# Patient Record
Sex: MALE | Race: WHITE | Employment: OTHER | URBAN - NONMETROPOLITAN AREA
[De-identification: names, ages, dates, MRNs, and addresses within clinical notes are randomized per-mention and may not be internally consistent; named-entity substitution may affect disease eponyms.]

---

## 2017-07-14 ENCOUNTER — ALLIED HEALTH/NURSE VISIT (OUTPATIENT)
Dept: FAMILY MEDICINE | Facility: CLINIC | Age: 72
End: 2017-07-14
Payer: COMMERCIAL

## 2017-07-14 ENCOUNTER — OFFICE VISIT (OUTPATIENT)
Dept: FAMILY MEDICINE | Facility: CLINIC | Age: 72
End: 2017-07-14
Payer: COMMERCIAL

## 2017-07-14 DIAGNOSIS — T16.1XXA FOREIGN BODY IN RIGHT EAR, INITIAL ENCOUNTER: Primary | ICD-10-CM

## 2017-07-14 DIAGNOSIS — T16.9XXA EAR FOREIGN BODY: Primary | ICD-10-CM

## 2017-07-14 PROCEDURE — 99207 ZZC NO CHARGE NURSE ONLY: CPT

## 2017-07-14 PROCEDURE — 99213 OFFICE O/P EST LOW 20 MIN: CPT | Performed by: FAMILY MEDICINE

## 2017-07-14 NOTE — PROGRESS NOTES
SUBJECTIVE:                                                    Vel Espana is a 72 year old male who presents to clinic today for the following health issues:      Foreign Body:  Hearing aid ear piece left inside right ear, causing some discomfort, tried to remove it by himself but was unsuccessful.         Problem list and histories reviewed & adjusted, as indicated.  Additional history: as documented    Patient Active Problem List   Diagnosis     CARDIOVASCULAR SCREENING; LDL GOAL LESS THAN 160     HTN (hypertension)     No past surgical history on file.    Social History   Substance Use Topics     Smoking status: Never Smoker     Smokeless tobacco: Not on file     Alcohol use No     No family history on file.      Current Outpatient Prescriptions   Medication Sig Dispense Refill     terazosin (HYTRIN) 10 MG capsule Take 10 mg by mouth At Bedtime       aspirin 81 MG tablet Take 81 mg by mouth daily       spironolactone (ALDACTONE) 25 MG tablet Take 25 mg by mouth daily       cephALEXin (KEFLEX) 500 MG capsule Take 1 capsule (500 mg) by mouth 3 times daily 30 capsule 0     lisinopril (PRINIVIL,ZESTRIL) 40 MG tablet Take 40 mg by mouth daily.       amLODIPine (NORVASC) 10 MG tablet Take 0.5 tablets by mouth daily.       Glucosamine-Chondroit-Vit C-Mn (GLUCOSAMINE CHONDR 1500 COMPLX PO) Take 1 tablet by mouth daily.       Multiple Vitamin (MULTI-VITAMIN) per tablet Take 1 tablet by mouth daily.       No Known Allergies  No lab results found.   BP Readings from Last 3 Encounters:   08/10/15 126/86   07/17/15 118/78   06/30/15 102/80    Wt Readings from Last 3 Encounters:   06/30/15 281 lb (127.5 kg)   05/31/12 233 lb (105.7 kg)                  Labs reviewed in EPIC    Reviewed and updated as needed this visit by clinical staff       Reviewed and updated as needed this visit by Provider         ROS:  Constitutional, HEENT, cardiovascular, pulmonary, gi and gu systems are negative, except as otherwise  noted.    OBJECTIVE:   GENERAL: alert and no distress  EYES: Eyes grossly normal to inspection, PERRL and conjunctivae and sclerae normal  HENT: normal cephalic/atraumatic, right ear: hearing aid ear piece in external candal, left ear: normal: no effusions, no erythema, normal landmarks and oral mucous membranes moist  NEURO: Normal strength and tone, mentation intact and speech normal      ASSESSMENT/PLAN:         ICD-10-CM    1. Foreign body in right ear, initial encounter T16.1XXA REMOVE FB, EXT AUDITORY CANAL       Hearing aid ear piece removed with the help of alligator without any complication. Cerumen cleaned with the help of curette. Suggested to avoid using Q-tips. Patient will be following audiology. QUESTIONS ANSWERED.      Michael Medrano MD  [facility]

## 2017-07-14 NOTE — MR AVS SNAPSHOT
"              After Visit Summary   7/14/2017    Vel Espana    MRN: [id number]           Patient Information     Date Of Birth          1945        Visit Information        Provider Department      7/14/2017 10:00 AM Michael Medrano MD [facility]        Today's Diagnoses     Foreign body in right ear, initial encounter    -  1       Follow-ups after your visit        Your next 10 appointments already scheduled     Jul 14, 2017 10:00 AM CDT   SHORT with Michael Medrano MD   [facility] ([facility])    [address] [id number] [phone]              Who to contact     If you have questions or need follow up information about today's clinic visit or your schedule please contact [facility] directly at [phone].  Normal or non-critical lab and imaging results will be communicated to you by MyChart, letter or phone within 4 business days after the clinic has received the results. If you do not hear from us within 7 days, please contact the clinic through MyChart or phone. If you have a critical or abnormal lab result, we will notify you by phone as soon as possible.  Submit refill requests through Next[employer] or call your pharmacy and they will forward the refill request to us. Please allow 3 business days for your refill to be completed.          Additional Information About Your Visit        [employer] Information     NextVR lets you send messages to your doctor, view your test results, renew your prescriptions, schedule appointments and more. To sign up, go to [email or web address] . Click on \"Log in\" on the left side of the screen, which will take you to the Welcome page. Then click on \"Sign up Now\" on the right side of the page.     You will be asked to enter the access code listed below, as well as some personal information. Please follow the directions to create your username and password.     Your access code " is: [email or web address]  Expires: 10/12/2017  9:50 AM     Your access code will  in 90 days. If you need help or a new code, please call your [place] clinic or [phone].        Care EveryWhere ID     This is your Care EveryWhere ID. This could be used by other organizations to access your [place] medical records  [id number]         Blood Pressure from Last 3 Encounters:   08/10/15 126/86   07/17/15 118/78   06/30/15 102/80    Weight from Last 3 Encounters:   06/30/15 281 lb (127.5 kg)   12 233 lb (105.7 kg)              We Performed the Following     REMOVE FB, EXT AUDITORY CANAL        Primary Care Provider    None Specified       No primary provider on file.        Equal Access to Services     JENNY LINCOLN : Ramón Weinstein, waaxda luqadaha, qaybta kaalmada adecoleyaceline, shelby palma . So [facility] [phone].    ATENCIÓN: Si habla español, tiene a sheehan disposición servicios gratuitos de asistencia lingüística. Llame al [phone].    We comply with applicable federal civil rights laws and [place] laws. We do not discriminate on the basis of race, color, national origin, age, disability sex, sexual orientation or gender identity.            Thank you!     Thank you for choosing [facility]  for your care. Our goal is always to provide you with excellent care. Hearing back from our patients is one way we can continue to improve our services. Please take a few minutes to complete the written survey that you may receive in the mail after your visit with us. Thank you!             Your Updated Medication List - Protect others around you: Learn how to safely use, store and throw away your medicines at www.disposemymeds.org.          This list is accurate as of: 17  9:50 AM.  Always use your most recent med list.                   Brand Name Dispense Instructions for use Diagnosis    amLODIPine 10 MG tablet    NORVASC     Take 0.5 tablets by mouth  daily.        aspirin 81 MG tablet      Take 81 mg by mouth daily        cephALEXin 500 MG capsule    KEFLEX    30 capsule    Take 1 capsule (500 mg) by mouth 3 times daily    Cellulitis of right lower leg       GLUCOSAMINE CHONDR 1500 COMPLX PO      Take 1 tablet by mouth daily.        lisinopril 40 MG tablet    PRINIVIL/ZESTRIL     Take 40 mg by mouth daily.        Multi-vitamin Tabs tablet   Generic drug:  multivitamin, therapeutic with minerals      Take 1 tablet by mouth daily.        spironolactone 25 MG tablet    ALDACTONE     Take 25 mg by mouth daily        terazosin 10 MG capsule    HYTRIN     Take 10 mg by mouth At Bedtime

## 2017-07-14 NOTE — MR AVS SNAPSHOT
"              After Visit Summary   7/14/2017    Vel Espana    MRN: [id number]           Patient Information     Date Of Birth          1945        Visit Information        Provider Department      7/14/2017 9:30 AM [place] PI RN [facility]        Today's Diagnoses     Ear foreign body    -  1       Follow-ups after your visit        Your next 10 appointments already scheduled     Jul 14, 2017 10:00 AM CDT   SHORT [facility]rano [facility]   [facility]    [address] [id number] [phone]              Who to contact     If you have questions or need follow up information about today's clinic visit or your schedule please contact [facility] directly at [phone].  Normal or non-critical lab and imaging results will be communicated to you by MyChart, letter or phone within 4 business days after the clinic has received the results. If you do not hear from us within 7 days, please contact the clinic through MyChart or phone. If you have a critical or abnormal lab result, we will notify you by phone as soon as possible.  Submit refill requests through [employer] or call your pharmacy and they will forward the refill request to us. Please allow 3 business days for your refill to be completed.          Additional Information About Your Visit        [employer] Information     [employer] lets you send messages to your doctor, view your test results, renew your prescriptions, schedule appointments and more. To sign up, go to [email or web address] . Click on \"Log in\" on the left side of the screen, which will take you to the Welcome page. Then click on \"Sign up Now\" on the right side of the page.     You will be asked to enter the access code listed below, as well as some personal information. Please follow the directions to create your username and password.     Your access code is: [email or web address]  Expires: 10/12/2017  9:50 " AM     Your access code will  in 90 days. If you need help or a new code, please call your [place] clinic or [phone].        Care EveryWhere ID     This is your Care EveryWhere ID. This could be used by other organizations to access your [place] medical records  [id number]         Blood Pressure from Last 3 Encounters:   08/10/15 126/86   07/17/15 118/78   06/30/15 102/80    Weight from Last 3 Encounters:   06/30/15 281 lb (127.5 kg)   12 233 lb (105.7 kg)              Today, you had the following     No orders found for display       Primary Care Provider    None Specified       No primary provider on file.        Equal Access to Services     JENNY LINCOLN : Ramón Weinstein, yazmin martinez, viv wagner, shelby palma . So [facility] [phone].    ATENCIÓN: Si habla español, tiene a sheehan disposición servicios gratuitos de asistencia lingüística. Llame al [phone].    We comply with applicable federal civil rights laws and [place] laws. We do not discriminate on the basis of race, color, national origin, age, disability sex, sexual orientation or gender identity.            Thank you!     Thank you for choosing [facility]  for your care. Our goal is always to provide you with excellent care. Hearing back from our patients is one way we can continue to improve our services. Please take a few minutes to complete the written survey that you may receive in the mail after your visit with us. Thank you!             Your Updated Medication List - Protect others around you: Learn how to safely use, store and throw away your medicines at www.disposemymeds.org.          This list is accurate as of: 17  9:53 AM.  Always use your most recent med list.                   Brand Name Dispense Instructions for use Diagnosis    amLODIPine 10 MG tablet    NORVASC     Take 0.5 tablets by mouth daily.        aspirin 81 MG tablet      Take 81  mg by mouth daily        cephALEXin 500 MG capsule    KEFLEX    30 capsule    Take 1 capsule (500 mg) by mouth 3 times daily    Cellulitis of right lower leg       GLUCOSAMINE CHONDR 1500 COMPLX PO      Take 1 tablet by mouth daily.        lisinopril 40 MG tablet    PRINIVIL/ZESTRIL     Take 40 mg by mouth daily.        Multi-vitamin Tabs tablet   Generic drug:  multivitamin, therapeutic with minerals      Take 1 tablet by mouth daily.        spironolactone 25 MG tablet    ALDACTONE     Take 25 mg by mouth daily        terazosin 10 MG capsule    HYTRIN     Take 10 mg by mouth At Bedtime

## 2017-10-23 ENCOUNTER — OFFICE VISIT (OUTPATIENT)
Dept: FAMILY MEDICINE | Facility: CLINIC | Age: 72
End: 2017-10-23
Payer: COMMERCIAL

## 2017-10-23 VITALS
RESPIRATION RATE: 16 BRPM | DIASTOLIC BLOOD PRESSURE: 80 MMHG | WEIGHT: 283 LBS | OXYGEN SATURATION: 98 % | SYSTOLIC BLOOD PRESSURE: 120 MMHG | HEART RATE: 71 BPM | BODY MASS INDEX: 39.47 KG/M2

## 2017-10-23 DIAGNOSIS — M21.611 BUNION, RIGHT: ICD-10-CM

## 2017-10-23 DIAGNOSIS — M79.671 RIGHT FOOT PAIN: Primary | ICD-10-CM

## 2017-10-23 DIAGNOSIS — L84 CALLUS OF FOOT: ICD-10-CM

## 2017-10-23 PROCEDURE — 99213 OFFICE O/P EST LOW 20 MIN: CPT | Performed by: NURSE PRACTITIONER

## 2017-10-23 NOTE — PATIENT INSTRUCTIONS
The pain is from the callus from the bunion on right foot  May need to be debrided  Also need to reduced friction or pressure to this area  Keep foot elevated at bedtime- off the mattress  Corn cushion when wearing shoes  May need orthotics   ?xray     Treating Corns and Calluses  If your corns or calluses are mild, reducing friction may help. Different shoes, moleskin patches, or soft pads may be all the treatment you need. In more severe cases, treating tissue buildup may require your doctor s care. Sometimes custom-made shoe inserts (orthotics) or special pads are prescribed to reduce friction and pressure.    Change shoes  If you have corns, your doctor may suggest wearing shoes that have more toe room. This way, buckled joints are less likely to be pinched against the top of the shoe. If you have calluses, wearing a cushioned insole, arch support, or heel counter can help reduce friction.  Visit your doctor  In some cases, your doctor may trim away the outer layers of skin that make up the corn or callus. For a painful corn, medicine may be injected beneath the built-up tissue.  Wear orthotics  Orthotics are specially made to meet the needs of your feet. They cushion calluses or divert pressure away from these problem areas. Worn as directed, orthotics help limit existing problems and prevent new ones from forming.  If you need surgery  If a bone or joint is out of place, certain parts of your foot may be under too much pressure. This can cause severe corns and calluses. In such cases, surgery may be the best way to correct the problem.  Outpatient procedures  In most cases, surgery to improve bone position is an outpatient procedure. Your doctor may cut away excess bone, reposition prominent bones, or even fuse joints. Sometimes tendons or ligaments are cut to reduce tension on a bone or joint. Your doctor will talk with you about the procedure that is best suited to your needs.  Date Last Reviewed:  7/1/2016 2000-2017 [employer]. [address] [place] [address], [place] [address]. All rights reserved. This information is not intended as a substitute for professional medical care. Always follow your healthcare professional's instructions.        Bunion    You have a bunion. This is a bony bump at the base of your big toe, along the inside edge of your foot. As the bump gets bigger, it can become red, swollen, and painful with shoe wear.  Bunions may occur if you wear shoes that are too tight and pinch your toes together. High heels may make this worse. In some cases a bunion is due to poor alignment of the foot and ankle. This puts extra weight on the instep of each foot.  Once a bunion forms, it changes the way weight is spread all across your foot. This causes the bunion to get worse over time. The big toe will bend more and more toward the other toes.  A minor bunion can be treated by:    Wearing properly fitting shoes    Using bunion pads    Wearing shoe inserts, called orthotics, to better align the foot and ankle  Physical therapy with ultrasound or whirlpool baths can ease pain, redness, and swelling. Severe cases may require surgery. If you don t treat what is causing the bunion, it may get larger and more painful.  Home care    Limit high heels. These shoes force your foot forward, crowding the toes together.    Switch to comfortable shoes with a wide toe area. Or have your existing shoes stretched by a shoe repair shop.    Avoid shoes that are tight, narrow, or pointed.    If you are flat-footed, using arch supports may help prevent further deformity. The best shoe inserts are the ones custom made by a foot specialist, called a podiatrist, or other healthcare provider.    Put a bunion pad over the bunion to ease pressure of your shoe against the bunion. You can buy these pads at most pharmacies without a prescription    To reduce pain and swelling, apply an ice pack over the injured area for  15 to 20 minutes. Do this every 1 to 2 hours the first day. Keep using ice 3 to 4 times a day until the pain and swelling goes away.    To make an ice pack, put ice cubes in a sealed zip-lock plastic bag. Wrap the bag in a clean, thin towel or cloth. Never put ice or an ice pack directly on the skin.    You may use over-the-counter pain medicine to control pain, unless another medicine was prescribed. Talk with your provider before using these medicines if you have chronic liver or kidney disease, or ever had a stomach ulcer or GI (gastrointestinal) bleeding.  Follow-up care  Follow up with a podiatrist or foot doctor, or as advised.  If X-rays were taken, you will be notified of any new findings that may affect your care.  When to seek medical care  Contact your healthcare provider if any of the following occur:    Increasing pain or redness around the base of the big toe    Painful ingrown toenail, with redness and swelling or pus around the nail  Date Last Reviewed: 11/21/2015 2000-2017 [employer]. [address] [place] [address], [place] [address]. All rights reserved. This information is not intended as a substitute for professional medical care. Always follow your healthcare professional's instructions.

## 2017-10-23 NOTE — NURSING NOTE
"Chief Complaint   Patient presents with     Musculoskeletal Problem     Right Foot, big toe       Initial /80  Pulse 71  Resp 16  Wt 283 lb (128.4 kg)  SpO2 98%  BMI 39.47 kg/m2 Estimated body mass index is 39.47 kg/(m^2) as calculated from the following:    Height as of 6/30/15: 5' 11\" (1.803 m).    Weight as of this encounter: 283 lb (128.4 kg).  Medication Reconciliation: complete    Health Maintenance that is potentially due pending provider review:  [facility]        Is there anyone who you would like to be able to receive your results? No  If yes have patient fill out KAVITA      "

## 2017-10-23 NOTE — PROGRESS NOTES
SUBJECTIVE:   Vel Espana is a 72 year old male who presents to clinic today for the following health issues:      Musculoskeletal problem/pain      Duration: 1 year worse in the past few days     Description  Location: Right Great Toe    Intensity:  moderate    Accompanying signs and symptoms: Red, Swollen, Feels sharp pain    History  Previous similar problem: no   Previous evaluation:  none    Precipitating or alleviating factors:  Trauma or overuse: no   Aggravating factors include: just bothersome all the time    Therapies tried and outcome: Tylenol, Cream        Problem list and histories reviewed & adjusted, as indicated.  Additional history: as documented    Patient Active Problem List   Diagnosis     CARDIOVASCULAR SCREENING; LDL GOAL LESS THAN 160     HTN (hypertension)     History reviewed. No pertinent surgical history.    Social History   Substance Use Topics     Smoking status: Never Smoker     Smokeless tobacco: Never Used     Alcohol use No     History reviewed. No pertinent family history.          Reviewed and updated as needed this visit by clinical staff     Reviewed and updated as needed this visit by Provider         ROS:  Constitutional, HEENT, cardiovascular, pulmonary, gi and gu systems are negative, except as otherwise noted.      OBJECTIVE:                                                    /80  Pulse 71  Resp 16  Wt 283 lb (128.4 kg)  SpO2 98%  BMI 39.47 kg/m2  Body mass index is 39.47 kg/(m^2).  GENERAL APPEARANCE: healthy, alert and no distress  ORTHO: Foot Exam: Inspection:  no swelling bunion noted   There is an area of hyperkeratoses with blackened center- this area is tender        Diagnostic test results:  Diagnostic Test Results:  none        ASSESSMENT/PLAN:                                                      1. Right foot pain    2. Callus of foot    3. Bunion, right        Patient Instructions     The pain is from the callus from the bunion on right foot  May  need to be debrided  Also need to reduced friction or pressure to this area  Keep foot elevated at bedtime- off the mattress  Corn cushion when wearing shoes  May need orthotics   ?xray     Treating Corns and Calluses  If your corns or calluses are mild, reducing friction may help. Different shoes, moleskin patches, or soft pads may be all the treatment you need. In more severe cases, treating tissue buildup may require your doctor s care. Sometimes custom-made shoe inserts (orthotics) or special pads are prescribed to reduce friction and pressure.    Change shoes  If you have corns, your doctor may suggest wearing shoes that have more toe room. This way, buckled joints are less likely to be pinched against the top of the shoe. If you have calluses, wearing a cushioned insole, arch support, or heel counter can help reduce friction.  Visit your doctor  In some cases, your doctor may trim away the outer layers of skin that make up the corn or callus. For a painful corn, medicine may be injected beneath the built-up tissue.  Wear orthotics  Orthotics are specially made to meet the needs of your feet. They cushion calluses or divert pressure away from these problem areas. Worn as directed, orthotics help limit existing problems and prevent new ones from forming.  If you need surgery  If a bone or joint is out of place, certain parts of your foot may be under too much pressure. This can cause severe corns and calluses. In such cases, surgery may be the best way to correct the problem.  Outpatient procedures  In most cases, surgery to improve bone position is an outpatient procedure. Your doctor may cut away excess bone, reposition prominent bones, or even fuse joints. Sometimes tendons or ligaments are cut to reduce tension on a bone or joint. Your doctor will talk with you about the procedure that is best suited to your needs.  Date Last Reviewed: 7/1/2016 2000-2017 [employer]. [address] [facility],  [place] [address]. All rights reserved. This information is not intended as a substitute for professional medical care. Always follow your healthcare professional's instructions.        Bunion    You have a bunion. This is a bony bump at the base of your big toe, along the inside edge of your foot. As the bump gets bigger, it can become red, swollen, and painful with shoe wear.  Bunions may occur if you wear shoes that are too tight and pinch your toes together. High heels may make this worse. In some cases a bunion is due to poor alignment of the foot and ankle. This puts extra weight on the instep of each foot.  Once a bunion forms, it changes the way weight is spread all across your foot. This causes the bunion to get worse over time. The big toe will bend more and more toward the other toes.  A minor bunion can be treated by:    Wearing properly fitting shoes    Using bunion pads    Wearing shoe inserts, called orthotics, to better align the foot and ankle  Physical therapy with ultrasound or whirlpool baths can ease pain, redness, and swelling. Severe cases may require surgery. If you don t treat what is causing the bunion, it may get larger and more painful.  Home care    Limit high heels. These shoes force your foot forward, crowding the toes together.    Switch to comfortable shoes with a wide toe area. Or have your existing shoes stretched by a shoe repair shop.    Avoid shoes that are tight, narrow, or pointed.    If you are flat-footed, using arch supports may help prevent further deformity. The best shoe inserts are the ones custom made by a foot specialist, called a podiatrist, or other healthcare provider.    Put a bunion pad over the bunion to ease pressure of your shoe against the bunion. You can buy these pads at most pharmacies without a prescription    To reduce pain and swelling, apply an ice pack over the injured area for 15 to 20 minutes. Do this every 1 to 2 hours the first day. Keep using ice  3 to 4 times a day until the pain and swelling goes away.    To make an ice pack, put ice cubes in a sealed zip-lock plastic bag. Wrap the bag in a clean, thin towel or cloth. Never put ice or an ice pack directly on the skin.    You may use over-the-counter pain medicine to control pain, unless another medicine was prescribed. Talk with your provider before using these medicines if you have chronic liver or kidney disease, or ever had a stomach ulcer or GI (gastrointestinal) bleeding.  Follow-up care  Follow up with a podiatrist or foot doctor, or as advised.  If X-rays were taken, you will be notified of any new findings that may affect your care.  When to seek medical care  Contact your healthcare provider if any of the following occur:    Increasing pain or redness around the base of the big toe    Painful ingrown toenail, with redness and swelling or pus around the nail  Date Last Reviewed: 11/21/2015 2000-2017 The [employer]. [address] [place] [address], [place] [address]. All rights reserved. This information is not intended as a substitute for professional medical care. Always follow your healthcare professional's instructions.            Ani Barboza NP  [facility]

## 2017-10-23 NOTE — MR AVS SNAPSHOT
After Visit Summary   10/23/2017    Vel Espana    MRN: [id number]           Patient Information     Date Of Birth          1945        Visit Information        Provider Department      10/23/2017 11:20 AM Ani Barboza NP [facility]        Care Instructions      The pain is from the callus from the bunion on right foot  May need to be debrided  Also need to reduced friction or pressure to this area  Keep foot elevated at bedtime- off the mattress  Corn cushion when wearing shoes  May need orthotics   ?xray     Treating Corns and Calluses  If your corns or calluses are mild, reducing friction may help. Different shoes, moleskin patches, or soft pads may be all the treatment you need. In more severe cases, treating tissue buildup may require your doctor s care. Sometimes custom-made shoe inserts (orthotics) or special pads are prescribed to reduce friction and pressure.    Change shoes  If you have corns, your doctor may suggest wearing shoes that have more toe room. This way, buckled joints are less likely to be pinched against the top of the shoe. If you have calluses, wearing a cushioned insole, arch support, or heel counter can help reduce friction.  Visit your doctor  In some cases, your doctor may trim away the outer layers of skin that make up the corn or callus. For a painful corn, medicine may be injected beneath the built-up tissue.  Wear orthotics  Orthotics are specially made to meet the needs of your feet. They cushion calluses or divert pressure away from these problem areas. Worn as directed, orthotics help limit existing problems and prevent new ones from forming.  If you need surgery  If a bone or joint is out of place, certain parts of your foot may be under too much pressure. This can cause severe corns and calluses. In such cases, surgery may be the best way to correct the problem.  Outpatient procedures  In most cases, surgery to improve bone position  is an outpatient procedure. Your doctor may cut away excess bone, reposition prominent bones, or even fuse joints. Sometimes tendons or ligaments are cut to reduce tension on a bone or joint. Your doctor will talk with you about the procedure that is best suited to your needs.  Date Last Reviewed: 7/1/2016 2000-2017 [employer]. [address] [place] [address], [place] [address]. All rights reserved. This information is not intended as a substitute for professional medical care. Always follow your healthcare professional's instructions.        Bunion    You have a bunion. This is a bony bump at the base of your big toe, along the inside edge of your foot. As the bump gets bigger, it can become red, swollen, and painful with shoe wear.  Bunions may occur if you wear shoes that are too tight and pinch your toes together. High heels may make this worse. In some cases a bunion is due to poor alignment of the foot and ankle. This puts extra weight on the instep of each foot.  Once a bunion forms, it changes the way weight is spread all across your foot. This causes the bunion to get worse over time. The big toe will bend more and more toward the other toes.  A minor bunion can be treated by:    Wearing properly fitting shoes    Using bunion pads    Wearing shoe inserts, called orthotics, to better align the foot and ankle  Physical therapy with ultrasound or whirlpool baths can ease pain, redness, and swelling. Severe cases may require surgery. If you don t treat what is causing the bunion, it may get larger and more painful.  Home care    Limit high heels. These shoes force your foot forward, crowding the toes together.    Switch to comfortable shoes with a wide toe area. Or have your existing shoes stretched by a shoe repair shop.    Avoid shoes that are tight, narrow, or pointed.    If you are flat-footed, using arch supports may help prevent further deformity. The best shoe inserts are the ones custom made by  a foot specialist, called a podiatrist, or other healthcare provider.    Put a bunion pad over the bunion to ease pressure of your shoe against the bunion. You can buy these pads at most pharmacies without a prescription    To reduce pain and swelling, apply an ice pack over the injured area for 15 to 20 minutes. Do this every 1 to 2 hours the first day. Keep using ice 3 to 4 times a day until the pain and swelling goes away.    To make an ice pack, put ice cubes in a sealed zip-lock plastic bag. Wrap the bag in a clean, thin towel or cloth. Never put ice or an ice pack directly on the skin.    You may use over-the-counter pain medicine to control pain, unless another medicine was prescribed. Talk with your provider before using these medicines if you have chronic liver or kidney disease, or ever had a stomach ulcer or GI (gastrointestinal) bleeding.  Follow-up care  Follow up with a podiatrist or foot doctor, or as advised.  If X-rays were taken, you will be notified of any new findings that may affect your care.  When to seek medical care  Contact your healthcare provider if any of the following occur:    Increasing pain or redness around the base of the big toe    Painful ingrown toenail, with redness and swelling or pus around the nail  Date Last Reviewed: 11/21/2015 2000-2017 [employer]. [address] [place] [address]. All rights reserved. This information is not intended as a substitute for professional medical care. Always follow your healthcare professional's instructions.                Follow-ups after your visit        Your next 10 appointments already scheduled     Nov 01, 2017  2:20 PM CDT   New Visit with Russell [facility] DPM   [facility]    [address] [place] [id number] [phone]              Who to contact     If you have questions or need follow up information about today's clinic visit or your schedule please  "contact [facility] directly at [phone].  Normal or non-critical lab and imaging results will be communicated to you by MyChart, letter or phone within 4 business days after the clinic has received the results. If you do not hear from us within 7 days, please contact the clinic through MyChart or phone. If you have a critical or abnormal lab result, we will notify you by phone as soon as possible.  Submit refill requests through [employer] or call your pharmacy and they will forward the refill request to us. Please allow 3 business days for your refill to be completed.          Additional Information About Your Visit        [employer] Information     [employer] lets you send messages to your doctor, view your test results, renew your prescriptions, schedule appointments and more. To sign up, go to [email or web address] . Click on \"Log in\" on the left side of the screen, which will take you to the Welcome page. Then click on \"Sign up Now\" on the right side of the page.     You will be asked to enter the access code listed below, as well as some personal information. Please follow the directions to create your username and password.     Your access code is: [email or web address]  Expires: 2018 12:01 PM     Your access code will  in 90 days. If you need help or a new code, please call your [facility] clinic or [phone].        Care EveryWhere ID     This is your Care EveryWhere ID. This could be used by other organizations to access your [facility] medical records  [id number]        Your Vitals Were     Pulse Respirations Pulse Oximetry BMI (Body Mass Index)          71 16 98% 39.47 kg/m2         Blood Pressure from Last 3 Encounters:   10/23/17 120/80   08/10/15 126/86   07/17/15 118/78    Weight from Last 3 Encounters:   10/23/17 283 lb (128.4 kg)   06/30/15 281 lb (127.5 kg)   12 233 lb (105.7 kg)              Today, you had the following     No orders found for display       Primary Care " Provider Office Phone # Fax #    Ani Barboza [phone]       [address] [facility] [address]        Equal Access to Services     JENNY LINCOLN : Hadii aad ku hadjennifero Sobullali, waaxda luqadaha, qaybta kaalmada adeharlan, shelby beltranluke barnhartcole momin minerva diaz. So [facility] [phone].    ATENCIÓN: Si habla español, tiene a sheehan disposición servicios gratuitos de asistencia lingüística. Llame al [phone].    We comply with applicable federal civil rights laws and [place] laws. We do not discriminate on the basis of race, color, national origin, age, disability, sex, sexual orientation, or gender identity.            Thank you!     Thank you for choosing [facility]  for your care. Our goal is always to provide you with excellent care. Hearing back from our patients is one way we can continue to improve our services. Please take a few minutes to complete the written survey that you may receive in the mail after your visit with us. Thank you!             Your Updated Medication List - Protect others around you: Learn how to safely use, store and throw away your medicines at www.disposemymeds.org.          This list is accurate as of: 10/23/17 12:01 PM.  Always use your most recent med list.                   Brand Name Dispense Instructions for use Diagnosis    aspirin 81 MG tablet      Take 81 mg by mouth daily        Multi-vitamin Tabs tablet   Generic drug:  multivitamin, therapeutic with minerals      Take 1 tablet by mouth daily.        spironolactone 25 MG tablet    ALDACTONE     Take 25 mg by mouth daily        terazosin 10 MG capsule    HYTRIN     Take 10 mg by mouth At Bedtime

## 2019-04-16 ENCOUNTER — OFFICE VISIT (OUTPATIENT)
Dept: FAMILY MEDICINE | Facility: CLINIC | Age: 74
End: 2019-04-16
Payer: COMMERCIAL

## 2019-04-16 VITALS
DIASTOLIC BLOOD PRESSURE: 80 MMHG | HEIGHT: 71 IN | WEIGHT: 275 LBS | RESPIRATION RATE: 18 BRPM | HEART RATE: 68 BPM | TEMPERATURE: 97.8 F | SYSTOLIC BLOOD PRESSURE: 106 MMHG | BODY MASS INDEX: 38.5 KG/M2

## 2019-04-16 DIAGNOSIS — L30.9 ECZEMA, UNSPECIFIED TYPE: Primary | ICD-10-CM

## 2019-04-16 PROCEDURE — 99213 OFFICE O/P EST LOW 20 MIN: CPT | Performed by: FAMILY MEDICINE

## 2019-04-16 RX ORDER — CLOBETASOL PROPIONATE 0.5 MG/G
OINTMENT TOPICAL 2 TIMES DAILY
Qty: 45 G | Refills: 1 | Status: SHIPPED | OUTPATIENT
Start: 2019-04-16 | End: 2019-12-16

## 2019-04-16 RX ORDER — ATENOLOL 25 MG/1
25 TABLET ORAL DAILY
Status: ON HOLD | COMMUNITY
End: 2020-03-05

## 2019-04-16 RX ORDER — CLOBETASOL PROPIONATE 0.5 MG/G
OINTMENT TOPICAL 2 TIMES DAILY
Qty: 45 G | Refills: 1 | Status: SHIPPED | OUTPATIENT
Start: 2019-04-16 | End: 2019-04-16

## 2019-04-16 ASSESSMENT — MIFFLIN-ST. JEOR: SCORE: 1999.52

## 2019-04-16 NOTE — PROGRESS NOTES
SUBJECTIVE:   Vel Espana is a 74 year old male who presents to clinic today for the following   health issues:      Rash      Duration: about a year- temi faded away and now has come back bad-     Description  Location: left leg - and starting up in upper leg fold  Itching: mild    Intensity:  moderate    Accompanying signs and symptoms: None    History (similar episodes/previous evaluation): recurrent    Precipitating or alleviating factors:  New exposures:  None  Recent travel: no      Therapies tried and outcome: hydrocortisone cream -  not effective        Additional history: as documented    Reviewed  and updated as needed this visit by clinical staff         Reviewed and updated as needed this visit by Provider         Patient Active Problem List   Diagnosis     CARDIOVASCULAR SCREENING; LDL GOAL LESS THAN 160     HTN (hypertension)     No past surgical history on file.    Social History     Tobacco Use     Smoking status: Never Smoker     Smokeless tobacco: Never Used   Substance Use Topics     Alcohol use: No     No family history on file.      Current Outpatient Medications   Medication Sig Dispense Refill     aspirin 81 MG tablet Take 81 mg by mouth daily       atenolol (TENORMIN) 25 MG tablet Take 25 mg by mouth daily       Multiple Vitamin (MULTI-VITAMIN) per tablet Take 1 tablet by mouth daily.       spironolactone (ALDACTONE) 25 MG tablet Take 25 mg by mouth daily       No Known Allergies  No lab results found.   BP Readings from Last 3 Encounters:   04/16/19 106/80   10/23/17 120/80   08/10/15 126/86    Wt Readings from Last 3 Encounters:   04/16/19 124.7 kg (275 lb)   10/23/17 128.4 kg (283 lb)   06/30/15 127.5 kg (281 lb)                  Labs reviewed in EPIC    ROS:  Constitutional, HEENT, cardiovascular, pulmonary, gi and gu systems are negative, except as otherwise noted.    OBJECTIVE:     /80 (Cuff Size: Adult Large)   Pulse 68   Temp 97.8  F (36.6  C) (Tympanic)   Resp 18    "Ht 1.803 m (5' 11\")   Wt 124.7 kg (275 lb)   BMI 38.35 kg/m    Body mass index is 38.35 kg/m .  GENERAL: alert and no distress  NECK: no adenopathy, no asymmetry, masses, or scars and thyroid normal to palpation  RESP: lungs clear to auscultation - no rales, rhonchi or wheezes  CV: regular rates and rhythm, normal S1 S2, no S3 or S4 and no murmur, click or rub  SKIN: dry erythematous rashes involving bilateral inner thigh and left lower leg as shown below, no blistering or discharge noted    Left lower leg      Right inner thigh          ASSESSMENT/PLAN:       (L30.9) Eczema, unspecified type  (primary encounter diagnosis)  Comment: Suspect rash is due to eczema.  Differential discussed in detail including but not limited to infection, vasculitis and allergy.  Clobetasol ointment prescribed and suggested to use regular moisturizer.  Follow-up if present persist or worsen, will consider skin biopsy.  Patient understood and in agreement with above plan.  All questions answered.  Plan: clobetasol (TEMOVATE) 0.05 % external ointment             Patient Instructions       Patient Education     Managing Atopic Dermatitis (Eczema)     After bathing, gently pat your skin dry (don t rub). Apply moisturizer while your skin is still damp.   To manage your symptoms and help reduce the severity and frequency, try these self-care tips:  Caring for your skin    Use a gentle, fragrance-free cleanser (or nonsoap cleanser) for bathing. Rinse well. Pat skin dry.    Take warm, not hot, baths or showers. Try to limit them to no more that 10 to 15 minutes.     Use moisturizer liberally right after you bathe, while your skin is still damp.    Avoid scratching because it will cause more damage to your skin.     Topical, over-the-counter hydrocortisone cream may help control mild symptoms.   Controlling your environment    Avoid extreme heat or cold.    Avoid very humid or very dry air.    If your home or office air is very dry, use a " humidifier.    Avoid allergens, such as dust, that may be present in bedding, carpets, plush toys, or rugs.    Know that pet hair and dander can cause flare-ups.  Seeking medical treatment  Another way to keep symptoms under control is to seek medical treatment. Talk with your healthcare provider about the type of treatment that may work best for you. Your provider may prescribe treatments such as the following:    Topical treatments to put on the skin daily    Medicines taken by mouth (oral medicines), such as antihistamines, antibiotics, or corticosteroids    In severe cases shots (injections) may be needed to control the symptoms. You may even need antibiotics if skin infections occur.  Treatments don t work the same way for every person. So if your symptoms continue or get worse, ask your healthcare provider about other treatments.  Making lifestyle choices    Manage the stress in your life.    Wear loose-fitting cotton clothing that does not bind or rub your skin.    Avoid contact with wool or other scratchy fabrics.    Use fragrance-free products.  Getting good results  Now that you know more about atopic dermatitis, the next step is up to you. Follow your healthcare provider s treatment plan and your self-care routine. This will help bring atopic dermatitis under control. If your symptoms persist, be sure to let your health care provider know.   Date Last Reviewed: 2/1/2017 2000-2018 [employer]. [address] [place] [address], [place] [address]. All rights reserved. This information is not intended as a substitute for professional medical care. Always follow your healthcare professional's instructions.               Michael Medrano MD  [facility]

## 2019-04-16 NOTE — PATIENT INSTRUCTIONS
Patient Education     Managing Atopic Dermatitis (Eczema)     After bathing, gently pat your skin dry (don t rub). Apply moisturizer while your skin is still damp.   To manage your symptoms and help reduce the severity and frequency, try these self-care tips:  Caring for your skin    Use a gentle, fragrance-free cleanser (or nonsoap cleanser) for bathing. Rinse well. Pat skin dry.    Take warm, not hot, baths or showers. Try to limit them to no more that 10 to 15 minutes.     Use moisturizer liberally right after you bathe, while your skin is still damp.    Avoid scratching because it will cause more damage to your skin.     Topical, over-the-counter hydrocortisone cream may help control mild symptoms.   Controlling your environment    Avoid extreme heat or cold.    Avoid very humid or very dry air.    If your home or office air is very dry, use a humidifier.    Avoid allergens, such as dust, that may be present in bedding, carpets, plush toys, or rugs.    Know that pet hair and dander can cause flare-ups.  Seeking medical treatment  Another way to keep symptoms under control is to seek medical treatment. Talk with your healthcare provider about the type of treatment that may work best for you. Your provider may prescribe treatments such as the following:    Topical treatments to put on the skin daily    Medicines taken by mouth (oral medicines), such as antihistamines, antibiotics, or corticosteroids    In severe cases shots (injections) may be needed to control the symptoms. You may even need antibiotics if skin infections occur.  Treatments don t work the same way for every person. So if your symptoms continue or get worse, ask your healthcare provider about other treatments.  Making lifestyle choices    Manage the stress in your life.    Wear loose-fitting cotton clothing that does not bind or rub your skin.    Avoid contact with wool or other scratchy fabrics.    Use fragrance-free products.  Getting good  results  Now that you know more about atopic dermatitis, the next step is up to you. Follow your healthcare provider s treatment plan and your self-care routine. This will help bring atopic dermatitis under control. If your symptoms persist, be sure to let your health care provider know.   Date Last Reviewed: 2/1/2017 2000-2018 [employer]. [address] [place] [address], [place] [address]. All rights reserved. This information is not intended as a substitute for professional medical care. Always follow your healthcare professional's instructions.

## 2019-04-16 NOTE — NURSING NOTE
"Chief Complaint   Patient presents with     Derm Problem       Initial /80 (Cuff Size: Adult Large)   Pulse 68   Temp 97.8  F (36.6  C) (Tympanic)   Resp 18   Ht 1.803 m (5' 11\")   Wt 124.7 kg (275 lb)   BMI 38.35 kg/m   Estimated body mass index is 38.35 kg/m  as calculated from the following:    Height as of this encounter: 1.803 m (5' 11\").    Weight as of this encounter: 124.7 kg (275 lb).    Patient presents to the clinic using No DME    Health Maintenance that is potentially due pending provider review:  [facility]    Pt reported. Sent to abstracting.    Is there anyone who you would like to be able to receive your results? Not Applicable  If yes have patient fill out KAVITA      "

## 2019-12-16 ENCOUNTER — OFFICE VISIT (OUTPATIENT)
Dept: FAMILY MEDICINE | Facility: CLINIC | Age: 74
End: 2019-12-16
Payer: COMMERCIAL

## 2019-12-16 ENCOUNTER — ANCILLARY PROCEDURE (OUTPATIENT)
Dept: GENERAL RADIOLOGY | Facility: CLINIC | Age: 74
End: 2019-12-16
Attending: FAMILY MEDICINE
Payer: COMMERCIAL

## 2019-12-16 VITALS
HEIGHT: 71 IN | BODY MASS INDEX: 39.62 KG/M2 | WEIGHT: 283 LBS | HEART RATE: 100 BPM | TEMPERATURE: 97.8 F | SYSTOLIC BLOOD PRESSURE: 132 MMHG | DIASTOLIC BLOOD PRESSURE: 84 MMHG | OXYGEN SATURATION: 94 % | RESPIRATION RATE: 20 BRPM

## 2019-12-16 DIAGNOSIS — J20.9 ACUTE BRONCHITIS WITH SYMPTOMS > 10 DAYS: Primary | ICD-10-CM

## 2019-12-16 DIAGNOSIS — R06.2 WHEEZING: ICD-10-CM

## 2019-12-16 DIAGNOSIS — R05.9 COUGH: ICD-10-CM

## 2019-12-16 PROCEDURE — 71046 X-RAY EXAM CHEST 2 VIEWS: CPT | Mod: FY

## 2019-12-16 PROCEDURE — 99214 OFFICE O/P EST MOD 30 MIN: CPT | Performed by: FAMILY MEDICINE

## 2019-12-16 RX ORDER — AZITHROMYCIN 250 MG/1
TABLET, FILM COATED ORAL
Qty: 6 TABLET | Refills: 0 | Status: SHIPPED | OUTPATIENT
Start: 2019-12-16

## 2019-12-16 RX ORDER — PREDNISONE 20 MG/1
40 TABLET ORAL DAILY
Qty: 14 TABLET | Refills: 0 | Status: ON HOLD | OUTPATIENT
Start: 2019-12-16 | End: 2020-03-05

## 2019-12-16 RX ORDER — BENZONATATE 100 MG/1
100 CAPSULE ORAL 3 TIMES DAILY PRN
Qty: 42 CAPSULE | Refills: 0 | Status: ON HOLD | OUTPATIENT
Start: 2019-12-16 | End: 2020-03-05

## 2019-12-16 ASSESSMENT — MIFFLIN-ST. JEOR: SCORE: 2035.81

## 2019-12-16 NOTE — PATIENT INSTRUCTIONS

## 2019-12-16 NOTE — PROGRESS NOTES
SUBJECTIVE   Vel Espana is a 74 year old male who presents with     RESPIRATORY SYMPTOMS      Duration: about 2 weeks- really bad for about 3-4 days     Description  cough, wheezing and fatigue/malaise. Can't sleep at night from coughing.     Severity: moderate    Accompanying signs and symptoms: Chest tightness from coughing. Lower abdomen gets really tight when coughing and sneezing     History (predisposing factors):  none    Precipitating or alleviating factors: None    Therapies tried and outcome:  rest and fluids, cough drops, diphenhydramine, Claritin, tylenol        PCP   Michael Medrano [phone]    Health Maintenance        Health Maintenance Due   Topic Date Due     ADVANCE CARE PLANNING  1945     COLONOSCOPY  04/11/1953     DTAP/TDAP/TD IMMUNIZATION (1 - Tdap) 04/11/1954     LIPID  04/11/1978     ZOSTER IMMUNIZATION (1 of 2) 04/11/1993     MEDICARE ANNUAL WELLNESS VISIT  04/11/2008     PNEUMOCOCCAL IMMUNIZATION 65+ LOW/MEDIUM RISK (1 of 2 - PCV13) 04/11/2008     INFLUENZA VACCINE (1) 09/01/2019       HPI        Patient Active Problem List   Diagnosis     CARDIOVASCULAR SCREENING; LDL GOAL LESS THAN 160     HTN (hypertension)     Current Outpatient Medications   Medication     aspirin 81 MG tablet     atenolol (TENORMIN) 25 MG tablet     No current facility-administered medications for this visit.        Patient Active Problem List   Diagnosis     CARDIOVASCULAR SCREENING; LDL GOAL LESS THAN 160     HTN (hypertension)     No past surgical history on file.    Social History     Tobacco Use     Smoking status: Never Smoker     Smokeless tobacco: Never Used   Substance Use Topics     Alcohol use: No     No family history on file.      Current Outpatient Medications   Medication Sig Dispense Refill     aspirin 81 MG tablet Take 81 mg by mouth every other day        atenolol (TENORMIN) 25 MG tablet Take 25 mg by mouth daily       azithromycin (ZITHROMAX) 250 MG tablet Two tablets first day,  "then one tablet daily for four days. 6 tablet 0     benzonatate (TESSALON) 100 MG capsule Take 1 capsule (100 mg) by mouth 3 times daily as needed 42 capsule 0     predniSONE (DELTASONE) 20 MG tablet Take 2 tablets (40 mg) by mouth daily for 7 days 14 tablet 0     No Known Allergies  No lab results found.   BP Readings from Last 3 Encounters:   12/16/19 132/84   04/16/19 106/80   10/23/17 120/80    Wt Readings from Last 3 Encounters:   12/16/19 128.4 kg (283 lb)   04/16/19 124.7 kg (275 lb)   10/23/17 128.4 kg (283 lb)                    Reviewed and updated:  Tobacco  Allergies  Meds  Med Hx  Surg Hx  Fam Hx  Soc Hx     ROS:  Constitutional, neuro, ENT, endocrine, pulmonary, cardiac, gastrointestinal, genitourinary, musculoskeletal, integument and psychiatric systems are negative, except as otherwise noted.    PHYSICAL EXAM   /84 (Cuff Size: Adult Regular)   Pulse 100   Temp 97.8  F (36.6  C) (Tympanic)   Resp 20   Ht 1.803 m (5' 11\")   Wt 128.4 kg (283 lb)   SpO2 94%   BMI 39.47 kg/m    Body mass index is 39.47 kg/m .  GENERAL: alert and no distress  EYES: Eyes grossly normal to inspection, PERRL and conjunctivae and sclerae normal  HENT: normal cephalic/atraumatic, ear canals and TM's normal, nose and mouth without ulcers or lesions, oropharynx clear and oral mucous membranes moist  NECK: no adenopathy, no asymmetry, masses, or scars and thyroid normal to palpation  RESP: few bibasilar crackles with some wheeze, auscultated, good air entry bilaterally  CV: regular rates and rhythm, normal S1 S2, no S3 or S4 and no murmur, click or rub  ABDOMEN: soft, nontender, no hepatosplenomegaly, no masses and bowel sounds normal  MS: no gross musculoskeletal defects noted, no edema, no calf tenderness  SKIN: no suspicious lesions or rashes  NEURO: Normal strength and tone, mentation intact and speech normal    Assessment & Plan     (J20.9) Acute bronchitis with symptoms > 10 days  (primary encounter " diagnosis)  Comment: Suspect symptoms secondary to acute bronchitis.  Azithromycin prescribed considering chronicity of symptoms.  Chest x-ray finding explained which came back unremarkable, a/w formal report.  Suggested to use prednisone for wheezing and Tessalon for cough control.  Continue well hydration, warm fluids.  Follow-up later this week if symptoms persist.  Patient/wife understood and in agreement with above plan.  All questions answered.  Plan: azithromycin (ZITHROMAX) 250 MG tablet,         predniSONE (DELTASONE) 20 MG tablet,         benzonatate (TESSALON) 100 MG capsule         (R05) Cough  Comment: Tessalon prescribed  Plan: XR Chest 2 Views            (R06.2) Wheezing  Comment: Suggested to use prednisone for wheezing          Patient Instructions     Patient Education     Bronchitis, Antibiotic Treatment (Adult)    Bronchitis is an infection of the air passages (bronchial tubes) in your lungs. It often occurs when you have a cold. This illness is contagious during the first few days and is spread through the air by coughing and sneezing, or by direct contact (touching the sick person and then touching your own eyes, nose, or mouth).  Symptoms of bronchitis include cough with mucus (phlegm) and low-grade fever. Bronchitis usually lasts 7 to 14 days. Mild cases can be treated with simple home remedies. More severe infection is treated with an antibiotic.  Home care  Follow these guidelines when caring for yourself at home:    If your symptoms are severe, rest at home for the first 2 to 3 days. When you go back to your usual activities, don't let yourself get too tired.    Don't smoke. Also stay away from secondhand smoke.    You may use over-the-counter medicines to control fever or pain, unless another medicine was prescribed. If you have chronic liver or kidney disease or have ever had a stomach ulcer or gastrointestinal bleeding, talk with your healthcare provider before using these medicines.  Also talk to your provider if you are taking medicine to prevent blood clots. Aspirin should never be given to anyone younger than 18 who is ill with a viral infection or fever. It may cause severe liver or brain damage.    Your appetite may be low, so a light diet is fine. Stay well hydrated by drinking 6 to 8 glasses of fluids per day. This includes water, soft drinks, sports drinks, juices, tea, or soup. Extra fluids will help loosen mucus in your nose and lungs.    Over-the-counter cough, cold, and sore-throat medicines will not shorten the length of the illness, but they may be helpful to reduce your symptoms. Don't use decongestants if you have high blood pressure.    Finish all antibiotic medicine. Do this even if you are feeling better after only a few days.  Follow-up care  Follow up with your healthcare provider, or as advised. If you had an X-ray or ECG (electrocardiogram), a specialist will review it. You will be told of any new test results that may affect your care.  If you are age 65 or older, if you smoke, or if you have a chronic lung disease or condition that affects your immune system, ask your healthcare provider about getting a pneumococcal vaccine and a yearly flu shot (influenza vaccine).  When to seek medical advice  Call your healthcare provider right away if any of these occur:    Fever of 100.4 F (38 C) or higher, or as directed by your healthcare provider    Coughing up more sputum    Weakness, drowsiness, headache, facial pain, ear pain, or a stiff neck  Call 911  Call 911 if any of these occur.    Coughing up blood    Weakness, drowsiness, headache, or stiff neck that get worse    Trouble breathing, wheezing, or pain with breathing  Date Last Reviewed: 6/1/2018 2000-2018 [employer]. [address] [place] [address], [place] [address]. All rights reserved. This information is not intended as a substitute for professional medical care. Always follow your healthcare professional's  instructions.               Michael Medrano MD  [facility]

## 2019-12-16 NOTE — NURSING NOTE
"Chief Complaint   Patient presents with     Cough     /84 (Cuff Size: Adult Regular)   Pulse 100   Temp 97.8  F (36.6  C) (Tympanic)   Resp 20   Ht 1.803 m (5' 11\")   Wt 128.4 kg (283 lb)   SpO2 94%   BMI 39.47 kg/m   Estimated body mass index is 39.47 kg/m  as calculated from the following:    Height as of this encounter: 1.803 m (5' 11\").    Weight as of this encounter: 128.4 kg (283 lb).  Patient presents to the clinic using No DME      Health Maintenance that is potentially due pending provider review:    Health Maintenance Due   Topic Date Due     ADVANCE CARE PLANNING  1945     COLONOSCOPY  04/11/1953     DTAP/TDAP/TD IMMUNIZATION (1 - Tdap) 04/11/1954     LIPID  04/11/1978     ZOSTER IMMUNIZATION (1 of 2) 04/11/1993     MEDICARE ANNUAL WELLNESS VISIT  04/11/2008     PNEUMOCOCCAL IMMUNIZATION 65+ LOW/MEDIUM RISK (1 of 2 - PCV13) 04/11/2008     INFLUENZA VACCINE (1) 09/01/2019                "

## 2020-02-03 NOTE — PROGRESS NOTES
S-(situation): Pt presents at clinic requesting nurse check rt ear for possible piece of hearing aide lodged in ear. [facility].    B-(background): Hearing aid removed last night with incomplete removal of entire H/A.    A-(assessment): Able to visualize small white plastic connector piece rt ear canal. Pt denies pain.     R-(recommendations): Advised need to see provider for eval and removal. Pt agreeable with this, scheduled with Dr. Medrano this AM. GRACE Brooks RN      
03-Feb-2020 12:49

## 2020-02-09 ENCOUNTER — ANESTHESIA (OUTPATIENT)
Dept: SURGERY | Facility: CLINIC | Age: 75
DRG: 219 | End: 2020-02-09
Payer: COMMERCIAL

## 2020-02-09 ENCOUNTER — MEDICAL CORRESPONDENCE (OUTPATIENT)
Dept: SURGERY | Facility: CLINIC | Age: 75
End: 2020-02-09

## 2020-02-09 ENCOUNTER — HOSPITAL ENCOUNTER (EMERGENCY)
Facility: CLINIC | Age: 75
Discharge: ANOTHER HOSPITAL | End: 2020-02-09
Attending: FAMILY MEDICINE | Admitting: FAMILY MEDICINE
Payer: COMMERCIAL

## 2020-02-09 ENCOUNTER — HOSPITAL ENCOUNTER (INPATIENT)
Facility: CLINIC | Age: 75
LOS: 24 days | Discharge: ANOTHER HOSPITAL | DRG: 219 | End: 2020-03-05
Attending: EMERGENCY MEDICINE | Admitting: THORACIC SURGERY (CARDIOTHORACIC VASCULAR SURGERY)
Payer: COMMERCIAL

## 2020-02-09 ENCOUNTER — ANESTHESIA EVENT (OUTPATIENT)
Dept: SURGERY | Facility: CLINIC | Age: 75
DRG: 219 | End: 2020-02-09
Payer: COMMERCIAL

## 2020-02-09 ENCOUNTER — APPOINTMENT (OUTPATIENT)
Dept: GENERAL RADIOLOGY | Facility: CLINIC | Age: 75
End: 2020-02-09
Attending: FAMILY MEDICINE
Payer: COMMERCIAL

## 2020-02-09 ENCOUNTER — APPOINTMENT (OUTPATIENT)
Dept: CT IMAGING | Facility: CLINIC | Age: 75
End: 2020-02-09
Attending: FAMILY MEDICINE
Payer: COMMERCIAL

## 2020-02-09 VITALS
OXYGEN SATURATION: 97 % | HEART RATE: 96 BPM | DIASTOLIC BLOOD PRESSURE: 84 MMHG | SYSTOLIC BLOOD PRESSURE: 125 MMHG | RESPIRATION RATE: 48 BRPM | TEMPERATURE: 97.8 F

## 2020-02-09 DIAGNOSIS — R55 SYNCOPE, UNSPECIFIED SYNCOPE TYPE: ICD-10-CM

## 2020-02-09 DIAGNOSIS — Z95.828 S/P ASCENDING AORTIC REPLACEMENT: Primary | ICD-10-CM

## 2020-02-09 DIAGNOSIS — I71.019 DISSECTION OF THORACIC AORTA (H): ICD-10-CM

## 2020-02-09 DIAGNOSIS — I71.00 DISSECTION OF AORTA, UNSPECIFIED PORTION OF AORTA (H): ICD-10-CM

## 2020-02-09 LAB
ABO + RH BLD: NORMAL
ABO + RH BLD: NORMAL
ALBUMIN SERPL-MCNC: 3.5 G/DL (ref 3.4–5)
ALBUMIN SERPL-MCNC: 3.5 G/DL (ref 3.4–5)
ALP SERPL-CCNC: 66 U/L (ref 40–150)
ALP SERPL-CCNC: 67 U/L (ref 40–150)
ALT SERPL W P-5'-P-CCNC: 27 U/L (ref 0–70)
ALT SERPL W P-5'-P-CCNC: 29 U/L (ref 0–70)
ANION GAP SERPL CALCULATED.3IONS-SCNC: 4 MMOL/L (ref 3–14)
ANION GAP SERPL CALCULATED.3IONS-SCNC: 6 MMOL/L (ref 3–14)
APTT PPP: 56 SEC (ref 22–37)
AST SERPL W P-5'-P-CCNC: 27 U/L (ref 0–45)
AST SERPL W P-5'-P-CCNC: 42 U/L (ref 0–45)
B-HCG FREE SERPL-ACNC: 1 [IU]/L (ref 0.86–1.14)
BASE DEFICIT BLDA-SCNC: 4.8 MMOL/L
BASOPHILS # BLD AUTO: 0 10E9/L (ref 0–0.2)
BASOPHILS # BLD AUTO: 0.1 10E9/L (ref 0–0.2)
BASOPHILS NFR BLD AUTO: 0.4 %
BASOPHILS NFR BLD AUTO: 0.4 %
BILIRUB SERPL-MCNC: 0.6 MG/DL (ref 0.2–1.3)
BILIRUB SERPL-MCNC: 0.9 MG/DL (ref 0.2–1.3)
BLD GP AB SCN SERPL QL: NORMAL
BLD PROD TYP BPU: NORMAL
BLD UNIT ID BPU: 0
BLOOD BANK CMNT PATIENT-IMP: NORMAL
BLOOD PRODUCT CODE: NORMAL
BPU ID: NORMAL
BUN SERPL-MCNC: 22 MG/DL (ref 7–30)
BUN SERPL-MCNC: 23 MG/DL (ref 7–30)
CA-I BLD-MCNC: 4.7 MG/DL (ref 4.4–5.2)
CA-I BLD-SCNC: 4.9 MG/DL (ref 4.4–5.2)
CALCIUM SERPL-MCNC: 8.8 MG/DL (ref 8.5–10.1)
CALCIUM SERPL-MCNC: 9 MG/DL (ref 8.5–10.1)
CHLORIDE SERPL-SCNC: 106 MMOL/L (ref 94–109)
CHLORIDE SERPL-SCNC: 108 MMOL/L (ref 94–109)
CO2 BLDCOV-SCNC: 24 MMOL/L (ref 21–28)
CO2 SERPL-SCNC: 25 MMOL/L (ref 20–32)
CO2 SERPL-SCNC: 25 MMOL/L (ref 20–32)
CREAT SERPL-MCNC: 1.24 MG/DL (ref 0.66–1.25)
CREAT SERPL-MCNC: 1.27 MG/DL (ref 0.66–1.25)
DIFFERENTIAL METHOD BLD: ABNORMAL
DIFFERENTIAL METHOD BLD: ABNORMAL
EOSINOPHIL # BLD AUTO: 0 10E9/L (ref 0–0.7)
EOSINOPHIL # BLD AUTO: 0.2 10E9/L (ref 0–0.7)
EOSINOPHIL NFR BLD AUTO: 0.2 %
EOSINOPHIL NFR BLD AUTO: 1.5 %
ERYTHROCYTE [DISTWIDTH] IN BLOOD BY AUTOMATED COUNT: 13.7 % (ref 10–15)
ERYTHROCYTE [DISTWIDTH] IN BLOOD BY AUTOMATED COUNT: 14 % (ref 10–15)
ETHANOL SERPL-MCNC: <0.01 G/DL
GFR SERPL CREATININE-BSD FRML MDRD: 54 ML/MIN/{1.73_M2}
GFR SERPL CREATININE-BSD FRML MDRD: 56 ML/MIN/{1.73_M2}
GLUCOSE BLD-MCNC: 142 MG/DL (ref 70–99)
GLUCOSE BLD-MCNC: 150 MG/DL (ref 70–99)
GLUCOSE SERPL-MCNC: 146 MG/DL (ref 70–99)
GLUCOSE SERPL-MCNC: 149 MG/DL (ref 70–99)
HCO3 BLD-SCNC: 22 MMOL/L (ref 21–28)
HCT VFR BLD AUTO: 39.5 % (ref 40–53)
HCT VFR BLD AUTO: 39.8 % (ref 40–53)
HCT VFR BLD CALC: 39 %PCV (ref 40–53)
HGB BLD CALC-MCNC: 13.3 G/DL (ref 13.3–17.7)
HGB BLD-MCNC: 11.4 G/DL (ref 13.3–17.7)
HGB BLD-MCNC: 12.8 G/DL (ref 13.3–17.7)
HGB BLD-MCNC: 12.9 G/DL (ref 13.3–17.7)
IMM GRANULOCYTES # BLD: 0.2 10E9/L (ref 0–0.4)
IMM GRANULOCYTES # BLD: 0.2 10E9/L (ref 0–0.4)
IMM GRANULOCYTES NFR BLD: 1.9 %
IMM GRANULOCYTES NFR BLD: 2 %
INR PPP: 1.38 (ref 0.86–1.14)
LACTATE BLD-SCNC: 2.1 MMOL/L (ref 0.7–2)
LACTATE BLD-SCNC: 2.8 MMOL/L (ref 0.7–2)
LYMPHOCYTES # BLD AUTO: 0.5 10E9/L (ref 0.8–5.3)
LYMPHOCYTES # BLD AUTO: 2.2 10E9/L (ref 0.8–5.3)
LYMPHOCYTES NFR BLD AUTO: 19 %
LYMPHOCYTES NFR BLD AUTO: 5.5 %
MCH RBC QN AUTO: 31.2 PG (ref 26.5–33)
MCH RBC QN AUTO: 31.7 PG (ref 26.5–33)
MCHC RBC AUTO-ENTMCNC: 32.4 G/DL (ref 31.5–36.5)
MCHC RBC AUTO-ENTMCNC: 32.4 G/DL (ref 31.5–36.5)
MCV RBC AUTO: 96 FL (ref 78–100)
MCV RBC AUTO: 98 FL (ref 78–100)
MONOCYTES # BLD AUTO: 0.5 10E9/L (ref 0–1.3)
MONOCYTES # BLD AUTO: 0.8 10E9/L (ref 0–1.3)
MONOCYTES NFR BLD AUTO: 5.1 %
MONOCYTES NFR BLD AUTO: 7.1 %
NEUTROPHILS # BLD AUTO: 7.9 10E9/L (ref 1.6–8.3)
NEUTROPHILS # BLD AUTO: 8 10E9/L (ref 1.6–8.3)
NEUTROPHILS NFR BLD AUTO: 70.1 %
NEUTROPHILS NFR BLD AUTO: 86.8 %
NRBC # BLD AUTO: 0 10*3/UL
NRBC # BLD AUTO: 0 10*3/UL
NRBC BLD AUTO-RTO: 0 /100
NRBC BLD AUTO-RTO: 0 /100
NUM BPU REQUESTED: 8
O2/TOTAL GAS SETTING VFR VENT: 60 %
PCO2 BLD: 45 MM HG (ref 35–45)
PCO2 BLDV: 43 MM HG (ref 40–50)
PH BLD: 7.29 PH (ref 7.35–7.45)
PH BLDV: 7.36 PH (ref 7.32–7.43)
PLATELET # BLD AUTO: 161 10E9/L (ref 150–450)
PLATELET # BLD AUTO: 175 10E9/L (ref 150–450)
PO2 BLD: 93 MM HG (ref 80–105)
PO2 BLDV: 19 MM HG (ref 25–47)
POTASSIUM BLD-SCNC: 4.3 MMOL/L (ref 3.4–5.3)
POTASSIUM BLD-SCNC: 4.6 MMOL/L (ref 3.4–5.3)
POTASSIUM SERPL-SCNC: 4.3 MMOL/L (ref 3.4–5.3)
POTASSIUM SERPL-SCNC: 4.3 MMOL/L (ref 3.4–5.3)
PROT SERPL-MCNC: 6.9 G/DL (ref 6.8–8.8)
PROT SERPL-MCNC: 7.2 G/DL (ref 6.8–8.8)
RBC # BLD AUTO: 4.04 10E12/L (ref 4.4–5.9)
RBC # BLD AUTO: 4.13 10E12/L (ref 4.4–5.9)
SAO2 % BLDV FROM PO2: 27 %
SODIUM BLD-SCNC: 139 MMOL/L (ref 133–144)
SODIUM BLD-SCNC: 139 MMOL/L (ref 133–144)
SODIUM SERPL-SCNC: 136 MMOL/L (ref 133–144)
SODIUM SERPL-SCNC: 139 MMOL/L (ref 133–144)
SPECIMEN EXP DATE BLD: NORMAL
T4 FREE SERPL-MCNC: 0.74 NG/DL (ref 0.76–1.46)
TRANSFUSION STATUS PATIENT QL: NORMAL
TROPONIN I BLD-MCNC: 0.75 UG/L (ref 0–0.08)
TROPONIN I SERPL-MCNC: <0.015 UG/L (ref 0–0.04)
TSH SERPL DL<=0.005 MIU/L-ACNC: 4.06 MU/L (ref 0.4–4)
WBC # BLD AUTO: 11.3 10E9/L (ref 4–11)
WBC # BLD AUTO: 9.2 10E9/L (ref 4–11)

## 2020-02-09 PROCEDURE — 25800030 ZZH RX IP 258 OP 636: Performed by: EMERGENCY MEDICINE

## 2020-02-09 PROCEDURE — 86850 RBC ANTIBODY SCREEN: CPT | Performed by: EMERGENCY MEDICINE

## 2020-02-09 PROCEDURE — 84295 ASSAY OF SERUM SODIUM: CPT

## 2020-02-09 PROCEDURE — 25000125 ZZHC RX 250: Performed by: FAMILY MEDICINE

## 2020-02-09 PROCEDURE — 85025 COMPLETE CBC W/AUTO DIFF WBC: CPT | Performed by: EMERGENCY MEDICINE

## 2020-02-09 PROCEDURE — 40000502 ZZHCL STATISTIC GLUCOSE ED POCT

## 2020-02-09 PROCEDURE — 85049 AUTOMATED PLATELET COUNT: CPT

## 2020-02-09 PROCEDURE — 85730 THROMBOPLASTIN TIME PARTIAL: CPT | Performed by: EMERGENCY MEDICINE

## 2020-02-09 PROCEDURE — C1758 CATHETER, URETERAL: HCPCS | Performed by: THORACIC SURGERY (CARDIOTHORACIC VASCULAR SURGERY)

## 2020-02-09 PROCEDURE — 72170 X-RAY EXAM OF PELVIS: CPT

## 2020-02-09 PROCEDURE — 25000128 H RX IP 250 OP 636: Performed by: EMERGENCY MEDICINE

## 2020-02-09 PROCEDURE — 27210460 ZZH PUMP APP ADULT PERFUSION: Performed by: THORACIC SURGERY (CARDIOTHORACIC VASCULAR SURGERY)

## 2020-02-09 PROCEDURE — 99285 EMERGENCY DEPT VISIT HI MDM: CPT | Mod: 25 | Performed by: FAMILY MEDICINE

## 2020-02-09 PROCEDURE — 37000008 ZZH ANESTHESIA TECHNICAL FEE, 1ST 30 MIN: Performed by: THORACIC SURGERY (CARDIOTHORACIC VASCULAR SURGERY)

## 2020-02-09 PROCEDURE — 25000565 ZZH ISOFLURANE, EA 15 MIN: Performed by: THORACIC SURGERY (CARDIOTHORACIC VASCULAR SURGERY)

## 2020-02-09 PROCEDURE — 37000009 ZZH ANESTHESIA TECHNICAL FEE, EACH ADDTL 15 MIN: Performed by: THORACIC SURGERY (CARDIOTHORACIC VASCULAR SURGERY)

## 2020-02-09 PROCEDURE — 25000128 H RX IP 250 OP 636: Performed by: FAMILY MEDICINE

## 2020-02-09 PROCEDURE — 85610 PROTHROMBIN TIME: CPT

## 2020-02-09 PROCEDURE — 99291 CRITICAL CARE FIRST HOUR: CPT | Mod: 25 | Performed by: EMERGENCY MEDICINE

## 2020-02-09 PROCEDURE — 25000125 ZZHC RX 250: Performed by: NURSE ANESTHETIST, CERTIFIED REGISTERED

## 2020-02-09 PROCEDURE — 86923 COMPATIBILITY TEST ELECTRIC: CPT | Performed by: EMERGENCY MEDICINE

## 2020-02-09 PROCEDURE — C1713 ANCHOR/SCREW BN/BN,TIS/BN: HCPCS | Performed by: THORACIC SURGERY (CARDIOTHORACIC VASCULAR SURGERY)

## 2020-02-09 PROCEDURE — 40000014 ZZH STATISTIC ARTERIAL MONITORING DAILY

## 2020-02-09 PROCEDURE — 84443 ASSAY THYROID STIM HORMONE: CPT | Performed by: FAMILY MEDICINE

## 2020-02-09 PROCEDURE — P9059 PLASMA, FRZ BETWEEN 8-24HOUR: HCPCS | Performed by: EMERGENCY MEDICINE

## 2020-02-09 PROCEDURE — 25000128 H RX IP 250 OP 636: Performed by: STUDENT IN AN ORGANIZED HEALTH CARE EDUCATION/TRAINING PROGRAM

## 2020-02-09 PROCEDURE — 84439 ASSAY OF FREE THYROXINE: CPT | Performed by: FAMILY MEDICINE

## 2020-02-09 PROCEDURE — 85396 CLOTTING ASSAY WHOLE BLOOD: CPT | Performed by: ANESTHESIOLOGY

## 2020-02-09 PROCEDURE — 02RF08Z REPLACEMENT OF AORTIC VALVE WITH ZOOPLASTIC TISSUE, OPEN APPROACH: ICD-10-PCS | Performed by: THORACIC SURGERY (CARDIOTHORACIC VASCULAR SURGERY)

## 2020-02-09 PROCEDURE — 41000018 ZZH PER-PERFUSION 1ST 30 MIN: Performed by: THORACIC SURGERY (CARDIOTHORACIC VASCULAR SURGERY)

## 2020-02-09 PROCEDURE — 96365 THER/PROPH/DIAG IV INF INIT: CPT | Performed by: EMERGENCY MEDICINE

## 2020-02-09 PROCEDURE — 83605 ASSAY OF LACTIC ACID: CPT | Performed by: EMERGENCY MEDICINE

## 2020-02-09 PROCEDURE — P9016 RBC LEUKOCYTES REDUCED: HCPCS | Performed by: EMERGENCY MEDICINE

## 2020-02-09 PROCEDURE — 93010 ELECTROCARDIOGRAM REPORT: CPT | Mod: Z6 | Performed by: EMERGENCY MEDICINE

## 2020-02-09 PROCEDURE — C1781 MESH (IMPLANTABLE): HCPCS | Performed by: THORACIC SURGERY (CARDIOTHORACIC VASCULAR SURGERY)

## 2020-02-09 PROCEDURE — 85610 PROTHROMBIN TIME: CPT | Performed by: EMERGENCY MEDICINE

## 2020-02-09 PROCEDURE — 82803 BLOOD GASES ANY COMBINATION: CPT

## 2020-02-09 PROCEDURE — 021009W BYPASS CORONARY ARTERY, ONE ARTERY FROM AORTA WITH AUTOLOGOUS VENOUS TISSUE, OPEN APPROACH: ICD-10-PCS | Performed by: THORACIC SURGERY (CARDIOTHORACIC VASCULAR SURGERY)

## 2020-02-09 PROCEDURE — 27810169 ZZH OR IMPLANT GENERAL: Performed by: THORACIC SURGERY (CARDIOTHORACIC VASCULAR SURGERY)

## 2020-02-09 PROCEDURE — 93010 ELECTROCARDIOGRAM REPORT: CPT | Mod: Z6 | Performed by: FAMILY MEDICINE

## 2020-02-09 PROCEDURE — 83605 ASSAY OF LACTIC ACID: CPT

## 2020-02-09 PROCEDURE — 85384 FIBRINOGEN ACTIVITY: CPT

## 2020-02-09 PROCEDURE — 80320 DRUG SCREEN QUANTALCOHOLS: CPT | Performed by: FAMILY MEDICINE

## 2020-02-09 PROCEDURE — 85025 COMPLETE CBC W/AUTO DIFF WBC: CPT | Performed by: FAMILY MEDICINE

## 2020-02-09 PROCEDURE — 25000132 ZZH RX MED GY IP 250 OP 250 PS 637: Performed by: FAMILY MEDICINE

## 2020-02-09 PROCEDURE — 70450 CT HEAD/BRAIN W/O DYE: CPT

## 2020-02-09 PROCEDURE — 85730 THROMBOPLASTIN TIME PARTIAL: CPT

## 2020-02-09 PROCEDURE — 40000497 ZZHCL STATISTIC SODIUM ED POCT

## 2020-02-09 PROCEDURE — 40000196 ZZH STATISTIC RAPCV CVP MONITORING

## 2020-02-09 PROCEDURE — 25000125 ZZHC RX 250: Performed by: EMERGENCY MEDICINE

## 2020-02-09 PROCEDURE — 84484 ASSAY OF TROPONIN QUANT: CPT

## 2020-02-09 PROCEDURE — 25000125 ZZHC RX 250: Performed by: STUDENT IN AN ORGANIZED HEALTH CARE EDUCATION/TRAINING PROGRAM

## 2020-02-09 PROCEDURE — C1763 CONN TISS, NON-HUMAN: HCPCS | Performed by: THORACIC SURGERY (CARDIOTHORACIC VASCULAR SURGERY)

## 2020-02-09 PROCEDURE — 5A1221Z PERFORMANCE OF CARDIAC OUTPUT, CONTINUOUS: ICD-10-PCS | Performed by: THORACIC SURGERY (CARDIOTHORACIC VASCULAR SURGERY)

## 2020-02-09 PROCEDURE — 86901 BLOOD TYPING SEROLOGIC RH(D): CPT | Performed by: EMERGENCY MEDICINE

## 2020-02-09 PROCEDURE — 74177 CT ABD & PELVIS W/CONTRAST: CPT

## 2020-02-09 PROCEDURE — 27210794 ZZH OR GENERAL SUPPLY STERILE: Performed by: THORACIC SURGERY (CARDIOTHORACIC VASCULAR SURGERY)

## 2020-02-09 PROCEDURE — 40000344 ZZHCL STATISTIC THAWING COMPONENT: Performed by: EMERGENCY MEDICINE

## 2020-02-09 PROCEDURE — P9041 ALBUMIN (HUMAN),5%, 50ML: HCPCS

## 2020-02-09 PROCEDURE — 02UX0JZ SUPPLEMENT THORACIC AORTA, ASCENDING/ARCH WITH SYNTHETIC SUBSTITUTE, OPEN APPROACH: ICD-10-PCS | Performed by: THORACIC SURGERY (CARDIOTHORACIC VASCULAR SURGERY)

## 2020-02-09 PROCEDURE — 27210447 ZZH PACK CELL SAVER CSP: Performed by: THORACIC SURGERY (CARDIOTHORACIC VASCULAR SURGERY)

## 2020-02-09 PROCEDURE — 25000125 ZZHC RX 250

## 2020-02-09 PROCEDURE — 40000048 ZZH STATISTIC DAILY SWAN MONITORING

## 2020-02-09 PROCEDURE — 25000128 H RX IP 250 OP 636: Performed by: THORACIC SURGERY (CARDIOTHORACIC VASCULAR SURGERY)

## 2020-02-09 PROCEDURE — 80053 COMPREHEN METABOLIC PANEL: CPT | Performed by: FAMILY MEDICINE

## 2020-02-09 PROCEDURE — 25800030 ZZH RX IP 258 OP 636: Performed by: STUDENT IN AN ORGANIZED HEALTH CARE EDUCATION/TRAINING PROGRAM

## 2020-02-09 PROCEDURE — 40000501 ZZHCL STATISTIC HEMATOCRIT ED POCT

## 2020-02-09 PROCEDURE — 36000074 ZZH SURGERY LEVEL 6 1ST 30 MIN - UMMC: Performed by: THORACIC SURGERY (CARDIOTHORACIC VASCULAR SURGERY)

## 2020-02-09 PROCEDURE — 80053 COMPREHEN METABOLIC PANEL: CPT | Performed by: EMERGENCY MEDICINE

## 2020-02-09 PROCEDURE — 93503 INSERT/PLACE HEART CATHETER: CPT

## 2020-02-09 PROCEDURE — 25000128 H RX IP 250 OP 636

## 2020-02-09 PROCEDURE — 82330 ASSAY OF CALCIUM: CPT

## 2020-02-09 PROCEDURE — 86900 BLOOD TYPING SEROLOGIC ABO: CPT | Performed by: EMERGENCY MEDICINE

## 2020-02-09 PROCEDURE — 84132 ASSAY OF SERUM POTASSIUM: CPT

## 2020-02-09 PROCEDURE — 25000125 ZZHC RX 250: Performed by: THORACIC SURGERY (CARDIOTHORACIC VASCULAR SURGERY)

## 2020-02-09 PROCEDURE — 06BQ0ZZ EXCISION OF LEFT SAPHENOUS VEIN, OPEN APPROACH: ICD-10-PCS | Performed by: THORACIC SURGERY (CARDIOTHORACIC VASCULAR SURGERY)

## 2020-02-09 PROCEDURE — 71046 X-RAY EXAM CHEST 2 VIEWS: CPT

## 2020-02-09 PROCEDURE — 82947 ASSAY GLUCOSE BLOOD QUANT: CPT

## 2020-02-09 PROCEDURE — 36000076 ZZH SURGERY LEVEL 6 EA 15 ADDTL MIN - UMMC: Performed by: THORACIC SURGERY (CARDIOTHORACIC VASCULAR SURGERY)

## 2020-02-09 PROCEDURE — 41000019 ZZH PERA-PERFUSION EACH ADDTL 15 MIN: Performed by: THORACIC SURGERY (CARDIOTHORACIC VASCULAR SURGERY)

## 2020-02-09 PROCEDURE — 40000498 ZZHCL STATISTIC POTASSIUM ED POCT

## 2020-02-09 PROCEDURE — 93005 ELECTROCARDIOGRAM TRACING: CPT | Performed by: FAMILY MEDICINE

## 2020-02-09 PROCEDURE — 99285 EMERGENCY DEPT VISIT HI MDM: CPT | Mod: 25 | Performed by: EMERGENCY MEDICINE

## 2020-02-09 PROCEDURE — 84484 ASSAY OF TROPONIN QUANT: CPT | Performed by: FAMILY MEDICINE

## 2020-02-09 PROCEDURE — 3E043XZ INTRODUCTION OF VASOPRESSOR INTO CENTRAL VEIN, PERCUTANEOUS APPROACH: ICD-10-PCS | Performed by: THORACIC SURGERY (CARDIOTHORACIC VASCULAR SURGERY)

## 2020-02-09 DEVICE — IMPLANTABLE DEVICE: Type: IMPLANTABLE DEVICE | Site: HEART | Status: FUNCTIONAL

## 2020-02-09 RX ORDER — FENTANYL CITRATE 50 UG/ML
INJECTION, SOLUTION INTRAMUSCULAR; INTRAVENOUS PRN
Status: DISCONTINUED | OUTPATIENT
Start: 2020-02-09 | End: 2020-02-10

## 2020-02-09 RX ORDER — IOPAMIDOL 755 MG/ML
80 INJECTION, SOLUTION INTRAVASCULAR ONCE
Status: COMPLETED | OUTPATIENT
Start: 2020-02-09 | End: 2020-02-09

## 2020-02-09 RX ORDER — ESMOLOL HYDROCHLORIDE 10 MG/ML
INJECTION INTRAVENOUS PRN
Status: DISCONTINUED | OUTPATIENT
Start: 2020-02-09 | End: 2020-02-10

## 2020-02-09 RX ORDER — ESMOLOL HYDROCHLORIDE 20 MG/ML
50-300 INJECTION, SOLUTION INTRAVENOUS CONTINUOUS
Status: DISCONTINUED | OUTPATIENT
Start: 2020-02-09 | End: 2020-02-10

## 2020-02-09 RX ORDER — NOREPINEPHRINE BITARTRATE 0.06 MG/ML
.03-.4 INJECTION, SOLUTION INTRAVENOUS CONTINUOUS
Status: DISCONTINUED | OUTPATIENT
Start: 2020-02-09 | End: 2020-02-13 | Stop reason: CLARIF

## 2020-02-09 RX ORDER — PHENYLEPHRINE HCL IN 0.9% NACL 1 MG/10 ML
SYRINGE (ML) INTRAVENOUS PRN
Status: DISCONTINUED | OUTPATIENT
Start: 2020-02-09 | End: 2020-02-10

## 2020-02-09 RX ORDER — LIDOCAINE HYDROCHLORIDE 20 MG/ML
INJECTION, SOLUTION INFILTRATION; PERINEURAL PRN
Status: DISCONTINUED | OUTPATIENT
Start: 2020-02-09 | End: 2020-02-10

## 2020-02-09 RX ORDER — ASPIRIN 81 MG/1
324 TABLET, CHEWABLE ORAL ONCE
Status: COMPLETED | OUTPATIENT
Start: 2020-02-09 | End: 2020-02-09

## 2020-02-09 RX ORDER — ETOMIDATE 2 MG/ML
INJECTION INTRAVENOUS PRN
Status: DISCONTINUED | OUTPATIENT
Start: 2020-02-09 | End: 2020-02-10

## 2020-02-09 RX ORDER — SODIUM CHLORIDE 9 MG/ML
1000 INJECTION, SOLUTION INTRAVENOUS CONTINUOUS
Status: DISCONTINUED | OUTPATIENT
Start: 2020-02-09 | End: 2020-02-09 | Stop reason: HOSPADM

## 2020-02-09 RX ORDER — SODIUM CHLORIDE, SODIUM GLUCONATE, SODIUM ACETATE, POTASSIUM CHLORIDE AND MAGNESIUM CHLORIDE 526; 502; 368; 37; 30 MG/100ML; MG/100ML; MG/100ML; MG/100ML; MG/100ML
INJECTION, SOLUTION INTRAVENOUS CONTINUOUS PRN
Status: DISCONTINUED | OUTPATIENT
Start: 2020-02-09 | End: 2020-02-10

## 2020-02-09 RX ORDER — NITROGLYCERIN 0.4 MG/1
0.4 TABLET SUBLINGUAL EVERY 5 MIN PRN
Status: DISCONTINUED | OUTPATIENT
Start: 2020-02-09 | End: 2020-02-09 | Stop reason: HOSPADM

## 2020-02-09 RX ADMIN — VANCOMYCIN HYDROCHLORIDE 1.5 G: 1 INJECTION, SOLUTION INTRAVENOUS at 23:45

## 2020-02-09 RX ADMIN — Medication 100 MCG: at 23:19

## 2020-02-09 RX ADMIN — ESMOLOL HYDROCHLORIDE IN SODIUM CHLORIDE 75 MCG/KG/MIN: 20 INJECTION INTRAVENOUS at 22:38

## 2020-02-09 RX ADMIN — ROCURONIUM BROMIDE 100 MG: 10 INJECTION INTRAVENOUS at 22:54

## 2020-02-09 RX ADMIN — SODIUM CHLORIDE 80 ML: 9 INJECTION, SOLUTION INTRAVENOUS at 20:27

## 2020-02-09 RX ADMIN — MIDAZOLAM 5 MG: 1 INJECTION INTRAMUSCULAR; INTRAVENOUS at 23:40

## 2020-02-09 RX ADMIN — LIDOCAINE HYDROCHLORIDE 100 MG: 20 INJECTION, SOLUTION INFILTRATION; PERINEURAL at 22:54

## 2020-02-09 RX ADMIN — Medication 1 UNITS: at 23:22

## 2020-02-09 RX ADMIN — IOPAMIDOL 80 ML: 755 INJECTION, SOLUTION INTRAVENOUS at 20:27

## 2020-02-09 RX ADMIN — Medication 200 MCG: at 23:41

## 2020-02-09 RX ADMIN — NOREPINEPHRINE BITARTRATE 6.4 MCG: 1 INJECTION INTRAVENOUS at 23:15

## 2020-02-09 RX ADMIN — PHENYLEPHRINE HYDROCHLORIDE 2 MCG/KG/MIN: 10 INJECTION INTRAVENOUS at 23:56

## 2020-02-09 RX ADMIN — FENTANYL CITRATE 150 MCG: 50 INJECTION, SOLUTION INTRAMUSCULAR; INTRAVENOUS at 23:34

## 2020-02-09 RX ADMIN — ESMOLOL HYDROCHLORIDE 10 MG: 10 INJECTION, SOLUTION INTRAVENOUS at 23:44

## 2020-02-09 RX ADMIN — NITROGLYCERIN 0.4 MG: 0.4 TABLET SUBLINGUAL at 19:10

## 2020-02-09 RX ADMIN — ASPIRIN 81 MG 324 MG: 81 TABLET ORAL at 19:06

## 2020-02-09 RX ADMIN — NITROGLYCERIN 0.4 MG: 0.4 TABLET SUBLINGUAL at 19:15

## 2020-02-09 RX ADMIN — ESMOLOL HYDROCHLORIDE 30 MG: 10 INJECTION, SOLUTION INTRAVENOUS at 23:42

## 2020-02-09 RX ADMIN — ESMOLOL HYDROCHLORIDE IN SODIUM CHLORIDE 50 MCG/KG/MIN: 20 INJECTION INTRAVENOUS at 22:58

## 2020-02-09 RX ADMIN — Medication 100 MCG: at 23:11

## 2020-02-09 RX ADMIN — Medication 100 MCG: at 23:03

## 2020-02-09 RX ADMIN — Medication 1 UNITS: at 23:29

## 2020-02-09 RX ADMIN — ESMOLOL HYDROCHLORIDE IN SODIUM CHLORIDE 50 MCG/KG/MIN: 20 INJECTION INTRAVENOUS at 22:14

## 2020-02-09 RX ADMIN — ESMOLOL HYDROCHLORIDE 20 MG: 10 INJECTION, SOLUTION INTRAVENOUS at 23:41

## 2020-02-09 RX ADMIN — Medication 200 MCG: at 23:42

## 2020-02-09 RX ADMIN — NITROGLYCERIN 0.4 MG: 0.4 TABLET SUBLINGUAL at 19:05

## 2020-02-09 RX ADMIN — FENTANYL CITRATE 250 MCG: 50 INJECTION, SOLUTION INTRAMUSCULAR; INTRAVENOUS at 22:54

## 2020-02-09 RX ADMIN — SODIUM CHLORIDE, SODIUM GLUCONATE, SODIUM ACETATE, POTASSIUM CHLORIDE AND MAGNESIUM CHLORIDE: 526; 502; 368; 37; 30 INJECTION, SOLUTION INTRAVENOUS at 22:54

## 2020-02-09 RX ADMIN — ESMOLOL HYDROCHLORIDE 10 MG: 10 INJECTION, SOLUTION INTRAVENOUS at 23:43

## 2020-02-09 RX ADMIN — CEFUROXIME 1.5 G: 1.5 INJECTION, POWDER, FOR SOLUTION INTRAVENOUS at 23:45

## 2020-02-09 RX ADMIN — ESMOLOL HYDROCHLORIDE 30 MG: 10 INJECTION, SOLUTION INTRAVENOUS at 23:40

## 2020-02-09 RX ADMIN — ETOMIDATE 26 MG: 2 INJECTION INTRAVENOUS at 22:54

## 2020-02-09 RX ADMIN — EPINEPHRINE 0.03 MCG/KG/MIN: 1 INJECTION PARENTERAL at 23:58

## 2020-02-09 ASSESSMENT — ENCOUNTER SYMPTOMS
VOMITING: 0
PALPITATIONS: 0
CHILLS: 0
FREQUENCY: 0
SINUS PRESSURE: 0
FEVER: 0
COUGH: 0
CONSTIPATION: 0
ABDOMINAL PAIN: 0
BLOOD IN STOOL: 0
DIARRHEA: 0
DYSURIA: 0
SORE THROAT: 0
WHEEZING: 0
SHORTNESS OF BREATH: 1
NAUSEA: 0
HEADACHES: 0
DIAPHORESIS: 0

## 2020-02-09 ASSESSMENT — MIFFLIN-ST. JEOR: SCORE: 2038.99

## 2020-02-09 NOTE — LETTER
Health Information Management Services               Recipient:  Keiko          Sender:  Analia Bueno MIGUEL ANGEL, LCSW  [address]   Phone: [phone]  Pager: [phone]  Unit: [phone]          Date: March 3, 2020  Patient Name:  Vel Espana  Routing Message:  MAR enclosed.  Will send H&P next on separate fax.  Thank you!  analia          The documents accompanying this notice contain confidential information belonging to the sender.  This information is intended only for the use of the individual or entity named above.  The authorized recipient of this information is prohibited from disclosing this information to any other party and is required to destroy the information after its stated need has been fulfilled, unless otherwise required by state law.      If you are not the intended recipient, you are hereby notified that any disclosure, copy, distribution or action taken in reliance on the contents of these documents is strictly prohibited.  If you have received this document in error, please return it by fax to [phone] with a note on the cover sheet explaining why you are returning it (e.g. not your patient, not your provider, etc.).  If you need further assistance, please call [place] Centralized Transcription at [phone].  Documents may also be returned by mail to [employer], , [facility] [address], [place], [place] [address].

## 2020-02-09 NOTE — LETTER
Health Information Management Services               Recipient:  [facility]  F: [phone]        Sender:  Sheron Bueno, ASIFSW, LCSW  [address]   Phone: [phone]  Pager: [phone]  Unit: [phone]          Date: March 5, 2020  Patient Name:  Vel Espana  Routing Message:  MAR from today - hard copy is in his packet of paperwork and was also sent to [facility] in admissions.  Thank you!  Sheron          The documents accompanying this notice contain confidential information belonging to the sender.  This information is intended only for the use of the individual or entity named above.  The authorized recipient of this information is prohibited from disclosing this information to any other party and is required to destroy the information after its stated need has been fulfilled, unless otherwise required by state law.      If you are not the intended recipient, you are hereby notified that any disclosure, copy, distribution or action taken in reliance on the contents of these documents is strictly prohibited.  If you have received this document in error, please return it by fax to [phone] with a note on the cover sheet explaining why you are returning it (e.g. not your patient, not your provider, etc.).  If you need further assistance, please call [place] Centralized Transcription at [phone].  Documents may also be returned by mail to [employer], , [facility] [address], [place], [place] [address].

## 2020-02-09 NOTE — LETTER
Health Information Management Services               Recipient:  [facility]  F: [phone]        Sender:  MIGUEL ANGEL Saldivar, LCSW  [address]   Phone: [phone]  Pager: [phone]  Unit: [phone]          Date: March 3, 2020  Patient Name:  Vel Espana  Routing Message:            The documents accompanying this notice contain confidential information belonging to the sender.  This information is intended only for the use of the individual or entity named above.  The authorized recipient of this information is prohibited from disclosing this information to any other party and is required to destroy the information after its stated need has been fulfilled, unless otherwise required by state law.      If you are not the intended recipient, you are hereby notified that any disclosure, copy, distribution or action taken in reliance on the contents of these documents is strictly prohibited.  If you have received this document in error, please return it by fax to [phone] with a note on the cover sheet explaining why you are returning it (e.g. not your patient, not your provider, etc.).  If you need further assistance, please call [place] Centralized Transcription at [phone].  Documents may also be returned by mail to [employer], , [address], [place], [place] [address].

## 2020-02-10 ENCOUNTER — APPOINTMENT (OUTPATIENT)
Dept: GENERAL RADIOLOGY | Facility: CLINIC | Age: 75
DRG: 219 | End: 2020-02-10
Attending: PHYSICIAN ASSISTANT
Payer: COMMERCIAL

## 2020-02-10 ENCOUNTER — APPOINTMENT (OUTPATIENT)
Dept: CARDIOLOGY | Facility: CLINIC | Age: 75
DRG: 219 | End: 2020-02-10
Payer: COMMERCIAL

## 2020-02-10 ENCOUNTER — APPOINTMENT (OUTPATIENT)
Dept: GENERAL RADIOLOGY | Facility: CLINIC | Age: 75
DRG: 219 | End: 2020-02-10
Attending: STUDENT IN AN ORGANIZED HEALTH CARE EDUCATION/TRAINING PROGRAM
Payer: COMMERCIAL

## 2020-02-10 PROBLEM — Z95.828 S/P ASCENDING AORTIC REPLACEMENT: Status: ACTIVE | Noted: 2020-02-10

## 2020-02-10 LAB
ALBUMIN SERPL-MCNC: 2.8 G/DL (ref 3.4–5)
ALP SERPL-CCNC: 40 U/L (ref 40–150)
ALT SERPL W P-5'-P-CCNC: 28 U/L (ref 0–70)
ANGLE RATE OF CLOT GROWTH: 58.3 DEG (ref 59–74)
ANGLE RATE OF CLOT GROWTH: 60.6 DEG (ref 59–74)
ANGLE RATE OF CLOT GROWTH: 62.1 DEG (ref 59–74)
ANION GAP SERPL CALCULATED.3IONS-SCNC: 16 MMOL/L (ref 3–14)
ANION GAP SERPL CALCULATED.3IONS-SCNC: 18 MMOL/L (ref 3–14)
APTT PPP: 41 SEC (ref 22–37)
APTT PPP: 42 SEC (ref 22–37)
APTT PPP: 50 SEC (ref 22–37)
APTT PPP: 50 SEC (ref 22–37)
APTT PPP: 55 SEC (ref 22–37)
AST SERPL W P-5'-P-CCNC: 123 U/L (ref 0–45)
BASE DEFICIT BLDA-SCNC: 1.2 MMOL/L
BASE DEFICIT BLDA-SCNC: 1.2 MMOL/L
BASE DEFICIT BLDA-SCNC: 2.5 MMOL/L
BASE DEFICIT BLDA-SCNC: 2.8 MMOL/L
BASE DEFICIT BLDA-SCNC: 3.5 MMOL/L
BASE DEFICIT BLDA-SCNC: 3.8 MMOL/L
BASE DEFICIT BLDA-SCNC: 4.1 MMOL/L
BASE DEFICIT BLDA-SCNC: 4.6 MMOL/L
BASE DEFICIT BLDA-SCNC: 4.8 MMOL/L
BASE DEFICIT BLDA-SCNC: 5.1 MMOL/L
BASE DEFICIT BLDA-SCNC: 5.6 MMOL/L
BASE DEFICIT BLDA-SCNC: 6.3 MMOL/L
BASE DEFICIT BLDA-SCNC: 6.6 MMOL/L
BASE DEFICIT BLDA-SCNC: 6.8 MMOL/L
BASE DEFICIT BLDA-SCNC: 8.5 MMOL/L
BASE DEFICIT BLDA-SCNC: NORMAL MMOL/L
BASE DEFICIT BLDV-SCNC: 1 MMOL/L
BASE DEFICIT BLDV-SCNC: 4.8 MMOL/L
BASE DEFICIT BLDV-SCNC: 5.9 MMOL/L
BASE DEFICIT BLDV-SCNC: 7.9 MMOL/L
BASE EXCESS BLDA CALC-SCNC: 0.2 MMOL/L
BASE EXCESS BLDA CALC-SCNC: 0.2 MMOL/L
BASE EXCESS BLDA CALC-SCNC: 0.3 MMOL/L
BASE EXCESS BLDA CALC-SCNC: NORMAL MMOL/L
BILIRUB SERPL-MCNC: 1.1 MG/DL (ref 0.2–1.3)
BLD PROD TYP BPU: NORMAL
BLD UNIT ID BPU: 0
BLOOD PRODUCT CODE: NORMAL
BPU ID: NORMAL
BUN SERPL-MCNC: 24 MG/DL (ref 7–30)
BUN SERPL-MCNC: 25 MG/DL (ref 7–30)
CA-I BLD-MCNC: 3.6 MG/DL (ref 4.4–5.2)
CA-I BLD-MCNC: 3.6 MG/DL (ref 4.4–5.2)
CA-I BLD-MCNC: 3.7 MG/DL (ref 4.4–5.2)
CA-I BLD-MCNC: 3.8 MG/DL (ref 4.4–5.2)
CA-I BLD-MCNC: 3.8 MG/DL (ref 4.4–5.2)
CA-I BLD-MCNC: 3.9 MG/DL (ref 4.4–5.2)
CA-I BLD-MCNC: 4 MG/DL (ref 4.4–5.2)
CA-I BLD-MCNC: 4.1 MG/DL (ref 4.4–5.2)
CA-I BLD-MCNC: 4.3 MG/DL (ref 4.4–5.2)
CA-I BLD-MCNC: 4.4 MG/DL (ref 4.4–5.2)
CA-I BLD-MCNC: 4.6 MG/DL (ref 4.4–5.2)
CA-I BLD-MCNC: 4.6 MG/DL (ref 4.4–5.2)
CA-I BLD-MCNC: 6.1 MG/DL (ref 4.4–5.2)
CA-I BLD-MCNC: NORMAL MG/DL (ref 4.4–5.2)
CALCIUM SERPL-MCNC: 8.3 MG/DL (ref 8.5–10.1)
CALCIUM SERPL-MCNC: 8.6 MG/DL (ref 8.5–10.1)
CHLORIDE SERPL-SCNC: 110 MMOL/L (ref 94–109)
CHLORIDE SERPL-SCNC: 111 MMOL/L (ref 94–109)
CI HYPOCOAGULATION INDEX: 0.8 RATIO (ref 0–3)
CI HYPOCOAGULATION INDEX: 0.9 RATIO (ref 0–3)
CI HYPOCOAGULATION INDEX: 1.7 RATIO (ref 0–3)
CLOT LYSIS 30M P MA LENFR BLD TEG: 0 % (ref 0–8)
CLOT LYSIS 30M P MA LENFR BLD TEG: 0 % (ref 0–8)
CLOT LYSIS 30M P MA LENFR BLD TEG: 1 % (ref 0–8)
CLOT STRENGTH BLD TEG: 6.2 KD/SC (ref 5.3–13.2)
CLOT STRENGTH BLD TEG: 6.4 KD/SC (ref 5.3–13.2)
CLOT STRENGTH BLD TEG: 6.5 KD/SC (ref 5.3–13.2)
CO2 SERPL-SCNC: 22 MMOL/L (ref 20–32)
CO2 SERPL-SCNC: 22 MMOL/L (ref 20–32)
CREAT SERPL-MCNC: 1.67 MG/DL (ref 0.66–1.25)
CREAT SERPL-MCNC: 1.67 MG/DL (ref 0.66–1.25)
ERYTHROCYTE [DISTWIDTH] IN BLOOD BY AUTOMATED COUNT: 14.2 % (ref 10–15)
ERYTHROCYTE [DISTWIDTH] IN BLOOD BY AUTOMATED COUNT: 14.3 % (ref 10–15)
ERYTHROCYTE [DISTWIDTH] IN BLOOD BY AUTOMATED COUNT: 14.7 % (ref 10–15)
ERYTHROCYTE [DISTWIDTH] IN BLOOD BY AUTOMATED COUNT: 15 % (ref 10–15)
FIBRINOGEN PPP-MCNC: 117 MG/DL (ref 200–420)
FIBRINOGEN PPP-MCNC: 164 MG/DL (ref 200–420)
FIBRINOGEN PPP-MCNC: 200 MG/DL (ref 200–420)
FIBRINOGEN PPP-MCNC: 223 MG/DL (ref 200–420)
GFR SERPL CREATININE-BSD FRML MDRD: 39 ML/MIN/{1.73_M2}
GFR SERPL CREATININE-BSD FRML MDRD: 39 ML/MIN/{1.73_M2}
GLUCOSE BLD-MCNC: 204 MG/DL (ref 70–99)
GLUCOSE BLD-MCNC: 206 MG/DL (ref 70–99)
GLUCOSE BLD-MCNC: 207 MG/DL (ref 70–99)
GLUCOSE BLD-MCNC: 213 MG/DL (ref 70–99)
GLUCOSE BLD-MCNC: 215 MG/DL (ref 70–99)
GLUCOSE BLD-MCNC: 215 MG/DL (ref 70–99)
GLUCOSE BLD-MCNC: 223 MG/DL (ref 70–99)
GLUCOSE BLD-MCNC: 229 MG/DL (ref 70–99)
GLUCOSE BLD-MCNC: 233 MG/DL (ref 70–99)
GLUCOSE BLD-MCNC: 234 MG/DL (ref 70–99)
GLUCOSE BLD-MCNC: 236 MG/DL (ref 70–99)
GLUCOSE BLD-MCNC: 248 MG/DL (ref 70–99)
GLUCOSE BLD-MCNC: 262 MG/DL (ref 70–99)
GLUCOSE BLD-MCNC: NORMAL MG/DL (ref 70–99)
GLUCOSE BLDC GLUCOMTR-MCNC: 126 MG/DL (ref 70–99)
GLUCOSE BLDC GLUCOMTR-MCNC: 133 MG/DL (ref 70–99)
GLUCOSE BLDC GLUCOMTR-MCNC: 144 MG/DL (ref 70–99)
GLUCOSE BLDC GLUCOMTR-MCNC: 161 MG/DL (ref 70–99)
GLUCOSE BLDC GLUCOMTR-MCNC: 172 MG/DL (ref 70–99)
GLUCOSE BLDC GLUCOMTR-MCNC: 172 MG/DL (ref 70–99)
GLUCOSE BLDC GLUCOMTR-MCNC: 174 MG/DL (ref 70–99)
GLUCOSE BLDC GLUCOMTR-MCNC: 174 MG/DL (ref 70–99)
GLUCOSE BLDC GLUCOMTR-MCNC: 178 MG/DL (ref 70–99)
GLUCOSE BLDC GLUCOMTR-MCNC: 182 MG/DL (ref 70–99)
GLUCOSE BLDC GLUCOMTR-MCNC: 192 MG/DL (ref 70–99)
GLUCOSE BLDC GLUCOMTR-MCNC: 69 MG/DL (ref 70–99)
GLUCOSE BLDC GLUCOMTR-MCNC: 90 MG/DL (ref 70–99)
GLUCOSE BLDC GLUCOMTR-MCNC: 91 MG/DL (ref 70–99)
GLUCOSE SERPL-MCNC: 175 MG/DL (ref 70–99)
GLUCOSE SERPL-MCNC: 192 MG/DL (ref 70–99)
GRAM STN SPEC: NORMAL
GRAM STN SPEC: NORMAL
HBA1C MFR BLD: 6 % (ref 0–5.6)
HCO3 BLD-SCNC: 19 MMOL/L (ref 21–28)
HCO3 BLD-SCNC: 20 MMOL/L (ref 21–28)
HCO3 BLD-SCNC: 21 MMOL/L (ref 21–28)
HCO3 BLD-SCNC: 22 MMOL/L (ref 21–28)
HCO3 BLD-SCNC: 22 MMOL/L (ref 21–28)
HCO3 BLD-SCNC: 23 MMOL/L (ref 21–28)
HCO3 BLD-SCNC: 24 MMOL/L (ref 21–28)
HCO3 BLD-SCNC: 25 MMOL/L (ref 21–28)
HCO3 BLD-SCNC: 25 MMOL/L (ref 21–28)
HCO3 BLD-SCNC: 26 MMOL/L (ref 21–28)
HCO3 BLD-SCNC: NORMAL MMOL/L (ref 21–28)
HCO3 BLDV-SCNC: 17 MMOL/L (ref 21–28)
HCO3 BLDV-SCNC: 22 MMOL/L (ref 21–28)
HCO3 BLDV-SCNC: 23 MMOL/L (ref 21–28)
HCO3 BLDV-SCNC: 24 MMOL/L (ref 21–28)
HCT VFR BLD AUTO: 25.4 % (ref 40–53)
HCT VFR BLD AUTO: 27.1 % (ref 40–53)
HCT VFR BLD AUTO: 27.5 % (ref 40–53)
HCT VFR BLD AUTO: 27.6 % (ref 40–53)
HGB BLD-MCNC: 10 G/DL (ref 13.3–17.7)
HGB BLD-MCNC: 10.1 G/DL (ref 13.3–17.7)
HGB BLD-MCNC: 10.2 G/DL (ref 13.3–17.7)
HGB BLD-MCNC: 10.9 G/DL (ref 13.3–17.7)
HGB BLD-MCNC: 8.1 G/DL (ref 13.3–17.7)
HGB BLD-MCNC: 8.4 G/DL (ref 13.3–17.7)
HGB BLD-MCNC: 8.5 G/DL (ref 13.3–17.7)
HGB BLD-MCNC: 8.7 G/DL (ref 13.3–17.7)
HGB BLD-MCNC: 8.8 G/DL (ref 13.3–17.7)
HGB BLD-MCNC: 8.8 G/DL (ref 13.3–17.7)
HGB BLD-MCNC: 9 G/DL (ref 13.3–17.7)
HGB BLD-MCNC: 9 G/DL (ref 13.3–17.7)
HGB BLD-MCNC: 9.2 G/DL (ref 13.3–17.7)
HGB BLD-MCNC: 9.3 G/DL (ref 13.3–17.7)
HGB BLD-MCNC: 9.4 G/DL (ref 13.3–17.7)
HGB BLD-MCNC: 9.5 G/DL (ref 13.3–17.7)
HGB BLD-MCNC: 9.9 G/DL (ref 13.3–17.7)
HGB BLD-MCNC: NORMAL G/DL (ref 13.3–17.7)
INR PPP: 0.9 (ref 0.86–1.14)
INR PPP: 0.94 (ref 0.86–1.14)
INR PPP: 0.95 (ref 0.86–1.14)
INR PPP: 0.98 (ref 0.86–1.14)
INR PPP: 1.05 (ref 0.86–1.14)
INR PPP: 1.12 (ref 0.86–1.14)
INR PPP: 1.49 (ref 0.86–1.14)
K TIME TO SPEC CLOT STRENGTH: 2.1 MIN (ref 1–3)
K TIME TO SPEC CLOT STRENGTH: 2.2 MIN (ref 1–3)
K TIME TO SPEC CLOT STRENGTH: 2.5 MIN (ref 1–3)
LACTATE BLD-SCNC: 11 MMOL/L (ref 0.7–2)
LACTATE BLD-SCNC: 11.9 MMOL/L (ref 0.7–2)
LACTATE BLD-SCNC: 13 MMOL/L (ref 0.7–2)
LACTATE BLD-SCNC: 13.1 MMOL/L (ref 0.7–2)
LACTATE BLD-SCNC: 13.1 MMOL/L (ref 0.7–2)
LACTATE BLD-SCNC: 13.2 MMOL/L (ref 0.7–2)
LACTATE BLD-SCNC: 13.5 MMOL/L (ref 0.7–2)
LACTATE BLD-SCNC: 3.3 MMOL/L (ref 0.7–2)
LACTATE BLD-SCNC: 4.1 MMOL/L (ref 0.7–2)
LACTATE BLD-SCNC: 4.8 MMOL/L (ref 0.7–2)
LACTATE BLD-SCNC: 4.8 MMOL/L (ref 0.7–2)
LACTATE BLD-SCNC: 5 MMOL/L (ref 0.7–2)
LACTATE BLD-SCNC: 5.7 MMOL/L (ref 0.7–2)
LACTATE BLD-SCNC: 7.3 MMOL/L (ref 0.7–2)
LACTATE BLD-SCNC: 7.3 MMOL/L (ref 0.7–2)
LACTATE BLD-SCNC: 7.7 MMOL/L (ref 0.7–2)
LACTATE BLD-SCNC: 7.9 MMOL/L (ref 0.7–2)
LACTATE BLD-SCNC: 8 MMOL/L (ref 0.7–2)
LACTATE BLD-SCNC: 8.7 MMOL/L (ref 0.7–2)
LACTATE BLD-SCNC: 9.6 MMOL/L (ref 0.7–2)
LACTATE BLD-SCNC: 9.7 MMOL/L (ref 0.7–2)
LY60 LYSIS AT 60 MINUTES: 0 % (ref 0–15)
LY60 LYSIS AT 60 MINUTES: 0.2 % (ref 0–15)
LY60 LYSIS AT 60 MINUTES: 3.8 % (ref 0–15)
Lab: NORMAL
MA MAXIMUM CLOT STRENGTH: 55.5 MM (ref 55–74)
MA MAXIMUM CLOT STRENGTH: 56.1 MM (ref 55–74)
MA MAXIMUM CLOT STRENGTH: 56.4 MM (ref 55–74)
MAGNESIUM SERPL-MCNC: 2.5 MG/DL (ref 1.6–2.3)
MAGNESIUM SERPL-MCNC: 2.5 MG/DL (ref 1.6–2.3)
MCH RBC QN AUTO: 31.1 PG (ref 26.5–33)
MCH RBC QN AUTO: 31.2 PG (ref 26.5–33)
MCH RBC QN AUTO: 31.6 PG (ref 26.5–33)
MCH RBC QN AUTO: 31.7 PG (ref 26.5–33)
MCHC RBC AUTO-ENTMCNC: 31.9 G/DL (ref 31.5–36.5)
MCHC RBC AUTO-ENTMCNC: 32.5 G/DL (ref 31.5–36.5)
MCHC RBC AUTO-ENTMCNC: 33.1 G/DL (ref 31.5–36.5)
MCHC RBC AUTO-ENTMCNC: 33.8 G/DL (ref 31.5–36.5)
MCV RBC AUTO: 94 FL (ref 78–100)
MCV RBC AUTO: 94 FL (ref 78–100)
MCV RBC AUTO: 96 FL (ref 78–100)
MCV RBC AUTO: 99 FL (ref 78–100)
MRSA DNA SPEC QL NAA+PROBE: NEGATIVE
NUM BPU REQUESTED: 10
NUM BPU REQUESTED: 12
NUM BPU REQUESTED: 4
O2/TOTAL GAS SETTING VFR VENT: 100 %
O2/TOTAL GAS SETTING VFR VENT: 40 %
O2/TOTAL GAS SETTING VFR VENT: 40 %
O2/TOTAL GAS SETTING VFR VENT: 50 %
O2/TOTAL GAS SETTING VFR VENT: 56 %
O2/TOTAL GAS SETTING VFR VENT: 56 %
O2/TOTAL GAS SETTING VFR VENT: 57 %
O2/TOTAL GAS SETTING VFR VENT: 60 %
O2/TOTAL GAS SETTING VFR VENT: 60 %
O2/TOTAL GAS SETTING VFR VENT: 70 %
O2/TOTAL GAS SETTING VFR VENT: 80 %
O2/TOTAL GAS SETTING VFR VENT: NORMAL %
OXYHGB MFR BLD: 97 % (ref 92–100)
OXYHGB MFR BLDV: 54 %
OXYHGB MFR BLDV: 67 %
OXYHGB MFR BLDV: 69 %
PCO2 BLD: 35 MM HG (ref 35–45)
PCO2 BLD: 36 MM HG (ref 35–45)
PCO2 BLD: 37 MM HG (ref 35–45)
PCO2 BLD: 38 MM HG (ref 35–45)
PCO2 BLD: 39 MM HG (ref 35–45)
PCO2 BLD: 39 MM HG (ref 35–45)
PCO2 BLD: 40 MM HG (ref 35–45)
PCO2 BLD: 40 MM HG (ref 35–45)
PCO2 BLD: 41 MM HG (ref 35–45)
PCO2 BLD: 41 MM HG (ref 35–45)
PCO2 BLD: 43 MM HG (ref 35–45)
PCO2 BLD: 44 MM HG (ref 35–45)
PCO2 BLD: 48 MM HG (ref 35–45)
PCO2 BLD: 53 MM HG (ref 35–45)
PCO2 BLD: NORMAL MM HG (ref 35–45)
PCO2 BLDV: 34 MM HG (ref 40–50)
PCO2 BLDV: 42 MM HG (ref 40–50)
PCO2 BLDV: 51 MM HG (ref 40–50)
PCO2 BLDV: 55 MM HG (ref 40–50)
PH BLD: 7.21 PH (ref 7.35–7.45)
PH BLD: 7.23 PH (ref 7.35–7.45)
PH BLD: 7.28 PH (ref 7.35–7.45)
PH BLD: 7.31 PH (ref 7.35–7.45)
PH BLD: 7.32 PH (ref 7.35–7.45)
PH BLD: 7.33 PH (ref 7.35–7.45)
PH BLD: 7.34 PH (ref 7.35–7.45)
PH BLD: 7.35 PH (ref 7.35–7.45)
PH BLD: 7.35 PH (ref 7.35–7.45)
PH BLD: 7.36 PH (ref 7.35–7.45)
PH BLD: 7.39 PH (ref 7.35–7.45)
PH BLD: 7.42 PH (ref 7.35–7.45)
PH BLD: 7.42 PH (ref 7.35–7.45)
PH BLD: 7.43 PH (ref 7.35–7.45)
PH BLD: NORMAL PH (ref 7.35–7.45)
PH BLDV: 7.21 PH (ref 7.32–7.43)
PH BLDV: 7.25 PH (ref 7.32–7.43)
PH BLDV: 7.32 PH (ref 7.32–7.43)
PH BLDV: 7.37 PH (ref 7.32–7.43)
PHOSPHATE SERPL-MCNC: 1 MG/DL (ref 2.5–4.5)
PLATELET # BLD AUTO: 114 10E9/L (ref 150–450)
PLATELET # BLD AUTO: 116 10E9/L (ref 150–450)
PLATELET # BLD AUTO: 138 10E9/L (ref 150–450)
PLATELET # BLD AUTO: 143 10E9/L (ref 150–450)
PLATELET # BLD AUTO: 145 10E9/L (ref 150–450)
PLATELET # BLD AUTO: 161 10E9/L (ref 150–450)
PLATELET # BLD AUTO: 177 10E9/L (ref 150–450)
PO2 BLD: 107 MM HG (ref 80–105)
PO2 BLD: 136 MM HG (ref 80–105)
PO2 BLD: 142 MM HG (ref 80–105)
PO2 BLD: 148 MM HG (ref 80–105)
PO2 BLD: 162 MM HG (ref 80–105)
PO2 BLD: 178 MM HG (ref 80–105)
PO2 BLD: 190 MM HG (ref 80–105)
PO2 BLD: 211 MM HG (ref 80–105)
PO2 BLD: 217 MM HG (ref 80–105)
PO2 BLD: 274 MM HG (ref 80–105)
PO2 BLD: 302 MM HG (ref 80–105)
PO2 BLD: 302 MM HG (ref 80–105)
PO2 BLD: 309 MM HG (ref 80–105)
PO2 BLD: 354 MM HG (ref 80–105)
PO2 BLD: 387 MM HG (ref 80–105)
PO2 BLD: 442 MM HG (ref 80–105)
PO2 BLD: 493 MM HG (ref 80–105)
PO2 BLD: 526 MM HG (ref 80–105)
PO2 BLD: NORMAL MM HG (ref 80–105)
PO2 BLDV: 29 MM HG (ref 25–47)
PO2 BLDV: 40 MM HG (ref 25–47)
PO2 BLDV: 41 MM HG (ref 25–47)
PO2 BLDV: 48 MM HG (ref 25–47)
POTASSIUM BLD-SCNC: 2.4 MMOL/L (ref 3.4–5.3)
POTASSIUM BLD-SCNC: 2.8 MMOL/L (ref 3.4–5.3)
POTASSIUM BLD-SCNC: 2.9 MMOL/L (ref 3.4–5.3)
POTASSIUM BLD-SCNC: 3.1 MMOL/L (ref 3.4–5.3)
POTASSIUM BLD-SCNC: 3.2 MMOL/L (ref 3.4–5.3)
POTASSIUM BLD-SCNC: 3.6 MMOL/L (ref 3.4–5.3)
POTASSIUM BLD-SCNC: 3.8 MMOL/L (ref 3.4–5.3)
POTASSIUM BLD-SCNC: 4.2 MMOL/L (ref 3.4–5.3)
POTASSIUM BLD-SCNC: 4.3 MMOL/L (ref 3.4–5.3)
POTASSIUM BLD-SCNC: 4.4 MMOL/L (ref 3.4–5.3)
POTASSIUM BLD-SCNC: 4.8 MMOL/L (ref 3.4–5.3)
POTASSIUM BLD-SCNC: 5.4 MMOL/L (ref 3.4–5.3)
POTASSIUM BLD-SCNC: 5.9 MMOL/L (ref 3.4–5.3)
POTASSIUM BLD-SCNC: 6.1 MMOL/L (ref 3.4–5.3)
POTASSIUM BLD-SCNC: 6.6 MMOL/L (ref 3.4–5.3)
POTASSIUM BLD-SCNC: NORMAL MMOL/L (ref 3.4–5.3)
POTASSIUM SERPL-SCNC: 2.9 MMOL/L (ref 3.4–5.3)
POTASSIUM SERPL-SCNC: 3 MMOL/L (ref 3.4–5.3)
POTASSIUM SERPL-SCNC: 4 MMOL/L (ref 3.4–5.3)
PROT SERPL-MCNC: 5.1 G/DL (ref 6.8–8.8)
R TIME UNTIL CLOT FORMS: 5.8 MIN (ref 4–9)
R TIME UNTIL CLOT FORMS: 5.8 MIN (ref 4–9)
R TIME UNTIL CLOT FORMS: 6.4 MIN (ref 4–9)
RBC # BLD AUTO: 2.7 10E12/L (ref 4.4–5.9)
RBC # BLD AUTO: 2.78 10E12/L (ref 4.4–5.9)
RBC # BLD AUTO: 2.82 10E12/L (ref 4.4–5.9)
RBC # BLD AUTO: 2.94 10E12/L (ref 4.4–5.9)
SODIUM BLD-SCNC: 137 MMOL/L (ref 133–144)
SODIUM BLD-SCNC: 138 MMOL/L (ref 133–144)
SODIUM BLD-SCNC: 139 MMOL/L (ref 133–144)
SODIUM BLD-SCNC: 140 MMOL/L (ref 133–144)
SODIUM BLD-SCNC: 142 MMOL/L (ref 133–144)
SODIUM BLD-SCNC: 143 MMOL/L (ref 133–144)
SODIUM BLD-SCNC: 144 MMOL/L (ref 133–144)
SODIUM BLD-SCNC: 147 MMOL/L (ref 133–144)
SODIUM BLD-SCNC: 147 MMOL/L (ref 133–144)
SODIUM BLD-SCNC: 149 MMOL/L (ref 133–144)
SODIUM BLD-SCNC: 150 MMOL/L (ref 133–144)
SODIUM BLD-SCNC: NORMAL MMOL/L (ref 133–144)
SODIUM SERPL-SCNC: 149 MMOL/L (ref 133–144)
SODIUM SERPL-SCNC: 150 MMOL/L (ref 133–144)
SPECIMEN SOURCE: NORMAL
SPECIMEN SOURCE: NORMAL
TRANSFUSION RXN BLOOD BANK NOTIFICATION: NORMAL
TRANSFUSION STATUS PATIENT QL: NORMAL
WBC # BLD AUTO: 10.4 10E9/L (ref 4–11)
WBC # BLD AUTO: 12.6 10E9/L (ref 4–11)
WBC # BLD AUTO: 16.4 10E9/L (ref 4–11)
WBC # BLD AUTO: 9 10E9/L (ref 4–11)

## 2020-02-10 PROCEDURE — P9041 ALBUMIN (HUMAN),5%, 50ML: HCPCS | Performed by: NURSE ANESTHETIST, CERTIFIED REGISTERED

## 2020-02-10 PROCEDURE — 85049 AUTOMATED PLATELET COUNT: CPT | Performed by: ANESTHESIOLOGY

## 2020-02-10 PROCEDURE — 85396 CLOTTING ASSAY WHOLE BLOOD: CPT

## 2020-02-10 PROCEDURE — 82803 BLOOD GASES ANY COMBINATION: CPT | Performed by: ANESTHESIOLOGY

## 2020-02-10 PROCEDURE — P9037 PLATE PHERES LEUKOREDU IRRAD: HCPCS | Performed by: EMERGENCY MEDICINE

## 2020-02-10 PROCEDURE — 99291 CRITICAL CARE FIRST HOUR: CPT | Mod: GC | Performed by: ANESTHESIOLOGY

## 2020-02-10 PROCEDURE — 25500064 ZZH RX 255 OP 636: Performed by: INTERNAL MEDICINE

## 2020-02-10 PROCEDURE — 25000125 ZZHC RX 250: Performed by: EMERGENCY MEDICINE

## 2020-02-10 PROCEDURE — 40000341 ZZHCL STATISTIC BB TRANSF RXN INVEST: Performed by: THORACIC SURGERY (CARDIOTHORACIC VASCULAR SURGERY)

## 2020-02-10 PROCEDURE — 93005 ELECTROCARDIOGRAM TRACING: CPT

## 2020-02-10 PROCEDURE — 84295 ASSAY OF SERUM SODIUM: CPT | Performed by: ANESTHESIOLOGY

## 2020-02-10 PROCEDURE — 82330 ASSAY OF CALCIUM: CPT | Performed by: ANESTHESIOLOGY

## 2020-02-10 PROCEDURE — 25800030 ZZH RX IP 258 OP 636: Performed by: EMERGENCY MEDICINE

## 2020-02-10 PROCEDURE — 93325 DOPPLER ECHO COLOR FLOW MAPG: CPT | Mod: 26 | Performed by: INTERNAL MEDICINE

## 2020-02-10 PROCEDURE — 25000125 ZZHC RX 250: Performed by: STUDENT IN AN ORGANIZED HEALTH CARE EDUCATION/TRAINING PROGRAM

## 2020-02-10 PROCEDURE — 94640 AIRWAY INHALATION TREATMENT: CPT

## 2020-02-10 PROCEDURE — 25000128 H RX IP 250 OP 636: Performed by: STUDENT IN AN ORGANIZED HEALTH CARE EDUCATION/TRAINING PROGRAM

## 2020-02-10 PROCEDURE — 83036 HEMOGLOBIN GLYCOSYLATED A1C: CPT | Performed by: ANESTHESIOLOGY

## 2020-02-10 PROCEDURE — 25800030 ZZH RX IP 258 OP 636: Performed by: PHYSICIAN ASSISTANT

## 2020-02-10 PROCEDURE — 85610 PROTHROMBIN TIME: CPT | Performed by: STUDENT IN AN ORGANIZED HEALTH CARE EDUCATION/TRAINING PROGRAM

## 2020-02-10 PROCEDURE — 25000131 ZZH RX MED GY IP 250 OP 636 PS 637: Performed by: STUDENT IN AN ORGANIZED HEALTH CARE EDUCATION/TRAINING PROGRAM

## 2020-02-10 PROCEDURE — C9113 INJ PANTOPRAZOLE SODIUM, VIA: HCPCS | Performed by: PHYSICIAN ASSISTANT

## 2020-02-10 PROCEDURE — 25000125 ZZHC RX 250: Performed by: PHYSICIAN ASSISTANT

## 2020-02-10 PROCEDURE — 40000264 ECHOCARDIOGRAM LIMITED

## 2020-02-10 PROCEDURE — 93010 ELECTROCARDIOGRAM REPORT: CPT | Mod: 76 | Performed by: INTERNAL MEDICINE

## 2020-02-10 PROCEDURE — 83605 ASSAY OF LACTIC ACID: CPT | Performed by: ANESTHESIOLOGY

## 2020-02-10 PROCEDURE — 25000555 ZZHC RX FACTOR IP 250 OP 636: Performed by: THORACIC SURGERY (CARDIOTHORACIC VASCULAR SURGERY)

## 2020-02-10 PROCEDURE — 88305 TISSUE EXAM BY PATHOLOGIST: CPT | Performed by: THORACIC SURGERY (CARDIOTHORACIC VASCULAR SURGERY)

## 2020-02-10 PROCEDURE — 94002 VENT MGMT INPAT INIT DAY: CPT

## 2020-02-10 PROCEDURE — P9012 CRYOPRECIPITATE EACH UNIT: HCPCS | Performed by: EMERGENCY MEDICINE

## 2020-02-10 PROCEDURE — 93321 DOPPLER ECHO F-UP/LMTD STD: CPT | Mod: 26 | Performed by: INTERNAL MEDICINE

## 2020-02-10 PROCEDURE — 25000125 ZZHC RX 250: Performed by: THORACIC SURGERY (CARDIOTHORACIC VASCULAR SURGERY)

## 2020-02-10 PROCEDURE — 25000555 ZZHC RX FACTOR IP 250 OP 636: Performed by: ANESTHESIOLOGY

## 2020-02-10 PROCEDURE — 25000128 H RX IP 250 OP 636: Performed by: ANESTHESIOLOGY

## 2020-02-10 PROCEDURE — 85384 FIBRINOGEN ACTIVITY: CPT

## 2020-02-10 PROCEDURE — 27210995 ZZH RX 272: Performed by: THORACIC SURGERY (CARDIOTHORACIC VASCULAR SURGERY)

## 2020-02-10 PROCEDURE — 83735 ASSAY OF MAGNESIUM: CPT | Performed by: THORACIC SURGERY (CARDIOTHORACIC VASCULAR SURGERY)

## 2020-02-10 PROCEDURE — 84132 ASSAY OF SERUM POTASSIUM: CPT

## 2020-02-10 PROCEDURE — 82330 ASSAY OF CALCIUM: CPT

## 2020-02-10 PROCEDURE — 25800030 ZZH RX IP 258 OP 636: Performed by: STUDENT IN AN ORGANIZED HEALTH CARE EDUCATION/TRAINING PROGRAM

## 2020-02-10 PROCEDURE — P9059 PLASMA, FRZ BETWEEN 8-24HOUR: HCPCS | Performed by: EMERGENCY MEDICINE

## 2020-02-10 PROCEDURE — 82330 ASSAY OF CALCIUM: CPT | Performed by: THORACIC SURGERY (CARDIOTHORACIC VASCULAR SURGERY)

## 2020-02-10 PROCEDURE — 87641 MR-STAPH DNA AMP PROBE: CPT | Performed by: THORACIC SURGERY (CARDIOTHORACIC VASCULAR SURGERY)

## 2020-02-10 PROCEDURE — 84100 ASSAY OF PHOSPHORUS: CPT | Performed by: PHYSICIAN ASSISTANT

## 2020-02-10 PROCEDURE — 85384 FIBRINOGEN ACTIVITY: CPT | Performed by: ANESTHESIOLOGY

## 2020-02-10 PROCEDURE — 85730 THROMBOPLASTIN TIME PARTIAL: CPT | Performed by: PHYSICIAN ASSISTANT

## 2020-02-10 PROCEDURE — 25000125 ZZHC RX 250: Performed by: NURSE ANESTHETIST, CERTIFIED REGISTERED

## 2020-02-10 PROCEDURE — 83605 ASSAY OF LACTIC ACID: CPT | Performed by: STUDENT IN AN ORGANIZED HEALTH CARE EDUCATION/TRAINING PROGRAM

## 2020-02-10 PROCEDURE — 85730 THROMBOPLASTIN TIME PARTIAL: CPT

## 2020-02-10 PROCEDURE — 85610 PROTHROMBIN TIME: CPT | Performed by: ANESTHESIOLOGY

## 2020-02-10 PROCEDURE — 84132 ASSAY OF SERUM POTASSIUM: CPT | Performed by: THORACIC SURGERY (CARDIOTHORACIC VASCULAR SURGERY)

## 2020-02-10 PROCEDURE — 25000128 H RX IP 250 OP 636

## 2020-02-10 PROCEDURE — 80053 COMPREHEN METABOLIC PANEL: CPT | Performed by: PHYSICIAN ASSISTANT

## 2020-02-10 PROCEDURE — 25000128 H RX IP 250 OP 636: Performed by: THORACIC SURGERY (CARDIOTHORACIC VASCULAR SURGERY)

## 2020-02-10 PROCEDURE — 85730 THROMBOPLASTIN TIME PARTIAL: CPT | Performed by: ANESTHESIOLOGY

## 2020-02-10 PROCEDURE — 80048 BASIC METABOLIC PNL TOTAL CA: CPT | Performed by: THORACIC SURGERY (CARDIOTHORACIC VASCULAR SURGERY)

## 2020-02-10 PROCEDURE — 00000146 ZZHCL STATISTIC GLUCOSE BY METER IP

## 2020-02-10 PROCEDURE — P9016 RBC LEUKOCYTES REDUCED: HCPCS | Performed by: EMERGENCY MEDICINE

## 2020-02-10 PROCEDURE — 94799 UNLISTED PULMONARY SVC/PX: CPT

## 2020-02-10 PROCEDURE — 82805 BLOOD GASES W/O2 SATURATION: CPT | Performed by: STUDENT IN AN ORGANIZED HEALTH CARE EDUCATION/TRAINING PROGRAM

## 2020-02-10 PROCEDURE — 40000986 XR ABDOMEN PORT 1 VW

## 2020-02-10 PROCEDURE — 85027 COMPLETE CBC AUTOMATED: CPT | Performed by: THORACIC SURGERY (CARDIOTHORACIC VASCULAR SURGERY)

## 2020-02-10 PROCEDURE — 5A1955Z RESPIRATORY VENTILATION, GREATER THAN 96 CONSECUTIVE HOURS: ICD-10-PCS | Performed by: THORACIC SURGERY (CARDIOTHORACIC VASCULAR SURGERY)

## 2020-02-10 PROCEDURE — 85027 COMPLETE CBC AUTOMATED: CPT | Performed by: STUDENT IN AN ORGANIZED HEALTH CARE EDUCATION/TRAINING PROGRAM

## 2020-02-10 PROCEDURE — 83605 ASSAY OF LACTIC ACID: CPT | Performed by: PHYSICIAN ASSISTANT

## 2020-02-10 PROCEDURE — 82330 ASSAY OF CALCIUM: CPT | Performed by: STUDENT IN AN ORGANIZED HEALTH CARE EDUCATION/TRAINING PROGRAM

## 2020-02-10 PROCEDURE — 82803 BLOOD GASES ANY COMBINATION: CPT

## 2020-02-10 PROCEDURE — 40000275 ZZH STATISTIC RCP TIME EA 10 MIN

## 2020-02-10 PROCEDURE — 84132 ASSAY OF SERUM POTASSIUM: CPT | Performed by: PHYSICIAN ASSISTANT

## 2020-02-10 PROCEDURE — 25800025 ZZH RX 258: Performed by: STUDENT IN AN ORGANIZED HEALTH CARE EDUCATION/TRAINING PROGRAM

## 2020-02-10 PROCEDURE — P9041 ALBUMIN (HUMAN),5%, 50ML: HCPCS | Performed by: PHYSICIAN ASSISTANT

## 2020-02-10 PROCEDURE — 40000344 ZZHCL STATISTIC THAWING COMPONENT: Performed by: EMERGENCY MEDICINE

## 2020-02-10 PROCEDURE — 87640 STAPH A DNA AMP PROBE: CPT | Performed by: THORACIC SURGERY (CARDIOTHORACIC VASCULAR SURGERY)

## 2020-02-10 PROCEDURE — 25800030 ZZH RX IP 258 OP 636: Performed by: THORACIC SURGERY (CARDIOTHORACIC VASCULAR SURGERY)

## 2020-02-10 PROCEDURE — 85610 PROTHROMBIN TIME: CPT

## 2020-02-10 PROCEDURE — 25000128 H RX IP 250 OP 636: Performed by: EMERGENCY MEDICINE

## 2020-02-10 PROCEDURE — 85610 PROTHROMBIN TIME: CPT | Performed by: PHYSICIAN ASSISTANT

## 2020-02-10 PROCEDURE — 40000986 XR CHEST PORT 1 VW

## 2020-02-10 PROCEDURE — 82947 ASSAY GLUCOSE BLOOD QUANT: CPT

## 2020-02-10 PROCEDURE — 85027 COMPLETE CBC AUTOMATED: CPT

## 2020-02-10 PROCEDURE — 93308 TTE F-UP OR LMTD: CPT | Mod: 26 | Performed by: INTERNAL MEDICINE

## 2020-02-10 PROCEDURE — 20000004 ZZH R&B ICU UMMC

## 2020-02-10 PROCEDURE — 25800025 ZZH RX 258: Performed by: PHYSICIAN ASSISTANT

## 2020-02-10 PROCEDURE — 82330 ASSAY OF CALCIUM: CPT | Performed by: PHYSICIAN ASSISTANT

## 2020-02-10 PROCEDURE — 25000128 H RX IP 250 OP 636: Performed by: PHYSICIAN ASSISTANT

## 2020-02-10 PROCEDURE — 94640 AIRWAY INHALATION TREATMENT: CPT | Mod: 76

## 2020-02-10 PROCEDURE — 25000128 H RX IP 250 OP 636: Performed by: NURSE ANESTHETIST, CERTIFIED REGISTERED

## 2020-02-10 PROCEDURE — 25800030 ZZH RX IP 258 OP 636: Performed by: ANESTHESIOLOGY

## 2020-02-10 PROCEDURE — 83605 ASSAY OF LACTIC ACID: CPT

## 2020-02-10 PROCEDURE — 83735 ASSAY OF MAGNESIUM: CPT | Performed by: PHYSICIAN ASSISTANT

## 2020-02-10 PROCEDURE — 83605 ASSAY OF LACTIC ACID: CPT | Performed by: THORACIC SURGERY (CARDIOTHORACIC VASCULAR SURGERY)

## 2020-02-10 PROCEDURE — 82805 BLOOD GASES W/O2 SATURATION: CPT | Performed by: PHYSICIAN ASSISTANT

## 2020-02-10 PROCEDURE — 84132 ASSAY OF SERUM POTASSIUM: CPT | Performed by: ANESTHESIOLOGY

## 2020-02-10 PROCEDURE — 82947 ASSAY GLUCOSE BLOOD QUANT: CPT | Performed by: ANESTHESIOLOGY

## 2020-02-10 PROCEDURE — 25000132 ZZH RX MED GY IP 250 OP 250 PS 637: Performed by: PHYSICIAN ASSISTANT

## 2020-02-10 PROCEDURE — 84295 ASSAY OF SERUM SODIUM: CPT

## 2020-02-10 PROCEDURE — 85027 COMPLETE CBC AUTOMATED: CPT | Performed by: PHYSICIAN ASSISTANT

## 2020-02-10 DEVICE — GRAFT PTFE FELT 4X4": Type: IMPLANTABLE DEVICE | Site: AORTA | Status: FUNCTIONAL

## 2020-02-10 DEVICE — SU DEVICE ENDO COR KNOT QUICK LOAD RELOAD 030874: Type: IMPLANTABLE DEVICE | Site: AORTA | Status: FUNCTIONAL

## 2020-02-10 DEVICE — SU DEVICE COR-KNOT MINI 4X14MM 031350: Type: IMPLANTABLE DEVICE | Site: AORTA | Status: FUNCTIONAL

## 2020-02-10 RX ORDER — MILRINONE LACTATE 0.2 MG/ML
0.12 INJECTION, SOLUTION INTRAVENOUS CONTINUOUS
Status: DISCONTINUED | OUTPATIENT
Start: 2020-02-10 | End: 2020-02-11

## 2020-02-10 RX ORDER — ALBUTEROL SULFATE 0.83 MG/ML
2.5 SOLUTION RESPIRATORY (INHALATION) EVERY 4 HOURS
Status: DISCONTINUED | OUTPATIENT
Start: 2020-02-10 | End: 2020-02-10

## 2020-02-10 RX ORDER — DEXTROSE MONOHYDRATE, SODIUM CHLORIDE, AND POTASSIUM CHLORIDE 50; 1.49; 4.5 G/1000ML; G/1000ML; G/1000ML
INJECTION, SOLUTION INTRAVENOUS CONTINUOUS
Status: DISCONTINUED | OUTPATIENT
Start: 2020-02-10 | End: 2020-02-13

## 2020-02-10 RX ORDER — CEFUROXIME SODIUM 1.5 G/16ML
INJECTION, POWDER, FOR SOLUTION INTRAVENOUS PRN
Status: DISCONTINUED | OUTPATIENT
Start: 2020-02-09 | End: 2020-02-10

## 2020-02-10 RX ORDER — PROPOFOL 10 MG/ML
5-75 INJECTION, EMULSION INTRAVENOUS CONTINUOUS
Status: DISCONTINUED | OUTPATIENT
Start: 2020-02-10 | End: 2020-02-10

## 2020-02-10 RX ORDER — PROPOFOL 10 MG/ML
10-20 INJECTION, EMULSION INTRAVENOUS EVERY 30 MIN PRN
Status: DISCONTINUED | OUTPATIENT
Start: 2020-02-10 | End: 2020-02-10

## 2020-02-10 RX ORDER — CALCIUM CHLORIDE 100 MG/ML
INJECTION INTRAVENOUS; INTRAVENTRICULAR PRN
Status: DISCONTINUED | OUTPATIENT
Start: 2020-02-10 | End: 2020-02-10

## 2020-02-10 RX ORDER — ONDANSETRON 4 MG/1
4 TABLET, ORALLY DISINTEGRATING ORAL EVERY 6 HOURS PRN
Status: DISCONTINUED | OUTPATIENT
Start: 2020-02-10 | End: 2020-03-05 | Stop reason: HOSPADM

## 2020-02-10 RX ORDER — VANCOMYCIN HYDROCHLORIDE 1 G/200ML
INJECTION, SOLUTION INTRAVENOUS PRN
Status: DISCONTINUED | OUTPATIENT
Start: 2020-02-09 | End: 2020-02-10

## 2020-02-10 RX ORDER — MUPIROCIN 20 MG/G
0.5 OINTMENT TOPICAL 2 TIMES DAILY
Status: DISCONTINUED | OUTPATIENT
Start: 2020-02-10 | End: 2020-02-12 | Stop reason: CLARIF

## 2020-02-10 RX ORDER — SODIUM CHLORIDE, SODIUM GLUCONATE, SODIUM ACETATE, POTASSIUM CHLORIDE AND MAGNESIUM CHLORIDE 526; 502; 368; 37; 30 MG/100ML; MG/100ML; MG/100ML; MG/100ML; MG/100ML
1000 INJECTION, SOLUTION INTRAVENOUS ONCE
Status: COMPLETED | OUTPATIENT
Start: 2020-02-10 | End: 2020-02-10

## 2020-02-10 RX ORDER — NALOXONE HYDROCHLORIDE 0.4 MG/ML
.1-.4 INJECTION, SOLUTION INTRAMUSCULAR; INTRAVENOUS; SUBCUTANEOUS
Status: DISCONTINUED | OUTPATIENT
Start: 2020-02-10 | End: 2020-02-12

## 2020-02-10 RX ORDER — CEFAZOLIN SODIUM 2 G/100ML
2 INJECTION, SOLUTION INTRAVENOUS EVERY 8 HOURS
Status: COMPLETED | OUTPATIENT
Start: 2020-02-10 | End: 2020-02-11

## 2020-02-10 RX ORDER — HEPARIN SODIUM 5000 [USP'U]/.5ML
INJECTION, SOLUTION INTRAVENOUS; SUBCUTANEOUS PRN
Status: DISCONTINUED | OUTPATIENT
Start: 2020-02-10 | End: 2020-02-10

## 2020-02-10 RX ORDER — ALBUTEROL SULFATE 90 UG/1
6 AEROSOL, METERED RESPIRATORY (INHALATION) EVERY 4 HOURS
Status: DISCONTINUED | OUTPATIENT
Start: 2020-02-10 | End: 2020-02-16

## 2020-02-10 RX ORDER — POTASSIUM CHLORIDE 750 MG/1
20-40 TABLET, EXTENDED RELEASE ORAL
Status: DISCONTINUED | OUTPATIENT
Start: 2020-02-10 | End: 2020-03-05 | Stop reason: HOSPADM

## 2020-02-10 RX ORDER — MEPERIDINE HYDROCHLORIDE 25 MG/ML
12.5-25 INJECTION INTRAMUSCULAR; INTRAVENOUS; SUBCUTANEOUS
Status: DISCONTINUED | OUTPATIENT
Start: 2020-02-10 | End: 2020-02-10

## 2020-02-10 RX ORDER — OXYCODONE HYDROCHLORIDE 5 MG/1
5-10 TABLET ORAL EVERY 4 HOURS PRN
Status: DISCONTINUED | OUTPATIENT
Start: 2020-02-10 | End: 2020-02-11

## 2020-02-10 RX ORDER — METHOCARBAMOL 500 MG/1
500 TABLET, FILM COATED ORAL 4 TIMES DAILY PRN
Status: DISCONTINUED | OUTPATIENT
Start: 2020-02-10 | End: 2020-02-11

## 2020-02-10 RX ORDER — ALBUMIN, HUMAN INJ 5% 5 %
SOLUTION INTRAVENOUS CONTINUOUS PRN
Status: DISCONTINUED | OUTPATIENT
Start: 2020-02-10 | End: 2020-02-10

## 2020-02-10 RX ORDER — POTASSIUM CHLORIDE 29.8 MG/ML
20 INJECTION INTRAVENOUS
Status: DISCONTINUED | OUTPATIENT
Start: 2020-02-10 | End: 2020-03-05 | Stop reason: HOSPADM

## 2020-02-10 RX ORDER — MEPERIDINE HYDROCHLORIDE 25 MG/ML
25 INJECTION INTRAMUSCULAR; INTRAVENOUS; SUBCUTANEOUS
Status: DISCONTINUED | OUTPATIENT
Start: 2020-02-10 | End: 2020-02-13 | Stop reason: CLARIF

## 2020-02-10 RX ORDER — LIDOCAINE 40 MG/G
CREAM TOPICAL
Status: DISCONTINUED | OUTPATIENT
Start: 2020-02-10 | End: 2020-03-05 | Stop reason: HOSPADM

## 2020-02-10 RX ORDER — PROPOFOL 10 MG/ML
10-20 INJECTION, EMULSION INTRAVENOUS EVERY 30 MIN PRN
Status: DISCONTINUED | OUTPATIENT
Start: 2020-02-10 | End: 2020-02-14

## 2020-02-10 RX ORDER — ALBUTEROL SULFATE 0.83 MG/ML
2.5 SOLUTION RESPIRATORY (INHALATION)
Status: DISCONTINUED | OUTPATIENT
Start: 2020-02-10 | End: 2020-03-05 | Stop reason: HOSPADM

## 2020-02-10 RX ORDER — DEXTROSE MONOHYDRATE 25 G/50ML
25-50 INJECTION, SOLUTION INTRAVENOUS
Status: DISCONTINUED | OUTPATIENT
Start: 2020-02-10 | End: 2020-02-12

## 2020-02-10 RX ORDER — ALBUMIN, HUMAN INJ 5% 5 %
SOLUTION INTRAVENOUS
Status: COMPLETED
Start: 2020-02-10 | End: 2020-02-10

## 2020-02-10 RX ORDER — POTASSIUM CHLORIDE 1.5 G/1.58G
20-40 POWDER, FOR SOLUTION ORAL
Status: DISCONTINUED | OUTPATIENT
Start: 2020-02-10 | End: 2020-03-05 | Stop reason: HOSPADM

## 2020-02-10 RX ORDER — ACETAMINOPHEN 325 MG/1
975 TABLET ORAL EVERY 8 HOURS
Status: DISCONTINUED | OUTPATIENT
Start: 2020-02-10 | End: 2020-02-11

## 2020-02-10 RX ORDER — POTASSIUM CHLORIDE 7.45 MG/ML
10 INJECTION INTRAVENOUS
Status: DISCONTINUED | OUTPATIENT
Start: 2020-02-10 | End: 2020-03-05 | Stop reason: HOSPADM

## 2020-02-10 RX ORDER — AMOXICILLIN 250 MG
1 CAPSULE ORAL 2 TIMES DAILY
Status: DISCONTINUED | OUTPATIENT
Start: 2020-02-10 | End: 2020-02-11

## 2020-02-10 RX ORDER — ACETAMINOPHEN 325 MG/1
650 TABLET ORAL EVERY 4 HOURS PRN
Status: DISCONTINUED | OUTPATIENT
Start: 2020-02-13 | End: 2020-02-11

## 2020-02-10 RX ORDER — HYDRALAZINE HYDROCHLORIDE 20 MG/ML
10 INJECTION INTRAMUSCULAR; INTRAVENOUS EVERY 30 MIN PRN
Status: DISCONTINUED | OUTPATIENT
Start: 2020-02-10 | End: 2020-02-25

## 2020-02-10 RX ORDER — DIPHENHYDRAMINE HYDROCHLORIDE 50 MG/ML
50 INJECTION INTRAMUSCULAR; INTRAVENOUS EVERY 6 HOURS PRN
Status: DISCONTINUED | OUTPATIENT
Start: 2020-02-10 | End: 2020-03-05 | Stop reason: HOSPADM

## 2020-02-10 RX ORDER — DEXTROSE MONOHYDRATE 25 G/50ML
INJECTION, SOLUTION INTRAVENOUS PRN
Status: DISCONTINUED | OUTPATIENT
Start: 2020-02-10 | End: 2020-02-10

## 2020-02-10 RX ORDER — PROTAMINE SULFATE 10 MG/ML
INJECTION, SOLUTION INTRAVENOUS PRN
Status: DISCONTINUED | OUTPATIENT
Start: 2020-02-10 | End: 2020-02-10

## 2020-02-10 RX ORDER — PROCHLORPERAZINE MALEATE 5 MG
5 TABLET ORAL EVERY 6 HOURS PRN
Status: DISCONTINUED | OUTPATIENT
Start: 2020-02-10 | End: 2020-03-05 | Stop reason: HOSPADM

## 2020-02-10 RX ORDER — ONDANSETRON 2 MG/ML
4 INJECTION INTRAMUSCULAR; INTRAVENOUS EVERY 6 HOURS PRN
Status: DISCONTINUED | OUTPATIENT
Start: 2020-02-10 | End: 2020-03-05 | Stop reason: HOSPADM

## 2020-02-10 RX ORDER — PROPOFOL 10 MG/ML
INJECTION, EMULSION INTRAVENOUS CONTINUOUS PRN
Status: DISCONTINUED | OUTPATIENT
Start: 2020-02-10 | End: 2020-02-10

## 2020-02-10 RX ORDER — DEXTROSE MONOHYDRATE 100 MG/ML
INJECTION, SOLUTION INTRAVENOUS CONTINUOUS PRN
Status: DISCONTINUED | OUTPATIENT
Start: 2020-02-10 | End: 2020-02-12

## 2020-02-10 RX ORDER — ALBUMIN, HUMAN INJ 5% 5 %
500-1000 SOLUTION INTRAVENOUS
Status: COMPLETED | OUTPATIENT
Start: 2020-02-10 | End: 2020-02-10

## 2020-02-10 RX ORDER — NALOXONE HYDROCHLORIDE 0.4 MG/ML
.1-.4 INJECTION, SOLUTION INTRAMUSCULAR; INTRAVENOUS; SUBCUTANEOUS
Status: DISCONTINUED | OUTPATIENT
Start: 2020-02-10 | End: 2020-03-05 | Stop reason: HOSPADM

## 2020-02-10 RX ORDER — LIDOCAINE 4 G/G
1-3 PATCH TOPICAL
Status: DISCONTINUED | OUTPATIENT
Start: 2020-02-10 | End: 2020-03-05 | Stop reason: HOSPADM

## 2020-02-10 RX ORDER — MAGNESIUM SULFATE HEPTAHYDRATE 40 MG/ML
4 INJECTION, SOLUTION INTRAVENOUS EVERY 4 HOURS PRN
Status: DISCONTINUED | OUTPATIENT
Start: 2020-02-10 | End: 2020-03-05 | Stop reason: HOSPADM

## 2020-02-10 RX ORDER — HYDROMORPHONE HYDROCHLORIDE 1 MG/ML
.3-.5 INJECTION, SOLUTION INTRAMUSCULAR; INTRAVENOUS; SUBCUTANEOUS
Status: DISCONTINUED | OUTPATIENT
Start: 2020-02-10 | End: 2020-03-05

## 2020-02-10 RX ORDER — MILRINONE LACTATE 0.2 MG/ML
INJECTION, SOLUTION INTRAVENOUS CONTINUOUS PRN
Status: DISCONTINUED | OUTPATIENT
Start: 2020-02-10 | End: 2020-02-10

## 2020-02-10 RX ORDER — NITROGLYCERIN 20 MG/100ML
INJECTION INTRAVENOUS CONTINUOUS PRN
Status: DISCONTINUED | OUTPATIENT
Start: 2020-02-10 | End: 2020-02-10

## 2020-02-10 RX ORDER — NITROGLYCERIN 10 MG/100ML
INJECTION INTRAVENOUS PRN
Status: DISCONTINUED | OUTPATIENT
Start: 2020-02-10 | End: 2020-02-10

## 2020-02-10 RX ORDER — POTASSIUM CHLORIDE 29.8 MG/ML
INJECTION INTRAVENOUS PRN
Status: DISCONTINUED | OUTPATIENT
Start: 2020-02-10 | End: 2020-02-10

## 2020-02-10 RX ORDER — POTASSIUM CL/LIDO/0.9 % NACL 10MEQ/0.1L
10 INTRAVENOUS SOLUTION, PIGGYBACK (ML) INTRAVENOUS
Status: DISCONTINUED | OUTPATIENT
Start: 2020-02-10 | End: 2020-03-05 | Stop reason: HOSPADM

## 2020-02-10 RX ORDER — ALBUTEROL SULFATE 90 UG/1
6 AEROSOL, METERED RESPIRATORY (INHALATION) EVERY 4 HOURS PRN
Status: DISCONTINUED | OUTPATIENT
Start: 2020-02-10 | End: 2020-02-16

## 2020-02-10 RX ORDER — MAGNESIUM SULFATE HEPTAHYDRATE 40 MG/ML
2 INJECTION, SOLUTION INTRAVENOUS DAILY PRN
Status: DISCONTINUED | OUTPATIENT
Start: 2020-02-10 | End: 2020-03-05 | Stop reason: HOSPADM

## 2020-02-10 RX ORDER — DEXMEDETOMIDINE HYDROCHLORIDE 4 UG/ML
.2-.7 INJECTION, SOLUTION INTRAVENOUS CONTINUOUS
Status: DISCONTINUED | OUTPATIENT
Start: 2020-02-10 | End: 2020-02-12 | Stop reason: CLARIF

## 2020-02-10 RX ORDER — PROPOFOL 10 MG/ML
5-75 INJECTION, EMULSION INTRAVENOUS CONTINUOUS
Status: DISCONTINUED | OUTPATIENT
Start: 2020-02-10 | End: 2020-02-14

## 2020-02-10 RX ORDER — ASPIRIN 81 MG/1
81 TABLET, CHEWABLE ORAL DAILY
Status: DISCONTINUED | OUTPATIENT
Start: 2020-02-11 | End: 2020-03-05 | Stop reason: HOSPADM

## 2020-02-10 RX ORDER — AMOXICILLIN 250 MG
2 CAPSULE ORAL 2 TIMES DAILY
Status: DISCONTINUED | OUTPATIENT
Start: 2020-02-10 | End: 2020-02-11

## 2020-02-10 RX ORDER — NICOTINE POLACRILEX 4 MG
15-30 LOZENGE BUCCAL
Status: DISCONTINUED | OUTPATIENT
Start: 2020-02-10 | End: 2020-02-12

## 2020-02-10 RX ORDER — ESMOLOL HYDROCHLORIDE 20 MG/ML
INJECTION, SOLUTION INTRAVENOUS CONTINUOUS PRN
Status: DISCONTINUED | OUTPATIENT
Start: 2020-02-09 | End: 2020-02-10

## 2020-02-10 RX ORDER — SODIUM CHLORIDE, SODIUM GLUCONATE, SODIUM ACETATE, POTASSIUM CHLORIDE AND MAGNESIUM CHLORIDE 526; 502; 368; 37; 30 MG/100ML; MG/100ML; MG/100ML; MG/100ML; MG/100ML
500 INJECTION, SOLUTION INTRAVENOUS ONCE
Status: COMPLETED | OUTPATIENT
Start: 2020-02-10 | End: 2020-02-10

## 2020-02-10 RX ADMIN — DEXMEDETOMIDINE 0.2 MCG/KG/HR: 100 INJECTION, SOLUTION, CONCENTRATE INTRAVENOUS at 12:34

## 2020-02-10 RX ADMIN — DEXTROSE MONOHYDRATE 25 G: 25 INJECTION, SOLUTION INTRAVENOUS at 05:40

## 2020-02-10 RX ADMIN — ALBUMIN HUMAN 1000 ML: 0.05 INJECTION, SOLUTION INTRAVENOUS at 10:42

## 2020-02-10 RX ADMIN — POTASSIUM CHLORIDE 10 MEQ: 7.46 INJECTION, SOLUTION INTRAVENOUS at 12:01

## 2020-02-10 RX ADMIN — CALCIUM GLUCONATE 2 G: 98 INJECTION, SOLUTION INTRAVENOUS at 19:07

## 2020-02-10 RX ADMIN — AMIODARONE HYDROCHLORIDE 150 MG: 1.5 INJECTION, SOLUTION INTRAVENOUS at 11:01

## 2020-02-10 RX ADMIN — NITROGLYCERIN 50 MCG: 10 INJECTION INTRAVENOUS at 00:06

## 2020-02-10 RX ADMIN — PROPOFOL 30 MCG/KG/MIN: 10 INJECTION, EMULSION INTRAVENOUS at 08:09

## 2020-02-10 RX ADMIN — PROTAMINE SULFATE 50 MG: 10 INJECTION, SOLUTION INTRAVENOUS at 06:05

## 2020-02-10 RX ADMIN — NITROGLYCERIN 100 MCG: 10 INJECTION INTRAVENOUS at 00:11

## 2020-02-10 RX ADMIN — ROCURONIUM BROMIDE 50 MG: 10 INJECTION INTRAVENOUS at 05:07

## 2020-02-10 RX ADMIN — EPINEPHRINE 0.1 MCG/KG/MIN: 1 INJECTION PARENTERAL at 17:54

## 2020-02-10 RX ADMIN — POTASSIUM CHLORIDE 20 MEQ: 29.8 INJECTION, SOLUTION INTRAVENOUS at 21:11

## 2020-02-10 RX ADMIN — MAGNESIUM SULFATE 2 G: 2 INJECTION INTRAVENOUS at 10:43

## 2020-02-10 RX ADMIN — PROTAMINE SULFATE 50 MG: 10 INJECTION, SOLUTION INTRAVENOUS at 06:09

## 2020-02-10 RX ADMIN — POTASSIUM PHOSPHATE, MONOBASIC AND POTASSIUM PHOSPHATE, DIBASIC 25 MMOL: 224; 236 INJECTION, SOLUTION INTRAVENOUS at 12:29

## 2020-02-10 RX ADMIN — DEXMEDETOMIDINE 0.4 MCG/KG/HR: 100 INJECTION, SOLUTION, CONCENTRATE INTRAVENOUS at 20:16

## 2020-02-10 RX ADMIN — VASOPRESSIN 1 UNITS/HR: 20 INJECTION INTRAVENOUS at 02:02

## 2020-02-10 RX ADMIN — HUMAN INSULIN 2.5 UNITS/HR: 100 INJECTION, SOLUTION SUBCUTANEOUS at 01:33

## 2020-02-10 RX ADMIN — Medication 0.08 MCG/KG/MIN: at 19:28

## 2020-02-10 RX ADMIN — CEFAZOLIN SODIUM 2 G: 2 INJECTION, SOLUTION INTRAVENOUS at 16:51

## 2020-02-10 RX ADMIN — PROTAMINE SULFATE 50 MG: 10 INJECTION, SOLUTION INTRAVENOUS at 06:10

## 2020-02-10 RX ADMIN — ALBUTEROL SULFATE 6 PUFF: 90 AEROSOL, METERED RESPIRATORY (INHALATION) at 12:42

## 2020-02-10 RX ADMIN — CALCIUM CHLORIDE 500 MG: 100 INJECTION, SOLUTION INTRAVENOUS at 08:23

## 2020-02-10 RX ADMIN — AMINOCAPROIC ACID 7.5 G: 250 INJECTION, SOLUTION INTRAVENOUS at 00:08

## 2020-02-10 RX ADMIN — SODIUM BICARBONATE 50 MEQ: 84 INJECTION, SOLUTION INTRAVENOUS at 08:48

## 2020-02-10 RX ADMIN — Medication 0.06 MCG/KG/MIN: at 02:02

## 2020-02-10 RX ADMIN — NITROGLYCERIN 0.05 MCG/KG/MIN: 20 INJECTION INTRAVENOUS at 08:15

## 2020-02-10 RX ADMIN — MEPERIDINE HYDROCHLORIDE 25 MG: 25 INJECTION INTRAMUSCULAR; INTRAVENOUS; SUBCUTANEOUS at 16:02

## 2020-02-10 RX ADMIN — HUMAN INSULIN: 100 INJECTION, SOLUTION SUBCUTANEOUS at 08:50

## 2020-02-10 RX ADMIN — FENTANYL CITRATE 100 MCG: 50 INJECTION, SOLUTION INTRAMUSCULAR; INTRAVENOUS at 00:09

## 2020-02-10 RX ADMIN — FENTANYL CITRATE 150 MCG: 50 INJECTION, SOLUTION INTRAMUSCULAR; INTRAVENOUS at 03:15

## 2020-02-10 RX ADMIN — PROTAMINE SULFATE 25 MG: 10 INJECTION, SOLUTION INTRAVENOUS at 06:13

## 2020-02-10 RX ADMIN — HUMAN INSULIN 13 UNITS/HR: 100 INJECTION, SOLUTION SUBCUTANEOUS at 10:00

## 2020-02-10 RX ADMIN — POTASSIUM CHLORIDE 20 MEQ: 29.8 INJECTION, SOLUTION INTRAVENOUS at 11:01

## 2020-02-10 RX ADMIN — INSULIN HUMAN 10 UNITS: 100 INJECTION, SOLUTION PARENTERAL at 05:36

## 2020-02-10 RX ADMIN — AMIODARONE HYDROCHLORIDE 1 MG/MIN: 50 INJECTION, SOLUTION INTRAVENOUS at 11:03

## 2020-02-10 RX ADMIN — FENTANYL CITRATE 50 MCG: 50 INJECTION, SOLUTION INTRAMUSCULAR; INTRAVENOUS at 05:41

## 2020-02-10 RX ADMIN — PROTAMINE SULFATE 25 MG: 10 INJECTION, SOLUTION INTRAVENOUS at 06:04

## 2020-02-10 RX ADMIN — POTASSIUM CHLORIDE 20 MEQ: 400 INJECTION, SOLUTION INTRAVENOUS at 07:57

## 2020-02-10 RX ADMIN — POTASSIUM CHLORIDE 20 MEQ: 29.8 INJECTION, SOLUTION INTRAVENOUS at 18:18

## 2020-02-10 RX ADMIN — COAGULATION FACTOR VIIA (RECOMBINANT) 6000 MCG: KIT at 06:33

## 2020-02-10 RX ADMIN — CEFUROXIME 1.5 G: 1.5 INJECTION, POWDER, FOR SOLUTION INTRAVENOUS at 05:45

## 2020-02-10 RX ADMIN — VANCOMYCIN HYDROCHLORIDE 1500 MG: 10 INJECTION, POWDER, LYOPHILIZED, FOR SOLUTION INTRAVENOUS at 22:07

## 2020-02-10 RX ADMIN — POTASSIUM CHLORIDE 20 MEQ: 29.8 INJECTION, SOLUTION INTRAVENOUS at 13:49

## 2020-02-10 RX ADMIN — PANTOPRAZOLE SODIUM 40 MG: 40 INJECTION, POWDER, FOR SOLUTION INTRAVENOUS at 12:41

## 2020-02-10 RX ADMIN — CALCIUM CHLORIDE 500 MG: 100 INJECTION, SOLUTION INTRAVENOUS at 06:35

## 2020-02-10 RX ADMIN — DEXTROSE 50 % IN WATER (D50W) INTRAVENOUS SYRINGE 25 ML: at 23:07

## 2020-02-10 RX ADMIN — ALBUMIN HUMAN: 0.05 INJECTION, SOLUTION INTRAVENOUS at 07:34

## 2020-02-10 RX ADMIN — HUMAN INSULIN 12 UNITS/HR: 100 INJECTION, SOLUTION SUBCUTANEOUS at 13:00

## 2020-02-10 RX ADMIN — POTASSIUM CHLORIDE 20 MEQ: 400 INJECTION, SOLUTION INTRAVENOUS at 09:41

## 2020-02-10 RX ADMIN — CEFUROXIME 1.5 G: 1.5 INJECTION, POWDER, FOR SOLUTION INTRAVENOUS at 03:45

## 2020-02-10 RX ADMIN — AMINOCAPROIC ACID 1.25 G/HR: 250 INJECTION, SOLUTION INTRAVENOUS at 01:15

## 2020-02-10 RX ADMIN — COAGULATION FACTOR VIIA (RECOMBINANT) 4000 MCG: KIT at 08:42

## 2020-02-10 RX ADMIN — MUPIROCIN 0.5 G: 20 OINTMENT TOPICAL at 20:19

## 2020-02-10 RX ADMIN — FENTANYL CITRATE 250 MCG: 50 INJECTION, SOLUTION INTRAMUSCULAR; INTRAVENOUS at 00:14

## 2020-02-10 RX ADMIN — FAMOTIDINE 40 MG: 10 INJECTION, SOLUTION INTRAVENOUS at 19:16

## 2020-02-10 RX ADMIN — Medication 2 UNITS: at 01:24

## 2020-02-10 RX ADMIN — NITROGLYCERIN 0.07 MCG/KG/MIN: 20 INJECTION INTRAVENOUS at 06:44

## 2020-02-10 RX ADMIN — Medication 150 MCG: at 01:13

## 2020-02-10 RX ADMIN — SUGAMMADEX 200 MG: 100 INJECTION, SOLUTION INTRAVENOUS at 10:06

## 2020-02-10 RX ADMIN — ACETAMINOPHEN 975 MG: 325 TABLET, FILM COATED ORAL at 17:37

## 2020-02-10 RX ADMIN — SODIUM CHLORIDE, SODIUM GLUCONATE, SODIUM ACETATE, POTASSIUM CHLORIDE AND MAGNESIUM CHLORIDE 1000 ML: 526; 502; 368; 37; 30 INJECTION, SOLUTION INTRAVENOUS at 12:19

## 2020-02-10 RX ADMIN — POTASSIUM CHLORIDE 20 MEQ: 29.8 INJECTION, SOLUTION INTRAVENOUS at 14:59

## 2020-02-10 RX ADMIN — AMIODARONE HYDROCHLORIDE 0.5 MG/MIN: 50 INJECTION, SOLUTION INTRAVENOUS at 17:48

## 2020-02-10 RX ADMIN — PROTAMINE SULFATE 25 MG: 10 INJECTION, SOLUTION INTRAVENOUS at 08:29

## 2020-02-10 RX ADMIN — DIPHENHYDRAMINE HYDROCHLORIDE 50 MG: 50 INJECTION, SOLUTION INTRAMUSCULAR; INTRAVENOUS at 17:37

## 2020-02-10 RX ADMIN — PROTAMINE SULFATE 50 MG: 10 INJECTION, SOLUTION INTRAVENOUS at 06:37

## 2020-02-10 RX ADMIN — POTASSIUM CHLORIDE 20 MEQ: 400 INJECTION, SOLUTION INTRAVENOUS at 07:02

## 2020-02-10 RX ADMIN — PROPOFOL 25 MCG/KG/MIN: 10 INJECTION, EMULSION INTRAVENOUS at 11:55

## 2020-02-10 RX ADMIN — DEXTROSE 50 % IN WATER (D50W) INTRAVENOUS SYRINGE 25 ML: at 23:33

## 2020-02-10 RX ADMIN — SODIUM CHLORIDE, SODIUM GLUCONATE, SODIUM ACETATE, POTASSIUM CHLORIDE AND MAGNESIUM CHLORIDE: 526; 502; 368; 37; 30 INJECTION, SOLUTION INTRAVENOUS at 09:19

## 2020-02-10 RX ADMIN — Medication 53000 UNITS: at 01:21

## 2020-02-10 RX ADMIN — CEFUROXIME 1.5 G: 1.5 INJECTION, POWDER, FOR SOLUTION INTRAVENOUS at 01:45

## 2020-02-10 RX ADMIN — HUMAN ALBUMIN MICROSPHERES AND PERFLUTREN 6 ML: 10; .22 INJECTION, SOLUTION INTRAVENOUS at 14:00

## 2020-02-10 RX ADMIN — POTASSIUM CHLORIDE 20 MEQ: 400 INJECTION, SOLUTION INTRAVENOUS at 08:43

## 2020-02-10 RX ADMIN — MILRINONE LACTATE IN DEXTROSE 0.25 MCG/KG/MIN: 200 INJECTION, SOLUTION INTRAVENOUS at 07:39

## 2020-02-10 RX ADMIN — EPINEPHRINE 0.1 MCG/KG/MIN: 1 INJECTION PARENTERAL at 11:57

## 2020-02-10 RX ADMIN — ALBUMIN HUMAN: 0.05 INJECTION, SOLUTION INTRAVENOUS at 08:06

## 2020-02-10 RX ADMIN — NITROGLYCERIN 50 MCG: 10 INJECTION INTRAVENOUS at 00:04

## 2020-02-10 RX ADMIN — ALBUTEROL SULFATE 6 PUFF: 90 AEROSOL, METERED RESPIRATORY (INHALATION) at 16:06

## 2020-02-10 RX ADMIN — NOREPINEPHRINE BITARTRATE 6.4 MCG: 1 INJECTION INTRAVENOUS at 07:53

## 2020-02-10 RX ADMIN — CALCIUM CHLORIDE 500 MG: 100 INJECTION, SOLUTION INTRAVENOUS at 06:38

## 2020-02-10 RX ADMIN — HUMAN INSULIN 6 UNITS/HR: 100 INJECTION, SOLUTION SUBCUTANEOUS at 20:17

## 2020-02-10 RX ADMIN — COAGULATION FACTOR VIIA (RECOMBINANT) 6000 MCG: KIT at 06:29

## 2020-02-10 RX ADMIN — SODIUM BICARBONATE 50 MEQ: 84 INJECTION, SOLUTION INTRAVENOUS at 08:59

## 2020-02-10 RX ADMIN — NITROGLYCERIN 100 MCG: 10 INJECTION INTRAVENOUS at 00:18

## 2020-02-10 RX ADMIN — NOREPINEPHRINE BITARTRATE 0.03 MCG/KG/MIN: 1 INJECTION INTRAVENOUS at 06:37

## 2020-02-10 RX ADMIN — ALBUMIN HUMAN: 0.05 INJECTION, SOLUTION INTRAVENOUS at 09:27

## 2020-02-10 RX ADMIN — Medication 50 MCG/HR: at 11:22

## 2020-02-10 RX ADMIN — CEFUROXIME 1.5 G: 1.5 INJECTION, POWDER, FOR SOLUTION INTRAVENOUS at 08:03

## 2020-02-10 RX ADMIN — SODIUM CHLORIDE, SODIUM GLUCONATE, SODIUM ACETATE, POTASSIUM CHLORIDE AND MAGNESIUM CHLORIDE 500 ML: 526; 502; 368; 37; 30 INJECTION, SOLUTION INTRAVENOUS at 15:14

## 2020-02-10 RX ADMIN — VANCOMYCIN HYDROCHLORIDE 1500 MG: 10 INJECTION, POWDER, LYOPHILIZED, FOR SOLUTION INTRAVENOUS at 13:54

## 2020-02-10 RX ADMIN — POTASSIUM CHLORIDE, DEXTROSE MONOHYDRATE AND SODIUM CHLORIDE: 150; 5; 450 INJECTION, SOLUTION INTRAVENOUS at 14:45

## 2020-02-10 RX ADMIN — PROTAMINE SULFATE 50 MG: 10 INJECTION, SOLUTION INTRAVENOUS at 06:06

## 2020-02-10 ASSESSMENT — ACTIVITIES OF DAILY LIVING (ADL)
AMBULATION: 0-->INDEPENDENT
RETIRED_EATING: 0-->INDEPENDENT
DRESS: 0-->INDEPENDENT
SWALLOWING: 0-->SWALLOWS FOODS/LIQUIDS WITHOUT DIFFICULTY
RETIRED_COMMUNICATION: 0-->UNDERSTANDS/COMMUNICATES WITHOUT DIFFICULTY
ADLS_ACUITY_SCORE: 14
TOILETING: 0-->INDEPENDENT
BATHING: 0-->INDEPENDENT
TRANSFERRING: 0-->INDEPENDENT
ADLS_ACUITY_SCORE: 14
PRIOR_FUNCTIONAL_LEVEL_COMMENT: INDEPENDENT
ADLS_ACUITY_SCORE: 16
NUMBER_OF_TIMES_PATIENT_HAS_FALLEN_WITHIN_LAST_SIX_MONTHS: 1
FALL_HISTORY_WITHIN_LAST_SIX_MONTHS: YES
COGNITION: 0 - NO COGNITION ISSUES REPORTED

## 2020-02-10 NOTE — ANESTHESIA PROCEDURE NOTES
Perioperative EZIO Report  Anesthesia Information  EZIO probe placed and report generated by: : Sara Jimenes MD Orza, Daniela Violeta, MD  Images for this study have been archived.   Surgeon:  Nivia Mckeon MD  Echocardiogram Comments: Procedures: 1. Ascending aortic dissection repair with DHCA 2. Aortic valve replacement with tissue valve 3. Coronary bypass grafting x1 SVG to RCA  Post-CPB EZIO:  New tissue AV well seated, no perivalvular regurgitation  RV- size decreased, with improved RV function  LV- normal size and function    Preanesthesia Checklist:  Patient identified, IV assessed, risks and benefits discussed, monitors and equipment assessed, procedure being performed at surgeon's request and anesthesia consent obtained.  General Procedure Information  Modalities:  2D, 3D, CW Doppler, PW Doppler and Color flow mapping    Type A aortic dissection  Echocardiographic and Doppler Measurements  Right Ventricle:  Cavity size dilated.   Hypertrophy not present.   Thrombus not present.    Global function severely impaired.     Left Ventricle:  Cavity size normal.   Hypertrophy not present.   Thrombus not present.   Global Function normal.       Ventricular Regional Function:  1- Basal Anteroseptal:  normal  2- Basal Anterior:  normal  3- Basal Anterolateral:  normal  4- Basal Inferolateral:  normal  5- Basal Inferior:  normal  6- Basal Inferoseptal:  normal  7- Mid Anteroseptal:  normal  8- Mid Anterior:  normal  9- Mid Anterolateral:  normal  10- Mid Inferolateral:  normal  11- Mid Inferior:  normal  12- Mid Inferoseptal:  normal  13- Apical Anterior:  normal  14- Apical Lateral:  normal  15- Apical Inferior:  normal  16- Apical Septal:  normal  17- Coltons Point:  normal    Valves  Aortic Valve: Annulus normal.  Regurgitation +2.  Leaflets calcified and thickened.  Leaflet motions restricted.  Specific leaflet segments with abnormal motions:  RCC, LCC and NCC  Mitral Valve: Annulus normal.  Stenosis not  present.  Regurgitation +2.  Leaflets normal.  Leaflet motions normal.    Tricuspid Valve: Annulus dilated.  Stenosis not present.  Regurgitation +2.  Leaflets normal.  Leaflet motions normal.        Aorta: Ascending Aorta: Size dilated.  Dissection present.  Plaque thickness less than 3 mm.  Mobile plaque not present.    Aortic Arch: Size dilated.   Dissection present.   Plaque thickness less than 3 mm.   Mobile plaque not present.    Descending Aorta: Size normal.   Dissection not present.   Plaque thickness less than 3 mm.   Mobile plaque not present.        Right Atrium:  Size dilated.   Spontaneous echo contrast not present.   Thrombus not present.   Tumor not present.   Device not present.     Left Atrium: Size normal.  Spontaneous echo contrast not present.  Thrombus not present.  Tumor not present.  Device not present.    Left atrial appendage normal.     Atrial Septum: Intra-atrial septal morphology normal.     Ventricular Septum: Intra-ventricular septum morphology normal.       Other Findings:   Pericardium:  normal.  . .  .  Cornoary sinus catheter present.  .  .  Post Intervention Findings  Procedure(s) performed:  Aortic Valve Repair/Replace, Ascending Aorta Repair and Aortic Arch Repair. Global function:  Improved.   Regional wall motion:  Improved   Surgeon(s) notified of all postintervention findings:  Yes  .  .  .   .  .  .  .  .  .  .    Pre-CPB EZIO: Type A thoracic aortic dissection with anterior wall hematoma in ascending aorta and proximal aortic arch, no clear dissection entry point or flap seen  RA- significantly dilated  RV- significantly dilated with severe RV dysfunction  LA- normal size  LV - underfilled, normal LV function  AV- sclerotic/calcified, restricted, low AV gradient, mild AI  TR- moderate  MR-mild-moderate  Mild pericardial effusion    Echocardiogram Comments  Procedures: 1. Ascending aortic dissection repair with DHCA 2. Aortic valve replacement with tissue valve 3. Coronary  bypass grafting x1 SVG to RCA  Post-CPB EZIO:  New tissue AV well seated, no perivalvular regurgitation  RV- size decreased, with improved RV function  LV- normal size and function

## 2020-02-10 NOTE — PROGRESS NOTES
Patient admitted to 4E ICU s/p Ascending aortic dissection repair, AVR and CABG X 1. Pt placed on a CMV 20 600 100% +10. Please see flowsheets for further information.

## 2020-02-10 NOTE — ANESTHESIA PREPROCEDURE EVALUATION
Anesthesia Pre-Procedure Evaluation    Patient: Vel Espana   MRN:     [id number] Gender:   male   Age:    74 year old :      1945        Preoperative Diagnosis: Dissection of aorta, unspecified portion of aorta (H) [I71.00]   Procedure(s):  Median Sternotomy, repair of ascending aorta, aortic valve repair, on-pump oxygenator, open saphenous vein harvest, coronary artery bypass x1, transesophageal echocardiogram due by anestheisa     Past Medical History:   Diagnosis Date     Psychosexual dysfunction with inhibited sexual excitement      Unspecified essential hypertension      Unspecified sleep apnea       History reviewed. No pertinent surgical history.       Anesthesia Evaluation     .             ROS/MED HX    ENT/Pulmonary:     (+)sleep apnea, , . .    Neurologic:       Cardiovascular: Comment: Type A acute aortic dissection    (+) hypertension----. : . . . :. .       METS/Exercise Tolerance:     Hematologic:         Musculoskeletal:         GI/Hepatic:         Renal/Genitourinary:         Endo:         Psychiatric:         Infectious Disease:         Malignancy:         Other:                     [facility] AN PHYSICAL EXAM    LABS:  CBC:   Lab Results   Component Value Date    WBC 9.2 2020    WBC 11.3 (H) 2020    HGB 9.2 (L) 02/10/2020    HGB 10.1 (L) 02/10/2020    HCT 39.5 (L) 2020    HCT 39.8 (L) 2020     2020     2020     BMP:   Lab Results   Component Value Date     02/10/2020     02/10/2020    POTASSIUM 5.9 (H) 02/10/2020    POTASSIUM 4.2 02/10/2020    CHLORIDE 106 2020    CHLORIDE 108 2020    CO2 25 2020    CO2 25 2020    BUN 23 2020    BUN 22 2020    CR 1.27 (H) 2020    CR 1.24 2020     (H) 02/10/2020     (H) 02/10/2020     COAGS:   Lab Results   Component Value Date    PTT 55 (H) 2020    INR 1.49 (H) 2020    FIBR 117 (L) 2020     POC: No results found  "for: BGM, HCG, HCGS  OTHER:   Lab Results   Component Value Date    PH 7.33 (L) 02/10/2020    LACT 5.7 (HH) 02/10/2020    ELMER 8.8 02/09/2020    ALBUMIN 3.5 02/09/2020    PROTTOTAL 7.2 02/09/2020    ALT 29 02/09/2020    AST 42 02/09/2020    ALKPHOS 67 02/09/2020    BILITOTAL 0.9 02/09/2020    TSH 4.06 (H) 02/09/2020    T4 0.74 (L) 02/09/2020        Preop Vitals    BP Readings from Last 3 Encounters:   02/09/20 (!) 140/112   02/09/20 125/84   12/16/19 132/84    Pulse Readings from Last 3 Encounters:   02/09/20 96   02/09/20 96   12/16/19 100      Resp Readings from Last 3 Encounters:   02/09/20 18   02/09/20 (!) 48   12/16/19 20    SpO2 Readings from Last 3 Encounters:   02/09/20 98%   02/09/20 97%   12/16/19 94%      Temp Readings from Last 1 Encounters:   02/09/20 36.6  C (97.9  F) (Oral)    Ht Readings from Last 1 Encounters:   02/09/20 1.753 m (5' 9\")      Wt Readings from Last 1 Encounters:   02/09/20 131.9 kg (290 lb 11.2 oz)    Estimated body mass index is 42.93 kg/m  as calculated from the following:    Height as of this encounter: 1.753 m (5' 9\").    Weight as of this encounter: 131.9 kg (290 lb 11.2 oz).     LDA:  Peripheral IV 02/09/20 Right Hand (Active)   Site Assessment WDL 2/9/2020 10:04 PM   Line Status Saline locked 2/9/2020 10:04 PM   Number of days: 1       Peripheral IV 02/09/20 Left (Active)   Site Assessment [facility] 2/9/2020 10:04 PM   Line Status Saline locked 2/9/2020 10:04 PM   Phlebitis Scale 0-->no symptoms 2/9/2020 10:04 PM   Infiltration Scale 0 2/9/2020  8:18 PM   Number of days: 1       Arterial Line 02/09/20 (Active)   Number of days: 1       CVC Double Lumen 02/09/20 (Active)   Number of days: 1       ETT 8 (Active)   Number of days: 1       Urethral Catheter Straight-tip;Temperature probe;Triple-lumen 16 fr (Active)   Number of days: 1        Assessment:   ASA SCORE: 4 emergent   H&P: History and physical reviewed and following examination; no interval change.    NPO Status: NPO " Appropriate     Plan:   Anes. Type:  General   Pre-Medication: None   Induction:  IV (Standard)   Airway: ETT; Oral   Access/Monitoring: PIV   Maintenance: Balanced     Postop Plan:   Postop Pain: Opioids  Postop Sedation/Airway: Not planned     PONV Management:   Adult Risk Factors:, Postop Opioids   Prevention: Ondansetron     CONSENT: Direct conversation   Plan and risks discussed with: Patient   Blood Products: Consented (ALL Blood Products)           Anesthesia Attending    HPI: Vel Espana is a 74 year old male with acute Type A aortic dissection, scheduled for Procedure(s):  Median Sternotomy, repair of ascending aorta, aortic valve repair, on-pump oxygenator, open saphenous vein harvest, coronary artery bypass x1, transesophageal echocardiogram due by anestheisa.      PMHx/PSHx/ROS:  Past Medical History:   Diagnosis Date     Psychosexual dysfunction with inhibited sexual excitement      Unspecified essential hypertension      Unspecified sleep apnea        History reviewed. No pertinent surgical history.    Soc Hx:   Social History     Tobacco Use     Smoking status: Never Smoker     Smokeless tobacco: Never Used   Substance Use Topics     Alcohol use: No         Allergies: No Known Allergies    Meds:   Medications Prior to Admission   Medication Sig Dispense Refill Last Dose     aspirin 81 MG tablet Take 81 mg by mouth every other day    Taking     atenolol (TENORMIN) 25 MG tablet Take 25 mg by mouth daily   Taking     azithromycin (ZITHROMAX) 250 MG tablet Two tablets first day, then one tablet daily for four days. 6 tablet 0      benzonatate (TESSALON) 100 MG capsule Take 1 capsule (100 mg) by mouth 3 times daily as needed 42 capsule 0      [] predniSONE (DELTASONE) 20 MG tablet Take 2 tablets (40 mg) by mouth daily for 7 days 14 tablet 0        No current outpatient medications on file.       Vital Signs:  T 97.9  P 96  /112  R 18  SpO2 98%    NPO status adequate.      74 year old male  to OR for Procedure(s):  Median Sternotomy, repair of ascending aorta, aortic valve repair, on-pump oxygenator, open saphenous vein harvest, coronary artery bypass x1, transesophageal echocardiogram due by anestheisa    Plan for GA, standard monitors, large bore IVs, invasive monitors, intraoperative EZIO, possible blood transfusion, postoperative ICU.  PONV prophylaxis.  Antibiotics per primary service.    Plan discussed with the anesthesia and surgery teams.  Anesthetic risks discussed with the patient, all questions answered.  Consent in chart.          Sara Jimenes MD

## 2020-02-10 NOTE — ED TRIAGE NOTES
Patient lives at home with wife (who is a hospice patient) He was out on the deck and fell but did not hit head. He is confused to the event, he is now c/o chest pain. He is alert and appropriate and follows commands. He knows it is Sunday.

## 2020-02-10 NOTE — ANESTHESIA PROCEDURE NOTES
Central Line Procedure Note  Staff:     Anesthesiologist:  Sara Jimenes MD    Resident/CRNA:  Lv Hurd MD    Central line placed by Resident/CRNA in the presence of a teaching physician    Location: In OR after induction  Procedure Start/Stop Times:     patient identified, IV checked, site marked, risks and benefits discussed, informed consent, monitors and equipment checked, pre-op evaluation and at physician/surgeon's request      Correct Patient: Yes      Correct Position: Yes      Correct Site: Yes      Correct Procedure: Yes      Correct Laterality:  Yes    Site Marked:  Yes  Line Placement:     Procedure:  Central Line    Insertion laterality:  Right    Insertion site:  Internal Jugular    Position:  Trendelenburg       Injection Technique:  Ultrasound guided    Sterile Ultrasound Technique:  Sterile probe cover and Sterile gel    Vein evaluated via U/S for patency/adequacy of catheter insertion and is adequate.  Using realtime U/S imaging the vein was punctured, and needle was observed entering vein on U/S      A permanent image is NOT entered into the patient's record.      Local skin infiltration:  None    Catheter size:  9 Fr, 2 lumen 11.5 cm (MAC)    Catheter length at skin (cm):  15    Cath secured with: suture      Dressing:  Tegaderm and Biopatch    Complications:  None obvious    Blood aspirated all lumens: Yes      All Lumens Flushed: Yes      Verification method:  Placement to be verified post-op

## 2020-02-10 NOTE — H&P
CARDIOTHORACIC SURGERY  2020       Date of Admission:  2020  Attending: Dr. Nivia Mckeon    HPI: Vel Espana is a 74 year old male with significant history of hypertension who presents with an aortic dissection. He had an episode of lightheadedness/syncope earlier and associated chest pain. He was evaluated at a local hospital and was found to have a Type A aortic dissection. He was then transferred here for definitive management.     He denies having any chest pain at the present time. The patient is otherwise generally healthy. [facility]. He reports that the only blood thinner he takes is a baby aspirin. 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      ROS:   The Review of Systems is negative other than noted in the HPI.    Past Medical History:  Past Medical History:   Diagnosis Date     Psychosexual dysfunction with inhibited sexual excitement      Unspecified essential hypertension      Unspecified sleep apnea        Past Surgical History:   History reviewed. No pertinent surgical history.    Medications:   [COMPLETED] aspirin (ASA) chewable tablet 324 mg  [COMPLETED] iopamidol (ISOVUE-370) solution 80 mL  [COMPLETED] sodium chloride 0.9 % bag 500mL for CT scan flush use    aspirin 81 MG tablet, Take 81 mg by mouth every other day   atenolol (TENORMIN) 25 MG tablet, Take 25 mg by mouth daily  azithromycin (ZITHROMAX) 250 MG tablet, Two tablets first day, then one tablet daily for four days.  benzonatate (TESSALON) 100 MG capsule, Take 1 capsule (100 mg) by mouth 3 times daily as needed  [] predniSONE (DELTASONE) 20 MG tablet, Take 2 tablets (40 mg) by mouth daily for 7 days        Allergies:   No Known Allergies    Social History:   Social History     Socioeconomic History     Marital status:      Spouse name: Not on file     Number of children: Not on file     Years of education: Not on file     Highest education level: Not on  "file   Occupational History     Not on file   Social Needs     Financial resource strain: Not on file     Food insecurity:     Worry: Not on file     Inability: Not on file     Transportation needs:     Medical: Not on file     Non-medical: Not on file   Tobacco Use     Smoking status: Never Smoker     Smokeless tobacco: Never Used   Substance and Sexual Activity     Alcohol use: No     Drug use: No     Sexual activity: Yes     Partners: Female   Lifestyle     Physical activity:     Days per week: Not on file     Minutes per session: Not on file     Stress: Not on file   Relationships     Social connections:     Talks on phone: Not on file     Gets together: Not on file     Attends Bahai service: Not on file     Active member of club or organization: Not on file     Attends meetings of clubs or organizations: Not on file     Relationship status: Not on file     Intimate partner violence:     Fear of current or ex partner: Not on file     Emotionally abused: Not on file     Physically abused: Not on file     Forced sexual activity: Not on file   Other Topics Concern     Parent/sibling w/ CABG, MI or angioplasty before 65F 55M? Not Asked   Social History Narrative     Not on file       Family History:   History reviewed. No pertinent family history.    Exam:  BP (!) 140/112   Pulse 96   Temp 97.9  F (36.6  C) (Oral)   Resp 18   Ht 1.753 m (5' 9\")   Wt 131.9 kg (290 lb 11.2 oz)   SpO2 98%   BMI 42.93 kg/m    General: NAD, following commands, no acute distress  HEENT: pupils equal and reactive  CV: RRR, no added sounds  Pulm: Non labored breathing, CTAB  Abd: soft, non distended, non tender, obese  Ext: Non tender, peripheral pulses palpable  Neuro: A&O x3, CN 2-12 intact, motor and sensation intact     Labs: Pending, please see EMR.    Imaging: Reviewed.   Recent Results (from the past 24 hour(s))   XR Pelvis 1/2 Views    Narrative    EXAM: XR PELVIS 1/2 VW  LOCATION: [facility]  DATE/TIME: " 2/9/2020 7:24 PM    INDICATION: Syncope. Pain after fall.  COMPARISON: None.      Impression    IMPRESSION: Negative pelvis. No evidence for fracture. Degenerative disc disease lower lumbar spine. Constipation.   XR Chest 2 Views    Narrative    CHEST TWO VIEW   2/9/2020 7:44 PM     HISTORY: Chest pain.    COMPARISON: Chest x-rays dated 12/16/2019.    FINDINGS: Cardiac silhouette appears enlarged but this could be due to  the technique. Lungs are grossly clear. Mediastinum and pulmonary  vascularity are grossly within normal limits. Tortuosity of thoracic  aorta is again noted. No pneumothorax or significant pleural fluid  collection. No acute fracture.      Impression    IMPRESSION:  1. Cardiac silhouette appears enlarged which could be due to  technique.  2. No other evidence of acute cardiopulmonary disease is seen.    NANI MENA MD   Head CT w/o contrast    Narrative    CT HEAD W/O CONTRAST 2/9/2020 7:49 PM    INDICATION: acute confusion  TECHNIQUE: CT scan of the head without contrast. Dose reduction  techniques were used.  CONTRAST: None.  COMPARISON: None.    FINDINGS:   No intracranial hemorrhage, extraaxial collection, mass effect or CT  evidence of acute infarct.  Moderate presumed chronic small vessel  ischemic changes. Mild generalized volume loss. The ventricles are  proportional to the sulci. Osseous structures are intact. Unremarkable  orbits. Changes of acute on chronic sinusitis. Clear mastoid air  cells.      Impression    IMPRESSION:  1. No intracranial hemorrhage, mass, or definite CT evidence of recent  ischemia.  2. Changes of acute on chronic sinusitis.    JOEL VALENCIA MD   CT Aortic Survey w Contrast    Narrative    CT AORTIC SURVEY WITH CONTRAST  2/9/2020 8:53 PM    HISTORY:  Chest pain, syncope, wide mediastinum.    TECHNIQUE: Scans obtained of the chest, abdomen, and pelvis without  and with IV contrast. 80 mL Isovue-370 injected. Radiation dose for  this scan was reduced using  automated exposure control, adjustment of  the mA and/or kV according to patient size, or iterative  reconstruction technique.    COMPARISON: Chest x-rays dated 2/9/2020 and 12/16/2019.    FINDINGS:   Chest: There is a Vince type A and DeBakey type II ascending  thoracic aortic aneurysm involving the aortic root and extending to  the proximal aortic arch. Aneurysmal dilatation of the aortic root  measures 7.1 x 6.2 cm in cross-section. There is a small amount of  contrast in the false lumen at the aortic arch, but no significant  contrast is seen in the proximal to midportion of the ascending  thoracic dissection false lumen. The false lumen does appear to narrow  the true lumen of the ascending aorta with the true lumen dimensions  of 4.0 x 1.4 cm (image 34 series 7). The major arteries of the neck  base extend from the true lumen and no evidence for extension of the  dissection into the major vessels of the neck is seen. Dissection  appears to terminate just after the origin of the left subclavian  artery. The descending thoracic aorta is grossly of normal caliber and  demonstrates no dissection. Atherosclerotic calcifications are seen in  the descending thoracic aorta and the abdominal aorta as well as the  iliac arteries.    The lungs are grossly clear without significant nodule, mass,  infiltrate, effusion, or pneumothorax. The heart is mildly enlarged.  There are calcifications of the aortic valve annulus and likely of the  leaflets indicating possible aortic valve stenosis. Recommend clinical  correlation. There are coronary artery calcifications. There are no  filling defects in the central pulmonary arteries to suggest central  pulmonary artery embolism. This study was not optimized for pulmonary  artery evaluation.    No mediastinal, hilar, or axillary lymphadenopathy is identified.  There is a calcified subcarinal lymph node.    Abdomen and pelvis: The liver, gallbladder, pancreas, spleen  and  bilateral adrenal glands enhance normally. Multiple probable cysts are  seen in the bilateral kidneys. There is mild atrophy of the bilateral  kidneys along with scarring in the bilateral kidneys. Cortical  calcification is seen in the left kidney. Tiny nonobstructive  bilateral intrarenal calculi measure less than 0.3 cm each. No  adenopathy or free fluid is seen in the peritoneal cavity. Visualized  portions of the bowel are grossly of normal caliber. Structure likely  representing a normal appendix extends from the cecum. No obvious  evidence for diverticulitis is seen. Small bowel is of normal caliber.    No aggressive osseous lesions or acute osseous fractures are seen.  There are degenerative changes in the spine.    The inferior most aspects of the pelvis are not completely included  and cannot be evaluated.      Impression    IMPRESSION:  1. Elizabeth type A/DeBakey type II ascending thoracic aortic aneurysm  (with dissection extending to the mid thoracic aortic arch but not  involving the great vessels of the neck). The false lumen does contain  some contrast-containing blood in the thoracic arch but does not  contain contrast containing blood at the aortic root or in the  ascending aorta. The false lumen also causes mass effect and narrowing  of the true lumen of the aorta narrowing the cross-sectional dimension  to as little as 1.4 cm in the ascending aorta.  2. There are aortic valve annular and possible leaflet calcifications  which can be associated with aortic valvular stenosis which can be  associated in an increased risk of poststenotic dilatation and  dissection of the ascending aorta.  3. Renal scarring and mild renal atrophy.    I discussed the ascending thoracic aortic aneurysm and dissection with  Dr. Payne on 2/9/2020 at approximately 9:00 P.M.    NANI MENA MD      Assessment: Vel Espana is a 74 year old male with significant history of a syncopal event and chest pain. Subsequent  evaluation revealed a Type A aortic dissection.     Plan:  We will proceed to the OR for immediate repair of the patient's Type A dissection. We will evaluate the valve using intraoperative echo and will replace as indicated. The risks and benefits of the procedure were explained to the patient and he has agreed to proceed with surgery. He will be admitted to the CVICU postop.     The patient was seen and evaluated with Nivia Mckeon MD, who agrees with the plan.    Leon Maradiaga MD  Fellow, CT Surgery

## 2020-02-10 NOTE — OP NOTE
SURGEON:  Nivia Mckeon MD      CO-SURGEONS:  Dr. Licea; Fellow, Dr. Leon Maradiaga; Vascular Surgeon, Dr. Tina Raymond      PREOPERATIVE DIAGNOSES:   1.  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   2.  Aortic valve moderate to severe stenosis  3.  Hypertension.      POSTOPERATIVE DIAGNOSES:   1.  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   2.  Aortic valve moderate to severe stenosi  3.  Hypertension  4. 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 Severe right ventricular dysfunction    PRIMARY PROCEDURES:   1.  Ascending aortic dissection repair using a 32 mm Dacron Gelweave graft.   2. AVR with 27 mm Patterson bovine pericardial valve  3. Coronary bypass to right coronary artery.  4. Left greater saphenous vein harvest.  2.  Circulatory arrest.   3.  Left femoral artery exploration      ANESTHESIA:  General with endotracheal intubation.      PUMP DATA:  Total bypass time 4 hours 19 minutes.  Total crossclamp time 2 hours 26 minutes.  Circulatory arrest time 28 minutes.      FINDINGS:  The patient had obvious dissection of the ascending aorta that stopped below the sinotubular junction that involved the coronary arteries or the valve.  It did extend all the way to the arch of the aorta, according to the CT scan, and intraoperatively, there was evidence of the intimal tear being present on the inferior aspect of the arch, which required resection.  The right heart function was severely depressed with left heart function being normal and the coronary ostia were without disease.  The femoral arteries were somewhat friable and required primary repair on the left and an interposition graft on the right with reduced pulses palpable on the left and right extremities after the intervention required by the vascular surgeons.      METHOD:  He was brought to the operating room emergently from the emergency room. He was  found to have an ascending dissection in the emergency room.  His presenting symptom was syncope.  He otherwise remained hemodynamically stable during transport and arrived with his blood pressure and heart rate under control.  He was able to give his own consent, and we proceeded to surgery emergently.      In the operating room, the patient was placed in supine position and had general anesthesia induced with endotracheal intubation performed without difficulty.  His neck, chest, abdomen and bilateral lower extremities were prepped and draped in normal sterile fashion.  Incision was made on the sternum and the left groin  simultaneously. Median sternotomy was completed and the sternum retracted with the pericardium opened from the innominate vein to the diaphragm with lateral extensions.  There was no significant effusion present, but there was obvious dissection of the ascending aorta with discoloration of the aorta towards the right.  Once the pericardium was opened, we proceeded to fully heparinize the patient and then cannulate the superior vena cava and inferior vena cava.He did not have an adequate size femoral vessel for cannulation therefore we used the large innominate artery for cannulation site.  He also had a severely dilated right heart suggesting dissection or clot or sudden occlusion of the RCA. For this reason, we procured a segment of left greater saphenous vein with an incision from mid thigh to upper calf prior to heparinization. The branches were clipped and the vein prepared for grafting. The wound was closed in layers of absorbable suture and stapled. The vein had the branches triple clipped. We encircled the superior vena cava for planned retrograde cerebral protection during circulatory arrest.  Once we had the cannulas in position, we went on cardiopulmonary bypass and began cooling immediately.  A retrograde cardioplegia catheter was placed, and we placed an aortic cross-clamp across the  midportion of the ascending aorta and obtained myocardial arrest using retrograde cardioplegia.  Of note, the patient did have aortic stenosis evident on the intraoperative echo which would require replacement.      We induced myocardial arrest using retrograde cold blood cardioplegia and monitored the temperature throughout the crossclamp time period and kept it less than 10 degree Celsius with near continuous retrograde cardioplegia.  In addition, we applied topical cold saline for epicardial cooling.  Once the heart was cold and arrested, we divided the ascending aorta and placed 2 bovine pericardial patches on the inside and outside of the aorta and placed tacking sutures to bring the walls together.  The left coronary ostia was clearly not involved, but the right ostia was small and toward the commissure of left and right cusps. This anomalous vessel was likely occluded by the hematoma of the aortic dissection so we bypassed it with the segment of reveresed saphenous vein. The right coronary artery was bypass at it's midportion where it was a 1.5mm vessel. The saphenous vein was anastomosed with a running 7-0 prolene suture and confirmed to be hemostatic.  Once this was completed, we used near continuous cardioplegia antegrade down this graft in addition to the retrograde cardioplegia. We then resected the calcific and sclerotic aortic valve. We sized it for a 27mm Patterson Inspiris bovine pericardial valve and we sized the aorta  to a 32 mm Gelweave graft.  This was then selected for the interposition graft.  We then continued to cool to a core temperature of 18 degrees Celsius and once this was obtained, we removed the crossclamp, emptied the patient of blood, and began retrograde cerebral perfusion.  The cerebral oximetry did not drop throughout the circulatory arrest time of 28 minutes.        Once we resumed the retrograde cerebral perfusion, we transected the aorta up to the innominate artery and we were  able to see an inferior tear in the proximal arch.  The walls of the aorta were then reapproximated with the bovine pericardium on the in and outside, and we sized the graft to a bevel.  We then used a running 4-0 Prolene suture to anastomose the graft in an end-to-side fashion.  Once this was anastomosed, we started full cardiopulmonary bypass and de aired the graft.  A crossclamp was then placed, and we inspected the distal anastomosis for hemostasis, which was obtained.  We then placed aortic valve stitches at the annulus and placed them through the sewing ring and seated the valve using cor-knots. We then anastomosed the proximal portion of the aorta to the graft with a running 4-0 Prolene suture.  When this was completed, we placed an antegrade cardioplegic catheter for de-airing into the graft itself.  We performed the proximal anastomosis in an end to side fashion using a running 7-0 prolene suture. We had begun rewarming as soon as the distal anastomosis was completed and so once the proximal anastomosis was completed, we were able to deliver the dose of hot shot cardioplegia and remove the crossclamp.  The patient resumed a normal sinus rhythm, but was bradycardic, and required atrial and ventricular pacing wires to be paced at a rate of 80 beats per minute in a DDD mode.      Once this was completed, we inspected all areas for hemostasis, which was obtained.  The left heart had excellent contractility,but the right heart was slow to recover but gradually improved on Echo.  The valve was functioning well and no perivalvar leak was evident. We remained on pressors and proceeded to reverse the heparin with protamine sulfate.  The patient remained coagulopathic and did require packed cells, platelets, FFP and factor VII.  We administered the protamine sulfate to reverse the heparin and when this was completely infused, we removed the venous cannulas and the aortic cannula.  The patient remained hemodynamically  stable and finally had evidence of hemostasis.  At this point, we placed 2 mediastinal tubes and a right pleural tube and proceeded to close the chest with interrupted #6 stainless steel wires for the manubrium and looped #8 wires for most of the sternum and the bottom of the manubrium.  The subcutaneous tissue, linea alba and skin were closed in layers of running absorbable suture and a dressing was applied.  The chest tubes were placed to suction.  The groin incision was closed in layers of absorable sutures. The patient was then taken to the surgical intensive care unit in serious but stable condition, having tolerated the procedure well.  There were no intraoperative complications.  The sponge, needle and instrument count was correct at the end of the case.  I was present for the entire operation.

## 2020-02-10 NOTE — BRIEF OP NOTE
[facility] [place]    Brief Operative Note    Pre-operative diagnosis: Dissection of aorta, unspecified portion of aorta (H) [I71.00]  Post-operative diagnosis Same as pre-operative diagnosis    Procedure: Procedure(s):  Median Sternotomy, repair of ascending aorta using 30mm gelweave graft, aortic valve repair using 27mm inspiris valve, on-pump oxygenator, open saphenous vein harvest, coronary artery bypass x1, transesophageal echocardiogram done by anestheisa, SVG to RCA, deep circulatory arrest 28 minutes.   Surgeon: Surgeon(s) and Role:     * Nivia Mckeon MD - Primary     * Leon Maradiaga MD - Fellow - Assisting  Anesthesia: General   Estimated blood loss: 1,500 ml  Drains: Two 36F mediastinal chest tubes, 32F right chest tube   Specimens:   ID Type Source Tests Collected by Time Destination   A : Ascending Aorta Tissue Artery, Aortic SURGICAL PATHOLOGY EXAM Nivia Mckeon MD 2/10/2020  2:15 AM    B : Aortic Valve Leaflets Tissue Heart SURGICAL PATHOLOGY EXAM Nivia Mckeon MD 2/10/2020  2:46 AM      Findings:   see op report.  Complications: see op report.  Implants:   Implant Name Type Inv. Item Serial No.  Lot No. LRB No. Used   photofix bovine pericardium     [id number] N/A 1   Gelweave Ante-Parker Graft Graft  [id number] [employer] [id number] N/A 1   Inspiris Resilia Aortic Valve Valve  [id number] [employer]  N/A 1   GRAFT PTFE FELT 4X4&quot; Graft GRAFT PTFE FELT 4X4&quot;  CR [employer] [id number] N/A 1   IMP KIT SUTURE COR-KNOT MINI 4X14MM [id number] Metallic Hardware/Froid IMP KIT SUTURE COR-KNOT MINI 4X14MM [id number]  [employer] [id number] N/A 1   DEVICE TRAORE ENDO COR KNOT QUICK LOAD RELOAD [id number] Wire DEVICE TRAORE ENDO COR KNOT QUICK LOAD RELOAD [id number]  [employer] [id number] N/A 1   unipolar temporary myocardial pacing lead    [employer] [id number] N/A 1   UNIPOLAR TEMPORARY MYOCARDIAL PACING LEAD    [employer] [id number] N/A 1     Valorie Quezada  NAGA  Cardiothoracic Surgery  Pager [phone]  February 10, 2020

## 2020-02-10 NOTE — TRANSFUSION REACTION (BLOOD)
Transfusion reaction    D: I unit PRBCs ordered by CVTS, provider at bedside upon initiation of transfusion.  A: BP drop within minutes of started transfusion. MAP as low as 50. Blood stopped. Lab updated of transfusion reaction and blood and tubing were sent to the blood bank, labs drawn.   R: BP improved shortly after stopping transfusion, pepcid and benedryl given as ordered as well as 500 ml plasmolyte bolus. Will order another unit PRBCs once OK given by lab.

## 2020-02-10 NOTE — ANESTHESIA PROCEDURE NOTES
PA Catheter Insertion Note  Anesthesiologist: Sara Jimenes MD  Resident:  Lv Hurd MD   PA Catheter placed by resident/CRNA in the presence of a teaching physician.  Introducer: Introducer placed as part of procedure (SEE separate note)   Skin prep: Chloraprep    PA Catheter type:  CCO    Distance catheter advanced:  55 cm.    Appropriate RA, RV, PA  waveforms?: Yes    Dressing:  Biopatch and Tegaderm    Complications:  Dysrhythmia    Dysrhythmia: PVCs

## 2020-02-10 NOTE — ANESTHESIA CARE TRANSFER NOTE
Patient: Vel Espana    Procedure(s):  Median Sternotomy, repair of ascending aorta using 32mm gelweave graft, deep circulatory arrest,  aortic valve repair using 27mm inspiris valve, on-pump oxygenator, open saphenous vein harvest, coronary artery bypass x1, transesophageal echocardiogram done by anestheisa    Diagnosis: Dissection of aorta, unspecified portion of aorta (H) [I71.00]  Diagnosis Additional Information: No value filed.    Anesthesia Type:   No value filed.     Note:  Airway :ETT  Patient transferred to:ICU  Comments: Patient placed on vent.  Gtts reviewed with icu.  Report given.ICU Handoff: Call for PAUSE to initiate/utilize ICU HANDOFF, Identified Patient, Identified Responsible Provider, Reviewed the Pertinent Medical History, Discussed Surgical Course, Reviewed Intra-OP Anesthesia Management and Issues during Anesthesia, Set Expectations for Post Procedure Period and Allowed Opportunity for Questions and Acknowledgement of Understanding      Vitals: (Last set prior to Anesthesia Care Transfer)    CRNA VITALS  2/10/2020 0914 - 2/10/2020 0959      2/10/2020             Resp Rate (set):  20    EKG:  Sinus tachycardia     ST elevation                Electronically Signed By: Nyasia Sandy CRNA, APRN BOBO  February 10, 2020  9:59 AM

## 2020-02-10 NOTE — ED PROVIDER NOTES
History     Chief Complaint   Patient presents with     Chest Pain     Altered Mental Status     HPI  Vel Espana is a 74 year old male who presents with a history of hypertension, who presents by ambulance for syncopal episode that occurred this evening immediately prior to arrival.  He is not remember the episode but fell onto the deck, and apparently his wife was home but is on hospice and has memory difficulties but was able to contact 911.  There was a denial of head injury, but in the ambulance the patient was ultimately straining some confusion with repetitive questions.  Their focus in the ambulance was on his complaint of anterior chest pain without radiation, possible shortness of breath.  Denies weakness.  Evaluation in the ambulance reveals no obvious injury from his fall.    Glucose in the ambulance was normal.    Allergies:  No Known Allergies    Problem List:    Patient Active Problem List    Diagnosis Date Noted     HTN (hypertension) 06/03/2012     Priority: Medium     CARDIOVASCULAR SCREENING; LDL GOAL LESS THAN 160 05/31/2012     Priority: Medium        Past Medical History:    Past Medical History:   Diagnosis Date     Psychosexual dysfunction with inhibited sexual excitement      Unspecified essential hypertension      Unspecified sleep apnea        Past Surgical History:    No past surgical history on file.    Family History:    No family history on file.    Social History:  Marital Status:   [2]  Social History     Tobacco Use     Smoking status: Never Smoker     Smokeless tobacco: Never Used   Substance Use Topics     Alcohol use: No     Drug use: No        Medications:    aspirin 81 MG tablet  atenolol (TENORMIN) 25 MG tablet  azithromycin (ZITHROMAX) 250 MG tablet  benzonatate (TESSALON) 100 MG capsule          Review of Systems   Constitutional: Negative for chills, diaphoresis and fever.   HENT: Negative for ear pain, sinus pressure and sore throat.    Eyes: Negative for  visual disturbance.   Respiratory: Positive for shortness of breath. Negative for cough and wheezing.    Cardiovascular: Positive for chest pain. Negative for palpitations.   Gastrointestinal: Negative for abdominal pain, blood in stool, constipation, diarrhea, nausea and vomiting.   Genitourinary: Negative for dysuria, frequency and urgency.   Skin: Negative for rash.   Neurological: Positive for syncope. Negative for headaches.   All other systems reviewed and are negative.        Physical Exam   BP: 126/89  Pulse: 85  Temp: 97.8  F (36.6  C)  Resp: 18  SpO2: 98 %      Physical Exam  Constitutional:       General: He is in acute distress.      Appearance: He is not ill-appearing or toxic-appearing.   HENT:      Head: Atraumatic.      Mouth/Throat:      Mouth: Mucous membranes are moist.   Eyes:      Extraocular Movements: Extraocular movements intact.      Conjunctiva/sclera: Conjunctivae normal.      Pupils: Pupils are equal, round, and reactive to light.   Neck:      Musculoskeletal: Neck supple.   Cardiovascular:      Rate and Rhythm: Normal rate and regular rhythm.      Heart sounds: Normal heart sounds. Heart sounds not distant. No murmur.   Pulmonary:      Effort: Pulmonary effort is normal.      Breath sounds: No decreased breath sounds, wheezing, rhonchi or rales.   Abdominal:      General: There is no distension.      Palpations: There is no mass.      Tenderness: There is no abdominal tenderness.   Musculoskeletal:      Right lower leg: No edema.      Left lower leg: No edema.   Skin:     Coloration: Skin is not pale.      Findings: No rash.   Neurological:      General: No focal deficit present.      Mental Status: He is alert and oriented to person, place, and time.      GCS: GCS eye subscore is 4. GCS verbal subscore is 5. GCS motor subscore is 6.       There is no midline neck tenderness.  There is no signs of head trauma.  Range of motion of the neck is normal.    ED Course        Procedures                  EKG Interpretation:      Interpreted by Leander Payne MD  EKG done at 1853 hrs. demonstrates a sinus rhythm at 70 bpm with a leftward axis and concave downwards ST configuration but not significant elevation of any of the ST segments.  They however appear to be more broad.  There is a marginal R wave progression.  No Q waves.  The intervals are normal with exception of a [place] this prolonged.  No ectopy.  Normal conduction.  There is no old EKG to compare.  Impression sinus rhythm 70 bpm with atypical appearance of the T and ST segment without significant elevation and more diffuse affecting almost all the leads including the inferior and precordial leads.      Critical Care time:  none               Results for orders placed or performed during the hospital encounter of 02/09/20 (from the past 24 hour(s))   CBC with platelets differential   Result Value Ref Range    WBC 11.3 (H) 4.0 - 11.0 10e9/L    RBC Count 4.13 (L) 4.4 - 5.9 10e12/L    Hemoglobin 12.9 (L) 13.3 - 17.7 g/dL    Hematocrit 39.8 (L) 40.0 - 53.0 %    MCV 96 78 - 100 fl    MCH 31.2 26.5 - 33.0 pg    MCHC 32.4 31.5 - 36.5 g/dL    RDW 13.7 10.0 - 15.0 %    Platelet Count 175 150 - 450 10e9/L    Diff Method Automated Method     % Neutrophils 70.1 %    % Lymphocytes 19.0 %    % Monocytes 7.1 %    % Eosinophils 1.5 %    % Basophils 0.4 %    % Immature Granulocytes 1.9 %    Nucleated RBCs 0 0 /100    Absolute Neutrophil 7.9 1.6 - 8.3 10e9/L    Absolute Lymphocytes 2.2 0.8 - 5.3 10e9/L    Absolute Monocytes 0.8 0.0 - 1.3 10e9/L    Absolute Eosinophils 0.2 0.0 - 0.7 10e9/L    Absolute Basophils 0.1 0.0 - 0.2 10e9/L    Abs Immature Granulocytes 0.2 0 - 0.4 10e9/L    Absolute Nucleated RBC 0.0    Comprehensive metabolic panel   Result Value Ref Range    Sodium 139 133 - 144 mmol/L    Potassium 4.3 3.4 - 5.3 mmol/L    Chloride 108 94 - 109 mmol/L    Carbon Dioxide 25 20 - 32 mmol/L    Anion Gap 6 3 - 14 mmol/L    Glucose 149 (H) 70 - 99 mg/dL    Urea  Nitrogen 22 7 - 30 mg/dL    Creatinine 1.24 0.66 - 1.25 mg/dL    GFR Estimate 56 (L) >60 mL/min/[1.73_m2]    GFR Estimate If Black 65 >60 mL/min/[1.73_m2]    Calcium 9.0 8.5 - 10.1 mg/dL    Bilirubin Total 0.6 0.2 - 1.3 mg/dL    Albumin 3.5 3.4 - 5.0 g/dL    Protein Total 6.9 6.8 - 8.8 g/dL    Alkaline Phosphatase 66 40 - 150 U/L    ALT 27 0 - 70 U/L    AST 27 0 - 45 U/L   Troponin I   Result Value Ref Range    Troponin I ES <0.015 0.000 - 0.045 ug/L   TSH with free T4 reflex   Result Value Ref Range    TSH 4.06 (H) 0.40 - 4.00 mU/L   Alcohol ethyl   Result Value Ref Range    Ethanol g/dL <0.01 <0.01 g/dL   T4 free   Result Value Ref Range    T4 Free 0.74 (L) 0.76 - 1.46 ng/dL   XR Pelvis 1/2 Views    Narrative    EXAM: XR PELVIS 1/2 VW  LOCATION: [facility]  DATE/TIME: 2/9/2020 7:24 PM    INDICATION: Syncope. Pain after fall.  COMPARISON: None.      Impression    IMPRESSION: Negative pelvis. No evidence for fracture. Degenerative disc disease lower lumbar spine. Constipation.   XR Chest 2 Views    Narrative    CHEST TWO VIEW   2/9/2020 7:44 PM     HISTORY: Chest pain.    COMPARISON: Chest x-rays dated 12/16/2019.    FINDINGS: Cardiac silhouette appears enlarged but this could be due to  the technique. Lungs are grossly clear. Mediastinum and pulmonary  vascularity are grossly within normal limits. Tortuosity of thoracic  aorta is again noted. No pneumothorax or significant pleural fluid  collection. No acute fracture.      Impression    IMPRESSION:  1. Cardiac silhouette appears enlarged which could be due to  technique.  2. No other evidence of acute cardiopulmonary disease is seen.    NANI MENA MD   Head CT w/o contrast    Narrative    CT HEAD W/O CONTRAST 2/9/2020 7:49 PM    INDICATION: acute confusion  TECHNIQUE: CT scan of the head without contrast. Dose reduction  techniques were used.  CONTRAST: None.  COMPARISON: None.    FINDINGS:   No intracranial hemorrhage, extraaxial collection, mass  effect or CT  evidence of acute infarct.  Moderate presumed chronic small vessel  ischemic changes. Mild generalized volume loss. The ventricles are  proportional to the sulci. Osseous structures are intact. Unremarkable  orbits. Changes of acute on chronic sinusitis. Clear mastoid air  cells.      Impression    IMPRESSION:  1. No intracranial hemorrhage, mass, or definite CT evidence of recent  ischemia.  2. Changes of acute on chronic sinusitis.    JOEL VALENCIA MD   CT Aortic Survey w Contrast    Narrative    CT AORTIC SURVEY WITH CONTRAST  2/9/2020 8:53 PM    HISTORY:  Chest pain, syncope, wide mediastinum.    TECHNIQUE: Scans obtained of the chest, abdomen, and pelvis without  and with IV contrast. 80 mL Isovue-370 injected. Radiation dose for  this scan was reduced using automated exposure control, adjustment of  the mA and/or kV according to patient size, or iterative  reconstruction technique.    COMPARISON: Chest x-rays dated 2/9/2020 and 12/16/2019.    FINDINGS:   Chest: There is a Belton type A and DeBakey type II ascending  thoracic aortic aneurysm involving the aortic root and extending to  the proximal aortic arch. Aneurysmal dilatation of the aortic root  measures 7.1 x 6.2 cm in cross-section. There is a small amount of  contrast in the false lumen at the aortic arch, but no significant  contrast is seen in the proximal to midportion of the ascending  thoracic dissection false lumen. The false lumen does appear to narrow  the true lumen of the ascending aorta with the true lumen dimensions  of 4.0 x 1.4 cm (image 34 series 7). The major arteries of the neck  base extend from the true lumen and no evidence for extension of the  dissection into the major vessels of the neck is seen. Dissection  appears to terminate just after the origin of the left subclavian  artery. The descending thoracic aorta is grossly of normal caliber and  demonstrates no dissection. Atherosclerotic calcifications are  seen in  the descending thoracic aorta and the abdominal aorta as well as the  iliac arteries.    The lungs are grossly clear without significant nodule, mass,  infiltrate, effusion, or pneumothorax. The heart is mildly enlarged.  There are calcifications of the aortic valve annulus and likely of the  leaflets indicating possible aortic valve stenosis. Recommend clinical  correlation. There are coronary artery calcifications. There are no  filling defects in the central pulmonary arteries to suggest central  pulmonary artery embolism. This study was not optimized for pulmonary  artery evaluation.    No mediastinal, hilar, or axillary lymphadenopathy is identified.  There is a calcified subcarinal lymph node.    Abdomen and pelvis: The liver, gallbladder, pancreas, spleen and  bilateral adrenal glands enhance normally. Multiple probable cysts are  seen in the bilateral kidneys. There is mild atrophy of the bilateral  kidneys along with scarring in the bilateral kidneys. Cortical  calcification is seen in the left kidney. Tiny nonobstructive  bilateral intrarenal calculi measure less than 0.3 cm each. No  adenopathy or free fluid is seen in the peritoneal cavity. Visualized  portions of the bowel are grossly of normal caliber. Structure likely  representing a normal appendix extends from the cecum. No obvious  evidence for diverticulitis is seen. Small bowel is of normal caliber.    No aggressive osseous lesions or acute osseous fractures are seen.  There are degenerative changes in the spine.    The inferior most aspects of the pelvis are not completely included  and cannot be evaluated.      Impression    IMPRESSION:  1. Orange type A/DeBakey type II ascending thoracic aortic aneurysm  (with dissection extending to the mid thoracic aortic arch but not  involving the great vessels of the neck). The false lumen does contain  some contrast-containing blood in the thoracic arch but does not  contain contrast  containing blood at the aortic root or in the  ascending aorta. The false lumen also causes mass effect and narrowing  of the true lumen of the aorta narrowing the cross-sectional dimension  to as little as 1.4 cm in the ascending aorta.  2. There are aortic valve annular and possible leaflet calcifications  which can be associated with aortic valvular stenosis which can be  associated in an increased risk of poststenotic dilatation and  dissection of the ascending aorta.  3. Renal scarring and mild renal atrophy.    I discussed the ascending thoracic aortic aneurysm and dissection with  Dr. Payne on 2/9/2020 at approximately 9:00 P.M.    NANI MENA MD       Medications - No data to display    Assessments & Plan (with Medical Decision Making)     MDM: Vel Espana is a 74 year old male who presents with history of [facility] and has very [facility] information in our medical record, but is been noted for family to have had some dilatation at the level of the aortic valve in the past, and here tonight at home had a syncopal episode without obvious head injury while he was on the back deck.  He apparently fell to his knees but did not fully lose consciousness and following this he expressed anterior chest pain, right sided and radiating up into the scapular region on the right side.  Paramedics had noted possible confusion in the ambulance.  Glucose have been normal in the ambulance.  Vital signs have been reassuring on his initial presentation, and with without hypertension fever or hypoxia.  He did not appear to be in significant distress and actually joked with us for much of his initial presentation.  I did not detect significant confusion on his presentation.    His initial cardiopulmonary exam was unremarkable, as well as neurologic exam and mental status.  The initial evaluation was focused on syncopal episode, chest pain and possible confusion.  He underwent head CT due to the  confusion as well as a syncopal evaluation with chest x-ray EKG electrolytes blood count and troponin.  Testing was reassuring with the exception that the chest x-ray showed a widened mediastinum, and his EKG demonstrated an atypical appearance with no ST elevation but a concave downward, almost tombstone appearance of the ST segments in both the inferior and across the precordial leads.  His troponin was normal as were the rest of his laboratory tests.  He still had anterior chest pain that was marginally improved with nitroglycerin and described this across the upper right chest.      Given his history of proximal aorta dilatation per family, persistent chest pain, an EKG that was atypical, and a wide mediastinum on chest x-ray I recommended that we pursue an aortic survey with CT given reassuring renal function.  The patient agreed and this demonstrated a type I aortic dissection.  I spoke with [facility] aortic red team emergently after I previewed the imaging.  Dr. Tabor in Radiology called me shortly after I initiated the [facility] aorta team phone call to specifically layout the formal findings, and I was able to relay this to the aortic team wall I was still on the phone with them.      They accepted for emergent transfer to the [facility] ED to ED, Dr. Licea accepting, code 3 ambulance called.      I discussed at length with the family and the patient regarding the life-threatening nature of aortic dissection especially type A.  CODE STATUS was reviewed with the patient and he is a full code.   At this point his systolic blood pressure continues to be in the 120s and no additional medications were needed to decrease the blood pressure.  Heart rate had been in the 70-80 range and no esmolol was initiated.       I have reviewed the nursing notes.    I have reviewed the findings, diagnosis, plan and need for follow up with the patient.       New Prescriptions    No medications on file        Final diagnoses:   Dissection of thoracic aorta (H)   Syncope, unspecified syncope type       2/9/2020   [facility] EMERGENCY DEPARTMENT     Leander Payne MD  02/10/20 0209

## 2020-02-10 NOTE — ANESTHESIA PROCEDURE NOTES
EZIO Probe Insertion Note:    Inserted by:  Sara Jimenes MD (Responsible Anesthesiologist)  Performed by  Personally  Probe Number: [id number]  Probe Status PRE Insertion: NO obvious damage  Probe type:  Adult 3D    Bite block used:   Yes  Insertion Technique: Easy, no oropharyngeal manipulation  Insertion complications: None obvious    Billing Report:EZIO report by Anesthesiologist (See Separate Report note)    Probe Status POST Removal: NO obvious damage

## 2020-02-10 NOTE — PLAN OF CARE
OT 4E: Cancel - OT consult received. Pt returned from the OR this AM s/p AVR, pt remains intubated/sedated and not appropriate for OT evaluation. Will re-assess on 2/11.

## 2020-02-10 NOTE — PROGRESS NOTES
CV ICU H&P  2/10/2020      CO-MORBIDITIES:   Patient Active Problem List   Diagnosis     CARDIOVASCULAR SCREENING; LDL GOAL LESS THAN 160     HTN (hypertension)       ASSESSMENT: Vel Espana is a 74 year old male with PMH of HTN POD#0 from Ascending aortic dissection repair using a 32 mm Dacron Gelweave graft, AVR with 27 mm Patterson bovine pericardial valve, Coronary bypass to right coronary artery, Left greater saphenous vein harvest, Circulatory arrest, Left femoral artery exploration.      PLAN:  Neuro/ pain/ sedation:  - Monitor neurological status. Notify the MD for any acute changes in exam.  - Fentanyl gtt for pain.  - Propofol gtt for sedation.    Pulmonary care:   - MV  - Titrate FiO2 for SpO2 >92%    Cardiovascular:    - Monitor hemodynamic status.   - MAP >65, SBP <100  - Continue epi, milrinone, and Sofia for RV support  - Hold PTA anti-hypertensives  - amiodarone gtt for VT run immediately post-op  - q1 hr doppler checks of b/l LE's    GI care:   - NPO except ice chips and medications.    Fluids/ Electrolytes/ Nutrition:   - TKO for IV fluid hydration  - No indication for parenteral nutrition.    Renal/ Fluid Balance:    - Urine output is adequate so far.  - Will continue to monitor intake and output.    Endocrine:    # Stress hyperglycemia  - Insulin gtt    ID/ Antibiotics:  - Complete perioperative antibiotics  - No other indication for antibiotics.     Heme:     - Hgb goal >7    Prophylaxis:    - Mechanical prophylaxis for DVT.   - No chemical DVT prophylaxis due to high risk of bleeding.   - Protonix qd    Lines/ tubes/ drains:  - MAC, Iola, Arterial line, quintero    Disposition:  - CV ICU.     Patient seen, findings and plan discussed with CV ICU staff, Dr. Foster.    Macario Alfonso MD  CA-3/PGY-3 Anesthesiology  [phone]      ====================================    HPI:   73 yo male who presented to OSH with syncopal event and chest pain, found to have type A aortic dissection. He was  transferred to [facility] and taken emergently to the OR where he underwent repair of ascending aortic aneurysm, AVR, and single vessel CABG. Intraoperative course was significant for poor RV function pre and post-bypass, requiring epi, milrinone, and Sofia. Blood products included 3 unit(s) pRBC, 5 unit(s) FFP, 4 plt, 2 cryo, and full dose factor 7.     PAST MEDICAL HISTORY:   Past Medical History:   Diagnosis Date     Psychosexual dysfunction with inhibited sexual excitement      Unspecified essential hypertension      Unspecified sleep apnea        PAST SURGICAL HISTORY: History reviewed. No pertinent surgical history.    FAMILY HISTORY: History reviewed. No pertinent family history.    SOCIAL HISTORY:   Social History     Tobacco Use     Smoking status: Never Smoker     Smokeless tobacco: Never Used   Substance Use Topics     Alcohol use: No         OBJECTIVE:   1. VITAL SIGNS:   Temp:  [97.8  F (36.6  C)-97.9  F (36.6  C)] 97.9  F (36.6  C)  Pulse:  [71-96] 96  Heart Rate:  [71-98] 95  Resp:  [11-48] 18  BP: (118-148)/() 140/112  SpO2:  [87 %-99 %] 98 %    Resp: 18        2. INTAKE/ OUTPUT:   I/O last 3 completed shifts:  In: 3406 [Other:600]  Out: 570 [Urine:570]    3. PHYSICAL EXAMINATION:   General: intubated, sedated  Neuro: heavily sedated  Resp: mechanical breath sounds  CV: RRR  Abdomen: Soft, Non-distended, Non-tender  Incisions: c/d/i  Extremities: warm and well perfused    4. INVESTIGATIONS:   Arterial Blood Gases     Recent Labs   Lab 02/10/20  0653 02/10/20  0648 02/10/20  0630  02/09/20  2351  02/09/20  2203  02/09/20  1852   WBC  --  16.4*  --   --   --   --  9.2  --  11.3*   HGB 9.0* 8.8* 10.0*   < > Specimen not collected   < > 12.8*  --  12.9*   PLT  --  145*  --   --  161  --  161  --  175   INR  --  0.94  --   --  1.49*  --  1.38*   < >  --    *  --  144   < > Specimen not collected   < > 136  --  139   POTASSIUM 3.2*  --  3.8   < > Specimen not collected   < > 4.3  --  4.3   BUN  --   --    --   --   --   --  23  --  22   CR  --   --   --   --   --   --  1.27*  --  1.24    < > = values in this interval not displayed.           5. RADIOLOGY:     =========================================

## 2020-02-10 NOTE — ED NOTES
Bed: ED01  Expected date: 2/9/20  Expected time: 10:03 PM  Means of arrival:   Comments:  Vel Espana:  Type A dissection from [facility], cardiothoracic to see on arrival    Rn report  Collapsed to knees AMS  Alert but confused  18 G x2  Head CT negative   trop neg  Lytes wnl  Hbg 12.9

## 2020-02-10 NOTE — ANESTHESIA PROCEDURE NOTES
Arterial Line Procedure Note  Staff:     Anesthesiologist:  Sara Jimenes MD    Resident/CRNA:  Kimberly Trivedi MD    Arterial line performed by resident/CRNA in presence of a teaching physician    Location: In OR Before Induction  Procedure Start/Stop Times:     patient identified, IV checked, site marked, risks and benefits discussed, informed consent, monitors and equipment checked, pre-op evaluation and at physician/surgeon's request      Correct Patient: Yes      Correct Position: Yes      Correct Site: Yes      Correct Procedure: Yes      Correct Laterality:  Yes    Site Marked:  Yes  Line Placement:     Procedure:  Arterial Line    Insertion Site:  Radial    Insertion laterality:  Left    Skin Prep: Chloraprep      Patient Prep: patient draped, mask, sterile gloves, hat and hand hygiene      Local skin infiltration:  None    Ultrasound Guided?: Yes      Artery evaluated via ultrasound confirming patency.   Using realtime imaging, the artery was punctured and the needle was observed entering the artery.      A permanent image is NOT entered into the patient's record.      Catheter size:  20 gauge, 12 cm    Cath secured with: suture      Dressing:  Tegaderm    Complications:  None obvious    Arterial waveform: Yes      IBP within 10% of NIBP: Yes

## 2020-02-11 ENCOUNTER — APPOINTMENT (OUTPATIENT)
Dept: GENERAL RADIOLOGY | Facility: CLINIC | Age: 75
DRG: 219 | End: 2020-02-11
Attending: PHYSICIAN ASSISTANT
Payer: COMMERCIAL

## 2020-02-11 ENCOUNTER — APPOINTMENT (OUTPATIENT)
Dept: GENERAL RADIOLOGY | Facility: CLINIC | Age: 75
DRG: 219 | End: 2020-02-11
Payer: COMMERCIAL

## 2020-02-11 LAB
ALBUMIN SERPL-MCNC: 3.1 G/DL (ref 3.4–5)
ALP SERPL-CCNC: 36 U/L (ref 40–150)
ALT SERPL W P-5'-P-CCNC: 30 U/L (ref 0–70)
ANION GAP SERPL CALCULATED.3IONS-SCNC: 8 MMOL/L (ref 3–14)
ANION GAP SERPL CALCULATED.3IONS-SCNC: 8 MMOL/L (ref 3–14)
ANION GAP SERPL CALCULATED.3IONS-SCNC: 9 MMOL/L (ref 3–14)
AST SERPL W P-5'-P-CCNC: 147 U/L (ref 0–45)
BASE DEFICIT BLDA-SCNC: 0.2 MMOL/L
BASE DEFICIT BLDA-SCNC: 1.2 MMOL/L
BASE EXCESS BLDA CALC-SCNC: 1 MMOL/L
BASE EXCESS BLDA CALC-SCNC: 1.9 MMOL/L
BASE EXCESS BLDV CALC-SCNC: 0.9 MMOL/L
BASE EXCESS BLDV CALC-SCNC: 1 MMOL/L
BASE EXCESS BLDV CALC-SCNC: 1.4 MMOL/L
BASE EXCESS BLDV CALC-SCNC: 1.7 MMOL/L
BILIRUB SERPL-MCNC: 0.9 MG/DL (ref 0.2–1.3)
BUN SERPL-MCNC: 32 MG/DL (ref 7–30)
BUN SERPL-MCNC: 39 MG/DL (ref 7–30)
BUN SERPL-MCNC: 44 MG/DL (ref 7–30)
CA-I BLD-MCNC: 4.5 MG/DL (ref 4.4–5.2)
CALCIUM SERPL-MCNC: 7.8 MG/DL (ref 8.5–10.1)
CALCIUM SERPL-MCNC: 7.9 MG/DL (ref 8.5–10.1)
CALCIUM SERPL-MCNC: 7.9 MG/DL (ref 8.5–10.1)
CHLORIDE SERPL-SCNC: 111 MMOL/L (ref 94–109)
CHLORIDE SERPL-SCNC: 113 MMOL/L (ref 94–109)
CHLORIDE SERPL-SCNC: 115 MMOL/L (ref 94–109)
CO2 SERPL-SCNC: 24 MMOL/L (ref 20–32)
CO2 SERPL-SCNC: 25 MMOL/L (ref 20–32)
CO2 SERPL-SCNC: 26 MMOL/L (ref 20–32)
CREAT SERPL-MCNC: 2.42 MG/DL (ref 0.66–1.25)
CREAT SERPL-MCNC: 2.73 MG/DL (ref 0.66–1.25)
CREAT SERPL-MCNC: 2.99 MG/DL (ref 0.66–1.25)
ERYTHROCYTE [DISTWIDTH] IN BLOOD BY AUTOMATED COUNT: 15.4 % (ref 10–15)
GFR SERPL CREATININE-BSD FRML MDRD: 19 ML/MIN/{1.73_M2}
GFR SERPL CREATININE-BSD FRML MDRD: 21 ML/MIN/{1.73_M2}
GFR SERPL CREATININE-BSD FRML MDRD: 25 ML/MIN/{1.73_M2}
GLUCOSE BLDC GLUCOMTR-MCNC: 101 MG/DL (ref 70–99)
GLUCOSE BLDC GLUCOMTR-MCNC: 111 MG/DL (ref 70–99)
GLUCOSE BLDC GLUCOMTR-MCNC: 113 MG/DL (ref 70–99)
GLUCOSE BLDC GLUCOMTR-MCNC: 117 MG/DL (ref 70–99)
GLUCOSE BLDC GLUCOMTR-MCNC: 122 MG/DL (ref 70–99)
GLUCOSE BLDC GLUCOMTR-MCNC: 122 MG/DL (ref 70–99)
GLUCOSE BLDC GLUCOMTR-MCNC: 124 MG/DL (ref 70–99)
GLUCOSE BLDC GLUCOMTR-MCNC: 127 MG/DL (ref 70–99)
GLUCOSE BLDC GLUCOMTR-MCNC: 134 MG/DL (ref 70–99)
GLUCOSE BLDC GLUCOMTR-MCNC: 138 MG/DL (ref 70–99)
GLUCOSE BLDC GLUCOMTR-MCNC: 140 MG/DL (ref 70–99)
GLUCOSE BLDC GLUCOMTR-MCNC: 148 MG/DL (ref 70–99)
GLUCOSE BLDC GLUCOMTR-MCNC: 152 MG/DL (ref 70–99)
GLUCOSE BLDC GLUCOMTR-MCNC: 166 MG/DL (ref 70–99)
GLUCOSE BLDC GLUCOMTR-MCNC: 96 MG/DL (ref 70–99)
GLUCOSE BLDC GLUCOMTR-MCNC: 97 MG/DL (ref 70–99)
GLUCOSE SERPL-MCNC: 146 MG/DL (ref 70–99)
GLUCOSE SERPL-MCNC: 148 MG/DL (ref 70–99)
GLUCOSE SERPL-MCNC: 154 MG/DL (ref 70–99)
HCO3 BLD-SCNC: 22 MMOL/L (ref 21–28)
HCO3 BLD-SCNC: 24 MMOL/L (ref 21–28)
HCO3 BLD-SCNC: 25 MMOL/L (ref 21–28)
HCO3 BLD-SCNC: 26 MMOL/L (ref 21–28)
HCO3 BLDV-SCNC: 25 MMOL/L (ref 21–28)
HCO3 BLDV-SCNC: 26 MMOL/L (ref 21–28)
HCO3 BLDV-SCNC: 27 MMOL/L (ref 21–28)
HCO3 BLDV-SCNC: 27 MMOL/L (ref 21–28)
HCT VFR BLD AUTO: 29.5 % (ref 40–53)
HGB BLD-MCNC: 9.4 G/DL (ref 13.3–17.7)
HGB BLD-MCNC: 9.8 G/DL (ref 13.3–17.7)
INTERPRETATION ECG - MUSE: NORMAL
LACTATE BLD-SCNC: 2.1 MMOL/L (ref 0.7–2)
LACTATE BLD-SCNC: 2.1 MMOL/L (ref 0.7–2)
MAGNESIUM SERPL-MCNC: 2.5 MG/DL (ref 1.6–2.3)
MCH RBC QN AUTO: 30.5 PG (ref 26.5–33)
MCHC RBC AUTO-ENTMCNC: 33.2 G/DL (ref 31.5–36.5)
MCV RBC AUTO: 92 FL (ref 78–100)
O2/TOTAL GAS SETTING VFR VENT: 40 %
O2/TOTAL GAS SETTING VFR VENT: 50 %
O2/TOTAL GAS SETTING VFR VENT: 60 %
OXYHGB MFR BLD: 94 % (ref 92–100)
OXYHGB MFR BLD: 96 % (ref 92–100)
OXYHGB MFR BLDV: 47 %
OXYHGB MFR BLDV: 48 %
OXYHGB MFR BLDV: 49 %
OXYHGB MFR BLDV: 59 %
PCO2 BLD: 33 MM HG (ref 35–45)
PCO2 BLD: 36 MM HG (ref 35–45)
PCO2 BLD: 38 MM HG (ref 35–45)
PCO2 BLD: 38 MM HG (ref 35–45)
PCO2 BLDV: 39 MM HG (ref 40–50)
PCO2 BLDV: 42 MM HG (ref 40–50)
PCO2 BLDV: 42 MM HG (ref 40–50)
PCO2 BLDV: 47 MM HG (ref 40–50)
PH BLD: 7.41 PH (ref 7.35–7.45)
PH BLD: 7.44 PH (ref 7.35–7.45)
PH BLD: 7.45 PH (ref 7.35–7.45)
PH BLD: 7.46 PH (ref 7.35–7.45)
PH BLDV: 7.37 PH (ref 7.32–7.43)
PH BLDV: 7.4 PH (ref 7.32–7.43)
PH BLDV: 7.41 PH (ref 7.32–7.43)
PH BLDV: 7.42 PH (ref 7.32–7.43)
PHOSPHATE SERPL-MCNC: 3.3 MG/DL (ref 2.5–4.5)
PLATELET # BLD AUTO: 111 10E9/L (ref 150–450)
PO2 BLD: 74 MM HG (ref 80–105)
PO2 BLD: 86 MM HG (ref 80–105)
PO2 BLD: 89 MM HG (ref 80–105)
PO2 BLD: 98 MM HG (ref 80–105)
PO2 BLDV: 26 MM HG (ref 25–47)
PO2 BLDV: 27 MM HG (ref 25–47)
PO2 BLDV: 28 MM HG (ref 25–47)
PO2 BLDV: 34 MM HG (ref 25–47)
POTASSIUM BLD-SCNC: 4.2 MMOL/L (ref 3.4–5.3)
POTASSIUM SERPL-SCNC: 4.5 MMOL/L (ref 3.4–5.3)
POTASSIUM SERPL-SCNC: 5.1 MMOL/L (ref 3.4–5.3)
POTASSIUM SERPL-SCNC: 5.3 MMOL/L (ref 3.4–5.3)
PROT SERPL-MCNC: 5.3 G/DL (ref 6.8–8.8)
RBC # BLD AUTO: 3.21 10E12/L (ref 4.4–5.9)
SODIUM SERPL-SCNC: 146 MMOL/L (ref 133–144)
SODIUM SERPL-SCNC: 147 MMOL/L (ref 133–144)
SODIUM SERPL-SCNC: 148 MMOL/L (ref 133–144)
WBC # BLD AUTO: 9.2 10E9/L (ref 4–11)

## 2020-02-11 PROCEDURE — 82330 ASSAY OF CALCIUM: CPT | Performed by: PHYSICIAN ASSISTANT

## 2020-02-11 PROCEDURE — 82805 BLOOD GASES W/O2 SATURATION: CPT | Performed by: PHYSICIAN ASSISTANT

## 2020-02-11 PROCEDURE — 25000132 ZZH RX MED GY IP 250 OP 250 PS 637: Performed by: THORACIC SURGERY (CARDIOTHORACIC VASCULAR SURGERY)

## 2020-02-11 PROCEDURE — 25800030 ZZH RX IP 258 OP 636: Performed by: ANESTHESIOLOGY

## 2020-02-11 PROCEDURE — 86900 BLOOD TYPING SEROLOGIC ABO: CPT | Performed by: ANESTHESIOLOGY

## 2020-02-11 PROCEDURE — 85018 HEMOGLOBIN: CPT | Performed by: STUDENT IN AN ORGANIZED HEALTH CARE EDUCATION/TRAINING PROGRAM

## 2020-02-11 PROCEDURE — 25000132 ZZH RX MED GY IP 250 OP 250 PS 637: Performed by: STUDENT IN AN ORGANIZED HEALTH CARE EDUCATION/TRAINING PROGRAM

## 2020-02-11 PROCEDURE — 25000128 H RX IP 250 OP 636: Performed by: STUDENT IN AN ORGANIZED HEALTH CARE EDUCATION/TRAINING PROGRAM

## 2020-02-11 PROCEDURE — 40000986 XR ABDOMEN PORT 1 VW

## 2020-02-11 PROCEDURE — 99291 CRITICAL CARE FIRST HOUR: CPT | Mod: GC | Performed by: ANESTHESIOLOGY

## 2020-02-11 PROCEDURE — 27210437 ZZH NUTRITION PRODUCT SEMIELEM INTERMED LITER

## 2020-02-11 PROCEDURE — 00000146 ZZHCL STATISTIC GLUCOSE BY METER IP

## 2020-02-11 PROCEDURE — 40000196 ZZH STATISTIC RAPCV CVP MONITORING

## 2020-02-11 PROCEDURE — 84100 ASSAY OF PHOSPHORUS: CPT | Performed by: PHYSICIAN ASSISTANT

## 2020-02-11 PROCEDURE — 25000125 ZZHC RX 250: Performed by: PHYSICIAN ASSISTANT

## 2020-02-11 PROCEDURE — 40000275 ZZH STATISTIC RCP TIME EA 10 MIN

## 2020-02-11 PROCEDURE — 93010 ELECTROCARDIOGRAM REPORT: CPT | Mod: 59 | Performed by: INTERNAL MEDICINE

## 2020-02-11 PROCEDURE — 40000014 ZZH STATISTIC ARTERIAL MONITORING DAILY

## 2020-02-11 PROCEDURE — 25000132 ZZH RX MED GY IP 250 OP 250 PS 637: Performed by: PHYSICIAN ASSISTANT

## 2020-02-11 PROCEDURE — 80048 BASIC METABOLIC PNL TOTAL CA: CPT | Performed by: STUDENT IN AN ORGANIZED HEALTH CARE EDUCATION/TRAINING PROGRAM

## 2020-02-11 PROCEDURE — 25000128 H RX IP 250 OP 636: Performed by: THORACIC SURGERY (CARDIOTHORACIC VASCULAR SURGERY)

## 2020-02-11 PROCEDURE — 40000048 ZZH STATISTIC DAILY SWAN MONITORING

## 2020-02-11 PROCEDURE — 94799 UNLISTED PULMONARY SVC/PX: CPT

## 2020-02-11 PROCEDURE — 94640 AIRWAY INHALATION TREATMENT: CPT | Mod: 76

## 2020-02-11 PROCEDURE — C9113 INJ PANTOPRAZOLE SODIUM, VIA: HCPCS | Performed by: PHYSICIAN ASSISTANT

## 2020-02-11 PROCEDURE — 83735 ASSAY OF MAGNESIUM: CPT | Performed by: PHYSICIAN ASSISTANT

## 2020-02-11 PROCEDURE — 85027 COMPLETE CBC AUTOMATED: CPT | Performed by: PHYSICIAN ASSISTANT

## 2020-02-11 PROCEDURE — 25800030 ZZH RX IP 258 OP 636: Performed by: PHYSICIAN ASSISTANT

## 2020-02-11 PROCEDURE — 83605 ASSAY OF LACTIC ACID: CPT | Performed by: PHYSICIAN ASSISTANT

## 2020-02-11 PROCEDURE — 44500 INTRO GASTROINTESTINAL TUBE: CPT

## 2020-02-11 PROCEDURE — 94640 AIRWAY INHALATION TREATMENT: CPT

## 2020-02-11 PROCEDURE — 71045 X-RAY EXAM CHEST 1 VIEW: CPT

## 2020-02-11 PROCEDURE — 25800030 ZZH RX IP 258 OP 636: Performed by: STUDENT IN AN ORGANIZED HEALTH CARE EDUCATION/TRAINING PROGRAM

## 2020-02-11 PROCEDURE — 20000004 ZZH R&B ICU UMMC

## 2020-02-11 PROCEDURE — 80053 COMPREHEN METABOLIC PANEL: CPT | Performed by: PHYSICIAN ASSISTANT

## 2020-02-11 PROCEDURE — 94003 VENT MGMT INPAT SUBQ DAY: CPT

## 2020-02-11 PROCEDURE — 25800030 ZZH RX IP 258 OP 636: Performed by: THORACIC SURGERY (CARDIOTHORACIC VASCULAR SURGERY)

## 2020-02-11 PROCEDURE — 93005 ELECTROCARDIOGRAM TRACING: CPT

## 2020-02-11 PROCEDURE — 25000128 H RX IP 250 OP 636: Performed by: PHYSICIAN ASSISTANT

## 2020-02-11 RX ORDER — METHOCARBAMOL 500 MG/1
500 TABLET, FILM COATED ORAL 4 TIMES DAILY PRN
Status: DISCONTINUED | OUTPATIENT
Start: 2020-02-11 | End: 2020-03-05

## 2020-02-11 RX ORDER — HEPARIN SODIUM 5000 [USP'U]/.5ML
5000 INJECTION, SOLUTION INTRAVENOUS; SUBCUTANEOUS EVERY 8 HOURS
Status: DISCONTINUED | OUTPATIENT
Start: 2020-02-11 | End: 2020-02-15

## 2020-02-11 RX ORDER — DEXTROSE MONOHYDRATE 100 MG/ML
INJECTION, SOLUTION INTRAVENOUS CONTINUOUS PRN
Status: DISCONTINUED | OUTPATIENT
Start: 2020-02-11 | End: 2020-02-29

## 2020-02-11 RX ORDER — AMOXICILLIN 250 MG
2 CAPSULE ORAL 2 TIMES DAILY
Status: DISCONTINUED | OUTPATIENT
Start: 2020-02-11 | End: 2020-03-05 | Stop reason: HOSPADM

## 2020-02-11 RX ORDER — ACETAMINOPHEN 325 MG/1
650 TABLET ORAL EVERY 4 HOURS PRN
Status: DISCONTINUED | OUTPATIENT
Start: 2020-02-13 | End: 2020-03-02 | Stop reason: CLARIF

## 2020-02-11 RX ORDER — ACETAMINOPHEN 325 MG/1
975 TABLET ORAL EVERY 8 HOURS
Status: COMPLETED | OUTPATIENT
Start: 2020-02-11 | End: 2020-02-13

## 2020-02-11 RX ORDER — OXYCODONE HYDROCHLORIDE 5 MG/1
5-10 TABLET ORAL EVERY 4 HOURS PRN
Status: DISCONTINUED | OUTPATIENT
Start: 2020-02-11 | End: 2020-03-02 | Stop reason: CLARIF

## 2020-02-11 RX ORDER — AMINO AC/PROTEIN HYDR/WHEY PRO 10G-100/30
1 LIQUID (ML) ORAL 3 TIMES DAILY
Status: DISCONTINUED | OUTPATIENT
Start: 2020-02-11 | End: 2020-02-13

## 2020-02-11 RX ORDER — LIDOCAINE HYDROCHLORIDE 20 MG/ML
5 SOLUTION OROPHARYNGEAL ONCE
Status: DISCONTINUED | OUTPATIENT
Start: 2020-02-11 | End: 2020-02-12

## 2020-02-11 RX ORDER — AMOXICILLIN 250 MG
1 CAPSULE ORAL 2 TIMES DAILY
Status: DISCONTINUED | OUTPATIENT
Start: 2020-02-11 | End: 2020-03-05 | Stop reason: HOSPADM

## 2020-02-11 RX ADMIN — ASPIRIN 81 MG CHEWABLE TABLET 81 MG: 81 TABLET CHEWABLE at 08:18

## 2020-02-11 RX ADMIN — HEPARIN SODIUM 5000 UNITS: 5000 INJECTION, SOLUTION INTRAVENOUS; SUBCUTANEOUS at 09:43

## 2020-02-11 RX ADMIN — PROPOFOL 30 MCG/KG/MIN: 10 INJECTION, EMULSION INTRAVENOUS at 21:49

## 2020-02-11 RX ADMIN — SENNOSIDES AND DOCUSATE SODIUM 1 TABLET: 8.6; 5 TABLET ORAL at 08:19

## 2020-02-11 RX ADMIN — ACETAMINOPHEN 975 MG: 325 TABLET, FILM COATED ORAL at 17:34

## 2020-02-11 RX ADMIN — HEPARIN SODIUM 5000 UNITS: 5000 INJECTION, SOLUTION INTRAVENOUS; SUBCUTANEOUS at 16:58

## 2020-02-11 RX ADMIN — PROPOFOL 25 MCG/KG/MIN: 10 INJECTION, EMULSION INTRAVENOUS at 06:16

## 2020-02-11 RX ADMIN — Medication 1 PACKET: at 14:16

## 2020-02-11 RX ADMIN — SODIUM CHLORIDE, POTASSIUM CHLORIDE, SODIUM LACTATE AND CALCIUM CHLORIDE 500 ML: 600; 310; 30; 20 INJECTION, SOLUTION INTRAVENOUS at 12:21

## 2020-02-11 RX ADMIN — ACETAMINOPHEN 975 MG: 325 TABLET, FILM COATED ORAL at 01:55

## 2020-02-11 RX ADMIN — ALBUTEROL SULFATE 6 PUFF: 90 AEROSOL, METERED RESPIRATORY (INHALATION) at 23:59

## 2020-02-11 RX ADMIN — EPINEPHRINE 0.08 MCG/KG/MIN: 1 INJECTION PARENTERAL at 12:22

## 2020-02-11 RX ADMIN — ALBUTEROL SULFATE 6 PUFF: 90 AEROSOL, METERED RESPIRATORY (INHALATION) at 11:59

## 2020-02-11 RX ADMIN — MULTIVITAMIN 15 ML: LIQUID ORAL at 12:40

## 2020-02-11 RX ADMIN — DEXMEDETOMIDINE 0.4 MCG/KG/HR: 100 INJECTION, SOLUTION, CONCENTRATE INTRAVENOUS at 02:11

## 2020-02-11 RX ADMIN — ALBUTEROL SULFATE 6 PUFF: 90 AEROSOL, METERED RESPIRATORY (INHALATION) at 07:38

## 2020-02-11 RX ADMIN — CEFAZOLIN SODIUM 2 G: 2 INJECTION, SOLUTION INTRAVENOUS at 00:05

## 2020-02-11 RX ADMIN — SENNOSIDES AND DOCUSATE SODIUM 1 TABLET: 8.6; 5 TABLET ORAL at 19:46

## 2020-02-11 RX ADMIN — MUPIROCIN 0.5 G: 20 OINTMENT TOPICAL at 08:29

## 2020-02-11 RX ADMIN — ALBUTEROL SULFATE 6 PUFF: 90 AEROSOL, METERED RESPIRATORY (INHALATION) at 15:59

## 2020-02-11 RX ADMIN — MUPIROCIN 0.5 G: 20 OINTMENT TOPICAL at 19:46

## 2020-02-11 RX ADMIN — PANTOPRAZOLE SODIUM 40 MG: 40 INJECTION, POWDER, FOR SOLUTION INTRAVENOUS at 07:30

## 2020-02-11 RX ADMIN — PROPOFOL 25 MCG/KG/MIN: 10 INJECTION, EMULSION INTRAVENOUS at 02:01

## 2020-02-11 RX ADMIN — PROPOFOL 25 MCG/KG/MIN: 10 INJECTION, EMULSION INTRAVENOUS at 12:25

## 2020-02-11 RX ADMIN — Medication 1 PACKET: at 19:46

## 2020-02-11 RX ADMIN — LIDOCAINE 1 PATCH: 560 PATCH PERCUTANEOUS; TOPICAL; TRANSDERMAL at 08:22

## 2020-02-11 RX ADMIN — PROPOFOL 30 MCG/KG/MIN: 10 INJECTION, EMULSION INTRAVENOUS at 17:00

## 2020-02-11 RX ADMIN — CEFAZOLIN SODIUM 2 G: 2 INJECTION, SOLUTION INTRAVENOUS at 08:17

## 2020-02-11 RX ADMIN — SODIUM CHLORIDE, POTASSIUM CHLORIDE, SODIUM LACTATE AND CALCIUM CHLORIDE 500 ML: 600; 310; 30; 20 INJECTION, SOLUTION INTRAVENOUS at 08:46

## 2020-02-11 RX ADMIN — ALBUTEROL SULFATE 6 PUFF: 90 AEROSOL, METERED RESPIRATORY (INHALATION) at 01:30

## 2020-02-11 RX ADMIN — ACETAMINOPHEN 975 MG: 325 TABLET, FILM COATED ORAL at 09:43

## 2020-02-11 ASSESSMENT — ACTIVITIES OF DAILY LIVING (ADL)
ADLS_ACUITY_SCORE: 14
ADLS_ACUITY_SCORE: 16
ADLS_ACUITY_SCORE: 14

## 2020-02-11 ASSESSMENT — MIFFLIN-ST. JEOR: SCORE: 2119.38

## 2020-02-11 NOTE — PLAN OF CARE
Admitted/transferred from: OR  Reason for admission/transfer: Post op AVR/Aortic dissection repair/CAB x1  2 RN skin assessment: completed by Ita PISANO RN.   Result of skin assessment and interventions/actions: Surgical incisions per flowsheets, no new skin concerns at this time. Preventative mepilex in place, skin beneath is WNL.   Height, weight, drug calc weight: Done  Patient belongings (see Flowsheet)  MDRO education added to care plan Yes   ?

## 2020-02-11 NOTE — PROGRESS NOTES
CVTS PROGRESS NOTE  February 11, 2020      CO-MORBIDITIES:   Dissection of aorta, unspecified portion of aorta (H)    ASSESSMENT: Vel Espana is a 74 year old male with PMH of HTN s/p Ascending aortic dissection repair using a 32 mm Dacron Gelweave graft, AVR with 27 mm Patterson bovine pericardial valve, Coronary bypass to right coronary artery, Left greater saphenous vein harvest, Circulatory arrest, Left femoral artery exploration on 2/10    TODAY'S PROGRESS:   - wean Sofia, continue epi  - start SQH  - NJ placement, begin TF    PLAN:  Neuro/ pain/ sedation:  - Monitor neurological status. Notify the MD for any acute changes in exam.  - Fentanyl gtt for pain.  - Propofol gtt for sedation.     Pulmonary care:   - MV  - Titrate FiO2 for SpO2 >92%     Cardiovascular:    - Monitor hemodynamic status.   - MAP >65, SBP <100  - Continue epi for RV support  - Wean Sofia  - Hold PTA anti-hypertensives  - amiodarone gtt for VT run immediately post-op  - q1 hr doppler checks of b/l LE's     GI care:   - NPO except ice chips and medications.     Fluids/ Electrolytes/ Nutrition:   - TKO for IV fluid hydration  - NJ placement today    Renal/ Fluid Balance:    #HANNA postoperatively  - Urine output is adequate so far.  - Will continue to monitor intake and output, Cr, electrolytes.     Endocrine:    # Stress hyperglycemia  - Insulin gtt     ID/ Antibiotics:  - Complete perioperative antibiotics  - No other indication for antibiotics.      Heme:     - Hgb goal >7     Prophylaxis:    - SQH  - Protonix qd     Lines/ tubes/ drains:  - MAC, [place], Arterial line, quintero     Disposition:  - CV ICU.      Patient seen, findings and plan discussed with CVTS fellow.     Macario Alfonso MD  CA-3/PGY-3 Anesthesiology  [phone]  ====================================    SUBJECTIVE:   No acute events overnight, stable pressor requirements.    OBJECTIVE:   1. VITAL SIGNS:   Temp:  [98.6  F (37  C)-102.4  F (39.1  C)] 100  F (37.8  C)  Heart  Rate:  [] 90  Resp:  [16-20] 16  BP: (68)/(48) 68/48  MAP:  [50 mmHg-100 mmHg] 67 mmHg  Arterial Line BP: ()/(43-83) 83/51  FiO2 (%):  [40 %-80 %] 40 %  SpO2:  [89 %-100 %] 92 %  Ventilation Mode: CMV/AC  (Continuous Mandatory Ventilation/ Assist Control)  FiO2 (%): 40 %  Rate Set (breaths/minute): 16 breaths/min  Tidal Volume Set (mL): 600 mL  PEEP (cm H2O): 10 cmH2O  Oxygen Concentration (%): 40 %  Resp: 16      2. INTAKE/ OUTPUT:   I/O last 3 completed shifts:  In: 34924.91 [I.V.:4196.91; Other:348; NG/GT:180]  Out: 3927 [Urine:1337; Emesis/NG output:250; Blood:1800; Chest Tube:540]    3. PHYSICAL EXAMINATION:   General: intubated, sedated  Neuro: sedated  Resp: mechanical breath sounds  CV: RRR, occasional PVC's  Abdomen: Soft, Non-distended, Non-tender  Incisions: c/d/i  Extremities: warm and well perfused    4. INVESTIGATIONS:   Arterial Blood Gases   Recent Labs   Lab 02/11/20  0346 02/10/20  2000 02/10/20  1830 02/10/20  1408   PH 7.46* 7.43 7.42 7.34*   PCO2 36 35 38 39   PO2 89 107* 136* 142*   HCO3 26 23 25 21     Complete Blood Count   Recent Labs   Lab 02/11/20  0346 02/10/20  2052 02/10/20  1408 02/10/20  1000   WBC 9.2 9.0 10.4 12.6*   HGB 9.8* 9.3* 8.4* 8.8*   * 114* 116* 138*     Basic Metabolic Panel  Recent Labs   Lab 02/11/20  0346 02/10/20  2358 02/10/20  2000 02/10/20  1614 02/10/20  1302 02/10/20  1000 02/10/20  0920  02/09/20  2203   *  --   --   --  149* 150* 150*   < > 136   POTASSIUM 4.5 4.2 3.6 4.0 3.0*  2.4* 2.9* 2.8*   < > 4.3   CHLORIDE 115*  --   --   --  111* 110*  --   --  106   CO2 24  --   --   --  22 22  --   --  25   BUN 32*  --   --   --  25 24  --   --  23   CR 2.42*  --   --   --  1.67* 1.67*  --   --  1.27*   *  --   --   --  175* 192* 204*   < > 146*    < > = values in this interval not displayed.     Liver Function Tests  Recent Labs   Lab 02/11/20  0346 02/10/20  2052 02/10/20  1408 02/10/20  1000 02/10/20  0905  02/09/20 2208  20  1852   *  --   --  123*  --   --  42 27   ALT 30  --   --  28  --   --  29 27   ALKPHOS 36*  --   --  40  --   --  67 66   BILITOTAL 0.9  --   --  1.1  --   --  0.9 0.6   ALBUMIN 3.1*  --   --  2.8*  --   --  3.5 3.5   INR  --  1.12 1.05 0.98 0.95   < > 1.38*  --     < > = values in this interval not displayed.     Pancreatic Enzymes  No lab results found in last 7 days.  Coagulation Profile  Recent Labs   Lab 02/10/20  2052 02/10/20  1408 02/10/20  1000 02/10/20  0905 02/10/20  0749 02/10/20  0648   INR 1.12 1.05 0.98 0.95 0.90 0.94   PTT  --   --  42* 41* 50* 50*         5. RADIOLOGY:   Recent Results (from the past 24 hour(s))   Echocardiogram Limited    Narrative    [id number]  [id number]  [id number]  [id number]^TYLOR^[id number]           [facility],[place]  Echocardiography Laboratory  [address] [place], [place] [address]     Name: RAJ ARCINIEGA  MRN: [id number]  : 1945  Study Date: 02/10/2020 01:44 PM  Age: 74 yrs  Gender: Male  Patient [facility]: [facility]  Reason For Study: Right side function  Ordering Physician: SHAGUFTA SNIDER  Performed By: Svitlana Sandy RDCS     BSA: 2.4 m2  Height: 69 in  Weight: 290 lb  HR: 110  BP: 97/43 mmHg  _____________________________________________________________________________  __        Procedure  Limited Portable Echo Adult. Contrast Optison. Technically difficult  study.Extremely poor acoustic windows. Optison (NDC #3493-4623-00) given  intravenously. Patient was given 6 ml mixture of 3 ml Optison and 6 ml saline.  3 ml wasted.  _____________________________________________________________________________  __        Interpretation Summary  Technically difficult study.Extremely poor acoustic windows.     Global and regional left ventricular function is normal with an EF of 60-65%.  Based on limited view the right ventricle appears mildly dilated with mildly  reduced global function.     There is no prior study for direct  comparison.  _____________________________________________________________________________  __        Left Ventricle  Global and regional left ventricular function is normal with an EF of 60-65%.     Right Ventricle  Based on limited view the right ventricle appears mildly dilated with mildly  reduced global function. A right heart catheter is noted in the right  ventricle.     Atria  The atria cannot be assessed.     Mitral Valve  The mitral valve cannot be assessed.        Aortic Valve  s/p AVR with 27mm Patterson bioprosthetic valve. Aortic valve is not completely  assessed on this study.     Tricuspid Valve  The valve leaflets are not well visualized. Trace tricuspid insufficiency is  present. Estimated pulmonary artery systolic pressure is 22 mmHg plus right  atrial pressure.     Pulmonic Valve  The valve leaflets are not well visualized. On Doppler interrogation, there is  no significant stenosis or regurgitation.     Vessels  Unable to assess mean RA pressure given the patient is on a ventilator.     Compared to Previous Study  There is no prior study for direct comparison.     _____________________________________________________________________________  __        Doppler Measurements & Calculations     TV max P.1 mmHg  TR max glen: 240.3 cm/sec  TR max P.1 mmHg     _____________________________________________________________________________  __           Report approved by: Eliel Velásquez 02/10/2020 03:05 PM      XR Chest Port 1 View    Narrative    Exam: AP chest radiograph 2020 1:43 AM.    HISTORY: Postop.    COMPARISON: 2/10/2020, chest CT 2020.    FINDINGS: AP chest radiograph. Endotracheal tube tip projects low  thoracic trachea, approximately 2 cm above the daniel. Enteric tube  extends beyond the field-of-view. Heflin-Berhane catheter projects over the  main pulmonary artery. Sternotomy wires. Valvular prosthesis.  Mediastinal drain. Trachea is midline, cardiomegaly. Bilateral  pleural  effusions with overlying basilar opacities and bilateral perihilar  opacities. No acute osseous or abdominal abnormality.      Impression    IMPRESSION:  1. Endotracheal tube tip at the low thoracic trachea, approximately 2  cm above the daniel.  2. Cardiomegaly with moderate pulmonary edema.  3. Bilateral pleural effusions with overlying basilar and retrocardiac  atelectasis/consolidation.    I have personally reviewed the examination and initial interpretation  and I agree with the findings.    ROWAN ROSAS MD       =========================================

## 2020-02-11 NOTE — PROCEDURES
Small Bowel Feeding Tube Placement Assessment  Reason for Feeding Tube Placement: Team request for post pyloric FT  Cortrak Start Time: 1015   Cortrak End Time: 1045  Medicine Delivered During Procedure: Lubricating jelly   Placement Successful: Presume FT tip is gastric (pending AXR verification)     Procedure Complications: FT coiling within stomach, unable to successfully advance to small bowel   Final Placement Camilo at exit of nare: 90 cm   Face to Face time with patient: 30 min       Bridle Placement:   Reason for bridle placement: Securement of FT    Medicine delivered during procedure: lubricating jelly   Procedure: Successful  Location of top of clip on FT: @ 91 cm marker   Condition of nose/skin at time of bridle placement: Unremarkable   Face to Face time with patient: <5 minutes.    Zina Austin RD, LD  [id number]  Pgr: [id number]

## 2020-02-11 NOTE — PROGRESS NOTES
Social Work: Assessment with Discharge Plan    Patient Name:  Vel Espana  :  1945  Age:  74 year old  MRN:  [id number]  Risk/Complexity Score:  Filed Complexity Screen Score: 5  Completed assessment with:  Rosa (Daughter), Savita (Daughter), Luis (Son), Yoko (Daughter In-law) and Leander (Son In-law)    Presenting Information   Reason for Referral:  Caregiver issues-  Pt is the caregiver for his spouse who is enrolled in hospice  Date of Intake:  2020  Referral Source:  Chart Review  Decision Maker:  Ronda Espana (Spouse)-  Family confirmed she is still capable of making decisions for him but has deferred consents to his children.  Alternate Decision Maker:  Rosa (Daughter), Savita (Daughter) and Luis (Son)  Health Care Directive:  None is chart, pt not appropriate to complete at this time  Living Situation:  Pt lives with Ronda (Spouse) in a mobile home, no steps to enter.  Previous Functional Status:  Independent, does not use any assistive devices and drove PTA.  Per family, pt has had some recent balance issues and has been holding onto things while ambulating.  Pt also has hearing loss and wears hearing aids.  Patient and family understanding of hospitalization:  Pt's family feel well informed and they have a good understanding.  Cultural/Language/Spiritual Considerations:  Pent[employer]ostal  Adjustment to Illness:  Unable to assess d/t pt condition.    Physical Health  Reason for Admission:    1. Dissection of aorta, unspecified portion of aorta (H)      Services Needed/Recommended:  TBD    Mental Health/Chemical Dependency  Diagnosis:  None  Support/Services in Place:  None  Services Needed/Recommended:  N/A    Support System  Significant relationship at present time:  Ronda (Spouse)-  She is enrolled in hospice and pt was her caregiver PTA, she is unable to assist him.  Family of origin is available for support:  Pt has three children and three step-children that can provide some support,  but not 24/7.  Other support available:  None  Gaps in support system:  Pt does not have 24/7 support.  Patient is caregiver to:  Spouse / Significant Other:  Pt's spouse is enrolled in hospice at home and pt is her primary caregiver.  Per pt's children, Ronda's sister in-law is able to caregive for her until Thursday.  Ronda's sister is then caregiving for her on Thursday and Friday.  Her daughter is trying to arrange to come into town from [place] on Friday to caregive for her for 1-2 weeks.     Provider Information   Primary Care Physician:  [facility]   [phone]   Clinic:  [address] [place] [id number]      :  None    Financial   Income Source:  Did not assess  Financial Concerns:  None Identified  Insurance:    Payor/Plan Subscriber Name Rel Member # Group #   [employer] - [employer]* RAJ ARCINIEGA  [id number]      [address] [address]       Discharge Plan   Patient and family discharge goal:  Family expressed that if pt needs a TCU, they are hoping to get him to the same facility as his spouse (who would be on hospice).  Provided education on discharge plan:  YES  Patient agreeable to discharge plan:  Unable to assess.    A list of Medicare Certified Facilities was provided to the patient and/or family to encourage patient choice. Patient's choices for facility are:  JOSE ANGEL provided a list of [employer] covered SNF facilities and VA contracted SNFs per family request.  Will NH provide Skilled rehabilitation or complex medical:  TBD  General information regarding anticipated insurance coverage and possible out of pocket cost was discussed. Patient and patient's family are aware patient may incur the cost of transportation to the facility, pending insurance payment: YES  Barriers to discharge:  Medical Stability    Discharge Recommendations   Anticipated Disposition:  TBD  Transportation Needs:  TBD  Name of Transportation Company and Phone:  TBD    Additional comments   JOSE ANGEL spoke with  Rosa (Daughter), Savita (Daughter), Luis (Son), Yoko (Daughter In-law) and Leander (Son In-law) via the telephone to introduce self, explain role and provide support.  They discussed that pt was independent PTA, he was the primary caregiver for Ronda (Spouse) who is currently enrolled in hospice and requiring wound care 3-4x/ day.  Per pt's children, they have figured out a temporary caregiver plan for her for the next 1-2 weeks however, they are concerned about a long term plan for her.  They expressed concern that pt will not be able to caregive for her immediately upon discharge from the hospital, which SW validated.      Family inquired about the potential of placing Ronda in a SNF for hospice, and then coordinating pt to go to the same facility for TCU.  They stated that Ronda's hospice RN discussed that she may be eligible for VA coverage for room and board as pt is a .  SW directed family to call the VA to inquire if she qualifies for this benefit.  SW also directed family to have the hospice SW get involved to assist Ronda/ family with getting additional services/ coordinating possible SNF placement for her.    JOSE ANGEL discussed that if pt were to need a TCU at discharge, this would be billed differently than hospice and discussed insurance coverage.  JOSE ANGEL also educated family that pt has [employer], so he would need to go to a facility that accepts this insurance.  JOSE ANGEL emailed Rosa ([email or web address]) a list of VA contracted SNFs and [employer] contracted SNFs per her request.  Family did mention [facility] in [place] as a potential preference.  Family denied having any other questions or concerns at this time.      RUSH Pelayo, [facility]  ICU   [facility]  Phone:  [phone]  Pager:  [phone]

## 2020-02-11 NOTE — CONSULTS
Laboratory Medicine and Pathology  Transfusion Medicine- Transfusion Reaction    Vel Espana MRN# [id number]   YOB: 1945 Age: 74 year old   Date of Reaction: 2/10/2020       Transfusion Reaction Evaluation   Impression  Hypotensive transfusion reaction that responded rapidly discontinuation of transfusion and fluid bolus. The patient was febrile prior to and during transfusion with ongoing fever and hypotension into the following day. Thus, contribution from his underlying condition cannot be excluded.    Recommendation    Transfuse as needed. If positive, final culture results will be called to the clinical team and reported in an addendum.     ----------------------------------    History  Vel Guo a 74 year old male who is status post ascending aortic dissection repair, AVR, and CABG on 2/10/20 with estimated blood loss of 1500 mL. After surgery he went to the ICU intubated, sedated, and on pressors. He received a partial unit of RBCs from 16:56 to 17:00 when the transfusion was stopped for hypotension.      Reported Symptoms  Hypotension, /60 to 69/43 and MAP down to 50, within minutes of starting the transfusion. He had stable post-op tachycardia during the event and no change in oxygenation or ventilator settings. Of note, he was febrile at 101.5 F at 16:56 and with a Tmax of 102.4 at 17:15, and continued to be febrile and hypotensive into the following day. Per nursing note, the BP improved shortly after stopping the transfusion and he was treated with benadryl and a plasmalyte bolus.      Blood Bank Investigation  Product Type: RBC  Unit Number: [id number]  Amount Remainin mL  Post-Transfusion Clerical Check: Correct  ABO/Rh: The unit type was O pos and the patient was O pos. The unit was compatible.  WILLAM: Negative  Post-Transfusion Plasma: straw colored    Gram stain showed no organisms and culture is no growth to date, pending final.    [facility]  Case  Definition: Hypotensive transfusion reaction  Severity: non-severe  Imputability: Probable      Lee Ann Jeffers MD  Transfusion Medicine Fellow  [phone]

## 2020-02-11 NOTE — PLAN OF CARE
OT 4E: Cancel - Pt remains intubated/sedated and not hemodynamically stable to initiate OT evaluation. Will reschedule and reassess on 2/12.

## 2020-02-11 NOTE — PHARMACY
Pharmacy Tube Feeding Consult    Medication reviewed for administration by feeding tube and for potential food/drug interactions.    Recommendation: No changes are needed at this time.     Pharmacy will continue to follow as new medications are ordered.    Ariella Zacarias, GeraldD

## 2020-02-11 NOTE — PROGRESS NOTES
CV ICU PROGRESS NOTE  February 11, 2020      CO-MORBIDITIES:   Dissection of aorta, unspecified portion of aorta (H)    ASSESSMENT: Vel Espana is a 74 year old male with PMH of HTN s/p Ascending aortic dissection repair using a 32 mm Dacron Gelweave graft, AVR with 27 mm Patterson bovine pericardial valve, Coronary bypass to right coronary artery, Left greater saphenous vein harvest, Circulatory arrest, Left femoral artery exploration on 2/10    TODAY'S PROGRESS:   - wean Sofia, continue epi  - start SQH  - NJ placement, begin TF    PLAN:  Neuro/ pain/ sedation:  - Monitor neurological status. Notify the MD for any acute changes in exam.  - Fentanyl gtt for pain.  - Propofol gtt for sedation.     Pulmonary care:   - MV  - Titrate FiO2 for SpO2 >92%     Cardiovascular:    - Monitor hemodynamic status.   - MAP >65, SBP <100  - Continue epi for RV support  - Wean Sofia  - Hold PTA anti-hypertensives  - amiodarone gtt for VT run immediately post-op  - q1 hr doppler checks of b/l LE's     GI care:   - NPO except ice chips and medications.     Fluids/ Electrolytes/ Nutrition:   - TKO for IV fluid hydration  - NJ placement today    Renal/ Fluid Balance:    #HANNA postoperatively  - Urine output is adequate so far.  - Will continue to monitor intake and output, Cr, electrolytes.     Endocrine:    # Stress hyperglycemia  - Insulin gtt     ID/ Antibiotics:  - Complete perioperative antibiotics  - No other indication for antibiotics.      Heme:     - Hgb goal >7     Prophylaxis:    - SQH  - Protonix qd     Lines/ tubes/ drains:  - MAC, [place], Arterial line, quintero     Disposition:  - CV ICU.      Patient seen, findings and plan discussed with CV ICU staff, Dr. Foster.     Macario Alfonso MD  CA-3/PGY-3 Anesthesiology  [phone]  ====================================    SUBJECTIVE:   No acute events overnight, stable pressor requirements.    OBJECTIVE:   1. VITAL SIGNS:   Temp:  [98.6  F (37  C)-102.4  F (39.1  C)] 100  F (37.8   C)  Heart Rate:  [] 89  Resp:  [16-20] 16  BP: (68)/(48) 68/48  MAP:  [50 mmHg-100 mmHg] 69 mmHg  Arterial Line BP: ()/(43-83) 97/56  FiO2 (%):  [40 %-80 %] 40 %  SpO2:  [89 %-100 %] 94 %  Ventilation Mode: CMV/AC  (Continuous Mandatory Ventilation/ Assist Control)  FiO2 (%): 40 %  Rate Set (breaths/minute): 16 breaths/min  Tidal Volume Set (mL): 600 mL  PEEP (cm H2O): 10 cmH2O  Oxygen Concentration (%): 40 %  Resp: 16      2. INTAKE/ OUTPUT:   I/O last 3 completed shifts:  In: 58977.91 [I.V.:4196.91; Other:348; NG/GT:180]  Out: 3927 [Urine:1337; Emesis/NG output:250; Blood:1800; Chest Tube:540]    3. PHYSICAL EXAMINATION:   General: intubated, sedated  Neuro: sedated  Resp: mechanical breath sounds  CV: RRR, occasional PVC's  Abdomen: Soft, Non-distended, Non-tender  Incisions: c/d/i  Extremities: warm and well perfused    4. INVESTIGATIONS:   Arterial Blood Gases   Recent Labs   Lab 02/11/20  0346 02/10/20  2000 02/10/20  1830 02/10/20  1408   PH 7.46* 7.43 7.42 7.34*   PCO2 36 35 38 39   PO2 89 107* 136* 142*   HCO3 26 23 25 21     Complete Blood Count   Recent Labs   Lab 02/11/20  0346 02/10/20  2052 02/10/20  1408 02/10/20  1000   WBC 9.2 9.0 10.4 12.6*   HGB 9.8* 9.3* 8.4* 8.8*   * 114* 116* 138*     Basic Metabolic Panel  Recent Labs   Lab 02/11/20  0346 02/10/20  2358 02/10/20  2000 02/10/20  1614 02/10/20  1302 02/10/20  1000 02/10/20  0920  02/09/20  2203   *  --   --   --  149* 150* 150*   < > 136   POTASSIUM 4.5 4.2 3.6 4.0 3.0*  2.4* 2.9* 2.8*   < > 4.3   CHLORIDE 115*  --   --   --  111* 110*  --   --  106   CO2 24  --   --   --  22 22  --   --  25   BUN 32*  --   --   --  25 24  --   --  23   CR 2.42*  --   --   --  1.67* 1.67*  --   --  1.27*   *  --   --   --  175* 192* 204*   < > 146*    < > = values in this interval not displayed.     Liver Function Tests  Recent Labs   Lab 02/11/20  0346 02/10/20  2052 02/10/20  1408 02/10/20  1000 02/10/20  0905  02/09/20 2201  20  1852   *  --   --  123*  --   --  42 27   ALT 30  --   --  28  --   --  29 27   ALKPHOS 36*  --   --  40  --   --  67 66   BILITOTAL 0.9  --   --  1.1  --   --  0.9 0.6   ALBUMIN 3.1*  --   --  2.8*  --   --  3.5 3.5   INR  --  1.12 1.05 0.98 0.95   < > 1.38*  --     < > = values in this interval not displayed.     Pancreatic Enzymes  No lab results found in last 7 days.  Coagulation Profile  Recent Labs   Lab 02/10/20  2052 02/10/20  1408 02/10/20  1000 02/10/20  0905 02/10/20  0749 02/10/20  0648   INR 1.12 1.05 0.98 0.95 0.90 0.94   PTT  --   --  42* 41* 50* 50*         5. RADIOLOGY:   Recent Results (from the past 24 hour(s))   Echocardiogram Limited    Narrative    [id number]  [id number]  [id number]  [id number]^[email or web address]^[id number]           [facility],[place]  Echocardiography Laboratory  [address] [place], [place] [address]     Name: RAJ ARCINIEGA  MRN: [id number]  : 1945  Study Date: 02/10/2020 01:44 PM  Age: 74 yrs  Gender: Male  Patient Location: [facility]  Reason For Study: Right side function  Ordering Physician: SHAGUFTA SNIDER  Performed By: Svitlana Sandy RDCS     BSA: 2.4 m2  Height: 69 in  Weight: 290 lb  HR: 110  BP: 97/43 mmHg  _____________________________________________________________________________  __        Procedure  Limited Portable Echo Adult. Contrast Optison. Technically difficult  study.Extremely poor acoustic windows. Optison (NDC #3275-6701-23) given  intravenously. Patient was given 6 ml mixture of 3 ml Optison and 6 ml saline.  3 ml wasted.  _____________________________________________________________________________  __        Interpretation Summary  Technically difficult study.Extremely poor acoustic windows.     Global and regional left ventricular function is normal with an EF of 60-65%.  Based on limited view the right ventricle appears mildly dilated with mildly  reduced global function.     There is no prior study for direct  comparison.  _____________________________________________________________________________  __        Left Ventricle  Global and regional left ventricular function is normal with an EF of 60-65%.     Right Ventricle  Based on limited view the right ventricle appears mildly dilated with mildly  reduced global function. A right heart catheter is noted in the right  ventricle.     Atria  The atria cannot be assessed.     Mitral Valve  The mitral valve cannot be assessed.        Aortic Valve  s/p AVR with 27mm Patterson bioprosthetic valve. Aortic valve is not completely  assessed on this study.     Tricuspid Valve  The valve leaflets are not well visualized. Trace tricuspid insufficiency is  present. Estimated pulmonary artery systolic pressure is 22 mmHg plus right  atrial pressure.     Pulmonic Valve  The valve leaflets are not well visualized. On Doppler interrogation, there is  no significant stenosis or regurgitation.     Vessels  Unable to assess mean RA pressure given the patient is on a ventilator.     Compared to Previous Study  There is no prior study for direct comparison.     _____________________________________________________________________________  __        Doppler Measurements & Calculations     TV max P.1 mmHg  TR max glen: 240.3 cm/sec  TR max P.1 mmHg     _____________________________________________________________________________  __           Report approved by: Eliel Velásquez 02/10/2020 03:05 PM      XR Chest Port 1 View    Narrative    Exam: AP chest radiograph 2020 1:43 AM.    HISTORY: Postop.    COMPARISON: 2/10/2020, chest CT 2020.    FINDINGS: AP chest radiograph. Endotracheal tube tip projects low  thoracic trachea, approximately 2 cm above the daniel. Enteric tube  extends beyond the field-of-view. Crum Lynne-Berhane catheter projects over the  main pulmonary artery. Sternotomy wires. Valvular prosthesis.  Mediastinal drain. Trachea is midline, cardiomegaly. Bilateral  pleural  effusions with overlying basilar opacities and bilateral perihilar  opacities. No acute osseous or abdominal abnormality.      Impression    IMPRESSION:  1. Endotracheal tube tip at the low thoracic trachea, approximately 2  cm above the daniel.  2. Cardiomegaly with moderate pulmonary edema.  3. Bilateral pleural effusions with overlying basilar and retrocardiac  atelectasis/consolidation.    I have personally reviewed the examination and initial interpretation  and I agree with the findings.    ROWAN ROSAS MD       =========================================

## 2020-02-11 NOTE — PROGRESS NOTES
"CLINICAL NUTRITION SERVICES - ASSESSMENT NOTE     Nutrition Prescription  Malnutrition Status:    Patient does not criteria.     Recommendations:  When NGT verified by AXR:   - Initiate Impact Peptide 1.5 @ 10 mL/h. Adv 15-20 mL/h q6h to goal @ 65 mL/h as tolerated.  - FWF 30 ml q4h for tube patency. Ensure HOB > 30 degrees while feeding.   - Prosource (1 pkts TID)  - Certavite daily     Goal: Impact Peptide 1.5 @ 65 ml/h + Prosource (` pkt TID) to provide 2460 kcal (28 kcal/kg), 180 g protein (2 g/kg), 218 g CHO, 100 g fat, 0 g fiber, 1201 ml free H2O.     Future Recommendations:  RD to assess appropriateness and follow-up for heart-healthy diet education s/p CABG        REASON FOR ASSESSMENT  Vel Espana is a/an 74 year old male assessed by the dietitian for Provider Order - RD to assess and order TF.    NUTRITION HISTORY  Lack of nutrition hx due to pt intubated sedated.     CURRENT NUTRITION ORDERS  Diet: NPO  Intake/Tolerance: Pt NPO on admit 2/10, vented (x2 days).     LABS  Labs reviewed    MEDICATIONS  Medications reviewed  Bowel regimen   Epinephrine 0.07 mcg/kg/min    ANTHROPOMETRICS  Height: 175.3 cm (5' 9\")  Most Recent Weight: 139.9 kg (308 lb 6.8 oz)    IBW: 73 kg  BMI: 42.96 kg/m2 Obesity Grade III BMI >40 - based on lowest weight this admit (131.9 kg on 02/09)  Weight History: No weight loss noted in recent weight trends. Pt up 8 kg (~18 lb) since admit suspect r/t fluid shifts.   02/11/20   139.9 kg (308 lb 6.8 oz)   02/09/20   131.9 kg (290 lb 11.2 oz)   12/16/19   128.4 kg (283 lb)   04/16/19   124.7 kg (275 lb)     Dosing Weight: 88 kg (adjusted) - based on lowest weight this admit (139.9 kg on 02/09) and IBW of 73.    ASSESSED NUTRITION NEEDS  Estimated Energy Needs: 2200 - 2650 kcals/day (25 - 30 kcals/kg)  Justification: Increased needs, post-op  Estimated Protein Needs: 130 - 176 grams protein/day (1.5 - 2 grams of pro/kg)  Justification: Increased needs, Obesity guidelines and " Post-op  Estimated Fluid Needs: 2200 - 2650 mL/day (1 mL/kcal)   Justification: Maintenance or per provider pending fluid status    PHYSICAL FINDINGS  See malnutrition section below.  - Dry, scaly skin on lower arms     MALNUTRITION  % Intake: Unable to assess - pt intubated, sedated  % Weight Loss: None noted  Subcutaneous Fat Loss: None observed  Muscle Loss: None observed  Fluid Accumulation/Edema: None noted per flow sheet. Pt skin feels taught.   Malnutrition Diagnosis: Patient does not meet two of the established criteria necessary for diagnosing malnutrition    NUTRITION DIAGNOSIS  Predicted inadequate nutrient intake (protein/energy) related to pt vented and sedated, TF to meet nutritional needs with potential for disruptions in regimen.     INTERVENTIONS  Implementation  Nutrition Education: Received consult for diet education s/p CABG (x1). Heart healthy diet ed not appropriate at this time due to patient intubated, sedated. Will revisit when appropriate.    Collaboration with other providers  Enteral Nutrition - See above recommendations   Multivitamin/mineral supplement therapy - Certavite daily     Goals  Total avg nutritional intake to meet a minimum of 25 kcal/kg and 1.5 g PRO/kg daily (per dosing wt 88 kg).     Monitoring/Evaluation  Progress toward goals will be monitored and evaluated per protocol.    Gisel Salgado  Dietetic Intern    I have read and agree with the above assessment and interventions.   Zina Austin RD, LD  [id number]  Pgr: [phone]

## 2020-02-11 NOTE — PROGRESS NOTES
Major Shift Events:  Febrile to 39.1, temp now 37.4. Pt woke up and followed commands, nodded appropriately, MINAL ESPINAL. Remains on the vent, AC 40%/600/16/10 with 20ppm Sofia. Received 1 unit PRBCs overnight with no transfusion reaction. Levo weaned to off, Epi weaned to 0.06. Pt was hypertensive SBP>130 so restarted Propofol and stopped Dex. Insulin gtt off after two sugars below 90 and 2 doses of D50. Urine output adequate, 30-60/hr, minimal chest tube output, 40ml every 2-4 hours. Converted to SR from accelerated junctional around 2200, now SR 80-90.   Plan: Continue to wean pressors and wean off Sofia.    For vital signs and complete assessments, please see documentation flowsheets.

## 2020-02-11 NOTE — PLAN OF CARE
Major Shift Events: Unable to wean pressors, continues to be intubated, weaning FiO2 as able.  1 unit RBCs, see transfusion reaction note.   Plan: Possibly wean vent/nitric oxide tomorrow, weaning pressors as able.   For vital signs and complete assessments, please see documentation flowsheets.

## 2020-02-11 NOTE — PLAN OF CARE
Major Shift Events:  Nitric weaned off, levo and epi titrated to meet BP goals. Gastric feeding tube placed and trickle feeds started. Fentanyl for pain and propofol for sedation until ready to wean ventilator. Follows commands with sedation interruption.  Plan: EP and nephrology consults tomorrow, vent weaning trials tomorrow if respiratory status stable overnight, wean pressors as able.   For vital signs and complete assessments, please see documentation flowsheets.

## 2020-02-12 ENCOUNTER — APPOINTMENT (OUTPATIENT)
Dept: OCCUPATIONAL THERAPY | Facility: CLINIC | Age: 75
DRG: 219 | End: 2020-02-12
Attending: PHYSICIAN ASSISTANT
Payer: COMMERCIAL

## 2020-02-12 ENCOUNTER — APPOINTMENT (OUTPATIENT)
Dept: GENERAL RADIOLOGY | Facility: CLINIC | Age: 75
DRG: 219 | End: 2020-02-12
Payer: COMMERCIAL

## 2020-02-12 LAB
ALBUMIN SERPL-MCNC: 2.7 G/DL (ref 3.4–5)
ALP SERPL-CCNC: 35 U/L (ref 40–150)
ALT SERPL W P-5'-P-CCNC: 20 U/L (ref 0–70)
ANION GAP SERPL CALCULATED.3IONS-SCNC: 4 MMOL/L (ref 3–14)
AST SERPL W P-5'-P-CCNC: 91 U/L (ref 0–45)
BASE DEFICIT BLDA-SCNC: 0.1 MMOL/L
BASE DEFICIT BLDA-SCNC: 2 MMOL/L
BASE DEFICIT BLDA-SCNC: 2.1 MMOL/L
BASE DEFICIT BLDV-SCNC: 0.2 MMOL/L
BASE DEFICIT BLDV-SCNC: 1.7 MMOL/L
BASE EXCESS BLDA CALC-SCNC: 0.6 MMOL/L
BASE EXCESS BLDV CALC-SCNC: 0.3 MMOL/L
BASE EXCESS BLDV CALC-SCNC: 1 MMOL/L
BASE EXCESS BLDV CALC-SCNC: 1.9 MMOL/L
BASE EXCESS BLDV CALC-SCNC: 2.3 MMOL/L
BILIRUB SERPL-MCNC: 0.6 MG/DL (ref 0.2–1.3)
BLD PROD TYP BPU: NORMAL
BLD UNIT ID BPU: 0
BLOOD PRODUCT CODE: NORMAL
BPU ID: NORMAL
BUN SERPL-MCNC: 52 MG/DL (ref 7–30)
CALCIUM SERPL-MCNC: 7.4 MG/DL (ref 8.5–10.1)
CHLORIDE SERPL-SCNC: 114 MMOL/L (ref 94–109)
CO2 SERPL-SCNC: 26 MMOL/L (ref 20–32)
CREAT SERPL-MCNC: 2.77 MG/DL (ref 0.66–1.25)
ERYTHROCYTE [DISTWIDTH] IN BLOOD BY AUTOMATED COUNT: 16.1 % (ref 10–15)
ERYTHROCYTE [DISTWIDTH] IN BLOOD BY AUTOMATED COUNT: 16.4 % (ref 10–15)
GFR SERPL CREATININE-BSD FRML MDRD: 21 ML/MIN/{1.73_M2}
GLUCOSE BLDC GLUCOMTR-MCNC: 113 MG/DL (ref 70–99)
GLUCOSE BLDC GLUCOMTR-MCNC: 119 MG/DL (ref 70–99)
GLUCOSE BLDC GLUCOMTR-MCNC: 133 MG/DL (ref 70–99)
GLUCOSE BLDC GLUCOMTR-MCNC: 150 MG/DL (ref 70–99)
GLUCOSE BLDC GLUCOMTR-MCNC: 152 MG/DL (ref 70–99)
GLUCOSE BLDC GLUCOMTR-MCNC: 156 MG/DL (ref 70–99)
GLUCOSE BLDC GLUCOMTR-MCNC: 157 MG/DL (ref 70–99)
GLUCOSE BLDC GLUCOMTR-MCNC: 159 MG/DL (ref 70–99)
GLUCOSE SERPL-MCNC: 152 MG/DL (ref 70–99)
HCO3 BLD-SCNC: 22 MMOL/L (ref 21–28)
HCO3 BLD-SCNC: 23 MMOL/L (ref 21–28)
HCO3 BLD-SCNC: 24 MMOL/L (ref 21–28)
HCO3 BLD-SCNC: 25 MMOL/L (ref 21–28)
HCO3 BLDV-SCNC: 23 MMOL/L (ref 21–28)
HCO3 BLDV-SCNC: 25 MMOL/L (ref 21–28)
HCO3 BLDV-SCNC: 25 MMOL/L (ref 21–28)
HCO3 BLDV-SCNC: 26 MMOL/L (ref 21–28)
HCO3 BLDV-SCNC: 27 MMOL/L (ref 21–28)
HCO3 BLDV-SCNC: 28 MMOL/L (ref 21–28)
HCT VFR BLD AUTO: 27.5 % (ref 40–53)
HCT VFR BLD AUTO: 28.5 % (ref 40–53)
HGB BLD-MCNC: 8.6 G/DL (ref 13.3–17.7)
HGB BLD-MCNC: 9 G/DL (ref 13.3–17.7)
LACTATE BLD-SCNC: 1.5 MMOL/L (ref 0.7–2)
MAGNESIUM SERPL-MCNC: 2.5 MG/DL (ref 1.6–2.3)
MCH RBC QN AUTO: 30.6 PG (ref 26.5–33)
MCH RBC QN AUTO: 30.6 PG (ref 26.5–33)
MCHC RBC AUTO-ENTMCNC: 31.3 G/DL (ref 31.5–36.5)
MCHC RBC AUTO-ENTMCNC: 31.6 G/DL (ref 31.5–36.5)
MCV RBC AUTO: 97 FL (ref 78–100)
MCV RBC AUTO: 98 FL (ref 78–100)
O2/TOTAL GAS SETTING VFR VENT: 50 %
O2/TOTAL GAS SETTING VFR VENT: 55 %
O2/TOTAL GAS SETTING VFR VENT: 55 %
O2/TOTAL GAS SETTING VFR VENT: 60 %
OXYHGB MFR BLD: 96 % (ref 92–100)
OXYHGB MFR BLD: 96 % (ref 92–100)
OXYHGB MFR BLD: 97 % (ref 92–100)
OXYHGB MFR BLD: 97 % (ref 92–100)
OXYHGB MFR BLDV: 46 %
OXYHGB MFR BLDV: 48 %
OXYHGB MFR BLDV: 49 %
OXYHGB MFR BLDV: 49 %
OXYHGB MFR BLDV: 51 %
OXYHGB MFR BLDV: 58 %
PCO2 BLD: 33 MM HG (ref 35–45)
PCO2 BLD: 36 MM HG (ref 35–45)
PCO2 BLD: 38 MM HG (ref 35–45)
PCO2 BLD: 39 MM HG (ref 35–45)
PCO2 BLDV: 39 MM HG (ref 40–50)
PCO2 BLDV: 42 MM HG (ref 40–50)
PCO2 BLDV: 44 MM HG (ref 40–50)
PCO2 BLDV: 44 MM HG (ref 40–50)
PCO2 BLDV: 46 MM HG (ref 40–50)
PCO2 BLDV: 46 MM HG (ref 40–50)
PH BLD: 7.4 PH (ref 7.35–7.45)
PH BLD: 7.41 PH (ref 7.35–7.45)
PH BLD: 7.42 PH (ref 7.35–7.45)
PH BLD: 7.44 PH (ref 7.35–7.45)
PH BLDV: 7.37 PH (ref 7.32–7.43)
PH BLDV: 7.38 PH (ref 7.32–7.43)
PH BLDV: 7.38 PH (ref 7.32–7.43)
PH BLDV: 7.39 PH (ref 7.32–7.43)
PHOSPHATE SERPL-MCNC: 4.7 MG/DL (ref 2.5–4.5)
PLATELET # BLD AUTO: 72 10E9/L (ref 150–450)
PLATELET # BLD AUTO: 79 10E9/L (ref 150–450)
PO2 BLD: 107 MM HG (ref 80–105)
PO2 BLD: 118 MM HG (ref 80–105)
PO2 BLD: 82 MM HG (ref 80–105)
PO2 BLD: 87 MM HG (ref 80–105)
PO2 BLDV: 27 MM HG (ref 25–47)
PO2 BLDV: 29 MM HG (ref 25–47)
PO2 BLDV: 30 MM HG (ref 25–47)
PO2 BLDV: 32 MM HG (ref 25–47)
POTASSIUM SERPL-SCNC: 4.8 MMOL/L (ref 3.4–5.3)
PROT SERPL-MCNC: 5.1 G/DL (ref 6.8–8.8)
RBC # BLD AUTO: 2.81 10E12/L (ref 4.4–5.9)
RBC # BLD AUTO: 2.94 10E12/L (ref 4.4–5.9)
SODIUM SERPL-SCNC: 144 MMOL/L (ref 133–144)
TRANSFUSION STATUS PATIENT QL: NORMAL
WBC # BLD AUTO: 10.3 10E9/L (ref 4–11)
WBC # BLD AUTO: 10.9 10E9/L (ref 4–11)

## 2020-02-12 PROCEDURE — 94640 AIRWAY INHALATION TREATMENT: CPT | Mod: 76

## 2020-02-12 PROCEDURE — 25000132 ZZH RX MED GY IP 250 OP 250 PS 637: Performed by: PHYSICIAN ASSISTANT

## 2020-02-12 PROCEDURE — 25800030 ZZH RX IP 258 OP 636: Performed by: PHYSICIAN ASSISTANT

## 2020-02-12 PROCEDURE — 83605 ASSAY OF LACTIC ACID: CPT | Performed by: PHYSICIAN ASSISTANT

## 2020-02-12 PROCEDURE — 20000004 ZZH R&B ICU UMMC

## 2020-02-12 PROCEDURE — 99291 CRITICAL CARE FIRST HOUR: CPT | Mod: GC | Performed by: ANESTHESIOLOGY

## 2020-02-12 PROCEDURE — 25000132 ZZH RX MED GY IP 250 OP 250 PS 637: Performed by: THORACIC SURGERY (CARDIOTHORACIC VASCULAR SURGERY)

## 2020-02-12 PROCEDURE — 71045 X-RAY EXAM CHEST 1 VIEW: CPT

## 2020-02-12 PROCEDURE — 25000132 ZZH RX MED GY IP 250 OP 250 PS 637: Performed by: STUDENT IN AN ORGANIZED HEALTH CARE EDUCATION/TRAINING PROGRAM

## 2020-02-12 PROCEDURE — 99221 1ST HOSP IP/OBS SF/LOW 40: CPT | Mod: GC | Performed by: INTERNAL MEDICINE

## 2020-02-12 PROCEDURE — 00000146 ZZHCL STATISTIC GLUCOSE BY METER IP

## 2020-02-12 PROCEDURE — 85027 COMPLETE CBC AUTOMATED: CPT | Performed by: ANESTHESIOLOGY

## 2020-02-12 PROCEDURE — 85027 COMPLETE CBC AUTOMATED: CPT | Performed by: SURGERY

## 2020-02-12 PROCEDURE — 25000131 ZZH RX MED GY IP 250 OP 636 PS 637: Performed by: ANESTHESIOLOGY

## 2020-02-12 PROCEDURE — C9113 INJ PANTOPRAZOLE SODIUM, VIA: HCPCS | Performed by: PHYSICIAN ASSISTANT

## 2020-02-12 PROCEDURE — 40000275 ZZH STATISTIC RCP TIME EA 10 MIN

## 2020-02-12 PROCEDURE — 97110 THERAPEUTIC EXERCISES: CPT | Mod: GO | Performed by: OCCUPATIONAL THERAPIST

## 2020-02-12 PROCEDURE — 84100 ASSAY OF PHOSPHORUS: CPT | Performed by: SURGERY

## 2020-02-12 PROCEDURE — 25000128 H RX IP 250 OP 636: Performed by: STUDENT IN AN ORGANIZED HEALTH CARE EDUCATION/TRAINING PROGRAM

## 2020-02-12 PROCEDURE — 40000014 ZZH STATISTIC ARTERIAL MONITORING DAILY

## 2020-02-12 PROCEDURE — 93005 ELECTROCARDIOGRAM TRACING: CPT

## 2020-02-12 PROCEDURE — 25800030 ZZH RX IP 258 OP 636: Performed by: ANESTHESIOLOGY

## 2020-02-12 PROCEDURE — 94640 AIRWAY INHALATION TREATMENT: CPT

## 2020-02-12 PROCEDURE — 97165 OT EVAL LOW COMPLEX 30 MIN: CPT | Mod: GO | Performed by: OCCUPATIONAL THERAPIST

## 2020-02-12 PROCEDURE — 40000196 ZZH STATISTIC RAPCV CVP MONITORING

## 2020-02-12 PROCEDURE — 27210437 ZZH NUTRITION PRODUCT SEMIELEM INTERMED LITER

## 2020-02-12 PROCEDURE — 94003 VENT MGMT INPAT SUBQ DAY: CPT

## 2020-02-12 PROCEDURE — 82805 BLOOD GASES W/O2 SATURATION: CPT | Performed by: PHYSICIAN ASSISTANT

## 2020-02-12 PROCEDURE — 83735 ASSAY OF MAGNESIUM: CPT | Performed by: SURGERY

## 2020-02-12 PROCEDURE — 25000128 H RX IP 250 OP 636: Performed by: PHYSICIAN ASSISTANT

## 2020-02-12 PROCEDURE — 25800030 ZZH RX IP 258 OP 636: Performed by: THORACIC SURGERY (CARDIOTHORACIC VASCULAR SURGERY)

## 2020-02-12 PROCEDURE — 40000048 ZZH STATISTIC DAILY SWAN MONITORING

## 2020-02-12 PROCEDURE — 80053 COMPREHEN METABOLIC PANEL: CPT | Performed by: SURGERY

## 2020-02-12 PROCEDURE — 93010 ELECTROCARDIOGRAM REPORT: CPT | Performed by: INTERNAL MEDICINE

## 2020-02-12 PROCEDURE — 25000128 H RX IP 250 OP 636: Performed by: THORACIC SURGERY (CARDIOTHORACIC VASCULAR SURGERY)

## 2020-02-12 RX ORDER — NICOTINE POLACRILEX 4 MG
15-30 LOZENGE BUCCAL
Status: DISCONTINUED | OUTPATIENT
Start: 2020-02-12 | End: 2020-02-24

## 2020-02-12 RX ORDER — DEXTROSE MONOHYDRATE 25 G/50ML
25-50 INJECTION, SOLUTION INTRAVENOUS
Status: DISCONTINUED | OUTPATIENT
Start: 2020-02-12 | End: 2020-02-24

## 2020-02-12 RX ORDER — FUROSEMIDE 10 MG/ML
40 INJECTION INTRAMUSCULAR; INTRAVENOUS ONCE
Status: DISCONTINUED | OUTPATIENT
Start: 2020-02-12 | End: 2020-02-12

## 2020-02-12 RX ADMIN — INSULIN ASPART 1 UNITS: 100 INJECTION, SOLUTION INTRAVENOUS; SUBCUTANEOUS at 13:55

## 2020-02-12 RX ADMIN — AMIODARONE HYDROCHLORIDE 0.5 MG/MIN: 50 INJECTION, SOLUTION INTRAVENOUS at 02:37

## 2020-02-12 RX ADMIN — SENNOSIDES AND DOCUSATE SODIUM 2 TABLET: 8.6; 5 TABLET ORAL at 20:13

## 2020-02-12 RX ADMIN — LIDOCAINE 1 PATCH: 560 PATCH PERCUTANEOUS; TOPICAL; TRANSDERMAL at 07:44

## 2020-02-12 RX ADMIN — ALBUTEROL SULFATE 6 PUFF: 90 AEROSOL, METERED RESPIRATORY (INHALATION) at 20:17

## 2020-02-12 RX ADMIN — MULTIVITAMIN 15 ML: LIQUID ORAL at 07:47

## 2020-02-12 RX ADMIN — PROPOFOL 30 MCG/KG/MIN: 10 INJECTION, EMULSION INTRAVENOUS at 06:03

## 2020-02-12 RX ADMIN — PANTOPRAZOLE SODIUM 40 MG: 40 INJECTION, POWDER, FOR SOLUTION INTRAVENOUS at 07:48

## 2020-02-12 RX ADMIN — HEPARIN SODIUM 5000 UNITS: 5000 INJECTION, SOLUTION INTRAVENOUS; SUBCUTANEOUS at 10:42

## 2020-02-12 RX ADMIN — PROPOFOL 30 MCG/KG/MIN: 10 INJECTION, EMULSION INTRAVENOUS at 18:25

## 2020-02-12 RX ADMIN — SODIUM CHLORIDE, POTASSIUM CHLORIDE, SODIUM LACTATE AND CALCIUM CHLORIDE 250 ML: 600; 310; 30; 20 INJECTION, SOLUTION INTRAVENOUS at 13:19

## 2020-02-12 RX ADMIN — ALBUTEROL SULFATE 6 PUFF: 90 AEROSOL, METERED RESPIRATORY (INHALATION) at 11:27

## 2020-02-12 RX ADMIN — ALBUTEROL SULFATE 6 PUFF: 90 AEROSOL, METERED RESPIRATORY (INHALATION) at 15:38

## 2020-02-12 RX ADMIN — ACETAMINOPHEN 975 MG: 325 TABLET, FILM COATED ORAL at 10:42

## 2020-02-12 RX ADMIN — PROPOFOL 30 MCG/KG/MIN: 10 INJECTION, EMULSION INTRAVENOUS at 02:34

## 2020-02-12 RX ADMIN — SENNOSIDES AND DOCUSATE SODIUM 1 TABLET: 8.6; 5 TABLET ORAL at 07:46

## 2020-02-12 RX ADMIN — PROPOFOL 10 MG: 10 INJECTION, EMULSION INTRAVENOUS at 15:58

## 2020-02-12 RX ADMIN — INSULIN ASPART 1 UNITS: 100 INJECTION, SOLUTION INTRAVENOUS; SUBCUTANEOUS at 20:13

## 2020-02-12 RX ADMIN — ALBUTEROL SULFATE 6 PUFF: 90 AEROSOL, METERED RESPIRATORY (INHALATION) at 07:49

## 2020-02-12 RX ADMIN — Medication 50 MCG/HR: at 07:33

## 2020-02-12 RX ADMIN — ACETAMINOPHEN 975 MG: 325 TABLET, FILM COATED ORAL at 17:39

## 2020-02-12 RX ADMIN — PROPOFOL 20 MCG/KG/MIN: 10 INJECTION, EMULSION INTRAVENOUS at 13:03

## 2020-02-12 RX ADMIN — Medication 1 PACKET: at 07:53

## 2020-02-12 RX ADMIN — ASPIRIN 81 MG CHEWABLE TABLET 81 MG: 81 TABLET CHEWABLE at 07:46

## 2020-02-12 RX ADMIN — Medication 1 PACKET: at 13:26

## 2020-02-12 RX ADMIN — Medication 1 PACKET: at 20:14

## 2020-02-12 RX ADMIN — EPINEPHRINE 0.02 MCG/KG/MIN: 1 INJECTION PARENTERAL at 21:41

## 2020-02-12 RX ADMIN — HEPARIN SODIUM 5000 UNITS: 5000 INJECTION, SOLUTION INTRAVENOUS; SUBCUTANEOUS at 17:39

## 2020-02-12 RX ADMIN — PROPOFOL 30 MCG/KG/MIN: 10 INJECTION, EMULSION INTRAVENOUS at 22:23

## 2020-02-12 RX ADMIN — INSULIN ASPART 1 UNITS: 100 INJECTION, SOLUTION INTRAVENOUS; SUBCUTANEOUS at 17:34

## 2020-02-12 RX ADMIN — ALBUTEROL SULFATE 6 PUFF: 90 AEROSOL, METERED RESPIRATORY (INHALATION) at 05:04

## 2020-02-12 RX ADMIN — HEPARIN SODIUM 5000 UNITS: 5000 INJECTION, SOLUTION INTRAVENOUS; SUBCUTANEOUS at 00:15

## 2020-02-12 RX ADMIN — MUPIROCIN 0.5 G: 20 OINTMENT TOPICAL at 07:54

## 2020-02-12 RX ADMIN — ACETAMINOPHEN 975 MG: 325 TABLET, FILM COATED ORAL at 02:08

## 2020-02-12 ASSESSMENT — ACTIVITIES OF DAILY LIVING (ADL)
ADLS_ACUITY_SCORE: 14
ADLS_ACUITY_SCORE: 16
ADLS_ACUITY_SCORE: 16

## 2020-02-12 ASSESSMENT — MIFFLIN-ST. JEOR: SCORE: 2128.38

## 2020-02-12 NOTE — ANESTHESIA POSTPROCEDURE EVALUATION
Anesthesia POST Procedure Evaluation    Patient: Vel Espana   MRN:     [id number] Gender:   male   Age:    74 year old :      1945        Preoperative Diagnosis: Dissection of aorta, unspecified portion of aorta (H) [I71.00]   Procedure(s):  Median Sternotomy, repair of ascending aorta using 32mm gelweave graft, deep circulatory arrest,  aortic valve repair using 27mm inspiris valve, on-pump oxygenator, open saphenous vein harvest, coronary artery bypass x1, transesophageal echocardiogram done by anestheisa   Postop Comments: No value filed.       Anesthesia Type:  Not documented  No value filed.    Reportable Event: NO     PAIN: Uncomplicated        Neuro/Psych:   Sign Out Status: Planned Postop Sedation     Airway/Resp.: Uneventful perioperative course   Sign Out Status: Airway Device present     Airway Device: ETT                 Reason: Planned (pre-op)     CV: Uneventful perioperative course   Sign Out status: Appropriate BP and perfusion indices; Appropriate HR/Rhythm     Disposition:   Sign Out in:  ICU  Disposition:  ICU  Recovery Course: Recovery in ICU  Follow-Up: Not required           Last Anesthesia Record Vitals:  CRNA VITALS  2/10/2020 0914 - 2/10/2020 1014      2/10/2020             Resp Rate (set):  20    EKG:  Sinus tachycardia     ST elevation          Last PACU Vitals:  Vitals Value Taken Time   BP     Temp     Pulse     Resp     SpO2 95 % 2020  5:19 AM   Temp src     NIBP     Pulse     SpO2     Resp     Temp     Ht Rate     Temp 2     Vitals shown include unvalidated device data.      Electronically Signed By: Edmundo Fairchild MD, 2020, 5:20 AM

## 2020-02-12 NOTE — PLAN OF CARE
PT 4E: PT orders received. Per discussion with OT, pt unable to follow commands, intubated and sedated. Pt not yet appropriate for PT, OT will follow for ROM as needed. PT will hold at this time.

## 2020-02-12 NOTE — PLAN OF CARE
PT 4E: Cancel-  Patient remains intubated/sedated, per RN patient not appropriate for therapy today due to increasing pressor requirements. Will reschedule and reassess on 2/12.

## 2020-02-12 NOTE — PLAN OF CARE
4E  Discharge Planner OT   Patient plan for discharge: pt. unable to discuss; family present and supportive for what pt. will need;rehab,home care,etc. At time of discharge.  Current status: pt. Intubated, sedated, no command following noted at time of session. Pt. Tolerated lt. ROM with no signs of discomfort. Pt. tolerated session well on VC-AC PEEP 10, Fio2 50 %; HR 86, o2 98%.   Barriers to return to prior living situation: medical readiness  Recommendations for discharge: anticipate IP rehab stay; ARU/TCU  Rationale for recommendations: to max. Safety/indep. With ADls/IADLs/mobility in home/community setting. Per family pt. Is indep.with all ADLs/IADLs including caring for his S.O.,who is currently home on hospice.       Entered by: Dorcas Phan 02/12/2020 11:13 AM

## 2020-02-12 NOTE — PROGRESS NOTES
Major Shift Events:  Pt remains intubated and sedated, follows commands off sedation. Around 2230 pt flipped in to an irregular rhythm, accelerated junctional, rate , BP stable. MD notified with no new orders. Levo weaned to 0.02, Epi remains at 0.05. UOP adequate, 30-45ml/hr. Tolerating tube feeds with residual < 10ml. Afebrile. Noted that that fingers and toes are more dusky than previously.  Plan: EP and Renal consult today and plans to wean the ventilator.    For vital signs and complete assessments, please see documentation flowsheets.

## 2020-02-12 NOTE — PROGRESS NOTES
CV ICU PROGRESS NOTE  February 12, 2020      CO-MORBIDITIES:   Dissection of aorta, unspecified portion of aorta (H)    ASSESSMENT: Vel Espana is a 74 year old male with PMH of HTN s/p Ascending aortic dissection repair using a 32 mm Dacron Gelweave graft, AVR with 27 mm Patterson bovine pericardial valve, Coronary bypass to right coronary artery, Left greater saphenous vein harvest, Circulatory arrest, Left femoral artery exploration on 2/10.     TODAY'S PROGRESS:   - sedation holiday  - continue pressors, wean as able  - EP consult to determine amiodarone duration       PLAN:  Neuro/ pain/ sedation:  - Monitor neurological status. Notify the MD for any acute changes in exam.  - Fentanyl gtt for pain.  - Propofol gtt for sedation.     Pulmonary care:   - MV  - Titrate FiO2 for SpO2 >92%     Cardiovascular:    - Monitor hemodynamic status.   - MAP >65, SBP <100  - Continue epi for RV support, NE for low SVR  - Hold PTA anti-hypertensives  - amiodarone gtt for VT run immediately post-op  - q1 hr doppler checks of b/l LE's     GI care:   - NPO except ice chips and medications.     Fluids/ Electrolytes/ Nutrition:   - TKO for IV fluid hydration  - NJ placed, advance TF to goal    Renal/ Fluid Balance:    #HANNA postoperatively  - Urine output is adequate so far.  - Will continue to monitor intake and output, Cr, electrolytes.     Endocrine:    # Stress hyperglycemia  - Insulin gtt     ID/ Antibiotics:  - Complete perioperative antibiotics  - No other indication for antibiotics.      Heme:     - Hgb goal >7     Prophylaxis:    - SQH  - Protonix qd     Lines/ tubes/ drains:  - MAC, [place], Arterial line, quintero     Disposition:  - CV ICU.      Patient seen, findings and plan discussed with CVTS fellow.     Macario Alfonso MD  CA-3/PGY-3 Anesthesiology  [phone]  ====================================    SUBJECTIVE:   No acute events overnight.    OBJECTIVE:   1. VITAL SIGNS:   Temp:  [99  F (37.2  C)-99.9  F (37.7  C)]  99.3  F (37.4  C)  Heart Rate:  [] 85  Resp:  [16-17] 16  MAP:  [51 mmHg-115 mmHg] 76 mmHg  Arterial Line BP: ()/(39-80) 101/65  FiO2 (%):  [50 %-60 %] 50 %  SpO2:  [90 %-99 %] 92 %  Ventilation Mode: CMV/AC  (Continuous Mandatory Ventilation/ Assist Control)  FiO2 (%): 50 %  Rate Set (breaths/minute): 16 breaths/min  Tidal Volume Set (mL): 600 mL  PEEP (cm H2O): 10 cmH2O  Oxygen Concentration (%): 50 %  Resp: 16      2. INTAKE/ OUTPUT:   I/O last 3 completed shifts:  In: 3657.69 [I.V.:1402.69; NG/GT:355; IV Piggyback:1500]  Out: 1307 [Urine:1037; Chest Tube:270]    3. PHYSICAL EXAMINATION:   General: intubated, sedated  Neuro: sedated, spontaneously opens eyes during sedation holiday and moves extremities but does not follow commands  Resp: mechanical breath sounds  CV: RRR  Abdomen: Soft, Non-distended, Non-tender  Incisions: c/d/i  Extremities: warm and well perfused    4. INVESTIGATIONS:   Arterial Blood Gases   Recent Labs   Lab 02/12/20  0816 02/12/20 0343 02/12/20  0009 02/11/20  1953   PH 7.40 7.42 7.44 7.45   PCO2 36 39 33* 33*   PO2 82 107* 118* 86   HCO3 23 25 22 22     Complete Blood Count   Recent Labs   Lab 02/12/20  0343 02/11/20  1139 02/11/20  0346 02/10/20  2052 02/10/20  1408   WBC 10.9  --  9.2 9.0 10.4   HGB 9.0* 9.4* 9.8* 9.3* 8.4*   PLT 79*  --  111* 114* 116*     Basic Metabolic Panel  Recent Labs   Lab 02/12/20  0343 02/11/20  1815 02/11/20  1139 02/11/20  0346    146* 147* 148*   POTASSIUM 4.8 5.3 5.1 4.5   CHLORIDE 114* 111* 113* 115*   CO2 26 26 25 24   BUN 52* 44* 39* 32*   CR 2.77* 2.99* 2.73* 2.42*   * 154* 148* 146*     Liver Function Tests  Recent Labs   Lab 02/12/20  0343 02/11/20  0346 02/10/20  2052 02/10/20  1408 02/10/20  1000 02/10/20  0905  02/09/20  2203   AST 91* 147*  --   --  123*  --   --  42   ALT 20 30  --   --  28  --   --  29   ALKPHOS 35* 36*  --   --  40  --   --  67   BILITOTAL 0.6 0.9  --   --  1.1  --   --  0.9   ALBUMIN 2.7* 3.1*  --    --  2.8*  --   --  3.5   INR  --   --  1.12 1.05 0.98 0.95   < > 1.38*    < > = values in this interval not displayed.     Pancreatic Enzymes  No lab results found in last 7 days.  Coagulation Profile  Recent Labs   Lab 02/10/20  2052 02/10/20  1408 02/10/20  1000 02/10/20  0905 02/10/20  0749 02/10/20  0648   INR 1.12 1.05 0.98 0.95 0.90 0.94   PTT  --   --  42* 41* 50* 50*         5. RADIOLOGY:   Recent Results (from the past 24 hour(s))   XR Chest Port 1 View    Narrative    Exam: AP chest radiograph 2/12/2020 7:03 AM .    HISTORY: Intubated patient, interval change.    COMPARISON: 2/11/2020, 2/10/2020, chest CT 9/20/2020.    FINDINGS: AP chest radiograph. Costophrenic angles are collimated out  of the field-of-view. Endotracheal tube tip projects over the mid/low  thoracic trachea. Postoperative chest. Valvular prosthesis. Mellette-Berhane  catheter tip projects over the main pulmonary artery. Enteric tube is  again outside the field-of-view. Right basilar chest tube. Mediastinal  drains. Cardiomegaly. Bibasilar and retrocardiac consolidative  opacities. Probable bilateral pleural effusions. No pneumothorax.       Impression    IMPRESSION:  1. Cardiomegaly with moderate pulmonary edema, stable.  2. Bilateral pleural effusions with overlying basilar  atelectasis/consolidation.    I have personally reviewed the examination and initial interpretation  and I agree with the findings.    XIMENA FLETCHER, DO       =========================================

## 2020-02-12 NOTE — PROGRESS NOTES
CV ICU PROGRESS NOTE  February 12, 2020      CO-MORBIDITIES:   Dissection of aorta, unspecified portion of aorta (H)    ASSESSMENT: Vel Espana is a 74 year old male with PMH of HTN s/p Ascending aortic dissection repair using a 32 mm Dacron Gelweave graft, AVR with 27 mm Patterson bovine pericardial valve, Coronary bypass to right coronary artery, Left greater saphenous vein harvest, Circulatory arrest, Left femoral artery exploration on 2/10.     TODAY'S PROGRESS:   - sedation holiday  - continue pressors, wean as able  - EP consult to determine amiodarone duration       PLAN:  Neuro/ pain/ sedation:  - Monitor neurological status. Notify the MD for any acute changes in exam.  - Fentanyl gtt for pain.  - Propofol gtt for sedation.     Pulmonary care:   - MV  - Titrate FiO2 for SpO2 >92%     Cardiovascular:    - Monitor hemodynamic status.   - MAP >65, SBP <100  - Continue epi for RV support, NE for low SVR  - Hold PTA anti-hypertensives  - amiodarone gtt for VT run immediately post-op  - q1 hr doppler checks of b/l LE's     GI care:   - NPO except ice chips and medications.     Fluids/ Electrolytes/ Nutrition:   - TKO for IV fluid hydration  - NJ placed, advance TF to goal    Renal/ Fluid Balance:    #HANNA postoperatively  - Urine output is adequate so far.  - Will continue to monitor intake and output, Cr, electrolytes.     Endocrine:    # Stress hyperglycemia  - Insulin gtt     ID/ Antibiotics:  - Complete perioperative antibiotics  - No other indication for antibiotics.      Heme:     - Hgb goal >7     Prophylaxis:    - SQH  - Protonix qd     Lines/ tubes/ drains:  - MAC, [place], Arterial line, quintero     Disposition:  - CV ICU.      Patient seen, findings and plan discussed with CV ICU staff, Dr. Foster.     Macario Alfonso MD  CA-3/PGY-3 Anesthesiology  [phone]  ====================================    SUBJECTIVE:   No acute events overnight.    OBJECTIVE:   1. VITAL SIGNS:   Temp:  [99  F (37.2  C)-99.9   F (37.7  C)] 99.3  F (37.4  C)  Heart Rate:  [] 85  Resp:  [16-17] 16  MAP:  [51 mmHg-115 mmHg] 76 mmHg  Arterial Line BP: ()/(39-80) 101/65  FiO2 (%):  [50 %-60 %] 50 %  SpO2:  [90 %-99 %] 92 %  Ventilation Mode: CMV/AC  (Continuous Mandatory Ventilation/ Assist Control)  FiO2 (%): 50 %  Rate Set (breaths/minute): 16 breaths/min  Tidal Volume Set (mL): 600 mL  PEEP (cm H2O): 10 cmH2O  Oxygen Concentration (%): 50 %  Resp: 16      2. INTAKE/ OUTPUT:   I/O last 3 completed shifts:  In: 3657.69 [I.V.:1402.69; NG/GT:355; IV Piggyback:1500]  Out: 1307 [Urine:1037; Chest Tube:270]    3. PHYSICAL EXAMINATION:   General: intubated, sedated  Neuro: sedated, spontaneously opens eyes during sedation holiday and moves extremities but does not follow commands  Resp: mechanical breath sounds  CV: RRR  Abdomen: Soft, Non-distended, Non-tender  Incisions: c/d/i  Extremities: warm and well perfused    4. INVESTIGATIONS:   Arterial Blood Gases   Recent Labs   Lab 02/12/20  0816 02/12/20 0343 02/12/20  0009 02/11/20 1953   PH 7.40 7.42 7.44 7.45   PCO2 36 39 33* 33*   PO2 82 107* 118* 86   HCO3 23 25 22 22     Complete Blood Count   Recent Labs   Lab 02/12/20  0343 02/11/20  1139 02/11/20  0346 02/10/20  2052 02/10/20  1408   WBC 10.9  --  9.2 9.0 10.4   HGB 9.0* 9.4* 9.8* 9.3* 8.4*   PLT 79*  --  111* 114* 116*     Basic Metabolic Panel  Recent Labs   Lab 02/12/20  0343 02/11/20  1815 02/11/20  1139 02/11/20  0346    146* 147* 148*   POTASSIUM 4.8 5.3 5.1 4.5   CHLORIDE 114* 111* 113* 115*   CO2 26 26 25 24   BUN 52* 44* 39* 32*   CR 2.77* 2.99* 2.73* 2.42*   * 154* 148* 146*     Liver Function Tests  Recent Labs   Lab 02/12/20  0343 02/11/20  0346 02/10/20  2052 02/10/20  1408 02/10/20  1000 02/10/20  0905  02/09/20  2203   AST 91* 147*  --   --  123*  --   --  42   ALT 20 30  --   --  28  --   --  29   ALKPHOS 35* 36*  --   --  40  --   --  67   BILITOTAL 0.6 0.9  --   --  1.1  --   --  0.9   ALBUMIN  2.7* 3.1*  --   --  2.8*  --   --  3.5   INR  --   --  1.12 1.05 0.98 0.95   < > 1.38*    < > = values in this interval not displayed.     Pancreatic Enzymes  No lab results found in last 7 days.  Coagulation Profile  Recent Labs   Lab 02/10/20  2052 02/10/20  1408 02/10/20  1000 02/10/20  0905 02/10/20  0749 02/10/20  0648   INR 1.12 1.05 0.98 0.95 0.90 0.94   PTT  --   --  42* 41* 50* 50*         5. RADIOLOGY:   Recent Results (from the past 24 hour(s))   XR Chest Port 1 View    Narrative    Exam: AP chest radiograph 2/12/2020 7:03 AM .    HISTORY: Intubated patient, interval change.    COMPARISON: 2/11/2020, 2/10/2020, chest CT 9/20/2020.    FINDINGS: AP chest radiograph. Costophrenic angles are collimated out  of the field-of-view. Endotracheal tube tip projects over the mid/low  thoracic trachea. Postoperative chest. Valvular prosthesis. [place]  catheter tip projects over the main pulmonary artery. Enteric tube is  again outside the field-of-view. Right basilar chest tube. Mediastinal  drains. Cardiomegaly. Bibasilar and retrocardiac consolidative  opacities. Probable bilateral pleural effusions. No pneumothorax.       Impression    IMPRESSION:  1. Cardiomegaly with moderate pulmonary edema, stable.  2. Bilateral pleural effusions with overlying basilar  atelectasis/consolidation.    I have personally reviewed the examination and initial interpretation  and I agree with the findings.    XIMENA FLETCHER, DO       =========================================

## 2020-02-12 NOTE — PROGRESS NOTES
"   02/12/20 1053   Quick Adds   Type of Visit Initial Occupational Therapy Evaluation   Living Environment   Lives With other (see comments)  (S.O. who is currently on Hospice)   Living Arrangements mobile home  (\"single wide\")   Home Accessibility no concerns   Transportation Anticipated car, drives self;family or friend will provide   Living Environment Comment per family present at al. 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  Pt. lives in a one level home with walk in shower.    Self-Care   Usual Activity Tolerance good   Current Activity Tolerance poor   Activity/Exercise/Self-Care Comment per pt.s family. pt. is very active at baseline cares for S.O., indep. with yardwork, driving, etc.   Functional Level   Ambulation 0-->independent   Transferring 0-->independent   Toileting 0-->independent   Bathing 0-->independent   Dressing 0-->independent   Eating 0-->independent   Communication 0-->understands/communicates without difficulty   Swallowing 0-->swallows foods/liquids without difficulty   Cognition 0 - no cognition issues reported   Which of the above functional risks had a recent onset or change? ambulation;transferring;toileting;bathing;dressing;cognition   Prior Functional Level Comment per pt.s family. pt. is indep. with all ADLs/IADLs at baseline.   General Information   Onset of Illness/Injury or Date of Surgery - Date 02/10/20   Referring Physician Valorie Wright PA-C   Additional Occupational Profile Info/Pertinent History of Current Problem  74 year old male with PMH of HTN s/p Ascending aortic dissection repair using a 32 mm Dacron Gelweave graft, AVR with 27 mm Patterson bovine pericardial valve, Coronary bypass to right coronary artery, Left greater saphenous vein harvest, Circulatory arrest, Left femoral artery exploration on 2/10   Precautions/Limitations sternal precautions  ( Left femoral artery exploration on 2/10 )   General Info Comments intubated/sedated " "  Cognitive Status Examination   Cognitive Comment eyes closed for majority of session, no consistent command following at time of session   Range of Motion (ROM)   ROM Comment PROM WFL   Strength   Strength Comments unable to fully assess 2/2 level of alertness, post op prec.   Transfer Skill: Bed to Chair/Chair to Bed   Level of [place]: Bed to Chair unable to perform   Transfer Skill: Sit to Stand   Level of [place]: Sit/Stand unable to perform   Activities of Daily Living Analysis   Impairments Contributing to Impaired Activities of Daily Living balance impaired;cognition impaired;post surgical precautions;strength decreased;ROM decreased   General Therapy Interventions   Planned Therapy Interventions ADL retraining;cognition;ROM;strengthening;transfer training;home program guidelines;progressive activity/exercise   Clinical Impression   Criteria for Skilled Therapeutic Interventions Met yes, treatment indicated   OT Diagnosis impaired ADLs/mobility   Influenced by the following impairments post op precautions, intubated/sedated   Assessment of Occupational Performance 3-5 Performance Deficits   Identified Performance Deficits dressing, toileting, bathing, home mgmt.   Clinical Decision Making (Complexity) Low complexity   Therapy Frequency 3x/week  (increase as appropriate)   Predicted Duration of Therapy Intervention (days/wks) ~ 2 weeks   Anticipated Equipment Needs at Discharge   (TBD)   Anticipated Discharge Disposition Transitional Care Facility;Acute Rehabilitation Facility   Risks and Benefits of Treatment have been explained. Yes   Patient, Family & other staff in agreement with plan of care Yes   [facility] AM-PAC TM \"6 Clicks\"   2016, Trustees of [facility], under license to [employer].  All rights reserved.   6 Clicks Short Forms Daily Activity Inpatient Short Form   [facility] AM-PAC  \"6 Clicks\" Daily Activity Inpatient Short Form   1. Putting on and taking off " regular lower body clothing? 1 - Total   2. Bathing (including washing, rinsing, drying)? 1 - Total   3. Toileting, which includes using toilet, bedpan or urinal? 1 - Total   4. Putting on and taking off regular upper body clothing? 1 - Total   5. Taking care of personal grooming such as brushing teeth? 1 - Total   6. Eating meals? 1 - Total   Daily Activity Raw Score (Score out of 24.Lower scores equate to lower levels of function) 6   Total Evaluation Time   Total Evaluation Time (Minutes) 10

## 2020-02-12 NOTE — PROGRESS NOTES
Social Work Services Progress Note    Hospital Day: 3  Date of Initial Social Work Evaluation:  2/12/2020  Collaborated with:  Rosa (Daughter), Savita (Daughter) and Luis (Son)    Data:  Vel Espana is a 74 year old male with PMH of HTN s/p Ascending aortic dissection repair using a 32 mm Dacron Gelweave graft, AVR with 27 mm Patterson bovine pericardial valve, Coronary bypass to right coronary artery, Left greater saphenous vein harvest, Circulatory arrest, Left femoral artery exploration on 2/10.  He remains intubated at this time.      Intervention:  Discharge Planning-  Ronda (Spouse) is enrolled in hospice and has 3-4x/ day wound care, pt was her caregiver prior to being hospitalized.  She has family members that are able to caregive for her temporarily, but pt's children are concerned with pt's ability to care for her at discharge, and expressed interest in placing pt and his spouse (Ronda for hospice, pt for TCU) at the same SNF.    SW printed out the list of [facility] contracted SNFs near pt's home and reviewed the list with jessica.  They identified [facility] (P:  [phone]) as their TCU preference.  SW encouraged them to review the list for back-up TCU preferences as well.  SW educated them in detail regarding insurance coverage for TCU, potential private room fees, and the private pay cost of w/c transport per their request.    Family again inquired about options for pt's spouse for placement.  Family called the [facility] and confirmed that she is not eligible for VA hospice benefits/ the VA will not pay for the room and board cost for hospice at a VA contracted SNF.  SW discussed that this would then be private pay, unless she qualifies for MA.  SW again directed them to get the hospice social worker involved to assist Ronda with additional resources/ getting into a SNF with hospice.    Family had numerous questions regarding VA coverage, if pt will need to be transferred to the VA when more stable,  if his bills get sent to the VA etc. (per family, Ronda is very concerned about bills).  SW directed them to contact the VA, and also provided them the phone number for [facility] billing department.      Family denied having any further questions at this time.     Assessment:  Pt is intubated and sedated.    Plan:    Anticipated Disposition:  Likely TCU    Barriers to d/c plan:  Medical Stability    Follow Up:   will continue to follow to provide support and continue discharge plans as identified.    RUSH Pelayo, [facility]  ICU   [facility]  Phone:  [phone]  Pager:  [phone]

## 2020-02-13 ENCOUNTER — APPOINTMENT (OUTPATIENT)
Dept: GENERAL RADIOLOGY | Facility: CLINIC | Age: 75
DRG: 219 | End: 2020-02-13
Payer: COMMERCIAL

## 2020-02-13 ENCOUNTER — APPOINTMENT (OUTPATIENT)
Dept: CARDIOLOGY | Facility: CLINIC | Age: 75
DRG: 219 | End: 2020-02-13
Payer: COMMERCIAL

## 2020-02-13 PROBLEM — N17.0 ACUTE KIDNEY FAILURE WITH TUBULAR NECROSIS (H): Status: ACTIVE | Noted: 2020-02-13

## 2020-02-13 LAB
ABO + RH BLD: NORMAL
ABO + RH BLD: NORMAL
ALBUMIN SERPL-MCNC: 2.8 G/DL (ref 3.4–5)
ALBUMIN UR-MCNC: NEGATIVE MG/DL
ALP SERPL-CCNC: 42 U/L (ref 40–150)
ALT SERPL W P-5'-P-CCNC: 16 U/L (ref 0–70)
ANION GAP SERPL CALCULATED.3IONS-SCNC: 2 MMOL/L (ref 3–14)
ANION GAP SERPL CALCULATED.3IONS-SCNC: 2 MMOL/L (ref 3–14)
APPEARANCE UR: CLEAR
AST SERPL W P-5'-P-CCNC: 58 U/L (ref 0–45)
BACTERIA #/AREA URNS HPF: ABNORMAL /HPF
BASE DEFICIT BLDA-SCNC: 0.1 MMOL/L
BASE DEFICIT BLDA-SCNC: 0.4 MMOL/L
BASE EXCESS BLDA CALC-SCNC: 0.8 MMOL/L
BASE EXCESS BLDA CALC-SCNC: 0.9 MMOL/L
BASE EXCESS BLDV CALC-SCNC: 0 MMOL/L
BASE EXCESS BLDV CALC-SCNC: 0.6 MMOL/L
BASE EXCESS BLDV CALC-SCNC: 1.4 MMOL/L
BASE EXCESS BLDV CALC-SCNC: 1.6 MMOL/L
BASE EXCESS BLDV CALC-SCNC: 1.9 MMOL/L
BASE EXCESS BLDV CALC-SCNC: 2.4 MMOL/L
BILIRUB SERPL-MCNC: 0.5 MG/DL (ref 0.2–1.3)
BILIRUB UR QL STRIP: NEGATIVE
BLD GP AB SCN SERPL QL: NORMAL
BLD PROD TYP BPU: NORMAL
BLOOD BANK CMNT PATIENT-IMP: NORMAL
BUN SERPL-MCNC: 92 MG/DL (ref 7–30)
BUN SERPL-MCNC: 97 MG/DL (ref 7–30)
CALCIUM SERPL-MCNC: 7.8 MG/DL (ref 8.5–10.1)
CALCIUM SERPL-MCNC: 7.9 MG/DL (ref 8.5–10.1)
CHLORIDE SERPL-SCNC: 114 MMOL/L (ref 94–109)
CHLORIDE SERPL-SCNC: 115 MMOL/L (ref 94–109)
CO2 SERPL-SCNC: 27 MMOL/L (ref 20–32)
CO2 SERPL-SCNC: 28 MMOL/L (ref 20–32)
COLOR UR AUTO: YELLOW
COPATH REPORT: NORMAL
CREAT SERPL-MCNC: 2.77 MG/DL (ref 0.66–1.25)
CREAT SERPL-MCNC: 2.89 MG/DL (ref 0.66–1.25)
CREAT UR-MCNC: 40 MG/DL
ERYTHROCYTE [DISTWIDTH] IN BLOOD BY AUTOMATED COUNT: 16.1 % (ref 10–15)
GFR SERPL CREATININE-BSD FRML MDRD: 20 ML/MIN/{1.73_M2}
GFR SERPL CREATININE-BSD FRML MDRD: 21 ML/MIN/{1.73_M2}
GLUCOSE BLDC GLUCOMTR-MCNC: 116 MG/DL (ref 70–99)
GLUCOSE BLDC GLUCOMTR-MCNC: 133 MG/DL (ref 70–99)
GLUCOSE BLDC GLUCOMTR-MCNC: 141 MG/DL (ref 70–99)
GLUCOSE BLDC GLUCOMTR-MCNC: 155 MG/DL (ref 70–99)
GLUCOSE BLDC GLUCOMTR-MCNC: 86 MG/DL (ref 70–99)
GLUCOSE SERPL-MCNC: 119 MG/DL (ref 70–99)
GLUCOSE SERPL-MCNC: 166 MG/DL (ref 70–99)
GLUCOSE UR STRIP-MCNC: NEGATIVE MG/DL
HCO3 BLD-SCNC: 24 MMOL/L (ref 21–28)
HCO3 BLD-SCNC: 25 MMOL/L (ref 21–28)
HCO3 BLD-SCNC: 26 MMOL/L (ref 21–28)
HCO3 BLD-SCNC: 26 MMOL/L (ref 21–28)
HCO3 BLDV-SCNC: 25 MMOL/L (ref 21–28)
HCO3 BLDV-SCNC: 26 MMOL/L (ref 21–28)
HCO3 BLDV-SCNC: 27 MMOL/L (ref 21–28)
HCO3 BLDV-SCNC: 27 MMOL/L (ref 21–28)
HCO3 BLDV-SCNC: 28 MMOL/L (ref 21–28)
HCO3 BLDV-SCNC: 28 MMOL/L (ref 21–28)
HCT VFR BLD AUTO: 26.6 % (ref 40–53)
HGB BLD-MCNC: 8 G/DL (ref 13.3–17.7)
HGB BLD-MCNC: 8.3 G/DL (ref 13.3–17.7)
HGB UR QL STRIP: ABNORMAL
INR PPP: 1.29 (ref 0.86–1.14)
KETONES UR STRIP-MCNC: NEGATIVE MG/DL
LACTATE BLD-SCNC: 0.8 MMOL/L (ref 0.7–2)
LEUKOCYTE ESTERASE UR QL STRIP: ABNORMAL
MAGNESIUM SERPL-MCNC: 2.8 MG/DL (ref 1.6–2.3)
MAGNESIUM SERPL-MCNC: 2.9 MG/DL (ref 1.6–2.3)
MCH RBC QN AUTO: 30.9 PG (ref 26.5–33)
MCHC RBC AUTO-ENTMCNC: 31.2 G/DL (ref 31.5–36.5)
MCV RBC AUTO: 99 FL (ref 78–100)
MUCOUS THREADS #/AREA URNS LPF: PRESENT /LPF
NITRATE UR QL: NEGATIVE
NUM BPU REQUESTED: 1
O2/TOTAL GAS SETTING VFR VENT: 50 %
OXYHGB MFR BLD: 95 % (ref 92–100)
OXYHGB MFR BLD: 95 % (ref 92–100)
OXYHGB MFR BLD: 96 % (ref 92–100)
OXYHGB MFR BLD: 96 % (ref 92–100)
OXYHGB MFR BLDV: 44 %
OXYHGB MFR BLDV: 45 %
OXYHGB MFR BLDV: 46 %
OXYHGB MFR BLDV: 50 %
OXYHGB MFR BLDV: 51 %
OXYHGB MFR BLDV: 56 %
PCO2 BLD: 38 MM HG (ref 35–45)
PCO2 BLD: 39 MM HG (ref 35–45)
PCO2 BLD: 42 MM HG (ref 35–45)
PCO2 BLD: 42 MM HG (ref 35–45)
PCO2 BLDV: 44 MM HG (ref 40–50)
PCO2 BLDV: 47 MM HG (ref 40–50)
PCO2 BLDV: 48 MM HG (ref 40–50)
PCO2 BLDV: 48 MM HG (ref 40–50)
PH BLD: 7.4 PH (ref 7.35–7.45)
PH BLD: 7.4 PH (ref 7.35–7.45)
PH BLD: 7.41 PH (ref 7.35–7.45)
PH BLD: 7.41 PH (ref 7.35–7.45)
PH BLDV: 7.36 PH (ref 7.32–7.43)
PH BLDV: 7.36 PH (ref 7.32–7.43)
PH BLDV: 7.37 PH (ref 7.32–7.43)
PH BLDV: 7.38 PH (ref 7.32–7.43)
PH UR STRIP: 5 PH (ref 5–7)
PHOSPHATE SERPL-MCNC: 3.6 MG/DL (ref 2.5–4.5)
PHOSPHATE SERPL-MCNC: 3.8 MG/DL (ref 2.5–4.5)
PLATELET # BLD AUTO: 74 10E9/L (ref 150–450)
PO2 BLD: 81 MM HG (ref 80–105)
PO2 BLD: 81 MM HG (ref 80–105)
PO2 BLD: 83 MM HG (ref 80–105)
PO2 BLD: 91 MM HG (ref 80–105)
PO2 BLDV: 27 MM HG (ref 25–47)
PO2 BLDV: 27 MM HG (ref 25–47)
PO2 BLDV: 28 MM HG (ref 25–47)
PO2 BLDV: 29 MM HG (ref 25–47)
PO2 BLDV: 30 MM HG (ref 25–47)
PO2 BLDV: 32 MM HG (ref 25–47)
POTASSIUM BLD-SCNC: 4.9 MMOL/L (ref 3.4–5.3)
POTASSIUM SERPL-SCNC: 5.1 MMOL/L (ref 3.4–5.3)
POTASSIUM SERPL-SCNC: 5.1 MMOL/L (ref 3.4–5.3)
POTASSIUM SERPL-SCNC: 5.2 MMOL/L (ref 3.4–5.3)
PROT SERPL-MCNC: 5.4 G/DL (ref 6.8–8.8)
RBC # BLD AUTO: 2.69 10E12/L (ref 4.4–5.9)
RBC #/AREA URNS AUTO: 9 /HPF (ref 0–2)
SODIUM SERPL-SCNC: 144 MMOL/L (ref 133–144)
SODIUM SERPL-SCNC: 144 MMOL/L (ref 133–144)
SOURCE: ABNORMAL
SP GR UR STRIP: 1.01 (ref 1–1.03)
SPECIMEN EXP DATE BLD: NORMAL
TRANS CELLS #/AREA URNS HPF: <1 /HPF (ref 0–1)
UROBILINOGEN UR STRIP-MCNC: NORMAL MG/DL (ref 0–2)
UUN UR-MCNC: 475 MG/DL
UUN/CREAT 24H UR: 12 G/G CR
WBC # BLD AUTO: 9 10E9/L (ref 4–11)
WBC #/AREA URNS AUTO: 4 /HPF (ref 0–5)

## 2020-02-13 PROCEDURE — 25000128 H RX IP 250 OP 636: Performed by: STUDENT IN AN ORGANIZED HEALTH CARE EDUCATION/TRAINING PROGRAM

## 2020-02-13 PROCEDURE — 93325 DOPPLER ECHO COLOR FLOW MAPG: CPT | Mod: 26 | Performed by: INTERNAL MEDICINE

## 2020-02-13 PROCEDURE — 93005 ELECTROCARDIOGRAM TRACING: CPT

## 2020-02-13 PROCEDURE — 93308 TTE F-UP OR LMTD: CPT | Mod: 26 | Performed by: INTERNAL MEDICINE

## 2020-02-13 PROCEDURE — 82805 BLOOD GASES W/O2 SATURATION: CPT | Performed by: PHYSICIAN ASSISTANT

## 2020-02-13 PROCEDURE — 99291 CRITICAL CARE FIRST HOUR: CPT | Mod: GC | Performed by: ANESTHESIOLOGY

## 2020-02-13 PROCEDURE — 84100 ASSAY OF PHOSPHORUS: CPT | Performed by: SURGERY

## 2020-02-13 PROCEDURE — 40000014 ZZH STATISTIC ARTERIAL MONITORING DAILY

## 2020-02-13 PROCEDURE — 25500064 ZZH RX 255 OP 636: Performed by: INTERNAL MEDICINE

## 2020-02-13 PROCEDURE — 81001 URINALYSIS AUTO W/SCOPE: CPT | Performed by: INTERNAL MEDICINE

## 2020-02-13 PROCEDURE — 83605 ASSAY OF LACTIC ACID: CPT | Performed by: SURGERY

## 2020-02-13 PROCEDURE — 93308 TTE F-UP OR LMTD: CPT

## 2020-02-13 PROCEDURE — 25000132 ZZH RX MED GY IP 250 OP 250 PS 637: Performed by: STUDENT IN AN ORGANIZED HEALTH CARE EDUCATION/TRAINING PROGRAM

## 2020-02-13 PROCEDURE — 85610 PROTHROMBIN TIME: CPT | Performed by: SURGERY

## 2020-02-13 PROCEDURE — 83735 ASSAY OF MAGNESIUM: CPT | Performed by: SURGERY

## 2020-02-13 PROCEDURE — 86923 COMPATIBILITY TEST ELECTRIC: CPT | Performed by: THORACIC SURGERY (CARDIOTHORACIC VASCULAR SURGERY)

## 2020-02-13 PROCEDURE — 40000196 ZZH STATISTIC RAPCV CVP MONITORING

## 2020-02-13 PROCEDURE — 25000128 H RX IP 250 OP 636: Performed by: ANESTHESIOLOGY

## 2020-02-13 PROCEDURE — 86900 BLOOD TYPING SEROLOGIC ABO: CPT | Performed by: THORACIC SURGERY (CARDIOTHORACIC VASCULAR SURGERY)

## 2020-02-13 PROCEDURE — 40000264 ECHOCARDIOGRAM LIMITED

## 2020-02-13 PROCEDURE — 86850 RBC ANTIBODY SCREEN: CPT | Performed by: THORACIC SURGERY (CARDIOTHORACIC VASCULAR SURGERY)

## 2020-02-13 PROCEDURE — 71045 X-RAY EXAM CHEST 1 VIEW: CPT

## 2020-02-13 PROCEDURE — 85018 HEMOGLOBIN: CPT | Performed by: ANESTHESIOLOGY

## 2020-02-13 PROCEDURE — 84540 ASSAY OF URINE/UREA-N: CPT | Performed by: INTERNAL MEDICINE

## 2020-02-13 PROCEDURE — 94003 VENT MGMT INPAT SUBQ DAY: CPT

## 2020-02-13 PROCEDURE — 94640 AIRWAY INHALATION TREATMENT: CPT

## 2020-02-13 PROCEDURE — 40000275 ZZH STATISTIC RCP TIME EA 10 MIN

## 2020-02-13 PROCEDURE — 20000004 ZZH R&B ICU UMMC

## 2020-02-13 PROCEDURE — 84132 ASSAY OF SERUM POTASSIUM: CPT | Performed by: PHYSICIAN ASSISTANT

## 2020-02-13 PROCEDURE — 93010 ELECTROCARDIOGRAM REPORT: CPT | Performed by: INTERNAL MEDICINE

## 2020-02-13 PROCEDURE — 25800030 ZZH RX IP 258 OP 636: Performed by: PHYSICIAN ASSISTANT

## 2020-02-13 PROCEDURE — 40000048 ZZH STATISTIC DAILY SWAN MONITORING

## 2020-02-13 PROCEDURE — 94640 AIRWAY INHALATION TREATMENT: CPT | Mod: 76

## 2020-02-13 PROCEDURE — 25800030 ZZH RX IP 258 OP 636: Performed by: ANESTHESIOLOGY

## 2020-02-13 PROCEDURE — 93321 DOPPLER ECHO F-UP/LMTD STD: CPT | Mod: 26 | Performed by: INTERNAL MEDICINE

## 2020-02-13 PROCEDURE — 84132 ASSAY OF SERUM POTASSIUM: CPT | Performed by: STUDENT IN AN ORGANIZED HEALTH CARE EDUCATION/TRAINING PROGRAM

## 2020-02-13 PROCEDURE — 83735 ASSAY OF MAGNESIUM: CPT | Performed by: ANESTHESIOLOGY

## 2020-02-13 PROCEDURE — 25000132 ZZH RX MED GY IP 250 OP 250 PS 637: Performed by: ANESTHESIOLOGY

## 2020-02-13 PROCEDURE — 80048 BASIC METABOLIC PNL TOTAL CA: CPT | Performed by: ANESTHESIOLOGY

## 2020-02-13 PROCEDURE — 80053 COMPREHEN METABOLIC PANEL: CPT | Performed by: SURGERY

## 2020-02-13 PROCEDURE — P9016 RBC LEUKOCYTES REDUCED: HCPCS | Performed by: THORACIC SURGERY (CARDIOTHORACIC VASCULAR SURGERY)

## 2020-02-13 PROCEDURE — 84100 ASSAY OF PHOSPHORUS: CPT | Performed by: ANESTHESIOLOGY

## 2020-02-13 PROCEDURE — 00000146 ZZHCL STATISTIC GLUCOSE BY METER IP

## 2020-02-13 PROCEDURE — 27210432 ZZH NUTRITION PRODUCT RENAL BASIC LITER

## 2020-02-13 PROCEDURE — 85027 COMPLETE CBC AUTOMATED: CPT | Performed by: SURGERY

## 2020-02-13 PROCEDURE — 25000132 ZZH RX MED GY IP 250 OP 250 PS 637: Performed by: THORACIC SURGERY (CARDIOTHORACIC VASCULAR SURGERY)

## 2020-02-13 PROCEDURE — 25000128 H RX IP 250 OP 636: Performed by: PHYSICIAN ASSISTANT

## 2020-02-13 PROCEDURE — 25000132 ZZH RX MED GY IP 250 OP 250 PS 637: Performed by: PHYSICIAN ASSISTANT

## 2020-02-13 PROCEDURE — 86901 BLOOD TYPING SEROLOGIC RH(D): CPT | Performed by: THORACIC SURGERY (CARDIOTHORACIC VASCULAR SURGERY)

## 2020-02-13 RX ORDER — FUROSEMIDE 10 MG/ML
60 INJECTION INTRAMUSCULAR; INTRAVENOUS ONCE
Status: DISCONTINUED | OUTPATIENT
Start: 2020-02-13 | End: 2020-02-13

## 2020-02-13 RX ORDER — FUROSEMIDE 10 MG/ML
40 INJECTION INTRAMUSCULAR; INTRAVENOUS ONCE
Status: COMPLETED | OUTPATIENT
Start: 2020-02-13 | End: 2020-02-13

## 2020-02-13 RX ORDER — AMINO AC/PROTEIN HYDR/WHEY PRO 10G-100/30
1 LIQUID (ML) ORAL 3 TIMES DAILY
Status: DISCONTINUED | OUTPATIENT
Start: 2020-02-13 | End: 2020-02-18

## 2020-02-13 RX ORDER — AMINO AC/PROTEIN HYDR/WHEY PRO 10G-100/30
1 LIQUID (ML) ORAL DAILY
Status: DISCONTINUED | OUTPATIENT
Start: 2020-02-14 | End: 2020-02-13

## 2020-02-13 RX ORDER — DOBUTAMINE HYDROCHLORIDE 200 MG/100ML
2.5-2 INJECTION INTRAVENOUS CONTINUOUS
Status: DISCONTINUED | OUTPATIENT
Start: 2020-02-13 | End: 2020-02-18

## 2020-02-13 RX ORDER — POLYETHYLENE GLYCOL 3350 17 G/17G
17 POWDER, FOR SOLUTION ORAL DAILY
Status: DISCONTINUED | OUTPATIENT
Start: 2020-02-13 | End: 2020-03-05 | Stop reason: HOSPADM

## 2020-02-13 RX ADMIN — ALBUTEROL SULFATE 6 PUFF: 90 AEROSOL, METERED RESPIRATORY (INHALATION) at 01:18

## 2020-02-13 RX ADMIN — PROPOFOL 30 MCG/KG/MIN: 10 INJECTION, EMULSION INTRAVENOUS at 20:55

## 2020-02-13 RX ADMIN — HEPARIN SODIUM 5000 UNITS: 5000 INJECTION, SOLUTION INTRAVENOUS; SUBCUTANEOUS at 16:55

## 2020-02-13 RX ADMIN — ALBUTEROL SULFATE 6 PUFF: 90 AEROSOL, METERED RESPIRATORY (INHALATION) at 16:18

## 2020-02-13 RX ADMIN — ALBUTEROL SULFATE 6 PUFF: 90 AEROSOL, METERED RESPIRATORY (INHALATION) at 12:15

## 2020-02-13 RX ADMIN — SODIUM CHLORIDE, POTASSIUM CHLORIDE, SODIUM LACTATE AND CALCIUM CHLORIDE 500 ML: 600; 310; 30; 20 INJECTION, SOLUTION INTRAVENOUS at 09:07

## 2020-02-13 RX ADMIN — PROPOFOL 25 MCG/KG/MIN: 10 INJECTION, EMULSION INTRAVENOUS at 08:35

## 2020-02-13 RX ADMIN — INSULIN ASPART 1 UNITS: 100 INJECTION, SOLUTION INTRAVENOUS; SUBCUTANEOUS at 23:59

## 2020-02-13 RX ADMIN — Medication 1 PACKET: at 19:56

## 2020-02-13 RX ADMIN — HUMAN ALBUMIN MICROSPHERES AND PERFLUTREN 5 ML: 10; .22 INJECTION, SOLUTION INTRAVENOUS at 14:00

## 2020-02-13 RX ADMIN — SENNOSIDES AND DOCUSATE SODIUM 2 TABLET: 8.6; 5 TABLET ORAL at 08:33

## 2020-02-13 RX ADMIN — ALBUTEROL SULFATE 6 PUFF: 90 AEROSOL, METERED RESPIRATORY (INHALATION) at 09:20

## 2020-02-13 RX ADMIN — ALBUTEROL SULFATE 6 PUFF: 90 AEROSOL, METERED RESPIRATORY (INHALATION) at 19:51

## 2020-02-13 RX ADMIN — AMIODARONE HYDROCHLORIDE 0.5 MG/MIN: 50 INJECTION, SOLUTION INTRAVENOUS at 01:54

## 2020-02-13 RX ADMIN — SENNOSIDES AND DOCUSATE SODIUM 2 TABLET: 8.6; 5 TABLET ORAL at 19:56

## 2020-02-13 RX ADMIN — ACETAMINOPHEN 975 MG: 325 TABLET, FILM COATED ORAL at 02:04

## 2020-02-13 RX ADMIN — INSULIN ASPART 1 UNITS: 100 INJECTION, SOLUTION INTRAVENOUS; SUBCUTANEOUS at 00:05

## 2020-02-13 RX ADMIN — ALBUTEROL SULFATE 6 PUFF: 90 AEROSOL, METERED RESPIRATORY (INHALATION) at 05:22

## 2020-02-13 RX ADMIN — PROPOFOL 30 MCG/KG/MIN: 10 INJECTION, EMULSION INTRAVENOUS at 14:10

## 2020-02-13 RX ADMIN — Medication 1 PACKET: at 14:43

## 2020-02-13 RX ADMIN — Medication 1 PACKET: at 08:41

## 2020-02-13 RX ADMIN — HEPARIN SODIUM 5000 UNITS: 5000 INJECTION, SOLUTION INTRAVENOUS; SUBCUTANEOUS at 00:50

## 2020-02-13 RX ADMIN — MULTIVITAMIN 15 ML: LIQUID ORAL at 08:33

## 2020-02-13 RX ADMIN — INSULIN ASPART 1 UNITS: 100 INJECTION, SOLUTION INTRAVENOUS; SUBCUTANEOUS at 04:46

## 2020-02-13 RX ADMIN — ASPIRIN 81 MG CHEWABLE TABLET 81 MG: 81 TABLET CHEWABLE at 08:33

## 2020-02-13 RX ADMIN — POLYETHYLENE GLYCOL 3350 17 G: 17 POWDER, FOR SOLUTION ORAL at 11:24

## 2020-02-13 RX ADMIN — PROPOFOL 30 MCG/KG/MIN: 10 INJECTION, EMULSION INTRAVENOUS at 16:34

## 2020-02-13 RX ADMIN — FUROSEMIDE 40 MG: 10 INJECTION, SOLUTION INTRAVENOUS at 11:51

## 2020-02-13 RX ADMIN — Medication 40 MG: at 08:41

## 2020-02-13 RX ADMIN — DOBUTAMINE HYDROCHLORIDE 2.5 MCG/KG/MIN: 200 INJECTION INTRAVENOUS at 08:59

## 2020-02-13 RX ADMIN — PROPOFOL 25 MCG/KG/MIN: 10 INJECTION, EMULSION INTRAVENOUS at 03:03

## 2020-02-13 RX ADMIN — HEPARIN SODIUM 5000 UNITS: 5000 INJECTION, SOLUTION INTRAVENOUS; SUBCUTANEOUS at 09:10

## 2020-02-13 ASSESSMENT — MIFFLIN-ST. JEOR: SCORE: 2115.38

## 2020-02-13 ASSESSMENT — ACTIVITIES OF DAILY LIVING (ADL)
ADLS_ACUITY_SCORE: 16

## 2020-02-13 NOTE — PLAN OF CARE
Major Shift Events: Trialed sedation vacation, no command following, ESPINAL. Restlessness, increased work of breathing, and high RR noted with lowered sedation. HR 80s-90s accelerated junction rhythm. BP labile. Titrating epi gtt. SVO2 low with epi wean. UO 40-60mL/hr.   Plan: Monitor hemodynamics, wean sedation as tolerated.  For vital signs and complete assessments, please see documentation flowsheets.

## 2020-02-13 NOTE — PLAN OF CARE
Major Shift Events: No acute events overnight. Michelle Segura MD aware of pt SvO2 in 40s. See flowsheets for full SEAN numbers. Epinephrine and propofol titrated to maintain SBP < 100 with MAP > 65. Epinephrine has ranged 0.02-0.04 mcg/kg/min & propofol 20-30 mcg/kg/min this shift. Amiodarone remains at 0.5 mg/min. Fentanyl gtt continues at 50 mcg/hour.    Plan: Continue daily sedation holidays & wean pressors as able.   For vital signs and complete assessments, please see documentation flowsheets.

## 2020-02-13 NOTE — CONSULTS
Cardiac Electrophysiology consultation      Reason for consultation:  Non-sustained VT    HPI:  75 yo male who, upon arrival to the ICU following surgical repair of a type A aortic dissection with aortic valve replacement and right coronary artery bypass under circulatory arrest, suffered 25 second run of monomorphic ventricular tachycardia in the setting of hypokalemia (K 2.8) and lactic acidosis (lactate 11.0).  He was started on and remains on amiodarone since.  With post-op care and correction of his electrolyte & metabolic derangements no further ventricular arrhythmias have been observed apart from occasional couplets and triplets.    He remains intubated and mechanically ventilated.  He is on epinephrine & norepinephrine infusions.  His post-op course has been complicated by acute kidney injury.    No current facility-administered medications on file prior to encounter.   aspirin 81 MG tablet, Take 81 mg by mouth every other day   atenolol (TENORMIN) 25 MG tablet, Take 25 mg by mouth daily  azithromycin (ZITHROMAX) 250 MG tablet, Two tablets first day, then one tablet daily for four days.  benzonatate (TESSALON) 100 MG capsule, Take 1 capsule (100 mg) by mouth 3 times daily as needed  [] predniSONE (DELTASONE) 20 MG tablet, Take 2 tablets (40 mg) by mouth daily for 7 days      No Known Allergies    Past Medical History:   Diagnosis Date     Psychosexual dysfunction with inhibited sexual excitement      Unspecified essential hypertension      Unspecified sleep apnea      Past Surgical History:   Procedure Laterality Date     REPAIR ANEURYSM ASCENDING AORTA N/A 2020    Procedure: Median Sternotomy, repair of ascending aorta using 32mm gelweave graft, deep circulatory arrest,  aortic valve repair using 27mm inspiris valve, on-pump oxygenator, open saphenous vein harvest, coronary artery bypass x1, transesophageal echocardiogram done by anestheisa;  Surgeon: Nivia Mckeon MD;  Location:  OR  "    History reviewed. No pertinent family history.  Social History     Socioeconomic History     Marital status:      Spouse name: Not on file     Number of children: Not on file     Years of education: Not on file     Highest education level: Not on file   Occupational History     Not on file   Social Needs     Financial resource strain: Not on file     Food insecurity:     Worry: Not on file     Inability: Not on file     Transportation needs:     Medical: Not on file     Non-medical: Not on file   Tobacco Use     Smoking status: Never Smoker     Smokeless tobacco: Never Used   Substance and Sexual Activity     Alcohol use: No     Drug use: No     Sexual activity: Yes     Partners: Female   Lifestyle     Physical activity:     Days per week: Not on file     Minutes per session: Not on file     Stress: Not on file   Relationships     Social connections:     Talks on phone: Not on file     Gets together: Not on file     Attends [employer] service: Not on file     Active member of club or organization: Not on file     Attends meetings of clubs or organizations: Not on file     Relationship status: Not on file     Intimate partner violence:     Fear of current or ex partner: Not on file     Emotionally abused: Not on file     Physically abused: Not on file     Forced sexual activity: Not on file   Other Topics Concern     Parent/sibling w/ CABG, MI or angioplasty before 65F 55M? Not Asked   Social History Narrative     Not on file     Review of systems could not be performed due to his sedated status.    Physical Examination:  Vitals: BP 91/74   Pulse 96   Temp 99.1  F (37.3  C) (Axillary)   Resp 18   Ht 1.753 m (5' 9\")   Wt 140.8 kg (310 lb 6.5 oz)   SpO2 92%   BMI 45.84 kg/m    GENERAL APPEARANCE: critically ill  HEENT: no scleral icterus  NECK:  Right internal jugular PA catheter  CHEST: equal chest rise.  CARDIOVASCULAR:  RRR, warm extremities.  ABDOMEN: soft  NEURO: sedated, opens eyes spontaneously " but does not follow commands.  SKIN: not jaundiced    CMP  Recent Labs   Lab 02/12/20  0343 02/11/20  1815 02/11/20  1139 02/11/20  0346  02/10/20  1614  02/10/20  1000  02/09/20  2203    146* 147* 148*  --   --    < > 150*   < > 136   POTASSIUM 4.8 5.3 5.1 4.5   < > 4.0   < > 2.9*   < > 4.3   CHLORIDE 114* 111* 113* 115*  --   --    < > 110*  --  106   CO2 26 26 25 24  --   --    < > 22  --  25   ANIONGAP 4 8 8 9  --   --    < > 18*  --  4   * 154* 148* 146*  --   --    < > 192*   < > 146*   BUN 52* 44* 39* 32*  --   --    < > 24  --  23   CR 2.77* 2.99* 2.73* 2.42*  --   --    < > 1.67*  --  1.27*   GFRESTIMATED 21* 19* 21* 25*  --   --    < > 39*  --  54*   GFRESTBLACK 24* 22* 25* 29*  --   --    < > 45*  --  63   ELMER 7.4* 7.8* 7.9* 7.9*  --   --    < > 8.6  --  8.8   MAG 2.5*  --   --  2.5*  --  2.5*  --  2.5*  --   --    PHOS 4.7*  --   --  3.3  --   --   --  1.0*  --   --    PROTTOTAL 5.1*  --   --  5.3*  --   --   --  5.1*  --  7.2   ALBUMIN 2.7*  --   --  3.1*  --   --   --  2.8*  --  3.5   BILITOTAL 0.6  --   --  0.9  --   --   --  1.1  --  0.9   ALKPHOS 35*  --   --  36*  --   --   --  40  --  67   AST 91*  --   --  147*  --   --   --  123*  --  42   ALT 20  --   --  30  --   --   --  28  --  29    < > = values in this interval not displayed.     CBC  Recent Labs   Lab 02/12/20  1606 02/12/20  0343 02/11/20  1139 02/11/20  0346 02/10/20  2052   WBC 10.3 10.9  --  9.2 9.0   RBC 2.81* 2.94*  --  3.21* 2.94*   HGB 8.6* 9.0* 9.4* 9.8* 9.3*   HCT 27.5* 28.5*  --  29.5* 27.5*   MCV 98 97  --  92 94   MCH 30.6 30.6  --  30.5 31.6   MCHC 31.3* 31.6  --  33.2 33.8   RDW 16.1* 16.4*  --  15.4* 15.0   PLT 72* 79*  --  111* 114*     INR  Recent Labs   Lab 02/10/20  2052 02/10/20  1408 02/10/20  1000 02/10/20  0905   INR 1.12 1.05 0.98 0.95     Arterial Blood Gas  Recent Labs   Lab 02/12/20  1928 02/12/20  1617 02/12/20  1212 02/12/20  0824 02/12/20  0816 02/12/20  0343  02/12/20  0009   PH 7.41  --   --    "--  7.40 7.42  --  7.44   PCO2 38  --   --   --  36 39  --  33*   PO2 87  --   --   --  82 107*  --  118*   HCO3 24  --   --   --  23 25  --  22   O2PER 50  50 50 50 55 55 60   < > 60  60    < > = values in this interval not displayed.     Echocardiography 2/10 showed an LV EF of 60-65% and a mildly dilated RV with mildly reduced RV systolic function.    EKGs show sinus rhythm,  msec, IVCD.      Assessment and recommendations:  75 yo male with a 25 second run of monomorphic ventricular tachycardia in the setting of significant hypokalemia and lactic acidosis just after undergoing cardiac surgery.  He has not had any significant ventricular arrhythmias since, albeit on amiodarone and while sedated.  Nonetheless, the metabolic and electrolyte derangements are a sufficiently plausible reason for the \"long\" non-sustained run of VT that we think amiodarone can be safely discontinued.    Please page [phone] with any questions.  I discussed the patient with Dr. Lauren Lamb.    Haim Bonner MD  Cardiac electrophysiology fellow      I have seen, interviewed, and examined patient. I reviewed the management plan with the patient.  I have reviewed the laboratory tests, imaging, and other investigations.  Discussed with the team and agree with the findings and plan in this resident/fellow/nurse practitioner's note. In addition, changes in the physical examination, assessment and plan have been incorporated into the note by myself, as to make it a single cohesive document. Plan was communicated to referring team/physician.      Lauren Lamb MD, MS  Cardiology/Cardiac EP Attending Staff      "

## 2020-02-13 NOTE — PROGRESS NOTES
CV ICU PROGRESS NOTE  February 12, 2020      CO-MORBIDITIES:   Dissection of aorta, unspecified portion of aorta (H)    ASSESSMENT: Vel Espana is a 74 year old male with PMH of HTN s/p Ascending aortic dissection repair using a 32 mm Dacron Gelweave graft, AVR with 27 mm Patterson bovine pericardial valve, Coronary bypass to right coronary artery, Left greater saphenous vein harvest, Circulatory arrest, Left femoral artery exploration on 2/10, remains intubated for airway protection for slowly resolving metabolic encephalopathy.     TODAY'S PROGRESS:   - Sedation holiday as able with BP control  - Switch Epi gtt to dobutamine gtt given Low CI and High MAP with normal SVR  - Discontinue amiodarone  - Recheck BMP at noon  - Nephrology consult today given significant HANNA  - TTE to assess right heart function    PLAN:  Neuro/ pain/ sedation:  - Monitor neurological status. Notify the MD for any acute changes in exam.  - Fentanyl gtt for pain.  - Propofol gtt for sedation  - Daily sedation holiday     Pulmonary care:   - PST as able with sedation wean  - MV  - Titrate FiO2 for SpO2 >92%     Cardiovascular:    - Monitor hemodynamic status.   - MAP >65, SBP <120  - Switch Epi gtt to dobutamine gtt 2/13/2020 given Low CI and High MAP with normal SVR  - Hold PTA anti-hypertensives  -CTA chest abdomen pelvis 2/13 to assess extent of dissection, pelvis not initially imaged    #RLE Thromectomy, status post fasciotomy 2/10  -2 OR 2/14 for closure with vascular surgery  - q1 hr doppler checks of b/l LE's    #Non-sustained VT. brief run of nonsustained VT, less than 30 seconds, in the operating room, was hypokalemic at the time, also was being paced, possible R-on-T phenomenon, has not had recurrence since temporary epicardial pacing has been discontinued.  Cardiology EP consult 2/12 recommended discontinuing amiodarone     GI care:   - NPO except ice chips and medications.     Fluids/ Electrolytes/ Nutrition:   - TKO for IV  fluid hydration  - NJ placed, advance TF to goal    Renal/ Fluid Balance:    #Nonoliguric HANNA  - Urine output is adequate so far.  - Will continue to monitor intake and output, Cr, electrolytes.   - Nephrology consult     Endocrine:    # Stress hyperglycemia  - Insulin gtt     ID/ Antibiotics:  - Complete perioperative antibiotics  - No other indication for antibiotics.      Heme:     - Hgb goal >7     Prophylaxis:    - SQH  - Protonix qd     Lines/ tubes/ drains:  - MAC, Allen, Arterial line, quintero     Disposition:  - CV ICU.      Patient seen, findings and plan discussed with CVTS fellow.     Robbie Goel MD  [id number] Cardiothoracic Anesthesiology Fellow  [phone]    ====================================    SUBJECTIVE:   No acute events overnight, persistently low svo2    OBJECTIVE:   1. VITAL SIGNS:   Temp:  [36.7  C (98  F)-37.7  C (99.9  F)] 37.6  C (99.7  F)  Heart Rate:  [75-89] 89  Resp:  [16-20] 20  BP: (91)/(74) 91/74  MAP:  [63 mmHg-99 mmHg] 82 mmHg  Arterial Line BP: ()/(49-77) 115/66  FiO2 (%):  [50 %] 50 %  SpO2:  [88 %-98 %] 92 %  Ventilation Mode: CMV/AC  (Continuous Mandatory Ventilation/ Assist Control)  FiO2 (%): 50 %  Rate Set (breaths/minute): 16 breaths/min  Tidal Volume Set (mL): 600 mL  PEEP (cm H2O): 10 cmH2O  Oxygen Concentration (%): 50 %  Resp: 20      2. INTAKE/ OUTPUT:   I/O last 3 completed shifts:  In: 3527.3 [I.V.:1427.3; NG/GT:585]  Out: 1308 [Urine:1128; Chest Tube:180]    3. PHYSICAL EXAMINATION:   General: intubated, sedated, prop  Neuro: sedated, spontaneously opens eyes to voice, does not follow commands to close eye during sedation holiday and moves extremities but does not follow commands   Resp: mechanical breath sounds  CV: RRR  Abdomen: Soft, Non-distended, Non-tender  Incisions: c/d/i  Extremities: warm and well perfused    4. INVESTIGATIONS:   Arterial Blood Gases   Recent Labs   Lab 02/13/20  0748 02/13/20  0353 02/12/20  1928 02/12/20  0816   PH 7.41 7.40  7.41 7.40   PCO2 38 42 38 36   PO2 81 91 87 82   HCO3 24 26 24 23     Complete Blood Count   Recent Labs   Lab 02/13/20  0353 02/12/20  1606 02/12/20  0343 02/11/20  1139 02/11/20  0346   WBC 9.0 10.3 10.9  --  9.2   HGB 8.3* 8.6* 9.0* 9.4* 9.8*   PLT 74* 72* 79*  --  111*     Basic Metabolic Panel  Recent Labs   Lab 02/13/20  0748 02/13/20  0353 02/12/20  0343 02/11/20  1815 02/11/20  1139   NA  --  144 144 146* 147*   POTASSIUM 4.9 5.2 4.8 5.3 5.1   CHLORIDE  --  114* 114* 111* 113*   CO2  --  28 26 26 25   BUN  --  92* 52* 44* 39*   CR  --  2.89* 2.77* 2.99* 2.73*   GLC  --  166* 152* 154* 148*     Liver Function Tests  Recent Labs   Lab 02/13/20  0353 02/12/20  0343 02/11/20  0346 02/10/20  2052 02/10/20  1408 02/10/20  1000   AST 58* 91* 147*  --   --  123*   ALT 16 20 30  --   --  28   ALKPHOS 42 35* 36*  --   --  40   BILITOTAL 0.5 0.6 0.9  --   --  1.1   ALBUMIN 2.8* 2.7* 3.1*  --   --  2.8*   INR 1.29*  --   --  1.12 1.05 0.98     Pancreatic Enzymes  No lab results found in last 7 days.  Coagulation Profile  Recent Labs   Lab 02/13/20  0353 02/10/20  2052 02/10/20  1408 02/10/20  1000 02/10/20  0905 02/10/20  0749 02/10/20  0648   INR 1.29* 1.12 1.05 0.98 0.95 0.90 0.94   PTT  --   --   --  42* 41* 50* 50*         5. RADIOLOGY:   Recent Results (from the past 24 hour(s))   XR Chest Port 1 View    Narrative    XR CHEST PORT 1 VW  2/13/2020 1:33 AM      HISTORY: intubated patient, interval change    COMPARISON: 2/12/2020, 2/11/2020    FINDINGS: AP chest radiograph. Magnolia-Berhane catheter tip at the main  pulmonary artery. Endotracheal tube tip projects over the mid/lower  thoracic trachea. Surgical clips. Valvular prosthesis. Sternotomy  wires. Mediastinal drain. Trachea is midline, cardiomegaly. Bilateral  pleural effusions with overlying basilar retrocardiac opacities.  Bilateral perihilar streaky opacities. No pneumothorax. No acute  osseous abnormality.      Impression    IMPRESSION:  1. Cardiomegaly with  moderate pulmonary edema.  2. Bilateral pleural effusions overlying basilar and retrocardiac  atelectasis/consolidation.    I have personally reviewed the examination and initial interpretation  and I agree with the findings.    JOEL SANTANA MD       =========================================

## 2020-02-13 NOTE — CONSULTS
Nephrology Initial Consult  February 13, 2020      Vel Espana MRN:[id number] YOB: 1945  Date of Admission:2/9/2020  Primary care provider: Michael Medrano  Requesting physician: Nivia Mckeon MD    ASSESSMENT AND RECOMMENDATIONS:     74 yr male with Type A  Aortic dissection, S/p repair , AVR ,CABG, Left greater saphenous vein harvest, Circulatory arrest, Left femoral artery exploration on 2/10. Has NSVT.Nephrology consulted for HANNA.     Non Oliguric HANNA   Unknown baseline  Cr   Cr on admission 1.24 . 2.99 --> 2.77 ---> Today 2.89  BUN 92  Good UOP   Likely etiology  : Hypoperfusion on presentation with IV contrast use  resulting in ATN.  With good UOP , I doubt there is any obstructive pathology   Will check the following :  UA   FeUREA    -- These have been ordered   Electrolytes are stable despite increasing Cr, with good UOP .       Volume /BP  Hypotensive - improved - On EPI and DOBUTAMINE. Current MAP 73  CVP 12 . PA 23, PCWP 11,SVO2 55.Spo2 89 on 50 % FIO2  Clinically he does have third spacing  With CXR showing pulmonary edema   -- we advised to start LASIX 40 mg IV daily   -- Monitor I/O,daily weight, renal panel   No urgent indication for renal replacement therapy , but he does not improve , he may eventually need it .     Anemia   Hb 9.4   On day of admission  12.8   Transfusion per primary team     Acid Base,Electrolytes :   Na    -  144  K      -  5.1  CO2 -  27. PH 7.36 - 7.41.  Acceptable . Also lasix is expected to cause some kaliuresis   Ca 7.9, albumin 2.8  Note patient did have NSVT ( 25 seconds) during this admission  with K of 2.8 and Lactic acid of 11    Recommendations were communicated to primary team via note  Patient seen and discussed with Dr Leon Yun MD, FACP  Nephrology Fellow   [facility]   Pager [phone]              REASON FOR CONSULT:  HANNA     HISTORY OF PRESENT ILLNESS:  Admitting provider and nursing notes  reviewed  Vel Espana is a 74 year old presented to outside hospital  with episode of lightheadedness/syncope earlier and associated chest pain. Found to have Type A aortic dissection . Transferred to [facility]. Underwent surgical repair of a type A aortic dissection with aortic valve replacement and right coronary artery bypass under circulatory arrest, suffered 25 second run of monomorphic ventricular tachycardia in the setting of hypokalemia (K 2.8) and lactic acidosis (lactate 11.0).   Currently on Amiodarone   He remains intubated and mechanically ventilated and on pressor support  Nephrology consulted for HANNA      He is intubated and sedated       PAST MEDICAL HISTORY:  Reviewed with patient on 02/13/2020     Past Medical History:   Diagnosis Date     Psychosexual dysfunction with inhibited sexual excitement      Unspecified essential hypertension      Unspecified sleep apnea        Past Surgical History:   Procedure Laterality Date     REPAIR ANEURYSM ASCENDING AORTA N/A 2/9/2020    Procedure: Median Sternotomy, repair of ascending aorta using 32mm gelweave graft, deep circulatory arrest,  aortic valve repair using 27mm inspiris valve, on-pump oxygenator, open saphenous vein harvest, coronary artery bypass x1, transesophageal echocardiogram done by anestheisa;  Surgeon: Nivia Mckeon MD;  Location: [facility] OR        MEDICATIONS:  PTA Meds  Prior to Admission medications    Medication Sig Last Dose Taking? Auth Provider   aspirin 81 MG tablet Take 81 mg by mouth every other day    Reported, Patient   atenolol (TENORMIN) 25 MG tablet Take 25 mg by mouth daily   Reported, Patient   azithromycin (ZITHROMAX) 250 MG tablet Two tablets first day, then one tablet daily for four days.   Michael Medrano MD   benzonatate (TESSALON) 100 MG capsule Take 1 capsule (100 mg) by mouth 3 times daily as needed   Michael Medrano MD      Current Meds    albuterol  6 puff Inhalation Q4H     aspirin  81 mg Oral Daily      heparin ANTICOAGULANT  5,000 Units Subcutaneous Q8H     insulin aspart  1-6 Units Subcutaneous Q4H     lidocaine  1-3 patch Transdermal Q24H     lidocaine   Transdermal Q8H     multivitamins w/minerals  15 mL Per Feeding Tube Daily     pantoprazole  40 mg Oral or Feeding Tube Daily     polyethylene glycol  17 g Oral or Feeding Tube Daily     protein modular  1 packet Per Feeding Tube TID     senna-docusate  1 tablet Oral or Feeding Tube BID    Or     senna-docusate  2 tablet Oral or Feeding Tube BID     sodium chloride (PF)  10 mL Intravenous Once     sodium chloride (PF)  3 mL Intracatheter Q8H     Infusion Meds    dextrose       DOBUTamine 2.5 mcg/kg/min (02/13/20 1400)     EPINEPHrine IV infusion ADULT Stopped (02/13/20 0905)     fentaNYL 50 mcg/hr (02/13/20 1400)     propofol (DIPRIVAN) infusion 30 mcg/kg/min (02/13/20 1410)     BETA BLOCKER NOT PRESCRIBED         ALLERGIES:    No Known Allergies    REVIEW OF SYSTEMS:  A comprehensive of systems was negative except as noted above.    SOCIAL HISTORY:   Social History     Socioeconomic History     Marital status:      Spouse name: Not on file     Number of children: Not on file     Years of education: Not on file     Highest education level: Not on file   Occupational History     Not on file   Social Needs     Financial resource strain: Not on file     Food insecurity:     Worry: Not on file     Inability: Not on file     Transportation needs:     Medical: Not on file     Non-medical: Not on file   Tobacco Use     Smoking status: Never Smoker     Smokeless tobacco: Never Used   Substance and Sexual Activity     Alcohol use: No     Drug use: No     Sexual activity: Yes     Partners: Female   Lifestyle     Physical activity:     Days per week: Not on file     Minutes per session: Not on file     Stress: Not on file   Relationships     Social connections:     Talks on phone: Not on file     Gets together: Not on file     Attends Zoroastrian service: Not on file  "    Active member of club or organization: Not on file     Attends meetings of clubs or organizations: Not on file     Relationship status: Not on file     Intimate partner violence:     Fear of current or ex partner: Not on file     Emotionally abused: Not on file     Physically abused: Not on file     Forced sexual activity: Not on file   Other Topics Concern     Parent/sibling w/ CABG, MI or angioplasty before 65F 55M? Not Asked   Social History Narrative     Not on file     He is intubated and sedated       FAMILY MEDICAL HISTORY:   History reviewed. No pertinent family history.  Reviewed with patient     PHYSICAL EXAM:   Temp  Av  F (37.8  C)  Min: 97.8  F (36.6  C)  Max: 102.4  F (39.1  C)  Arterial Line BP  Min: 69/43  Max: 152/57  Arterial Line MAP (mmHg)  Av.7 mmHg  Min: 50 mmHg  Max: 115 mmHg      Pulse  Av.9  Min: 71  Max: 96 Resp  Av.9  Min: 11  Max: 48  FiO2 (%)  Av.1 %  Min: 40 %  Max: 100 %  SpO2  Av.6 %  Min: 87 %  Max: 100 %    CVP (mmHg): 12 mmHg  BP 91/74   Pulse 96   Temp 99.3  F (37.4  C) (Pulmonary Artery)   Resp 19   Ht 1.753 m (5' 9\")   Wt 139.5 kg (307 lb 8.7 oz)   SpO2 93%   BMI 45.42 kg/m     Date 20 0700 - 20 0659   Shift 3551-6617 7675-9815 7956-3470 24 Hour Total   INTAKE   I.V. 316.64   316.64   NG/GT 70   70   IV Piggyback 500   500   Enteral 490   490   Blood Components 300   300   Shift Total(mL/kg) 1676.64(12.02)   1676.64(12.02)   OUTPUT   Urine 1470   1470   Chest Tube(mL/kg) 60(0.43)   60(0.43)   Shift Total(mL/kg) 1530(10.97)   1530(10.97)   Weight (kg) 139.5 139.5 139.5 139.5      Admit Weight: 131.9 kg (290 lb 11.2 oz)(bed scale)     GENERAL APPEARANCE: intubated and sedated   EYES: no  scleral icterus, pupils equal  Endo: no goiter, no moon facies  Lymphatics: no cervical or supraclavicular LAD  Pulmonary: lungs clear to auscultation with equal breath sounds bilaterally, no clubbing  CV: regular rhythm, normal rate, no rub   " - JVD no   - Edema 2+   GI: soft, nontender, normal bowel sounds  MS: no evidence of inflammation in joints, no muscle tenderness  : has quintero  SKIN: no rash, warm, dry, no cyanosis  NEURO: sedated      LABS:   CMP  Recent Labs   Lab 02/13/20  1119 02/13/20  0748 02/13/20  0353 02/12/20  0343 02/11/20  1815  02/11/20  0346  02/10/20  1000     --  144 144 146*   < > 148*   < > 150*   POTASSIUM 5.1 4.9 5.2 4.8 5.3   < > 4.5   < > 2.9*   CHLORIDE 115*  --  114* 114* 111*   < > 115*   < > 110*   CO2 27  --  28 26 26   < > 24   < > 22   ANIONGAP 2*  --  2* 4 8   < > 9   < > 18*   *  --  166* 152* 154*   < > 146*   < > 192*   BUN 97*  --  92* 52* 44*   < > 32*   < > 24   CR 2.77*  --  2.89* 2.77* 2.99*   < > 2.42*   < > 1.67*   GFRESTIMATED 21*  --  20* 21* 19*   < > 25*   < > 39*   GFRESTBLACK 24*  --  23* 24* 22*   < > 29*   < > 45*   ELMER 7.8*  --  7.9* 7.4* 7.8*   < > 7.9*   < > 8.6   MAG 2.8*  --  2.9* 2.5*  --   --  2.5*   < > 2.5*   PHOS 3.6  --  3.8 4.7*  --   --  3.3  --  1.0*   PROTTOTAL  --   --  5.4* 5.1*  --   --  5.3*  --  5.1*   ALBUMIN  --   --  2.8* 2.7*  --   --  3.1*  --  2.8*   BILITOTAL  --   --  0.5 0.6  --   --  0.9  --  1.1   ALKPHOS  --   --  42 35*  --   --  36*  --  40   AST  --   --  58* 91*  --   --  147*  --  123*   ALT  --   --  16 20  --   --  30  --  28    < > = values in this interval not displayed.     CBC  Recent Labs   Lab 02/13/20  1119 02/13/20  0353 02/12/20  1606 02/12/20  0343  02/11/20  0346   HGB 8.0* 8.3* 8.6* 9.0*   < > 9.8*   WBC  --  9.0 10.3 10.9  --  9.2   RBC  --  2.69* 2.81* 2.94*  --  3.21*   HCT  --  26.6* 27.5* 28.5*  --  29.5*   MCV  --  99 98 97  --  92   MCH  --  30.9 30.6 30.6  --  30.5   MCHC  --  31.2* 31.3* 31.6  --  33.2   RDW  --  16.1* 16.1* 16.4*  --  15.4*   PLT  --  74* 72* 79*  --  111*    < > = values in this interval not displayed.     INR  Recent Labs   Lab 02/13/20  0353 02/10/20  2052 02/10/20  1408 02/10/20  1000 02/10/20  0905  02/10/20  0749 02/10/20  0648   INR 1.29* 1.12 1.05 0.98 0.95 0.90 0.94   PTT  --   --   --  42* 41* 50* 50*     ABG  Recent Labs   Lab 02/13/20  1110 02/13/20  0853 02/13/20  0748 02/13/20  0353  02/12/20  1928   PH 7.41  --  7.41 7.40  --  7.41   PCO2 39  --  38 42  --  38   PO2 83  --  81 91  --  87   HCO3 25  --  24 26  --  24   O2PER 50  50 50 50  50 50  50   < > 50  50    < > = values in this interval not displayed.      URINE STUDIES  No lab results found.  No lab results found.  PTH  No lab results found.  IRON STUDIES  No lab results found.    IMAGING:  Pertinent test results reviewed    Jmaa Harry MD

## 2020-02-13 NOTE — PROGRESS NOTES
CLINICAL NUTRITION SERVICES - BRIEF NOTE   (see RD note on 2/11 for full assessment)    Pt currently receiving non-renal TF at goal. K+ trending high, 5.2 this morning. Will change to renal formula today.     Nutrition changes today:  - Change to Nepro @ 50 mL/hr via NGT   - Continue protein modulars, Prosource (1 pkt TID)    Goal: Nepro @ 50 ml/hr + Prosource (1 pkt TID) to provide 2280 kcal (26 kcal/kg), 130 g pro (1.5 g pro/kg), 193 g CHO, 15 g Fiber, 876 ml H2O. This meets 100% low-end energy needs, but also receiving additional kcal from propofol, and 100% protein needs.     Gisel Salgado  Dietetic Intern    I have read and agree with above interventions.   Zina Austin, RD, LD  [id number]  Pgr: [phone]

## 2020-02-14 ENCOUNTER — APPOINTMENT (OUTPATIENT)
Dept: OCCUPATIONAL THERAPY | Facility: CLINIC | Age: 75
DRG: 219 | End: 2020-02-14
Payer: COMMERCIAL

## 2020-02-14 ENCOUNTER — APPOINTMENT (OUTPATIENT)
Dept: GENERAL RADIOLOGY | Facility: CLINIC | Age: 75
DRG: 219 | End: 2020-02-14
Payer: COMMERCIAL

## 2020-02-14 ENCOUNTER — APPOINTMENT (OUTPATIENT)
Dept: GENERAL RADIOLOGY | Facility: CLINIC | Age: 75
DRG: 219 | End: 2020-02-14
Attending: NEUROLOGICAL SURGERY
Payer: COMMERCIAL

## 2020-02-14 LAB
ALBUMIN UR-MCNC: NEGATIVE MG/DL
ALT SERPL W P-5'-P-CCNC: 17 U/L (ref 0–70)
AMMONIA PLAS-SCNC: 30 UMOL/L (ref 10–50)
ANION GAP SERPL CALCULATED.3IONS-SCNC: 3 MMOL/L (ref 3–14)
APPEARANCE UR: ABNORMAL
AST SERPL W P-5'-P-CCNC: 44 U/L (ref 0–45)
BACTERIA #/AREA URNS HPF: ABNORMAL /HPF
BASE EXCESS BLDA CALC-SCNC: 1.4 MMOL/L
BASE EXCESS BLDA CALC-SCNC: 2.4 MMOL/L
BASE EXCESS BLDA CALC-SCNC: 4.3 MMOL/L
BASE EXCESS BLDV CALC-SCNC: 0.8 MMOL/L
BASE EXCESS BLDV CALC-SCNC: 2.1 MMOL/L
BASE EXCESS BLDV CALC-SCNC: 2.9 MMOL/L
BASE EXCESS BLDV CALC-SCNC: 3 MMOL/L
BASE EXCESS BLDV CALC-SCNC: 3.2 MMOL/L
BASE EXCESS BLDV CALC-SCNC: 4 MMOL/L
BILIRUB UR QL STRIP: NEGATIVE
BUN SERPL-MCNC: 108 MG/DL (ref 7–30)
CALCIUM SERPL-MCNC: 7.9 MG/DL (ref 8.5–10.1)
CHLORIDE SERPL-SCNC: 114 MMOL/L (ref 94–109)
CO2 SERPL-SCNC: 28 MMOL/L (ref 20–32)
COLOR UR AUTO: ABNORMAL
CREAT SERPL-MCNC: 2.44 MG/DL (ref 0.66–1.25)
ERYTHROCYTE [DISTWIDTH] IN BLOOD BY AUTOMATED COUNT: 15.9 % (ref 10–15)
ERYTHROCYTE [DISTWIDTH] IN BLOOD BY AUTOMATED COUNT: 15.9 % (ref 10–15)
GFR SERPL CREATININE-BSD FRML MDRD: 25 ML/MIN/{1.73_M2}
GLUCOSE BLDC GLUCOMTR-MCNC: 113 MG/DL (ref 70–99)
GLUCOSE BLDC GLUCOMTR-MCNC: 113 MG/DL (ref 70–99)
GLUCOSE BLDC GLUCOMTR-MCNC: 116 MG/DL (ref 70–99)
GLUCOSE BLDC GLUCOMTR-MCNC: 122 MG/DL (ref 70–99)
GLUCOSE BLDC GLUCOMTR-MCNC: 125 MG/DL (ref 70–99)
GLUCOSE BLDC GLUCOMTR-MCNC: 142 MG/DL (ref 70–99)
GLUCOSE BLDC GLUCOMTR-MCNC: 157 MG/DL (ref 70–99)
GLUCOSE SERPL-MCNC: 132 MG/DL (ref 70–99)
GLUCOSE UR STRIP-MCNC: NEGATIVE MG/DL
GRAN CASTS #/AREA URNS LPF: 8 /LPF
HCO3 BLD-SCNC: 26 MMOL/L (ref 21–28)
HCO3 BLD-SCNC: 27 MMOL/L (ref 21–28)
HCO3 BLD-SCNC: 28 MMOL/L (ref 21–28)
HCO3 BLDV-SCNC: 25 MMOL/L (ref 21–28)
HCO3 BLDV-SCNC: 28 MMOL/L (ref 21–28)
HCO3 BLDV-SCNC: 28 MMOL/L (ref 21–28)
HCO3 BLDV-SCNC: 29 MMOL/L (ref 21–28)
HCT VFR BLD AUTO: 28.4 % (ref 40–53)
HCT VFR BLD AUTO: 29 % (ref 40–53)
HGB BLD-MCNC: 8.7 G/DL (ref 13.3–17.7)
HGB BLD-MCNC: 9 G/DL (ref 13.3–17.7)
HGB UR QL STRIP: ABNORMAL
INTERPRETATION ECG - MUSE: NORMAL
INTERPRETATION ECG - MUSE: NORMAL
KETONES UR STRIP-MCNC: NEGATIVE MG/DL
LEUKOCYTE ESTERASE UR QL STRIP: ABNORMAL
MCH RBC QN AUTO: 30.5 PG (ref 26.5–33)
MCH RBC QN AUTO: 31 PG (ref 26.5–33)
MCHC RBC AUTO-ENTMCNC: 30.6 G/DL (ref 31.5–36.5)
MCHC RBC AUTO-ENTMCNC: 31 G/DL (ref 31.5–36.5)
MCV RBC AUTO: 100 FL (ref 78–100)
MCV RBC AUTO: 100 FL (ref 78–100)
MUCOUS THREADS #/AREA URNS LPF: PRESENT /LPF
NITRATE UR QL: NEGATIVE
O2/TOTAL GAS SETTING VFR VENT: 40 %
O2/TOTAL GAS SETTING VFR VENT: 50 %
O2/TOTAL GAS SETTING VFR VENT: 60 %
O2/TOTAL GAS SETTING VFR VENT: 60 %
OXYHGB MFR BLD: 94 % (ref 92–100)
OXYHGB MFR BLD: 96 % (ref 92–100)
OXYHGB MFR BLD: 96 % (ref 92–100)
OXYHGB MFR BLDV: 51 %
OXYHGB MFR BLDV: 53 %
OXYHGB MFR BLDV: 54 %
OXYHGB MFR BLDV: 56 %
OXYHGB MFR BLDV: 57 %
OXYHGB MFR BLDV: 66 %
PCO2 BLD: 39 MM HG (ref 35–45)
PCO2 BLD: 39 MM HG (ref 35–45)
PCO2 BLD: 43 MM HG (ref 35–45)
PCO2 BLDV: 38 MM HG (ref 40–50)
PCO2 BLDV: 43 MM HG (ref 40–50)
PCO2 BLDV: 47 MM HG (ref 40–50)
PCO2 BLDV: 47 MM HG (ref 40–50)
PCO2 BLDV: 48 MM HG (ref 40–50)
PCO2 BLDV: 48 MM HG (ref 40–50)
PH BLD: 7.41 PH (ref 7.35–7.45)
PH BLD: 7.43 PH (ref 7.35–7.45)
PH BLD: 7.47 PH (ref 7.35–7.45)
PH BLDV: 7.37 PH (ref 7.32–7.43)
PH BLDV: 7.38 PH (ref 7.32–7.43)
PH BLDV: 7.39 PH (ref 7.32–7.43)
PH BLDV: 7.41 PH (ref 7.32–7.43)
PH BLDV: 7.42 PH (ref 7.32–7.43)
PH BLDV: 7.43 PH (ref 7.32–7.43)
PH UR STRIP: 5 PH (ref 5–7)
PLATELET # BLD AUTO: 77 10E9/L (ref 150–450)
PLATELET # BLD AUTO: 87 10E9/L (ref 150–450)
PO2 BLD: 69 MM HG (ref 80–105)
PO2 BLD: 83 MM HG (ref 80–105)
PO2 BLD: 88 MM HG (ref 80–105)
PO2 BLDV: 29 MM HG (ref 25–47)
PO2 BLDV: 30 MM HG (ref 25–47)
PO2 BLDV: 30 MM HG (ref 25–47)
PO2 BLDV: 31 MM HG (ref 25–47)
PO2 BLDV: 32 MM HG (ref 25–47)
PO2 BLDV: 35 MM HG (ref 25–47)
POTASSIUM SERPL-SCNC: 4.5 MMOL/L (ref 3.4–5.3)
POTASSIUM SERPL-SCNC: 4.8 MMOL/L (ref 3.4–5.3)
RBC # BLD AUTO: 2.85 10E12/L (ref 4.4–5.9)
RBC # BLD AUTO: 2.9 10E12/L (ref 4.4–5.9)
RBC #/AREA URNS AUTO: 1 /HPF (ref 0–2)
SODIUM SERPL-SCNC: 144 MMOL/L (ref 133–144)
SOURCE: ABNORMAL
SP GR UR STRIP: 1.01 (ref 1–1.03)
UROBILINOGEN UR STRIP-MCNC: NORMAL MG/DL (ref 0–2)
WBC # BLD AUTO: 8.2 10E9/L (ref 4–11)
WBC # BLD AUTO: 8.3 10E9/L (ref 4–11)
WBC #/AREA URNS AUTO: 3 /HPF (ref 0–5)

## 2020-02-14 PROCEDURE — 25000128 H RX IP 250 OP 636: Performed by: ANESTHESIOLOGY

## 2020-02-14 PROCEDURE — 99291 CRITICAL CARE FIRST HOUR: CPT | Mod: GC | Performed by: INTERNAL MEDICINE

## 2020-02-14 PROCEDURE — 40000014 ZZH STATISTIC ARTERIAL MONITORING DAILY

## 2020-02-14 PROCEDURE — 27210432 ZZH NUTRITION PRODUCT RENAL BASIC LITER

## 2020-02-14 PROCEDURE — 81001 URINALYSIS AUTO W/SCOPE: CPT | Performed by: NEUROLOGICAL SURGERY

## 2020-02-14 PROCEDURE — 25000132 ZZH RX MED GY IP 250 OP 250 PS 637: Performed by: ANESTHESIOLOGY

## 2020-02-14 PROCEDURE — 84460 ALANINE AMINO (ALT) (SGPT): CPT | Performed by: THORACIC SURGERY (CARDIOTHORACIC VASCULAR SURGERY)

## 2020-02-14 PROCEDURE — 71045 X-RAY EXAM CHEST 1 VIEW: CPT

## 2020-02-14 PROCEDURE — 87040 BLOOD CULTURE FOR BACTERIA: CPT | Performed by: NEUROLOGICAL SURGERY

## 2020-02-14 PROCEDURE — 40000275 ZZH STATISTIC RCP TIME EA 10 MIN

## 2020-02-14 PROCEDURE — 00000146 ZZHCL STATISTIC GLUCOSE BY METER IP

## 2020-02-14 PROCEDURE — 25000128 H RX IP 250 OP 636: Performed by: STUDENT IN AN ORGANIZED HEALTH CARE EDUCATION/TRAINING PROGRAM

## 2020-02-14 PROCEDURE — 84132 ASSAY OF SERUM POTASSIUM: CPT | Performed by: THORACIC SURGERY (CARDIOTHORACIC VASCULAR SURGERY)

## 2020-02-14 PROCEDURE — 25000132 ZZH RX MED GY IP 250 OP 250 PS 637: Performed by: PHYSICIAN ASSISTANT

## 2020-02-14 PROCEDURE — 40000048 ZZH STATISTIC DAILY SWAN MONITORING

## 2020-02-14 PROCEDURE — 85027 COMPLETE CBC AUTOMATED: CPT | Performed by: STUDENT IN AN ORGANIZED HEALTH CARE EDUCATION/TRAINING PROGRAM

## 2020-02-14 PROCEDURE — 94640 AIRWAY INHALATION TREATMENT: CPT | Mod: 76

## 2020-02-14 PROCEDURE — 82805 BLOOD GASES W/O2 SATURATION: CPT | Performed by: PHYSICIAN ASSISTANT

## 2020-02-14 PROCEDURE — 85027 COMPLETE CBC AUTOMATED: CPT | Performed by: THORACIC SURGERY (CARDIOTHORACIC VASCULAR SURGERY)

## 2020-02-14 PROCEDURE — 36415 COLL VENOUS BLD VENIPUNCTURE: CPT | Performed by: NEUROLOGICAL SURGERY

## 2020-02-14 PROCEDURE — 25800030 ZZH RX IP 258 OP 636: Performed by: ANESTHESIOLOGY

## 2020-02-14 PROCEDURE — 40000196 ZZH STATISTIC RAPCV CVP MONITORING

## 2020-02-14 PROCEDURE — 25000128 H RX IP 250 OP 636: Performed by: NEUROLOGICAL SURGERY

## 2020-02-14 PROCEDURE — 94640 AIRWAY INHALATION TREATMENT: CPT

## 2020-02-14 PROCEDURE — 25000125 ZZHC RX 250: Performed by: ANESTHESIOLOGY

## 2020-02-14 PROCEDURE — 80048 BASIC METABOLIC PNL TOTAL CA: CPT | Performed by: STUDENT IN AN ORGANIZED HEALTH CARE EDUCATION/TRAINING PROGRAM

## 2020-02-14 PROCEDURE — 94003 VENT MGMT INPAT SUBQ DAY: CPT

## 2020-02-14 PROCEDURE — 25000132 ZZH RX MED GY IP 250 OP 250 PS 637: Performed by: STUDENT IN AN ORGANIZED HEALTH CARE EDUCATION/TRAINING PROGRAM

## 2020-02-14 PROCEDURE — 20000004 ZZH R&B ICU UMMC

## 2020-02-14 PROCEDURE — 97110 THERAPEUTIC EXERCISES: CPT | Mod: GO

## 2020-02-14 PROCEDURE — 71045 X-RAY EXAM CHEST 1 VIEW: CPT | Mod: 77

## 2020-02-14 PROCEDURE — 84450 TRANSFERASE (AST) (SGOT): CPT | Performed by: THORACIC SURGERY (CARDIOTHORACIC VASCULAR SURGERY)

## 2020-02-14 PROCEDURE — 82140 ASSAY OF AMMONIA: CPT | Performed by: THORACIC SURGERY (CARDIOTHORACIC VASCULAR SURGERY)

## 2020-02-14 PROCEDURE — 25000132 ZZH RX MED GY IP 250 OP 250 PS 637: Performed by: THORACIC SURGERY (CARDIOTHORACIC VASCULAR SURGERY)

## 2020-02-14 RX ORDER — PROPOFOL 10 MG/ML
INJECTION, EMULSION INTRAVENOUS
Status: DISCONTINUED
Start: 2020-02-14 | End: 2020-02-14 | Stop reason: HOSPADM

## 2020-02-14 RX ORDER — ATORVASTATIN CALCIUM 40 MG/1
40 TABLET, FILM COATED ORAL EVERY EVENING
Status: DISCONTINUED | OUTPATIENT
Start: 2020-02-14 | End: 2020-03-04

## 2020-02-14 RX ORDER — FUROSEMIDE 10 MG/ML
40 INJECTION INTRAMUSCULAR; INTRAVENOUS EVERY 6 HOURS
Status: COMPLETED | OUTPATIENT
Start: 2020-02-14 | End: 2020-02-14

## 2020-02-14 RX ORDER — DEXMEDETOMIDINE HYDROCHLORIDE 4 UG/ML
.2-1.2 INJECTION, SOLUTION INTRAVENOUS CONTINUOUS
Status: DISCONTINUED | OUTPATIENT
Start: 2020-02-14 | End: 2020-02-16 | Stop reason: CLARIF

## 2020-02-14 RX ORDER — PROPOFOL 10 MG/ML
5-75 INJECTION, EMULSION INTRAVENOUS CONTINUOUS
Status: DISCONTINUED | OUTPATIENT
Start: 2020-02-14 | End: 2020-02-24

## 2020-02-14 RX ADMIN — ACETAMINOPHEN 650 MG: 325 TABLET, FILM COATED ORAL at 20:09

## 2020-02-14 RX ADMIN — Medication 200 MCG/HR: at 21:50

## 2020-02-14 RX ADMIN — PROPOFOL 20 MCG/KG/MIN: 10 INJECTION, EMULSION INTRAVENOUS at 20:05

## 2020-02-14 RX ADMIN — ALBUTEROL SULFATE 6 PUFF: 90 AEROSOL, METERED RESPIRATORY (INHALATION) at 00:14

## 2020-02-14 RX ADMIN — ATORVASTATIN CALCIUM 40 MG: 40 TABLET, FILM COATED ORAL at 20:09

## 2020-02-14 RX ADMIN — PROPOFOL 30 MCG/KG/MIN: 10 INJECTION, EMULSION INTRAVENOUS at 00:44

## 2020-02-14 RX ADMIN — Medication 100 MCG/HR: at 01:59

## 2020-02-14 RX ADMIN — Medication 1 PACKET: at 14:11

## 2020-02-14 RX ADMIN — PROPOFOL 30 MCG/KG/MIN: 10 INJECTION, EMULSION INTRAVENOUS at 04:53

## 2020-02-14 RX ADMIN — NICARDIPINE HYDROCHLORIDE 2.5 MG/HR: 0.2 INJECTION, SOLUTION INTRAVENOUS at 15:49

## 2020-02-14 RX ADMIN — Medication 1 PACKET: at 20:09

## 2020-02-14 RX ADMIN — MULTIVITAMIN 15 ML: LIQUID ORAL at 07:37

## 2020-02-14 RX ADMIN — PROPOFOL 30 MCG/KG/MIN: 10 INJECTION, EMULSION INTRAVENOUS at 12:21

## 2020-02-14 RX ADMIN — HEPARIN SODIUM 5000 UNITS: 5000 INJECTION, SOLUTION INTRAVENOUS; SUBCUTANEOUS at 00:44

## 2020-02-14 RX ADMIN — ASPIRIN 81 MG CHEWABLE TABLET 81 MG: 81 TABLET CHEWABLE at 07:37

## 2020-02-14 RX ADMIN — ALBUTEROL SULFATE 6 PUFF: 90 AEROSOL, METERED RESPIRATORY (INHALATION) at 04:44

## 2020-02-14 RX ADMIN — ALBUTEROL SULFATE 6 PUFF: 90 AEROSOL, METERED RESPIRATORY (INHALATION) at 08:36

## 2020-02-14 RX ADMIN — DEXMEDETOMIDINE 1.2 MCG/KG/HR: 100 INJECTION, SOLUTION, CONCENTRATE INTRAVENOUS at 18:19

## 2020-02-14 RX ADMIN — Medication 1 PACKET: at 07:38

## 2020-02-14 RX ADMIN — ALBUTEROL SULFATE 6 PUFF: 90 AEROSOL, METERED RESPIRATORY (INHALATION) at 13:06

## 2020-02-14 RX ADMIN — FUROSEMIDE 40 MG: 10 INJECTION, SOLUTION INTRAVENOUS at 14:15

## 2020-02-14 RX ADMIN — DOBUTAMINE HYDROCHLORIDE 5 MCG/KG/MIN: 200 INJECTION INTRAVENOUS at 10:51

## 2020-02-14 RX ADMIN — FUROSEMIDE 40 MG: 10 INJECTION, SOLUTION INTRAVENOUS at 18:52

## 2020-02-14 RX ADMIN — CALCIUM GLUCONATE 1 G: 98 INJECTION, SOLUTION INTRAVENOUS at 07:45

## 2020-02-14 RX ADMIN — Medication 40 MG: at 07:38

## 2020-02-14 RX ADMIN — PROPOFOL 30 MCG/KG/MIN: 10 INJECTION, EMULSION INTRAVENOUS at 08:16

## 2020-02-14 RX ADMIN — DEXMEDETOMIDINE 0.4 MCG/KG/HR: 100 INJECTION, SOLUTION, CONCENTRATE INTRAVENOUS at 14:06

## 2020-02-14 RX ADMIN — HEPARIN SODIUM 5000 UNITS: 5000 INJECTION, SOLUTION INTRAVENOUS; SUBCUTANEOUS at 09:29

## 2020-02-14 RX ADMIN — HEPARIN SODIUM 5000 UNITS: 5000 INJECTION, SOLUTION INTRAVENOUS; SUBCUTANEOUS at 18:52

## 2020-02-14 RX ADMIN — ALBUTEROL SULFATE 6 PUFF: 90 AEROSOL, METERED RESPIRATORY (INHALATION) at 15:55

## 2020-02-14 ASSESSMENT — ACTIVITIES OF DAILY LIVING (ADL)
ADLS_ACUITY_SCORE: 16

## 2020-02-14 ASSESSMENT — MIFFLIN-ST. JEOR: SCORE: 2120.38

## 2020-02-14 NOTE — PLAN OF CARE
Major Shift Events: Dobutamine titrated with propofol and fentanyl to maintain SBP <120 and MAP > 65. Continues on propofol at 30, dobutamine at 2.5, and fentanyl at 100.   Plan: Continue to wean sedation/pressors as able with SvO2 goal > 65.   For vital signs and complete assessments, please see documentation flowsheets.

## 2020-02-14 NOTE — PLAN OF CARE
Discharge Planner OT   Patient plan for discharge: unable to discharge  Current status: Pt remains intubated/sedated and not following commands. Pt tolerated PROM to BUEs/LEs to maintain ROM and joint integrity for ADLs. Pt with no significant change in VS.   Barriers to return to prior living situation: medical status, post op precautions, deconditioning   Recommendations for discharge: anticipate IP rehab   Rationale for recommendations: recommend ongoing OT intervention to maximize independence and safety with ADLs.        Entered by: Vilma Earl 02/14/2020 3:55 PM

## 2020-02-14 NOTE — PROGRESS NOTES
CV ICU PROGRESS NOTE  February 14, 2020      CO-MORBIDITIES:   Dissection of aorta, unspecified portion of aorta (H)    ASSESSMENT: Vel Espana is a 74 year old male with PMH of HTN s/p Ascending aortic dissection repair using a 32 mm Dacron Gelweave graft, AVR with 27 mm Patterson bovine pericardial valve, Coronary bypass to right coronary artery, Left greater saphenous vein harvest, Circulatory arrest, Left femoral artery exploration on 2/10, remains intubated for airway protection for slowly resolving metabolic encephalopathy.     TODAY'S PROGRESS:   -Switch from propofol to Precedex GTT  -Lasix 40 mg every 6 hours for 2 doses  -Continue dobutamine GTT for goal Svo2 greater than 60    PLAN:  Neuro/ pain/ sedation:  - Monitor neurological status. Notify the MD for any acute changes in exam.  - Fentanyl gtt for pain.  -Switch from propofol to Precedex GTT  - Daily sedation holiday     Pulmonary care:   - PST as able with sedation wean  - MV  - Titrate FiO2 for SpO2 >92%-  - Dr. Mckeon requests PEEP no less than 8     Cardiovascular:    - Monitor hemodynamic status.   - MAP >65, SBP <120  - Switch Epi gtt to dobutamine gtt 2/13/2020 given Low CI and High MAP with normal SVR  - Hold PTA anti-hypertensives    #Non-sustained VT. brief run of nonsustained VT, less than 30 seconds, in the operating room, was hypokalemic at the time, also was being paced, possible R-on-T phenomenon, has not had recurrence since temporary epicardial pacing has been discontinued.  Cardiology EP consult 2/12 recommended discontinuing amiodarone     GI care:   - NPO except ice chips and medications.     Fluids/ Electrolytes/ Nutrition:   - TKO for IV fluid hydration  - NJ placed, advance TF to goal    Renal/ Fluid Balance:    #Nonoliguric HANNA  - Urine output is adequate so far.  - Will continue to monitor intake and output, Cr, electrolytes.   - Nephrology consult  -Lasix 40 mg every 6 hours for 2 doses 2/14  -Net -1 L     Endocrine:     # Stress hyperglycemia  - Insulin gtt     ID/ Antibiotics:  - Complete perioperative antibiotics  - No other indication for antibiotics.      Heme:     - Hgb goal >7     Prophylaxis:    - SQH  - Protonix qd     Lines/ tubes/ drains:  - MAC, [place], Arterial line, quintero     Disposition:  - CV ICU.      Patient seen, findings and plan discussed with CVICU attending Dr. Reshma Goel MD  PGY5 Cardiothoracic Anesthesiology Fellow  [phone]    ====================================    SUBJECTIVE:   No significant events overnight    OBJECTIVE:   1. VITAL SIGNS:   Temp:  [97.5  F (36.4  C)-100.8  F (38.2  C)] 100.8  F (38.2  C)  Heart Rate:  [] 109  Resp:  [16-19] 19  MAP:  [65 mmHg-97 mmHg] 77 mmHg  Arterial Line BP: ()/(50-81) 114/61  FiO2 (%):  [40 %-50 %] 40 %  SpO2:  [90 %-98 %] 96 %  Ventilation Mode: CMV/AC  (Continuous Mandatory Ventilation/ Assist Control)  FiO2 (%): 40 %  Rate Set (breaths/minute): 16 breaths/min  Tidal Volume Set (mL): 500 mL  PEEP (cm H2O): 10 cmH2O  Oxygen Concentration (%): 40 %  Resp: 19      2. INTAKE/ OUTPUT:   I/O last 3 completed shifts:  In: 3356.72 [I.V.:996.72; NG/GT:270; IV Piggyback:500]  Out: 3621 [Urine:3191; Chest Tube:430]    3. PHYSICAL EXAMINATION:   General: intubated, sedated, propofol  Neuro: Sedated, pupils equal round reactive to light, opens eyes to voice only, not to command, moves all 4 extremities spontaneously  Resp: mechanical breath sounds   CV: RRR, pacer wires disconnected  Abdomen: Soft, Non-distended, Non-tender  Incisions: c/d/i  Extremities: warm and well perfused, trace dusky appearance to left middle and ring fingers, improved versus yesterday, dusky appearance to the right second and third toes, stable    4. INVESTIGATIONS:   Arterial Blood Gases   Recent Labs   Lab 02/14/20  1150 02/14/20  0416 02/13/20 1958 02/13/20  1110   PH 7.43 7.41 7.40 7.41   PCO2 39 43 42 39   PO2 83 88 81 83   HCO3 26 27 26 25     Complete Blood  Count   Recent Labs   Lab 02/14/20  0416 02/13/20  1119 02/13/20  0353 02/12/20  1606 02/12/20  0343   WBC 8.2  --  9.0 10.3 10.9   HGB 8.7* 8.0* 8.3* 8.6* 9.0*   PLT 77*  --  74* 72* 79*     Basic Metabolic Panel  Recent Labs   Lab 02/14/20  0416 02/13/20  1827 02/13/20  1119 02/13/20  0748 02/13/20  0353 02/12/20  0343     --  144  --  144 144   POTASSIUM 4.8 5.1 5.1 4.9 5.2 4.8   CHLORIDE 114*  --  115*  --  114* 114*   CO2 28  --  27  --  28 26   *  --  97*  --  92* 52*   CR 2.44*  --  2.77*  --  2.89* 2.77*   *  --  119*  --  166* 152*     Liver Function Tests  Recent Labs   Lab 02/13/20  0353 02/12/20  0343 02/11/20  0346 02/10/20  2052 02/10/20  1408 02/10/20  1000   AST 58* 91* 147*  --   --  123*   ALT 16 20 30  --   --  28   ALKPHOS 42 35* 36*  --   --  40   BILITOTAL 0.5 0.6 0.9  --   --  1.1   ALBUMIN 2.8* 2.7* 3.1*  --   --  2.8*   INR 1.29*  --   --  1.12 1.05 0.98     Pancreatic Enzymes  No lab results found in last 7 days.  Coagulation Profile  Recent Labs   Lab 02/13/20  0353 02/10/20  2052 02/10/20  1408 02/10/20  1000 02/10/20  0905 02/10/20  0749 02/10/20  0648   INR 1.29* 1.12 1.05 0.98 0.95 0.90 0.94   PTT  --   --   --  42* 41* 50* 50*         5. RADIOLOGY:   Recent Results (from the past 24 hour(s))   XR Chest Port 1 View    Narrative    Exam: AP chest radiograph 2/14/2020 1:50 AM.    HISTORY: Intubated patient, interval change.    COMPARISON: 2/13/2020, 2/12/2020.    FINDINGS: AP chest radiograph. Postoperative chest. Endotracheal tube  tip at the midthoracic trachea. Enteric tubes extend beyond the  field-of-view. Valvular prosthesis. Pulmonary arterial catheter tip at  the main pulmonary artery. Mediastinal drains. Trachea is midline,  cardiomegaly. Stable left pleural effusion with overlying basilar and  retrocardiac opacity. Bilateral perihilar streaky opacities. No  pneumothorax.      Impression    IMPRESSION:  1. Stable left pleural effusion with overlying basilar  retrocardiac  atelectasis.  2. Right pleural effusion has resolved.  3. Cardiomegaly with moderate pulmonary edema.    I have personally reviewed the examination and initial interpretation  and I agree with the findings.    MAGDA KEENAN MD       =========================================

## 2020-02-14 NOTE — PROGRESS NOTES
Care Coordinator Progress Note    Admission Date/Time:  2/9/2020  Attending MD:  Nivia Mckeon MD    Data  Chart reviewed, discussed with interdisciplinary team.   Patient was admitted for: Dissection of aorta, unspecified portion of aorta (H).     Assessment  Pt is s/p AVR and CABG X1 on 2/10.  Pt remain in ICU vented and sedated.  Visited pt dtr, Rosa to introduce self and for support.  RNCC role discussed and contact info provided.  Pt dtr stated the team have been updating them well about the plan of care.  Pt dtr had question about pt [facility] coverage and if they are aware of pt admission.  RNCC informed pt dtr the insurance that is listed in out system is [employer] and directed her to call [facility] to check regarding his coverage.  RNCC informed pt dtr I can call [facility] and inform them that pt is hospitalized but family need to follow up about his coverage.  Per chart review, SW had addressed pt dtr [facility] questions few days ago too.  RNCC called [facility] referral line # [phone] and informed them that pt is hospitalized at [facility].   RNCC will cont to follow plan of care.     Plan  Anticipated Discharge Date:  TBD.  Anticipated Discharge Plan:   TBD.  RNCC will cont to follow plan of care.      Chris Isaac RN, PHN, BSN  4A and 4E/ ICU  Care Coordinator  Phone: [phone]  Pager: [phone]

## 2020-02-14 NOTE — PROGRESS NOTES
CVTS PROGRESS NOTE  February 14, 2020      CO-MORBIDITIES:   Dissection of aorta, unspecified portion of aorta (H)    ASSESSMENT: Vel Espana is a 74 year old male with PMH of HTN s/p Ascending aortic dissection repair using a 32 mm Dacron Gelweave graft, AVR with 27 mm [place] bovine pericardial valve, Coronary bypass to right coronary artery, Left greater saphenous vein harvest, Circulatory arrest, Left femoral artery exploration on 2/10, remains intubated for airway protection for slowly resolving metabolic encephalopathy.     TODAY'S PROGRESS:   -Switch from propofol to Precedex GTT  -Lasix 40 mg every 6 hours for 2 doses  -Continue dobutamine GTT for goal Svo2 greater than 60    PLAN:  Neuro/ pain/ sedation:  - Monitor neurological status. Notify the MD for any acute changes in exam.  - Fentanyl gtt for pain.  -Switch from propofol to Precedex GTT  - Daily sedation holiday     Pulmonary care:   - PST as able with sedation wean  - MV  - Titrate FiO2 for SpO2 >92%-  - Dr. Mckeon requests PEEP no less than 8     Cardiovascular:    - Monitor hemodynamic status.   - MAP >65, SBP <120  - Switch Epi gtt to dobutamine gtt 2/13/2020 given Low CI and High MAP with normal SVR  - Hold PTA anti-hypertensives    #Non-sustained VT. brief run of nonsustained VT, less than 30 seconds, in the operating room, was hypokalemic at the time, also was being paced, possible R-on-T phenomenon, has not had recurrence since temporary epicardial pacing has been discontinued.  Cardiology EP consult 2/12 recommended discontinuing amiodarone     GI care:   - NPO except ice chips and medications.     Fluids/ Electrolytes/ Nutrition:   - TKO for IV fluid hydration  - NJ placed, advance TF to goal    Renal/ Fluid Balance:    #Nonoliguric HANNA  - Urine output is adequate so far.  - Will continue to monitor intake and output, Cr, electrolytes.   - Nephrology consult  -Lasix 40 mg every 6 hours for 2 doses 2/14  -Net -1 L     Endocrine:    #  Stress hyperglycemia  - Insulin gtt     ID/ Antibiotics:  - Complete perioperative antibiotics  - No other indication for antibiotics.      Heme:     - Hgb goal >7     Prophylaxis:    - SQH  - Protonix qd     Lines/ tubes/ drains:  - MAC, [place], Arterial line, quintero     Disposition:  - CV ICU.       Robbie Goel MD  PGY5 Cardiothoracic Anesthesiology Fellow  [phone]    ====================================    SUBJECTIVE:   No significant events overnight    OBJECTIVE:   1. VITAL SIGNS:   Temp:  [97.5  F (36.4  C)-100.8  F (38.2  C)] 100.8  F (38.2  C)  Heart Rate:  [] 108  Resp:  [16-19] 18  MAP:  [65 mmHg-97 mmHg] 70 mmHg  Arterial Line BP: ()/(50-81) 97/55  FiO2 (%):  [40 %-50 %] 40 %  SpO2:  [90 %-98 %] 96 %  Ventilation Mode: CMV/AC  (Continuous Mandatory Ventilation/ Assist Control)  FiO2 (%): 40 %  Rate Set (breaths/minute): 16 breaths/min  Tidal Volume Set (mL): 500 mL  PEEP (cm H2O): 10 cmH2O  Oxygen Concentration (%): 40 %  Resp: 18      2. INTAKE/ OUTPUT:   I/O last 3 completed shifts:  In: 2543.37 [I.V.:1133.37; NG/GT:260]  Out: 2848 [Urine:2436; Chest Tube:412]    3. PHYSICAL EXAMINATION:   General: intubated, sedated, propofol  Neuro: Sedated, pupils equal round reactive to light, opens eyes to voice only, not to command, moves all 4 extremities spontaneously  Resp: mechanical breath sounds   CV: RRR, pacer wires disconnected  Abdomen: Soft, Non-distended, Non-tender  Incisions: c/d/i  Extremities: warm and well perfused, trace dusky appearance to left middle and ring fingers, improved versus yesterday, dusky appearance to the right second and third toes, stable    4. INVESTIGATIONS:   Arterial Blood Gases   Recent Labs   Lab 02/14/20  1150 02/14/20  0416 02/13/20  1958 02/13/20  1110   PH 7.43 7.41 7.40 7.41   PCO2 39 43 42 39   PO2 83 88 81 83   HCO3 26 27 26 25     Complete Blood Count   Recent Labs   Lab 02/14/20  0416 02/13/20  1119 02/13/20  0353 02/12/20  1606 02/12/20  0343    WBC 8.2  --  9.0 10.3 10.9   HGB 8.7* 8.0* 8.3* 8.6* 9.0*   PLT 77*  --  74* 72* 79*     Basic Metabolic Panel  Recent Labs   Lab 02/14/20  0416 02/13/20  1827 02/13/20  1119 02/13/20  0748 02/13/20  0353 02/12/20  0343     --  144  --  144 144   POTASSIUM 4.8 5.1 5.1 4.9 5.2 4.8   CHLORIDE 114*  --  115*  --  114* 114*   CO2 28  --  27  --  28 26   *  --  97*  --  92* 52*   CR 2.44*  --  2.77*  --  2.89* 2.77*   *  --  119*  --  166* 152*     Liver Function Tests  Recent Labs   Lab 02/13/20  0353 02/12/20  0343 02/11/20  0346 02/10/20  2052 02/10/20  1408 02/10/20  1000   AST 58* 91* 147*  --   --  123*   ALT 16 20 30  --   --  28   ALKPHOS 42 35* 36*  --   --  40   BILITOTAL 0.5 0.6 0.9  --   --  1.1   ALBUMIN 2.8* 2.7* 3.1*  --   --  2.8*   INR 1.29*  --   --  1.12 1.05 0.98     Pancreatic Enzymes  No lab results found in last 7 days.  Coagulation Profile  Recent Labs   Lab 02/13/20  0353 02/10/20  2052 02/10/20  1408 02/10/20  1000 02/10/20  0905 02/10/20  0749 02/10/20  0648   INR 1.29* 1.12 1.05 0.98 0.95 0.90 0.94   PTT  --   --   --  42* 41* 50* 50*         5. RADIOLOGY:   Recent Results (from the past 24 hour(s))   XR Chest Port 1 View    Narrative    Exam: AP chest radiograph 2/14/2020 1:50 AM.    HISTORY: Intubated patient, interval change.    COMPARISON: 2/13/2020, 2/12/2020.    FINDINGS: AP chest radiograph. Postoperative chest. Endotracheal tube  tip at the midthoracic trachea. Enteric tubes extend beyond the  field-of-view. Valvular prosthesis. Pulmonary arterial catheter tip at  the main pulmonary artery. Mediastinal drains. Trachea is midline,  cardiomegaly. Stable left pleural effusion with overlying basilar and  retrocardiac opacity. Bilateral perihilar streaky opacities. No  pneumothorax.      Impression    IMPRESSION:  1. Stable left pleural effusion with overlying basilar retrocardiac  atelectasis.  2. Right pleural effusion has resolved.  3. Cardiomegaly with moderate  pulmonary edema.    I have personally reviewed the examination and initial interpretation  and I agree with the findings.    MAGDA KEENAN MD       =========================================

## 2020-02-15 ENCOUNTER — APPOINTMENT (OUTPATIENT)
Dept: GENERAL RADIOLOGY | Facility: CLINIC | Age: 75
DRG: 219 | End: 2020-02-15
Payer: COMMERCIAL

## 2020-02-15 LAB
ALBUMIN SERPL-MCNC: 2.3 G/DL (ref 3.4–5)
ALP SERPL-CCNC: 66 U/L (ref 40–150)
ALT SERPL W P-5'-P-CCNC: 17 U/L (ref 0–70)
ANION GAP SERPL CALCULATED.3IONS-SCNC: 4 MMOL/L (ref 3–14)
ANION GAP SERPL CALCULATED.3IONS-SCNC: 5 MMOL/L (ref 3–14)
AST SERPL W P-5'-P-CCNC: 32 U/L (ref 0–45)
BASE DEFICIT BLDA-SCNC: 1.6 MMOL/L
BASE DEFICIT BLDV-SCNC: 0.4 MMOL/L
BASE DEFICIT BLDV-SCNC: 0.9 MMOL/L
BASE DEFICIT BLDV-SCNC: 1.4 MMOL/L
BASE DEFICIT BLDV-SCNC: 4.2 MMOL/L
BASE EXCESS BLDA CALC-SCNC: 2.6 MMOL/L
BASE EXCESS BLDA CALC-SCNC: 2.9 MMOL/L
BASE EXCESS BLDA CALC-SCNC: 3.2 MMOL/L
BASE EXCESS BLDV CALC-SCNC: 1.2 MMOL/L
BILIRUB SERPL-MCNC: 0.5 MG/DL (ref 0.2–1.3)
BLD PROD TYP BPU: NORMAL
BLD UNIT ID BPU: 0
BLOOD PRODUCT CODE: NORMAL
BPU ID: NORMAL
BUN SERPL-MCNC: 107 MG/DL (ref 7–30)
BUN SERPL-MCNC: 110 MG/DL (ref 7–30)
CALCIUM SERPL-MCNC: 7.8 MG/DL (ref 8.5–10.1)
CALCIUM SERPL-MCNC: 7.8 MG/DL (ref 8.5–10.1)
CHLORIDE SERPL-SCNC: 112 MMOL/L (ref 94–109)
CHLORIDE SERPL-SCNC: 115 MMOL/L (ref 94–109)
CO2 SERPL-SCNC: 28 MMOL/L (ref 20–32)
CO2 SERPL-SCNC: 29 MMOL/L (ref 20–32)
CREAT SERPL-MCNC: 2.07 MG/DL (ref 0.66–1.25)
CREAT SERPL-MCNC: 2.09 MG/DL (ref 0.66–1.25)
ERYTHROCYTE [DISTWIDTH] IN BLOOD BY AUTOMATED COUNT: 15.8 % (ref 10–15)
ERYTHROCYTE [DISTWIDTH] IN BLOOD BY AUTOMATED COUNT: 15.9 % (ref 10–15)
GFR SERPL CREATININE-BSD FRML MDRD: 30 ML/MIN/{1.73_M2}
GFR SERPL CREATININE-BSD FRML MDRD: 30 ML/MIN/{1.73_M2}
GLUCOSE BLDC GLUCOMTR-MCNC: 115 MG/DL (ref 70–99)
GLUCOSE BLDC GLUCOMTR-MCNC: 128 MG/DL (ref 70–99)
GLUCOSE BLDC GLUCOMTR-MCNC: 143 MG/DL (ref 70–99)
GLUCOSE BLDC GLUCOMTR-MCNC: 147 MG/DL (ref 70–99)
GLUCOSE BLDC GLUCOMTR-MCNC: 148 MG/DL (ref 70–99)
GLUCOSE BLDC GLUCOMTR-MCNC: 148 MG/DL (ref 70–99)
GLUCOSE BLDC GLUCOMTR-MCNC: 172 MG/DL (ref 70–99)
GLUCOSE SERPL-MCNC: 169 MG/DL (ref 70–99)
GLUCOSE SERPL-MCNC: 194 MG/DL (ref 70–99)
GRAM STN SPEC: ABNORMAL
HCO3 BLD-SCNC: 24 MMOL/L (ref 21–28)
HCO3 BLD-SCNC: 28 MMOL/L (ref 21–28)
HCO3 BLD-SCNC: 29 MMOL/L (ref 21–28)
HCO3 BLD-SCNC: 29 MMOL/L (ref 21–28)
HCO3 BLDV-SCNC: 21 MMOL/L (ref 21–28)
HCO3 BLDV-SCNC: 24 MMOL/L (ref 21–28)
HCO3 BLDV-SCNC: 25 MMOL/L (ref 21–28)
HCO3 BLDV-SCNC: 26 MMOL/L (ref 21–28)
HCO3 BLDV-SCNC: 27 MMOL/L (ref 21–28)
HCT VFR BLD AUTO: 28.9 % (ref 40–53)
HCT VFR BLD AUTO: 28.9 % (ref 40–53)
HGB BLD-MCNC: 8.8 G/DL (ref 13.3–17.7)
HGB BLD-MCNC: 8.9 G/DL (ref 13.3–17.7)
Lab: ABNORMAL
MCH RBC QN AUTO: 31.2 PG (ref 26.5–33)
MCH RBC QN AUTO: 32 PG (ref 26.5–33)
MCHC RBC AUTO-ENTMCNC: 30.4 G/DL (ref 31.5–36.5)
MCHC RBC AUTO-ENTMCNC: 30.8 G/DL (ref 31.5–36.5)
MCV RBC AUTO: 103 FL (ref 78–100)
MCV RBC AUTO: 104 FL (ref 78–100)
O2/TOTAL GAS SETTING VFR VENT: 50 %
O2/TOTAL GAS SETTING VFR VENT: 60 %
OXYHGB MFR BLD: 93 % (ref 92–100)
OXYHGB MFR BLD: 93 % (ref 92–100)
OXYHGB MFR BLD: 94 % (ref 92–100)
OXYHGB MFR BLD: 97 % (ref 92–100)
OXYHGB MFR BLDV: 49 %
OXYHGB MFR BLDV: 55 %
OXYHGB MFR BLDV: 56 %
OXYHGB MFR BLDV: 59 %
OXYHGB MFR BLDV: 59 %
PCO2 BLD: 41 MM HG (ref 35–45)
PCO2 BLD: 47 MM HG (ref 35–45)
PCO2 BLD: 48 MM HG (ref 35–45)
PCO2 BLD: 50 MM HG (ref 35–45)
PCO2 BLDV: 38 MM HG (ref 40–50)
PCO2 BLDV: 45 MM HG (ref 40–50)
PCO2 BLDV: 46 MM HG (ref 40–50)
PCO2 BLDV: 47 MM HG (ref 40–50)
PCO2 BLDV: 48 MM HG (ref 40–50)
PH BLD: 7.37 PH (ref 7.35–7.45)
PH BLD: 7.37 PH (ref 7.35–7.45)
PH BLD: 7.39 PH (ref 7.35–7.45)
PH BLD: 7.39 PH (ref 7.35–7.45)
PH BLDV: 7.34 PH (ref 7.32–7.43)
PH BLDV: 7.35 PH (ref 7.32–7.43)
PH BLDV: 7.36 PH (ref 7.32–7.43)
PLATELET # BLD AUTO: 109 10E9/L (ref 150–450)
PLATELET # BLD AUTO: 92 10E9/L (ref 150–450)
PO2 BLD: 113 MM HG (ref 80–105)
PO2 BLD: 70 MM HG (ref 80–105)
PO2 BLD: 71 MM HG (ref 80–105)
PO2 BLD: 79 MM HG (ref 80–105)
PO2 BLDV: 30 MM HG (ref 25–47)
PO2 BLDV: 32 MM HG (ref 25–47)
PO2 BLDV: 32 MM HG (ref 25–47)
PO2 BLDV: 34 MM HG (ref 25–47)
PO2 BLDV: 36 MM HG (ref 25–47)
POTASSIUM SERPL-SCNC: 4.4 MMOL/L (ref 3.4–5.3)
POTASSIUM SERPL-SCNC: 4.5 MMOL/L (ref 3.4–5.3)
PROT SERPL-MCNC: 5.8 G/DL (ref 6.8–8.8)
RBC # BLD AUTO: 2.78 10E12/L (ref 4.4–5.9)
RBC # BLD AUTO: 2.82 10E12/L (ref 4.4–5.9)
SODIUM SERPL-SCNC: 146 MMOL/L (ref 133–144)
SODIUM SERPL-SCNC: 147 MMOL/L (ref 133–144)
SODIUM SERPL-SCNC: 148 MMOL/L (ref 133–144)
SPECIMEN SOURCE: ABNORMAL
TRANSFUSION STATUS PATIENT QL: NORMAL
TRANSFUSION STATUS PATIENT QL: NORMAL
WBC # BLD AUTO: 12.8 10E9/L (ref 4–11)
WBC # BLD AUTO: 9.1 10E9/L (ref 4–11)

## 2020-02-15 PROCEDURE — 25000132 ZZH RX MED GY IP 250 OP 250 PS 637: Performed by: PHYSICIAN ASSISTANT

## 2020-02-15 PROCEDURE — 25800030 ZZH RX IP 258 OP 636: Performed by: THORACIC SURGERY (CARDIOTHORACIC VASCULAR SURGERY)

## 2020-02-15 PROCEDURE — 20000004 ZZH R&B ICU UMMC

## 2020-02-15 PROCEDURE — 00000146 ZZHCL STATISTIC GLUCOSE BY METER IP

## 2020-02-15 PROCEDURE — 80053 COMPREHEN METABOLIC PANEL: CPT | Performed by: STUDENT IN AN ORGANIZED HEALTH CARE EDUCATION/TRAINING PROGRAM

## 2020-02-15 PROCEDURE — 94640 AIRWAY INHALATION TREATMENT: CPT | Mod: 76

## 2020-02-15 PROCEDURE — 25000128 H RX IP 250 OP 636: Performed by: STUDENT IN AN ORGANIZED HEALTH CARE EDUCATION/TRAINING PROGRAM

## 2020-02-15 PROCEDURE — 40000556 ZZH STATISTIC PERIPHERAL IV START W US GUIDANCE

## 2020-02-15 PROCEDURE — 94003 VENT MGMT INPAT SUBQ DAY: CPT

## 2020-02-15 PROCEDURE — 25000128 H RX IP 250 OP 636: Performed by: THORACIC SURGERY (CARDIOTHORACIC VASCULAR SURGERY)

## 2020-02-15 PROCEDURE — 25000132 ZZH RX MED GY IP 250 OP 250 PS 637: Performed by: ANESTHESIOLOGY

## 2020-02-15 PROCEDURE — 25000125 ZZHC RX 250: Performed by: STUDENT IN AN ORGANIZED HEALTH CARE EDUCATION/TRAINING PROGRAM

## 2020-02-15 PROCEDURE — 84295 ASSAY OF SERUM SODIUM: CPT | Performed by: STUDENT IN AN ORGANIZED HEALTH CARE EDUCATION/TRAINING PROGRAM

## 2020-02-15 PROCEDURE — 40000014 ZZH STATISTIC ARTERIAL MONITORING DAILY

## 2020-02-15 PROCEDURE — 94667 MNPJ CHEST WALL 1ST: CPT

## 2020-02-15 PROCEDURE — 89051 BODY FLUID CELL COUNT: CPT | Performed by: STUDENT IN AN ORGANIZED HEALTH CARE EDUCATION/TRAINING PROGRAM

## 2020-02-15 PROCEDURE — 25000128 H RX IP 250 OP 636: Performed by: PHYSICIAN ASSISTANT

## 2020-02-15 PROCEDURE — 31622 DX BRONCHOSCOPE/WASH: CPT

## 2020-02-15 PROCEDURE — 94640 AIRWAY INHALATION TREATMENT: CPT

## 2020-02-15 PROCEDURE — 87070 CULTURE OTHR SPECIMN AEROBIC: CPT | Performed by: STUDENT IN AN ORGANIZED HEALTH CARE EDUCATION/TRAINING PROGRAM

## 2020-02-15 PROCEDURE — 40000986 XR CHEST PORT 1 VW

## 2020-02-15 PROCEDURE — 25000132 ZZH RX MED GY IP 250 OP 250 PS 637: Performed by: THORACIC SURGERY (CARDIOTHORACIC VASCULAR SURGERY)

## 2020-02-15 PROCEDURE — 27211040 ZZH CONTINUOUS NEBULIZER MICRO PUMP

## 2020-02-15 PROCEDURE — 40000196 ZZH STATISTIC RAPCV CVP MONITORING

## 2020-02-15 PROCEDURE — 25000125 ZZHC RX 250: Performed by: PHYSICIAN ASSISTANT

## 2020-02-15 PROCEDURE — 25000128 H RX IP 250 OP 636

## 2020-02-15 PROCEDURE — 82805 BLOOD GASES W/O2 SATURATION: CPT | Performed by: PHYSICIAN ASSISTANT

## 2020-02-15 PROCEDURE — 80048 BASIC METABOLIC PNL TOTAL CA: CPT | Performed by: STUDENT IN AN ORGANIZED HEALTH CARE EDUCATION/TRAINING PROGRAM

## 2020-02-15 PROCEDURE — 87205 SMEAR GRAM STAIN: CPT | Performed by: STUDENT IN AN ORGANIZED HEALTH CARE EDUCATION/TRAINING PROGRAM

## 2020-02-15 PROCEDURE — 99291 CRITICAL CARE FIRST HOUR: CPT | Mod: GC | Performed by: INTERNAL MEDICINE

## 2020-02-15 PROCEDURE — 40000048 ZZH STATISTIC DAILY SWAN MONITORING

## 2020-02-15 PROCEDURE — 27210432 ZZH NUTRITION PRODUCT RENAL BASIC LITER

## 2020-02-15 PROCEDURE — 71045 X-RAY EXAM CHEST 1 VIEW: CPT

## 2020-02-15 PROCEDURE — 25800030 ZZH RX IP 258 OP 636: Performed by: NEUROLOGICAL SURGERY

## 2020-02-15 PROCEDURE — 93005 ELECTROCARDIOGRAM TRACING: CPT

## 2020-02-15 PROCEDURE — 25000128 H RX IP 250 OP 636: Performed by: ANESTHESIOLOGY

## 2020-02-15 PROCEDURE — 87106 FUNGI IDENTIFICATION YEAST: CPT | Performed by: STUDENT IN AN ORGANIZED HEALTH CARE EDUCATION/TRAINING PROGRAM

## 2020-02-15 PROCEDURE — 93010 ELECTROCARDIOGRAM REPORT: CPT | Performed by: INTERNAL MEDICINE

## 2020-02-15 PROCEDURE — 25000132 ZZH RX MED GY IP 250 OP 250 PS 637: Performed by: STUDENT IN AN ORGANIZED HEALTH CARE EDUCATION/TRAINING PROGRAM

## 2020-02-15 PROCEDURE — 25000128 H RX IP 250 OP 636: Performed by: NEUROLOGICAL SURGERY

## 2020-02-15 PROCEDURE — 40000275 ZZH STATISTIC RCP TIME EA 10 MIN

## 2020-02-15 PROCEDURE — 85027 COMPLETE CBC AUTOMATED: CPT | Performed by: STUDENT IN AN ORGANIZED HEALTH CARE EDUCATION/TRAINING PROGRAM

## 2020-02-15 RX ORDER — NOREPINEPHRINE BITARTRATE 0.06 MG/ML
0.03-0.4 INJECTION, SOLUTION INTRAVENOUS CONTINUOUS
Status: DISCONTINUED | OUTPATIENT
Start: 2020-02-15 | End: 2020-02-17

## 2020-02-15 RX ORDER — ACETYLCYSTEINE 200 MG/ML
2 SOLUTION ORAL; RESPIRATORY (INHALATION)
Status: DISCONTINUED | OUTPATIENT
Start: 2020-02-15 | End: 2020-02-25

## 2020-02-15 RX ORDER — HEPARIN SODIUM 10000 [USP'U]/100ML
500 INJECTION, SOLUTION INTRAVENOUS CONTINUOUS
Status: DISCONTINUED | OUTPATIENT
Start: 2020-02-15 | End: 2020-02-17

## 2020-02-15 RX ORDER — CARBOXYMETHYLCELLULOSE SODIUM 5 MG/ML
1 SOLUTION/ DROPS OPHTHALMIC 3 TIMES DAILY PRN
Status: DISCONTINUED | OUTPATIENT
Start: 2020-02-15 | End: 2020-03-05 | Stop reason: HOSPADM

## 2020-02-15 RX ORDER — PIPERACILLIN SODIUM, TAZOBACTAM SODIUM 3; .375 G/15ML; G/15ML
3.38 INJECTION, POWDER, LYOPHILIZED, FOR SOLUTION INTRAVENOUS EVERY 6 HOURS
Status: DISCONTINUED | OUTPATIENT
Start: 2020-02-15 | End: 2020-02-20

## 2020-02-15 RX ORDER — FUROSEMIDE 10 MG/ML
40 INJECTION INTRAMUSCULAR; INTRAVENOUS ONCE
Status: COMPLETED | OUTPATIENT
Start: 2020-02-15 | End: 2020-02-15

## 2020-02-15 RX ADMIN — ALBUTEROL SULFATE 6 PUFF: 90 AEROSOL, METERED RESPIRATORY (INHALATION) at 00:45

## 2020-02-15 RX ADMIN — HEPARIN SODIUM 5000 UNITS: 5000 INJECTION, SOLUTION INTRAVENOUS; SUBCUTANEOUS at 01:18

## 2020-02-15 RX ADMIN — AMIODARONE HYDROCHLORIDE 1 MG/MIN: 50 INJECTION, SOLUTION INTRAVENOUS at 05:25

## 2020-02-15 RX ADMIN — VANCOMYCIN HYDROCHLORIDE 2500 MG: 10 INJECTION, POWDER, LYOPHILIZED, FOR SOLUTION INTRAVENOUS at 11:53

## 2020-02-15 RX ADMIN — Medication 40 MG: at 08:33

## 2020-02-15 RX ADMIN — AMIODARONE HYDROCHLORIDE 1 MG/MIN: 50 INJECTION, SOLUTION INTRAVENOUS at 09:32

## 2020-02-15 RX ADMIN — ALBUTEROL SULFATE 2.5 MG: 2.5 SOLUTION RESPIRATORY (INHALATION) at 20:52

## 2020-02-15 RX ADMIN — INSULIN ASPART 1 UNITS: 100 INJECTION, SOLUTION INTRAVENOUS; SUBCUTANEOUS at 16:04

## 2020-02-15 RX ADMIN — HEPARIN SODIUM 500 UNITS/HR: 10000 INJECTION, SOLUTION INTRAVENOUS at 15:21

## 2020-02-15 RX ADMIN — ALBUTEROL SULFATE 6 PUFF: 90 AEROSOL, METERED RESPIRATORY (INHALATION) at 04:53

## 2020-02-15 RX ADMIN — PROPOFOL 15 MCG/KG/MIN: 10 INJECTION, EMULSION INTRAVENOUS at 14:00

## 2020-02-15 RX ADMIN — FUROSEMIDE 40 MG: 10 INJECTION, SOLUTION INTRAVENOUS at 12:24

## 2020-02-15 RX ADMIN — PROPOFOL 15 MCG/KG/MIN: 10 INJECTION, EMULSION INTRAVENOUS at 03:34

## 2020-02-15 RX ADMIN — ASPIRIN 81 MG CHEWABLE TABLET 81 MG: 81 TABLET CHEWABLE at 08:37

## 2020-02-15 RX ADMIN — INSULIN ASPART 1 UNITS: 100 INJECTION, SOLUTION INTRAVENOUS; SUBCUTANEOUS at 04:02

## 2020-02-15 RX ADMIN — MULTIVITAMIN 15 ML: LIQUID ORAL at 08:37

## 2020-02-15 RX ADMIN — Medication 1 PACKET: at 20:00

## 2020-02-15 RX ADMIN — ACETAMINOPHEN 650 MG: 325 TABLET, FILM COATED ORAL at 01:18

## 2020-02-15 RX ADMIN — ALBUTEROL SULFATE 6 PUFF: 90 AEROSOL, METERED RESPIRATORY (INHALATION) at 08:07

## 2020-02-15 RX ADMIN — INSULIN ASPART 1 UNITS: 100 INJECTION, SOLUTION INTRAVENOUS; SUBCUTANEOUS at 08:38

## 2020-02-15 RX ADMIN — ACETAMINOPHEN 650 MG: 325 TABLET, FILM COATED ORAL at 20:12

## 2020-02-15 RX ADMIN — PIPERACILLIN AND TAZOBACTAM 3.38 G: 3; .375 INJECTION, POWDER, FOR SOLUTION INTRAVENOUS at 14:10

## 2020-02-15 RX ADMIN — ATORVASTATIN CALCIUM 40 MG: 40 TABLET, FILM COATED ORAL at 20:12

## 2020-02-15 RX ADMIN — Medication 150 MCG/HR: at 10:29

## 2020-02-15 RX ADMIN — ALBUTEROL SULFATE 6 PUFF: 90 AEROSOL, METERED RESPIRATORY (INHALATION) at 15:56

## 2020-02-15 RX ADMIN — MIDAZOLAM 2 MG: 1 INJECTION INTRAMUSCULAR; INTRAVENOUS at 13:46

## 2020-02-15 RX ADMIN — INSULIN ASPART 1 UNITS: 100 INJECTION, SOLUTION INTRAVENOUS; SUBCUTANEOUS at 20:00

## 2020-02-15 RX ADMIN — Medication 1 PACKET: at 15:37

## 2020-02-15 RX ADMIN — DOBUTAMINE HYDROCHLORIDE 5 MCG/KG/MIN: 200 INJECTION INTRAVENOUS at 00:05

## 2020-02-15 RX ADMIN — AMIODARONE HYDROCHLORIDE 150 MG: 1.5 INJECTION, SOLUTION INTRAVENOUS at 05:24

## 2020-02-15 RX ADMIN — PIPERACILLIN AND TAZOBACTAM 3.38 G: 3; .375 INJECTION, POWDER, FOR SOLUTION INTRAVENOUS at 20:12

## 2020-02-15 RX ADMIN — HEPARIN SODIUM 5000 UNITS: 5000 INJECTION, SOLUTION INTRAVENOUS; SUBCUTANEOUS at 08:37

## 2020-02-15 RX ADMIN — HYDROMORPHONE HYDROCHLORIDE 0.5 MG: 1 INJECTION, SOLUTION INTRAMUSCULAR; INTRAVENOUS; SUBCUTANEOUS at 13:45

## 2020-02-15 RX ADMIN — ACETAMINOPHEN 650 MG: 325 TABLET, FILM COATED ORAL at 05:05

## 2020-02-15 RX ADMIN — ACETYLCYSTEINE 2 ML: 200 SOLUTION ORAL; RESPIRATORY (INHALATION) at 20:52

## 2020-02-15 RX ADMIN — Medication 0.02 MCG/KG/MIN: at 13:01

## 2020-02-15 RX ADMIN — Medication 1 PACKET: at 08:32

## 2020-02-15 RX ADMIN — PIPERACILLIN AND TAZOBACTAM 3.38 G: 3; .375 INJECTION, POWDER, FOR SOLUTION INTRAVENOUS at 06:23

## 2020-02-15 RX ADMIN — INSULIN ASPART 1 UNITS: 100 INJECTION, SOLUTION INTRAVENOUS; SUBCUTANEOUS at 01:18

## 2020-02-15 ASSESSMENT — ACTIVITIES OF DAILY LIVING (ADL)
ADLS_ACUITY_SCORE: 22

## 2020-02-15 ASSESSMENT — MIFFLIN-ST. JEOR: SCORE: 2101.38

## 2020-02-15 NOTE — PROGRESS NOTES
CVTS PROGRESS NOTE  February 15, 2020      CO-MORBIDITIES:   Dissection of aorta, unspecified portion of aorta (H)    ASSESSMENT: Vel Espana is a 74 year old male with PMH of HTN s/p Ascending aortic dissection repair using a 32 mm Dacron Gelweave graft, AVR with 27 mm [place] bovine pericardial valve, Coronary bypass to right coronary artery, Left greater saphenous vein harvest, Circulatory arrest, Left femoral artery exploration on 2/10, remains intubated for airway protection for slowly resolving metabolic encephalopathy.     TODAY'S PROGRESS:   -continue dobutamine, NE as needed for MAP >65 with low SVR  -febrile overnight, pan culture and start vanc zosyn  - bronch today  - propofol for sedation  - increase PEEP to improve oxygenation  - amiodarone for a-fib with RVR overnight    PLAN:  Neuro/ pain/ sedation:  - Monitor neurological status. Notify the MD for any acute changes in exam.  - Fentanyl gtt for pain.  - propofol gtt  - Daily sedation holiday     Pulmonary care:   - PST as able with sedation wean  - MV  - Titrate FiO2 for SpO2 >92%-  - Dr. Mckeon requests PEEP no less than 8  - bronchoscopy 2/15     Cardiovascular:    - Monitor hemodynamic status.   - MAP >65, SBP <120  - dobutamine, NE gtr  - Hold PTA anti-hypertensives    #Non-sustained VT. brief run of nonsustained VT, less than 30 seconds, in the operating room, was hypokalemic at the time, also was being paced, possible R-on-T phenomenon, has not had recurrence since temporary epicardial pacing has been discontinued.  Cardiology EP consult 2/12 recommended discontinuing amiodarone  #A-fib with RVR: noted overnight 2/14  - amiodarone gtt     GI care:   - NPO except ice chips and medications.     Fluids/ Electrolytes/ Nutrition:   - TKO for IV fluid hydration  - NJ placed, advance TF to goal    Renal/ Fluid Balance:    #Nonoliguric HANNA  - Urine output is adequate so far.  - Will continue to monitor intake and output, Cr, electrolytes.   -  Nephrology consult  -Lasix 40 mg x 1 2/15     Endocrine:    # Stress hyperglycemia  - Insulin gtt     ID/ Antibiotics:  - Start vanc/zosyn 2/15     Heme:     - Hgb goal >7     Prophylaxis:    - SQH  - Protonix qd     Lines/ tubes/ drains:  - MAC, [place], Arterial line, quintero     Disposition:  - CV ICU.      Patient seen, findings and plan discussed with CVTS fellow     Macario Alfonso MD  [phone]    ====================================    SUBJECTIVE:   In afib overnight, amiodarone started. Also febrile to 102.7 F.    OBJECTIVE:   1. VITAL SIGNS:   Temp:  [99.5  F (37.5  C)-102.7  F (39.3  C)] 100.4  F (38  C)  Heart Rate:  [] 93  Resp:  [16-30] 20  MAP:  [58 mmHg-108 mmHg] 83 mmHg  Arterial Line BP: ()/(47-96) 121/66  FiO2 (%):  [60 %-70 %] 70 %  SpO2:  [89 %-99 %] 92 %  Ventilation Mode: CMV/AC  (Continuous Mandatory Ventilation/ Assist Control)  FiO2 (%): 70 %  Rate Set (breaths/minute): 16 breaths/min  Tidal Volume Set (mL): 500 mL  PEEP (cm H2O): 10 cmH2O  Oxygen Concentration (%): 60 %  Resp: 20      2. INTAKE/ OUTPUT:   I/O last 3 completed shifts:  In: 3121.64 [I.V.:1481.64; NG/GT:390]  Out: 5070 [Urine:4890; Chest Tube:180]    3. PHYSICAL EXAMINATION:   General: intubated, sedated, propofol  Neuro: Sedated, pupils equal round reactive to light,moves upper extremities without purpose  Resp: mechanical breath sounds   CV: RRR, pacer wires disconnected  Abdomen: Soft, Non-distended, Non-tender  Incisions: c/d/i  Extremities: warm and well perfused, dusky appearance to left middle and ring fingers resolved, dusky appearance to the right second and third toes, stable    4. INVESTIGATIONS:   Arterial Blood Gases   Recent Labs   Lab 02/15/20  0815 02/15/20  0337 02/14/20 2030 02/14/20  1150   PH 7.37 7.39 7.47* 7.43   PCO2 41 47* 39 39   PO2 70* 79* 69* 83   HCO3 24 28 28 26     Complete Blood Count   Recent Labs   Lab 02/15/20  0337 02/14/20 2030 02/14/20  0416 02/13/20  1119 02/13/20  0353   WBC 9.1  8.3 8.2  --  9.0   HGB 8.8* 9.0* 8.7* 8.0* 8.3*   PLT 92* 87* 77*  --  74*     Basic Metabolic Panel  Recent Labs   Lab 02/15/20  1213 02/15/20  0337 02/14/20 2030 02/14/20 0416  02/13/20  1119   * 147*  --  144  --  144   POTASSIUM 4.5 4.4 4.5 4.8   < > 5.1   CHLORIDE 112* 115*  --  114*  --  115*   CO2 29 28  --  28  --  27   * 110*  --  108*  --  97*   CR 2.09* 2.07*  --  2.44*  --  2.77*   * 169*  --  132*  --  119*    < > = values in this interval not displayed.     Liver Function Tests  Recent Labs   Lab 02/15/20  1213 02/14/20 2030 02/13/20  0353 02/12/20  0343 02/11/20  0346 02/10/20  2052 02/10/20  1408 02/10/20  1000   AST 32 44 58* 91* 147*  --   --  123*   ALT 17 17 16 20 30  --   --  28   ALKPHOS 66  --  42 35* 36*  --   --  40   BILITOTAL 0.5  --  0.5 0.6 0.9  --   --  1.1   ALBUMIN 2.3*  --  2.8* 2.7* 3.1*  --   --  2.8*   INR  --   --  1.29*  --   --  1.12 1.05 0.98     Pancreatic Enzymes  No lab results found in last 7 days.  Coagulation Profile  Recent Labs   Lab 02/13/20  0353 02/10/20  2052 02/10/20  1408 02/10/20  1000 02/10/20  0905 02/10/20  0749 02/10/20  0648   INR 1.29* 1.12 1.05 0.98 0.95 0.90 0.94   PTT  --   --   --  42* 41* 50* 50*         5. RADIOLOGY:   Recent Results (from the past 24 hour(s))   XR Chest Port 1 View    Narrative    Exam: AP chest radiograph 2/14/2020 9:48 PM.    HISTORY: Evaluate for pneumonia.    COMPARISON: 2/14/2020, 2/13/2020, 2/12/2020.    FINDINGS: AP chest radiograph. Endotracheal tube tip projects at the  mid/low thoracic trachea. Enteric tubes extend beyond the  field-of-view. [place] catheter tip at the main pulmonary artery.  Aortic valvular prosthesis. Trachea is midline, cardiac silhouette is  partially obscured. Increased left pleural effusion with overlying  basilar retrocardiac opacities. Bilateral perihilar streaky opacities  and presumed small right pleural effusion. No acute osseous or  abdominal abnormality.       Impression    IMPRESSION:  1. Increased left pleural effusion with overlying basilar retrocardiac  consolidation/atelectasis.  2. Cardiomegaly with moderate pulmonary edema, slightly increased.    I have personally reviewed the examination and initial interpretation  and I agree with the findings.    CHINA TAYLOR MD   XR Chest Port 1 View    Narrative    Exam: AP chest radiograph 2/15/2020 4:17 AM .    COMPARISON: 2/14/2020, 2/13/2020.    HISTORY: Intubated patient, interval change.    FINDINGS: AP chest radiograph. Endotracheal tube tip approximately at  the midthoracic trachea. Postoperative chest, aortic valvular  prosthesis. [place]-Berhane catheter tip at the main pulmonary artery.  Substantially increased left pleural effusion and/or atelectasis with  near complete opacification of the left hemithorax. Right pulmonary  perihilar streaky opacities. No pneumothorax.      Impression    IMPRESSION:  1. Increased left pleural effusion and/or atelectasis with near  complete opacification of the left hemithorax. Mucous plugging is a  consideration.  2. Moderate pulmonary edema.    I have personally reviewed the examination and initial interpretation  and I agree with the findings.    XIMENA FLETCHER, DO       =========================================

## 2020-02-15 NOTE — PROGRESS NOTES
Nephrology Progress Note  February 15, 2020      Vel Espana MRN:[id number] YOB: 1945  Date of Admission:2/9/2020    ASSESSMENT AND RECOMMENDATIONS:   74 yr male with Type A  Aortic dissection, S/p repair , AVR ,CABG, Left greater saphenous vein harvest, Circulatory arrest, Left femoral artery exploration on 2/10. Has NSVT.Nephrology consulted for HANNA.     Non Oliguric HANNA   Unknown baseline  Cr   Cr on admission 1.24  Etiology felt to be hypoperfusion on presentation with IV contrast use  resulting in ATN.  --  Overall, renal functon improving with excellent UOP. Recommend continuing diuretics as needed for volume management, would plan for no more than negative 2 L balance in 24 hours, do not want to precipitate hypotension that could cause recurrent HANNA    Volume /BP  BPs improved overall, currently on Dobutamine. CVP 16. Diuresis as above.    Anemia   Hb stable ~ 8-9.     Acid Base,Electrolytes :   Hypernatremia - recommend increasing Free Water to 60cc/hr via NGT and trend Na q6 hrs.    Recommendations were communicated to primary team via note    Patient seen and discussed with Dr Emanuel Puente MD  Neph Fellow  [phone]    INTERVAL HISTORY   Remains intubated and sedated on propofol. Febrile to 102.7 in the past 24 hours. UOP increasing, 4.6L yesterday with neg 2L balance. Crt improving to 2.0 from 2.44.    REVIEW OF SYSTEMS:  A review of systems was not obtained due to intubated status    Current Meds    albuterol  6 puff Inhalation Q4H     aspirin  81 mg Oral Daily     atorvastatin  40 mg Oral QPM     heparin ANTICOAGULANT  5,000 Units Subcutaneous Q8H     insulin aspart  1-6 Units Subcutaneous Q4H     lidocaine  1-3 patch Transdermal Q24H     lidocaine   Transdermal Q8H     multivitamins w/minerals  15 mL Per Feeding Tube Daily     pantoprazole  40 mg Oral or Feeding Tube Daily     piperacillin-tazobactam  3.375 g Intravenous Q6H     polyethylene glycol  17 g Oral or Feeding  "Tube Daily     protein modular  1 packet Per Feeding Tube TID     senna-docusate  1 tablet Oral or Feeding Tube BID    Or     senna-docusate  2 tablet Oral or Feeding Tube BID     sodium chloride (PF)  3 mL Intracatheter Q8H     Infusion Meds    amiodarone       dexmedetomidine Stopped (20)     dextrose       DOBUTamine 2.5 mcg/kg/min (02/15/20 0800)     fentaNYL 150 mcg/hr (02/15/20 0910)     niCARdipine 40 mg in 200 mL 0.9% NaCl Stopped (20)     propofol (DIPRIVAN) infusion 7 mcg/kg/min (02/15/20 0909)     BETA BLOCKER NOT PRESCRIBED       ALLERGIES:    No Known Allergies    PHYSICAL EXAM:   Temp  Av  F (37.8  C)  Min: 97.8  F (36.6  C)  Max: 102.4  F (39.1  C)  Arterial Line BP  Min: 69/43  Max: 152/57  Arterial Line MAP (mmHg)  Av.7 mmHg  Min: 50 mmHg  Max: 115 mmHg      Pulse  Av.9  Min: 71  Max: 96 Resp  Av.9  Min: 11  Max: 48  FiO2 (%)  Av.1 %  Min: 40 %  Max: 100 %  SpO2  Av.6 %  Min: 87 %  Max: 100 %    CVP (mmHg): 12 mmHg  BP 91/74   Pulse 96   Temp 99.8  F (37.7  C) (Axillary)   Resp 19   Ht 1.753 m (5' 9\")   Wt 138.1 kg (304 lb 7.3 oz)   SpO2 99%   BMI 44.96 kg/m     Date 20 07 - 20 0659   Shift 2102-1462 9370-1377 9944-7631 24 Hour Total   INTAKE   I.V. 316.64   316.64   NG/GT 70   70   IV Piggyback 500   500   Enteral 490   490   Blood Components 300   300   Shift Total(mL/kg) 1676.64(12.02)   1676.64(12.02)   OUTPUT   Urine 1470   1470   Chest Tube(mL/kg) 60(0.43)   60(0.43)   Shift Total(mL/kg) 1530(10.97)   1530(10.97)   Weight (kg) 139.5 139.5 139.5 139.5      Admit Weight: 131.9 kg (290 lb 11.2 oz)(bed scale)     GENERAL APPEARANCE: intubated and sedated   EYES: no  scleral icterus, pupils equal  Endo: no goiter, no moon facies  Pulmonary:  Decreased air entry in bases with basal rales   CV: regular rhythm, normal rate, no rub   - JVD no   - Edema 2+   GI: soft, nontender, normal bowel sounds  MS: no evidence of inflammation " in joints, no muscle tenderness  : has leon  SKIN: no rash, warm, dry, no cyanosis  NEURO: sedated      LABS:   CMP  Recent Labs   Lab 02/15/20  0337 02/14/20  2030 02/14/20  0416 02/13/20  1827 02/13/20  1119  02/13/20  0353 02/12/20  0343  02/11/20  0346  02/10/20  1000   *  --  144  --  144  --  144 144   < > 148*   < > 150*   POTASSIUM 4.4 4.5 4.8 5.1 5.1   < > 5.2 4.8   < > 4.5   < > 2.9*   CHLORIDE 115*  --  114*  --  115*  --  114* 114*   < > 115*   < > 110*   CO2 28  --  28  --  27  --  28 26   < > 24   < > 22   ANIONGAP 4  --  3  --  2*  --  2* 4   < > 9   < > 18*   *  --  132*  --  119*  --  166* 152*   < > 146*   < > 192*   *  --  108*  --  97*  --  92* 52*   < > 32*   < > 24   CR 2.07*  --  2.44*  --  2.77*  --  2.89* 2.77*   < > 2.42*   < > 1.67*   GFRESTIMATED 30*  --  25*  --  21*  --  20* 21*   < > 25*   < > 39*   GFRESTBLACK 35*  --  29*  --  24*  --  23* 24*   < > 29*   < > 45*   ELMER 7.8*  --  7.9*  --  7.8*  --  7.9* 7.4*   < > 7.9*   < > 8.6   MAG  --   --   --   --  2.8*  --  2.9* 2.5*  --  2.5*   < > 2.5*   PHOS  --   --   --   --  3.6  --  3.8 4.7*  --  3.3  --  1.0*   PROTTOTAL  --   --   --   --   --   --  5.4* 5.1*  --  5.3*  --  5.1*   ALBUMIN  --   --   --   --   --   --  2.8* 2.7*  --  3.1*  --  2.8*   BILITOTAL  --   --   --   --   --   --  0.5 0.6  --  0.9  --  1.1   ALKPHOS  --   --   --   --   --   --  42 35*  --  36*  --  40   AST  --  44  --   --   --   --  58* 91*  --  147*  --  123*   ALT  --  17  --   --   --   --  16 20  --  30  --  28    < > = values in this interval not displayed.     CBC  Recent Labs   Lab 02/15/20  0337 02/14/20  2030 02/14/20  0416 02/13/20  1119 02/13/20  0353   HGB 8.8* 9.0* 8.7* 8.0* 8.3*   WBC 9.1 8.3 8.2  --  9.0   RBC 2.82* 2.90* 2.85*  --  2.69*   HCT 28.9* 29.0* 28.4*  --  26.6*   * 100 100  --  99   MCH 31.2 31.0 30.5  --  30.9   MCHC 30.4* 31.0* 30.6*  --  31.2*   RDW 15.8* 15.9* 15.9*  --  16.1*   PLT 92* 87* 77*   --  74*     INR  Recent Labs   Lab 02/13/20  0353 02/10/20  2052 02/10/20  1408 02/10/20  1000 02/10/20  0905 02/10/20  0749 02/10/20  0648   INR 1.29* 1.12 1.05 0.98 0.95 0.90 0.94   PTT  --   --   --  42* 41* 50* 50*     ABG  Recent Labs   Lab 02/15/20  0815 02/15/20  0337 02/14/20  2354 02/14/20  2030  02/14/20  1150   PH 7.37 7.39  --  7.47*  --  7.43   PCO2 41 47*  --  39  --  39   PO2 70* 79*  --  69*  --  83   HCO3 24 28  --  28  --  26   O2PER 60  60 60  60 60.0 60  60   < > 40    < > = values in this interval not displayed.      URINE STUDIES  Recent Labs   Lab Test 02/14/20 2031 02/13/20  1447   COLOR Light Yellow Yellow   APPEARANCE Slightly Cloudy Clear   URINEGLC Negative Negative   URINEBILI Negative Negative   URINEKETONE Negative Negative   SG 1.009 1.012   UBLD Small* Small*   URINEPH 5.0 5.0   PROTEIN Negative Negative   NITRITE Negative Negative   LEUKEST Small* Small*   RBCU 1 9*   WBCU 3 4     IMAGING:  Pertinent test results reviewed    Westley Puente MD    Patient was seen and evaluated by me, Serge Castellanos MD. I have reviewed the note and agree with the the plan of care as documented by the fellow.

## 2020-02-15 NOTE — PROGRESS NOTES
CV ICU PROGRESS NOTE  February 15, 2020      CO-MORBIDITIES:   Dissection of aorta, unspecified portion of aorta (H)    ASSESSMENT: Vel Espana is a 74 year old male with PMH of HTN s/p Ascending aortic dissection repair using a 32 mm Dacron Gelweave graft, AVR with 27 mm Patterson bovine pericardial valve, Coronary bypass to right coronary artery, Left greater saphenous vein harvest, Circulatory arrest, Left femoral artery exploration on 2/10, remains intubated for airway protection for slowly resolving metabolic encephalopathy.     TODAY'S PROGRESS:   -continue dobutamine, NE as needed for MAP >65 with low SVR  -febrile overnight, pan culture and start vanc zosyn  - bronch today  - propofol for sedation  - increase PEEP to improve oxygenation  - amiodarone for a-fib with RVR overnight    PLAN:  Neuro/ pain/ sedation:  - Monitor neurological status. Notify the MD for any acute changes in exam.  - Fentanyl gtt for pain.  - propofol gtt  - Daily sedation holiday     Pulmonary care:   - PST as able with sedation wean  - MV  - Titrate FiO2 for SpO2 >92%-  - Dr. Mckeon requests PEEP no less than 8  - bronchoscopy 2/15     Cardiovascular:    - Monitor hemodynamic status.   - MAP >65, SBP <120  - dobutamine, NE gtr  - Hold PTA anti-hypertensives    #Non-sustained VT. brief run of nonsustained VT, less than 30 seconds, in the operating room, was hypokalemic at the time, also was being paced, possible R-on-T phenomenon, has not had recurrence since temporary epicardial pacing has been discontinued.  Cardiology EP consult 2/12 recommended discontinuing amiodarone  #A-fib with RVR: noted overnight 2/14  - amiodarone gtt     GI care:   - NPO except ice chips and medications.     Fluids/ Electrolytes/ Nutrition:   - TKO for IV fluid hydration  - NJ placed, advance TF to goal    Renal/ Fluid Balance:    #Nonoliguric HANNA  - Urine output is adequate so far.  - Will continue to monitor intake and output, Cr, electrolytes.   -  Nephrology consult  -Lasix 40 mg x 1 2/15     Endocrine:    # Stress hyperglycemia  - Insulin gtt     ID/ Antibiotics:  - Start vanc/zosyn 2/15     Heme:     - Hgb goal >7     Prophylaxis:    - SQH  - Protonix qd     Lines/ tubes/ drains:  - MAC, [place], Arterial line, quintero     Disposition:  - CV ICU.      Patient seen, findings and plan discussed with CVICU attending Dr. Reshma Alfonso MD  [phone]    ====================================    SUBJECTIVE:   In afib overnight, amiodarone started. Also febrile to 102.7 F.    OBJECTIVE:   1. VITAL SIGNS:   Temp:  [99.5  F (37.5  C)-102.7  F (39.3  C)] 99.8  F (37.7  C)  Heart Rate:  [] 98  Resp:  [16-30] 18  MAP:  [58 mmHg-108 mmHg] 64 mmHg  Arterial Line BP: ()/(47-96) 92/48  FiO2 (%):  [40 %-70 %] 70 %  SpO2:  [89 %-99 %] 92 %  Ventilation Mode: CMV/AC  (Continuous Mandatory Ventilation/ Assist Control)  FiO2 (%): 70 %  Rate Set (breaths/minute): 16 breaths/min  Tidal Volume Set (mL): 500 mL  PEEP (cm H2O): 10 cmH2O  Oxygen Concentration (%): 60 %  Resp: 18      2. INTAKE/ OUTPUT:   I/O last 3 completed shifts:  In: 3121.64 [I.V.:1481.64; NG/GT:390]  Out: 5070 [Urine:4890; Chest Tube:180]    3. PHYSICAL EXAMINATION:   General: intubated, sedated, propofol  Neuro: Sedated, pupils equal round reactive to light,moves upper extremities without purpose  Resp: mechanical breath sounds   CV: RRR, pacer wires disconnected  Abdomen: Soft, Non-distended, Non-tender  Incisions: c/d/i  Extremities: warm and well perfused, dusky appearance to left middle and ring fingers resolved, dusky appearance to the right second and third toes, stable    4. INVESTIGATIONS:   Arterial Blood Gases   Recent Labs   Lab 02/15/20  0815 02/15/20  0337 02/14/20 2030 02/14/20  1150   PH 7.37 7.39 7.47* 7.43   PCO2 41 47* 39 39   PO2 70* 79* 69* 83   HCO3 24 28 28 26     Complete Blood Count   Recent Labs   Lab 02/15/20  0337 02/14/20 2030 02/14/20  0416 02/13/20  1119  02/13/20 0353   WBC 9.1 8.3 8.2  --  9.0   HGB 8.8* 9.0* 8.7* 8.0* 8.3*   PLT 92* 87* 77*  --  74*     Basic Metabolic Panel  Recent Labs   Lab 02/15/20  0337 02/14/20 2030 02/14/20  0416 02/13/20  1827 02/13/20  1119  02/13/20  0353   *  --  144  --  144  --  144   POTASSIUM 4.4 4.5 4.8 5.1 5.1   < > 5.2   CHLORIDE 115*  --  114*  --  115*  --  114*   CO2 28  --  28  --  27  --  28   *  --  108*  --  97*  --  92*   CR 2.07*  --  2.44*  --  2.77*  --  2.89*   *  --  132*  --  119*  --  166*    < > = values in this interval not displayed.     Liver Function Tests  Recent Labs   Lab 02/14/20 2030 02/13/20 0353 02/12/20 0343 02/11/20  0346 02/10/20  2052 02/10/20  1408 02/10/20  1000   AST 44 58* 91* 147*  --   --  123*   ALT 17 16 20 30  --   --  28   ALKPHOS  --  42 35* 36*  --   --  40   BILITOTAL  --  0.5 0.6 0.9  --   --  1.1   ALBUMIN  --  2.8* 2.7* 3.1*  --   --  2.8*   INR  --  1.29*  --   --  1.12 1.05 0.98     Pancreatic Enzymes  No lab results found in last 7 days.  Coagulation Profile  Recent Labs   Lab 02/13/20  0353 02/10/20  2052 02/10/20  1408 02/10/20  1000 02/10/20  0905 02/10/20  0749 02/10/20  0648   INR 1.29* 1.12 1.05 0.98 0.95 0.90 0.94   PTT  --   --   --  42* 41* 50* 50*         5. RADIOLOGY:   Recent Results (from the past 24 hour(s))   XR Chest Port 1 View    Narrative    Exam: AP chest radiograph 2/14/2020 9:48 PM.    HISTORY: Evaluate for pneumonia.    COMPARISON: 2/14/2020, 2/13/2020, 2/12/2020.    FINDINGS: AP chest radiograph. Endotracheal tube tip projects at the  mid/low thoracic trachea. Enteric tubes extend beyond the  field-of-view. [place] catheter tip at the main pulmonary artery.  Aortic valvular prosthesis. Trachea is midline, cardiac silhouette is  partially obscured. Increased left pleural effusion with overlying  basilar retrocardiac opacities. Bilateral perihilar streaky opacities  and presumed small right pleural effusion. No acute osseous  or  abdominal abnormality.      Impression    IMPRESSION:  1. Increased left pleural effusion with overlying basilar retrocardiac  consolidation/atelectasis.  2. Cardiomegaly with moderate pulmonary edema, slightly increased.    I have personally reviewed the examination and initial interpretation  and I agree with the findings.    CHINA TAYLOR MD   XR Chest Port 1 View    Narrative    Exam: AP chest radiograph 2/15/2020 4:17 AM .    COMPARISON: 2/14/2020, 2/13/2020.    HISTORY: Intubated patient, interval change.    FINDINGS: AP chest radiograph. Endotracheal tube tip approximately at  the midthoracic trachea. Postoperative chest, aortic valvular  prosthesis. [place]-Berhane catheter tip at the main pulmonary artery.  Substantially increased left pleural effusion and/or atelectasis with  near complete opacification of the left hemithorax. Right pulmonary  perihilar streaky opacities. No pneumothorax.      Impression    IMPRESSION:  1. Increased left pleural effusion and/or atelectasis with near  complete opacification of the left hemithorax. Mucous plugging is a  consideration.  2. Moderate pulmonary edema.    I have personally reviewed the examination and initial interpretation  and I agree with the findings.    XIMENA FLETCHER, DO       =========================================

## 2020-02-15 NOTE — PLAN OF CARE
Major Shift Events: Patient had been weaned from propofol to precedex. Patient tachypneic and PO2 at 88%. FiO2 increased from 40% to 60%.  Patient transitioned back to propofol for sedation.     Patient with elevated temperatures - 102.7. Blood cultures and urine cultures obtained. Patient flipped into a-fib w/RVR. Amio bolus and gtt started.Patient remains on 5 mcg/kg/min dobutamine and 1 mg/min Amio. Patient sedated on 20 mcg/kg/min propofol and 200 mcg/hour fentanyl.     Plan: continue to wean sedation as tolerated by patient.   For vital signs and complete assessments, please see documentation flowsheets.

## 2020-02-15 NOTE — PROGRESS NOTES
Nephrology Initial Consult  February 13, 2020      Vel Espana MRN:[id number] YOB: 1945  Date of Admission:2/9/2020  Primary care provider: Michale Medrano  Requesting physician: Nivia Mckeon MD    ASSESSMENT AND RECOMMENDATIONS:     74 yr male with Type A  Aortic dissection, S/p repair , AVR ,CABG, Left greater saphenous vein harvest, Circulatory arrest, Left femoral artery exploration on 2/10. Has NSVT.Nephrology consulted for HANNA.     Non Oliguric HANNA   Unknown baseline  Cr   Cr on admission 1.24 . 2.99 --> 2.77 --->2.89 --> 2.44  BUN 92 --> 108  Good UOP   Etiology  : Hypoperfusion on presentation with IV contrast use  resulting in ATN.  With good UOP , I doubt there is any obstructive pathology   UA: WBC 4 , RBC 9 Nitrite negative LE small ,pH 5.0  FeUREA  26.8  % which suggests more prerenal and hence likely hypoperfusion       Volume /BP  Hypotensive - improved - On DOBUTAMINE. Current MAP 73  CVP  Went up from 10 to 18 ,PCWP 18  Clinically he does have third spacing  With CXR showing pulmonary edema   -- Lasix 50 mg  IV x1 dose given today  -- Monitor I/O,daily weight, renal panel   No urgent indication for renal replacement therapy , but he does not improve , he may eventually need it .     Anemia   Hb 9.4   On day of admission  12.8   Transfusion per primary team     Acid Base,Electrolytes :   Na    -  144  K      -  4.8  CO2 -  28. PH 7.36 - 7.43  Acceptable . Also lasix is expected to cause some kaliuresis   Ca 7.9, albumin 2.8  Note patient did have NSVT ( 25 seconds) during this admission  with K of 2.8 and Lactic acid of 11 - lactic acid is now 0.8    Recommendations were communicated to primary team via note  Patient seen and discussed with Dr Leon Yun MD, FACP  Nephrology Fellow   [facility]   Pager [phone]      INTERVAL HISTORY   Cr stable   Good UOP , still hypervolemic  Base don raised CVP and CXR . Leg swelling minimal   He is  intubated and sedated       PAST MEDICAL HISTORY:  Reviewed with patient on 02/14/2020     Past Medical History:   Diagnosis Date     Psychosexual dysfunction with inhibited sexual excitement      Unspecified essential hypertension      Unspecified sleep apnea        Past Surgical History:   Procedure Laterality Date     REPAIR ANEURYSM ASCENDING AORTA N/A 2/9/2020    Procedure: Median Sternotomy, repair of ascending aorta using 32mm gelweave graft, deep circulatory arrest,  aortic valve repair using 27mm inspiris valve, on-pump oxygenator, open saphenous vein harvest, coronary artery bypass x1, transesophageal echocardiogram done by anestheisa;  Surgeon: Nivia Mckeon MD;  Location:  OR        MEDICATIONS:  PTA Meds  Prior to Admission medications    Medication Sig Last Dose Taking? Auth Provider   aspirin 81 MG tablet Take 81 mg by mouth every other day    Reported, Patient   atenolol (TENORMIN) 25 MG tablet Take 25 mg by mouth daily   Reported, Patient   azithromycin (ZITHROMAX) 250 MG tablet Two tablets first day, then one tablet daily for four days.   Michael Medrano MD   benzonatate (TESSALON) 100 MG capsule Take 1 capsule (100 mg) by mouth 3 times daily as needed   Michael Medrano MD      Current Meds    albuterol  6 puff Inhalation Q4H     aspirin  81 mg Oral Daily     atorvastatin  40 mg Oral QPM     furosemide  40 mg Intravenous Q6H     heparin ANTICOAGULANT  5,000 Units Subcutaneous Q8H     insulin aspart  1-6 Units Subcutaneous Q4H     lidocaine  1-3 patch Transdermal Q24H     lidocaine   Transdermal Q8H     multivitamins w/minerals  15 mL Per Feeding Tube Daily     pantoprazole  40 mg Oral or Feeding Tube Daily     polyethylene glycol  17 g Oral or Feeding Tube Daily     protein modular  1 packet Per Feeding Tube TID     senna-docusate  1 tablet Oral or Feeding Tube BID    Or     senna-docusate  2 tablet Oral or Feeding Tube BID     sodium chloride (PF)  3 mL Intracatheter Q8H     Infusion  Meds    dexmedetomidine 0.7 mcg/kg/hr (02/14/20 1549)     dextrose       DOBUTamine 5 mcg/kg/min (02/14/20 1500)     EPINEPHrine IV infusion ADULT Stopped (02/13/20 0905)     fentaNYL 100 mcg/hr (02/14/20 1500)     niCARdipine 40 mg in 200 mL 0.9% NaCl Stopped (02/14/20 1556)     BETA BLOCKER NOT PRESCRIBED         ALLERGIES:    No Known Allergies    REVIEW OF SYSTEMS:  A comprehensive of systems was negative except as noted above.    SOCIAL HISTORY:   Social History     Socioeconomic History     Marital status:      Spouse name: Not on file     Number of children: Not on file     Years of education: Not on file     Highest education level: Not on file   Occupational History     Not on file   Social Needs     Financial resource strain: Not on file     Food insecurity:     Worry: Not on file     Inability: Not on file     Transportation needs:     Medical: Not on file     Non-medical: Not on file   Tobacco Use     Smoking status: Never Smoker     Smokeless tobacco: Never Used   Substance and Sexual Activity     Alcohol use: No     Drug use: No     Sexual activity: Yes     Partners: Female   Lifestyle     Physical activity:     Days per week: Not on file     Minutes per session: Not on file     Stress: Not on file   Relationships     Social connections:     Talks on phone: Not on file     Gets together: Not on file     Attends Restoration service: Not on file     Active member of club or organization: Not on file     Attends meetings of clubs or organizations: Not on file     Relationship status: Not on file     Intimate partner violence:     Fear of current or ex partner: Not on file     Emotionally abused: Not on file     Physically abused: Not on file     Forced sexual activity: Not on file   Other Topics Concern     Parent/sibling w/ CABG, MI or angioplasty before 65F 55M? Not Asked   Social History Narrative     Not on file     He is intubated and sedated       FAMILY MEDICAL HISTORY:   History reviewed. No  "pertinent family history.  Reviewed with patient     PHYSICAL EXAM:   Temp  Av  F (37.8  C)  Min: 97.8  F (36.6  C)  Max: 102.4  F (39.1  C)  Arterial Line BP  Min: 69/43  Max: 152/57  Arterial Line MAP (mmHg)  Av.7 mmHg  Min: 50 mmHg  Max: 115 mmHg      Pulse  Av.9  Min: 71  Max: 96 Resp  Av.9  Min: 11  Max: 48  FiO2 (%)  Av.1 %  Min: 40 %  Max: 100 %  SpO2  Av.6 %  Min: 87 %  Max: 100 %    CVP (mmHg): 12 mmHg  BP 91/74   Pulse 96   Temp 101.1  F (38.4  C) (Pulmonary Artery)   Resp 22   Ht 1.753 m (5' 9\")   Wt 140 kg (308 lb 10.3 oz)   SpO2 96%   BMI 45.58 kg/m     Date 20 0700 - 20 0659   Shift 3522-5297 7517-7690 7291-3323 24 Hour Total   INTAKE   I.V. 316.64   316.64   NG/GT 70   70   IV Piggyback 500   500   Enteral 490   490   Blood Components 300   300   Shift Total(mL/kg) 1676.64(12.02)   1676.64(12.02)   OUTPUT   Urine 1470   1470   Chest Tube(mL/kg) 60(0.43)   60(0.43)   Shift Total(mL/kg) 1530(10.97)   1530(10.97)   Weight (kg) 139.5 139.5 139.5 139.5      Admit Weight: 131.9 kg (290 lb 11.2 oz)(bed scale)     GENERAL APPEARANCE: intubated and sedated   EYES: no  scleral icterus, pupils equal  Endo: no goiter, no moon facies  Pulmonary:  Decreased air entry in bases with basal rales   CV: regular rhythm, normal rate, no rub   - JVD no   - Edema 2+   GI: soft, nontender, normal bowel sounds  MS: no evidence of inflammation in joints, no muscle tenderness  : has quintero  SKIN: no rash, warm, dry, no cyanosis  NEURO: sedated      LABS:   CMP  Recent Labs   Lab 20  0416 20  1827 20  1119 20  0748 20  0353 20  0343  20  0346  02/10/20  1000     --  144  --  144 144   < > 148*   < > 150*   POTASSIUM 4.8 5.1 5.1 4.9 5.2 4.8   < > 4.5   < > 2.9*   CHLORIDE 114*  --  115*  --  114* 114*   < > 115*   < > 110*   CO2 28  --  27  --  28 26   < > 24   < > 22   ANIONGAP 3  --  2*  --  2* 4   < > 9   < > 18*   *  --  " 119*  --  166* 152*   < > 146*   < > 192*   *  --  97*  --  92* 52*   < > 32*   < > 24   CR 2.44*  --  2.77*  --  2.89* 2.77*   < > 2.42*   < > 1.67*   GFRESTIMATED 25*  --  21*  --  20* 21*   < > 25*   < > 39*   GFRESTBLACK 29*  --  24*  --  23* 24*   < > 29*   < > 45*   ELMER 7.9*  --  7.8*  --  7.9* 7.4*   < > 7.9*   < > 8.6   MAG  --   --  2.8*  --  2.9* 2.5*  --  2.5*   < > 2.5*   PHOS  --   --  3.6  --  3.8 4.7*  --  3.3  --  1.0*   PROTTOTAL  --   --   --   --  5.4* 5.1*  --  5.3*  --  5.1*   ALBUMIN  --   --   --   --  2.8* 2.7*  --  3.1*  --  2.8*   BILITOTAL  --   --   --   --  0.5 0.6  --  0.9  --  1.1   ALKPHOS  --   --   --   --  42 35*  --  36*  --  40   AST  --   --   --   --  58* 91*  --  147*  --  123*   ALT  --   --   --   --  16 20  --  30  --  28    < > = values in this interval not displayed.     CBC  Recent Labs   Lab 02/14/20  0416 02/13/20  1119 02/13/20  0353 02/12/20  1606 02/12/20  0343   HGB 8.7* 8.0* 8.3* 8.6* 9.0*   WBC 8.2  --  9.0 10.3 10.9   RBC 2.85*  --  2.69* 2.81* 2.94*   HCT 28.4*  --  26.6* 27.5* 28.5*     --  99 98 97   MCH 30.5  --  30.9 30.6 30.6   MCHC 30.6*  --  31.2* 31.3* 31.6   RDW 15.9*  --  16.1* 16.1* 16.4*   PLT 77*  --  74* 72* 79*     INR  Recent Labs   Lab 02/13/20  0353 02/10/20  2052 02/10/20  1408 02/10/20  1000 02/10/20  0905 02/10/20  0749 02/10/20  0648   INR 1.29* 1.12 1.05 0.98 0.95 0.90 0.94   PTT  --   --   --  42* 41* 50* 50*     ABG  Recent Labs   Lab 02/14/20  1623 02/14/20  1150 02/14/20  1149 02/14/20  0808 02/14/20  0416  02/13/20  1958 02/13/20  1110   PH  --  7.43  --   --  7.41  --  7.40 7.41   PCO2  --  39  --   --  43  --  42 39   PO2  --  83  --   --  88  --  81 83   HCO3  --  26  --   --  27  --  26 25   O2PER 40 40 40 50 50  50   < > 50  50 50  50    < > = values in this interval not displayed.      URINE STUDIES  Recent Labs   Lab Test 02/13/20  1447   COLOR Yellow   APPEARANCE Clear   URINEGLC Negative   URINEBILI Negative    URINEKETONE Negative   SG 1.012   UBLD Small*   URINEPH 5.0   PROTEIN Negative   NITRITE Negative   LEUKEST Small*   RBCU 9*   WBCU 4     No lab results found.  PTH  No lab results found.  IRON STUDIES  No lab results found.    IMAGING:  Pertinent test results reviewed    Jama Harry MD

## 2020-02-15 NOTE — PHARMACY-VANCOMYCIN DOSING SERVICE
Pharmacy Vancomycin Initial Note  Date of Service February 15, 2020  Patient's  1945  74 year old, male    Indication: Ventilator-Associated Pneumonia    Current estimated CrCl = Estimated Creatinine Clearance: 42 mL/min (A) (based on SCr of 2.07 mg/dL (H)).    Creatinine for last 3 days  2020:  3:53 AM Creatinine 2.89 mg/dL; 11:19 AM Creatinine 2.77 mg/dL  2020:  4:16 AM Creatinine 2.44 mg/dL  2/15/2020:  3:37 AM Creatinine 2.07 mg/dL    Recent Vancomycin Level(s) for last 3 days  No results found for requested labs within last 72 hours.      Vancomycin IV Administrations (past 72 hours)      No vancomycin orders with administrations in past 72 hours.                Nephrotoxins and other renal medications (From now, onward)    Start     Dose/Rate Route Frequency Ordered Stop    02/15/20 1115  vancomycin (VANCOCIN) 2,500 mg in sodium chloride 0.9 % 250 mL intermittent infusion      2,500 mg (central catheter)  over 60 Minutes Intravenous EVERY 24 HOURS 02/15/20 1106      02/15/20 1030  furosemide (LASIX) injection 40 mg      40 mg  over 1-2 Minutes Intravenous ONCE 02/15/20 1020      02/15/20 0700  piperacillin-tazobactam (ZOSYN) 3.375 g vial to attach to  mL bag      3.375 g  over 1 Hours Intravenous EVERY 6 HOURS 02/15/20 0612            Contrast Orders - past 72 hours (72h ago, onward)    Start     Dose/Rate Route Frequency Ordered Stop    20 1400  perflutren diluted 1mL to 2mL with saline (OPTISON) diluted injection 5 mL      5 mL Intravenous ONCE 20 1356 20 1400                Plan:  1.  Start vancomycin  2500 mg IV q24h.   2.  Goal Trough Level: 15-20 mg/L   3.  Pharmacy will check trough levels as appropriate in 1-3 Days.    4. Serum creatinine levels will be ordered daily for the first week of therapy and at least twice weekly for subsequent weeks.    5. Allen method utilized to dose vancomycin therapy: Method 2    [facility]

## 2020-02-16 ENCOUNTER — APPOINTMENT (OUTPATIENT)
Dept: GENERAL RADIOLOGY | Facility: CLINIC | Age: 75
DRG: 219 | End: 2020-02-16
Payer: COMMERCIAL

## 2020-02-16 LAB
ANION GAP SERPL CALCULATED.3IONS-SCNC: 3 MMOL/L (ref 3–14)
APTT PPP: 48 SEC (ref 22–37)
BASE DEFICIT BLDA-SCNC: 5.8 MMOL/L
BASE EXCESS BLDA CALC-SCNC: 4 MMOL/L
BUN SERPL-MCNC: 113 MG/DL (ref 7–30)
CALCIUM SERPL-MCNC: 8.2 MG/DL (ref 8.5–10.1)
CHLORIDE SERPL-SCNC: 113 MMOL/L (ref 94–109)
CO2 SERPL-SCNC: 30 MMOL/L (ref 20–32)
CREAT SERPL-MCNC: 2.16 MG/DL (ref 0.66–1.25)
ERYTHROCYTE [DISTWIDTH] IN BLOOD BY AUTOMATED COUNT: 15.9 % (ref 10–15)
GFR SERPL CREATININE-BSD FRML MDRD: 29 ML/MIN/{1.73_M2}
GLUCOSE BLDC GLUCOMTR-MCNC: 113 MG/DL (ref 70–99)
GLUCOSE BLDC GLUCOMTR-MCNC: 119 MG/DL (ref 70–99)
GLUCOSE BLDC GLUCOMTR-MCNC: 123 MG/DL (ref 70–99)
GLUCOSE BLDC GLUCOMTR-MCNC: 134 MG/DL (ref 70–99)
GLUCOSE BLDC GLUCOMTR-MCNC: 138 MG/DL (ref 70–99)
GLUCOSE BLDC GLUCOMTR-MCNC: 139 MG/DL (ref 70–99)
GLUCOSE SERPL-MCNC: 170 MG/DL (ref 70–99)
HCO3 BLD-SCNC: 19 MMOL/L (ref 21–28)
HCO3 BLD-SCNC: 28 MMOL/L (ref 21–28)
HCT VFR BLD AUTO: 28.3 % (ref 40–53)
HGB BLD-MCNC: 8.5 G/DL (ref 13.3–17.7)
MAGNESIUM SERPL-MCNC: 2.7 MG/DL (ref 1.6–2.3)
MCH RBC QN AUTO: 31.4 PG (ref 26.5–33)
MCHC RBC AUTO-ENTMCNC: 30 G/DL (ref 31.5–36.5)
MCV RBC AUTO: 104 FL (ref 78–100)
O2/TOTAL GAS SETTING VFR VENT: 60 %
O2/TOTAL GAS SETTING VFR VENT: 60 %
OXYHGB MFR BLD: 96 % (ref 92–100)
OXYHGB MFR BLD: 98 % (ref 92–100)
PCO2 BLD: 33 MM HG (ref 35–45)
PCO2 BLD: 38 MM HG (ref 35–45)
PH BLD: 7.37 PH (ref 7.35–7.45)
PH BLD: 7.48 PH (ref 7.35–7.45)
PLATELET # BLD AUTO: 105 10E9/L (ref 150–450)
PO2 BLD: 115 MM HG (ref 80–105)
PO2 BLD: 82 MM HG (ref 80–105)
POTASSIUM SERPL-SCNC: 3.9 MMOL/L (ref 3.4–5.3)
POTASSIUM SERPL-SCNC: 4.2 MMOL/L (ref 3.4–5.3)
RBC # BLD AUTO: 2.71 10E12/L (ref 4.4–5.9)
SODIUM SERPL-SCNC: 146 MMOL/L (ref 133–144)
SODIUM SERPL-SCNC: 149 MMOL/L (ref 133–144)
WBC # BLD AUTO: 9.5 10E9/L (ref 4–11)

## 2020-02-16 PROCEDURE — 80048 BASIC METABOLIC PNL TOTAL CA: CPT | Performed by: STUDENT IN AN ORGANIZED HEALTH CARE EDUCATION/TRAINING PROGRAM

## 2020-02-16 PROCEDURE — 27211417 ZZ H KIT, VPS RHYTHM STYLET

## 2020-02-16 PROCEDURE — 31622 DX BRONCHOSCOPE/WASH: CPT

## 2020-02-16 PROCEDURE — 25000125 ZZHC RX 250: Performed by: PHYSICIAN ASSISTANT

## 2020-02-16 PROCEDURE — 25800030 ZZH RX IP 258 OP 636: Performed by: THORACIC SURGERY (CARDIOTHORACIC VASCULAR SURGERY)

## 2020-02-16 PROCEDURE — 27210577 ZZ H INTRODUCER MICRO SET

## 2020-02-16 PROCEDURE — 85027 COMPLETE CBC AUTOMATED: CPT | Performed by: STUDENT IN AN ORGANIZED HEALTH CARE EDUCATION/TRAINING PROGRAM

## 2020-02-16 PROCEDURE — 94003 VENT MGMT INPAT SUBQ DAY: CPT

## 2020-02-16 PROCEDURE — 84295 ASSAY OF SERUM SODIUM: CPT | Performed by: STUDENT IN AN ORGANIZED HEALTH CARE EDUCATION/TRAINING PROGRAM

## 2020-02-16 PROCEDURE — 82805 BLOOD GASES W/O2 SATURATION: CPT | Performed by: PHYSICIAN ASSISTANT

## 2020-02-16 PROCEDURE — 25000128 H RX IP 250 OP 636: Performed by: THORACIC SURGERY (CARDIOTHORACIC VASCULAR SURGERY)

## 2020-02-16 PROCEDURE — 20000004 ZZH R&B ICU UMMC

## 2020-02-16 PROCEDURE — 25000128 H RX IP 250 OP 636: Performed by: ANESTHESIOLOGY

## 2020-02-16 PROCEDURE — 94669 MECHANICAL CHEST WALL OSCILL: CPT

## 2020-02-16 PROCEDURE — 25800030 ZZH RX IP 258 OP 636: Performed by: STUDENT IN AN ORGANIZED HEALTH CARE EDUCATION/TRAINING PROGRAM

## 2020-02-16 PROCEDURE — 25000132 ZZH RX MED GY IP 250 OP 250 PS 637: Performed by: THORACIC SURGERY (CARDIOTHORACIC VASCULAR SURGERY)

## 2020-02-16 PROCEDURE — 40000014 ZZH STATISTIC ARTERIAL MONITORING DAILY

## 2020-02-16 PROCEDURE — 71045 X-RAY EXAM CHEST 1 VIEW: CPT

## 2020-02-16 PROCEDURE — 25000128 H RX IP 250 OP 636: Performed by: STUDENT IN AN ORGANIZED HEALTH CARE EDUCATION/TRAINING PROGRAM

## 2020-02-16 PROCEDURE — 83735 ASSAY OF MAGNESIUM: CPT | Performed by: STUDENT IN AN ORGANIZED HEALTH CARE EDUCATION/TRAINING PROGRAM

## 2020-02-16 PROCEDURE — 25000132 ZZH RX MED GY IP 250 OP 250 PS 637: Performed by: PHYSICIAN ASSISTANT

## 2020-02-16 PROCEDURE — 25000125 ZZHC RX 250

## 2020-02-16 PROCEDURE — 94668 MNPJ CHEST WALL SBSQ: CPT

## 2020-02-16 PROCEDURE — 40000809 ZZH STATISTIC NO DOCUMENTATION TO SUPPORT CHARGE

## 2020-02-16 PROCEDURE — 94640 AIRWAY INHALATION TREATMENT: CPT | Mod: 76

## 2020-02-16 PROCEDURE — 36569 INSJ PICC 5 YR+ W/O IMAGING: CPT

## 2020-02-16 PROCEDURE — 74018 RADEX ABDOMEN 1 VIEW: CPT

## 2020-02-16 PROCEDURE — 94640 AIRWAY INHALATION TREATMENT: CPT

## 2020-02-16 PROCEDURE — 40000275 ZZH STATISTIC RCP TIME EA 10 MIN

## 2020-02-16 PROCEDURE — 40000986 XR CHEST PORT 1 VW

## 2020-02-16 PROCEDURE — 99291 CRITICAL CARE FIRST HOUR: CPT | Mod: GC | Performed by: INTERNAL MEDICINE

## 2020-02-16 PROCEDURE — 27211391 ZZ H KIT, 6 FR TL BIOFLO OPEN ENDED PICC

## 2020-02-16 PROCEDURE — 25000132 ZZH RX MED GY IP 250 OP 250 PS 637: Performed by: STUDENT IN AN ORGANIZED HEALTH CARE EDUCATION/TRAINING PROGRAM

## 2020-02-16 PROCEDURE — 25000132 ZZH RX MED GY IP 250 OP 250 PS 637: Performed by: ANESTHESIOLOGY

## 2020-02-16 PROCEDURE — 27210432 ZZH NUTRITION PRODUCT RENAL BASIC LITER

## 2020-02-16 PROCEDURE — 25000125 ZZHC RX 250: Performed by: STUDENT IN AN ORGANIZED HEALTH CARE EDUCATION/TRAINING PROGRAM

## 2020-02-16 PROCEDURE — 84132 ASSAY OF SERUM POTASSIUM: CPT | Performed by: STUDENT IN AN ORGANIZED HEALTH CARE EDUCATION/TRAINING PROGRAM

## 2020-02-16 PROCEDURE — 25000128 H RX IP 250 OP 636: Performed by: NEUROLOGICAL SURGERY

## 2020-02-16 PROCEDURE — 85730 THROMBOPLASTIN TIME PARTIAL: CPT | Performed by: STUDENT IN AN ORGANIZED HEALTH CARE EDUCATION/TRAINING PROGRAM

## 2020-02-16 PROCEDURE — 00000146 ZZHCL STATISTIC GLUCOSE BY METER IP

## 2020-02-16 RX ORDER — HEPARIN SODIUM,PORCINE 10 UNIT/ML
5-10 VIAL (ML) INTRAVENOUS EVERY 24 HOURS
Status: DISCONTINUED | OUTPATIENT
Start: 2020-02-16 | End: 2020-03-05 | Stop reason: HOSPADM

## 2020-02-16 RX ORDER — LIDOCAINE 40 MG/G
CREAM TOPICAL
Status: DISCONTINUED | OUTPATIENT
Start: 2020-02-16 | End: 2020-02-25

## 2020-02-16 RX ORDER — LIDOCAINE HYDROCHLORIDE 20 MG/ML
5 SOLUTION OROPHARYNGEAL ONCE
Status: DISCONTINUED | OUTPATIENT
Start: 2020-02-16 | End: 2020-02-24

## 2020-02-16 RX ORDER — ALBUTEROL SULFATE 90 UG/1
6 AEROSOL, METERED RESPIRATORY (INHALATION)
Status: DISCONTINUED | OUTPATIENT
Start: 2020-02-16 | End: 2020-02-24

## 2020-02-16 RX ORDER — ALBUTEROL SULFATE 0.83 MG/ML
2.5 SOLUTION RESPIRATORY (INHALATION)
Status: DISCONTINUED | OUTPATIENT
Start: 2020-02-16 | End: 2020-02-25

## 2020-02-16 RX ORDER — HEPARIN SODIUM,PORCINE 10 UNIT/ML
5-10 VIAL (ML) INTRAVENOUS
Status: DISCONTINUED | OUTPATIENT
Start: 2020-02-16 | End: 2020-03-05 | Stop reason: HOSPADM

## 2020-02-16 RX ADMIN — ALBUTEROL SULFATE 2.5 MG: 2.5 SOLUTION RESPIRATORY (INHALATION) at 20:30

## 2020-02-16 RX ADMIN — PROPOFOL 10 MCG/KG/MIN: 10 INJECTION, EMULSION INTRAVENOUS at 22:09

## 2020-02-16 RX ADMIN — PIPERACILLIN AND TAZOBACTAM 3.38 G: 3; .375 INJECTION, POWDER, FOR SOLUTION INTRAVENOUS at 08:33

## 2020-02-16 RX ADMIN — ACETYLCYSTEINE 2 ML: 200 SOLUTION ORAL; RESPIRATORY (INHALATION) at 01:06

## 2020-02-16 RX ADMIN — Medication 40 MG: at 08:35

## 2020-02-16 RX ADMIN — MULTIVITAMIN 15 ML: LIQUID ORAL at 08:33

## 2020-02-16 RX ADMIN — LIDOCAINE HYDROCHLORIDE 3.5 ML: 10 INJECTION, SOLUTION EPIDURAL; INFILTRATION; INTRACAUDAL; PERINEURAL at 10:30

## 2020-02-16 RX ADMIN — PROPOFOL 10 MCG/KG/MIN: 10 INJECTION, EMULSION INTRAVENOUS at 00:05

## 2020-02-16 RX ADMIN — ATORVASTATIN CALCIUM 40 MG: 40 TABLET, FILM COATED ORAL at 19:42

## 2020-02-16 RX ADMIN — Medication 1 PACKET: at 14:32

## 2020-02-16 RX ADMIN — PIPERACILLIN AND TAZOBACTAM 3.38 G: 3; .375 INJECTION, POWDER, FOR SOLUTION INTRAVENOUS at 01:55

## 2020-02-16 RX ADMIN — LIDOCAINE 2 PATCH: 560 PATCH PERCUTANEOUS; TOPICAL; TRANSDERMAL at 08:33

## 2020-02-16 RX ADMIN — PIPERACILLIN AND TAZOBACTAM 3.38 G: 3; .375 INJECTION, POWDER, FOR SOLUTION INTRAVENOUS at 14:31

## 2020-02-16 RX ADMIN — ACETYLCYSTEINE 2 ML: 200 SOLUTION ORAL; RESPIRATORY (INHALATION) at 16:13

## 2020-02-16 RX ADMIN — Medication 1 PACKET: at 08:36

## 2020-02-16 RX ADMIN — VANCOMYCIN HYDROCHLORIDE 2500 MG: 10 INJECTION, POWDER, LYOPHILIZED, FOR SOLUTION INTRAVENOUS at 11:24

## 2020-02-16 RX ADMIN — Medication 150 MCG/HR: at 02:49

## 2020-02-16 RX ADMIN — ACETYLCYSTEINE 2 ML: 200 SOLUTION ORAL; RESPIRATORY (INHALATION) at 07:57

## 2020-02-16 RX ADMIN — ALBUTEROL SULFATE 2.5 MG: 2.5 SOLUTION RESPIRATORY (INHALATION) at 01:06

## 2020-02-16 RX ADMIN — ALBUTEROL SULFATE 2.5 MG: 2.5 SOLUTION RESPIRATORY (INHALATION) at 07:57

## 2020-02-16 RX ADMIN — ACETYLCYSTEINE 2 ML: 200 SOLUTION ORAL; RESPIRATORY (INHALATION) at 04:54

## 2020-02-16 RX ADMIN — ACETYLCYSTEINE 2 ML: 200 SOLUTION ORAL; RESPIRATORY (INHALATION) at 13:04

## 2020-02-16 RX ADMIN — PIPERACILLIN AND TAZOBACTAM 3.38 G: 3; .375 INJECTION, POWDER, FOR SOLUTION INTRAVENOUS at 19:42

## 2020-02-16 RX ADMIN — SODIUM CHLORIDE, PRESERVATIVE FREE 10 ML: 5 INJECTION INTRAVENOUS at 12:49

## 2020-02-16 RX ADMIN — ALBUTEROL SULFATE 2.5 MG: 2.5 SOLUTION RESPIRATORY (INHALATION) at 13:04

## 2020-02-16 RX ADMIN — ALBUTEROL SULFATE 2.5 MG: 2.5 SOLUTION RESPIRATORY (INHALATION) at 04:54

## 2020-02-16 RX ADMIN — PROPOFOL 10 MCG/KG/MIN: 10 INJECTION, EMULSION INTRAVENOUS at 11:33

## 2020-02-16 RX ADMIN — AMIODARONE HYDROCHLORIDE 0.5 MG/MIN: 50 INJECTION, SOLUTION INTRAVENOUS at 19:43

## 2020-02-16 RX ADMIN — ALBUTEROL SULFATE 2.5 MG: 2.5 SOLUTION RESPIRATORY (INHALATION) at 16:13

## 2020-02-16 RX ADMIN — ACETAMINOPHEN 650 MG: 325 TABLET, FILM COATED ORAL at 19:42

## 2020-02-16 RX ADMIN — ASPIRIN 81 MG CHEWABLE TABLET 81 MG: 81 TABLET CHEWABLE at 08:33

## 2020-02-16 RX ADMIN — Medication 1 PACKET: at 19:49

## 2020-02-16 RX ADMIN — ACETYLCYSTEINE 2 ML: 200 SOLUTION ORAL; RESPIRATORY (INHALATION) at 20:31

## 2020-02-16 RX ADMIN — DOBUTAMINE HYDROCHLORIDE 2.5 MCG/KG/MIN: 200 INJECTION INTRAVENOUS at 00:30

## 2020-02-16 ASSESSMENT — MIFFLIN-ST. JEOR: SCORE: 2106.38

## 2020-02-16 ASSESSMENT — ACTIVITIES OF DAILY LIVING (ADL)
ADLS_ACUITY_SCORE: 22

## 2020-02-16 NOTE — PROCEDURES
[facility], [place]    Triple Lumen PICC Placement  Date/Time: 2/16/2020 11:10 AM  Performed by: Swetha Becker RN  Authorized by: Macario Alfonso MD   Indications: vascular access    UNIVERSAL PROTOCOL   Site Marked: Yes  Prior Images Obtained and Reviewed:  Yes  Required items: Required blood products, implants, devices and special equipment available    Patient identity confirmed:  Provided demographic data, hospital-assigned identification number and arm band  NA - No sedation, light sedation, or local anesthesia  Confirmation Checklist:  Patient's identity using two indicators, relevant allergies, procedure was appropriate and matched the consent or emergent situation and correct equipment/implants were available  Time out: Immediately prior to the procedure a time out was called    Universal Protocol: the Joint Commission Universal Protocol was followed    Preparation: Patient was prepped and draped in usual sterile fashion           ANESTHESIA    Anesthesia: Local infiltration  Local Anesthetic:  Lidocaine 1% without epinephrine  Anesthetic Total (mL):  3.5      SEDATION    Patient Sedated: No        Preparation: skin prepped with ChloraPrep  Skin prep agent: skin prep agent completely dried prior to procedure  Sterile barriers: maximum sterile barriers were used: cap, mask, sterile gown, sterile gloves, and large sterile sheet  Hand hygiene: hand hygiene performed prior to central venous catheter insertion  Type of line used: PICC and Power PICC  Catheter type: triple lumen  Lumen type: non-valved  Catheter size: 6 Fr  Brand: other (see comment) ([place])  Lot number: [id number]  Placement method: venipuncture, MST, ultrasound and tip confirmation system  Number of attempts: 1  Successful placement: yes  Orientation: right  Location: basilic vein (0.59 cm vein diameter)  Arm circumference: adults 10 cm  Extremity circumference: 38  Visible catheter length: 1  Total catheter  length: 40  Dressing and securement: chlorhexidine disc applied, statlock, sterile dressing applied, site cleaned and sutured  Post procedure assessment: blood return through all ports, free fluid flow and placement verified by x-ray  PROCEDURE   Patient Tolerance:  Patient tolerated the procedure well with no immediate complications

## 2020-02-16 NOTE — PROVIDER NOTIFICATION
"Notified MD of abd XR results. MD said to leave TF stopped until NJ can be re-wired or replaced, continue free water flushes through \"[place]\" but clamp OG.  "

## 2020-02-16 NOTE — PROVIDER NOTIFICATION
Asked CVTS team during rounds about volume out from OG and notified MDs that output is thick and yellow. MD ordered to hold TF for now and get and abd XR. MD also said sodium levels can be drawn daily now, MD will change order.

## 2020-02-16 NOTE — PLAN OF CARE
Major Shift Events:  Patient remains intubated and sedated, Propofol at 10mcg/kg/ min and fentanyl 150 mcg/hour. Minimal movements. Patient remains in a-fib - rate controlled - on amio at 0.5 mg/min and heparin at 500 U per hour, dobutamine at 2.5 mcg/kg/min. Patient remains on 60% FiO2, peep 12, Vt 500, and RR 16. CHX completed this morning. T max overnight 99.1.  Plan: bronch today.  For vital signs and complete assessments, please see documentation flowsheets.

## 2020-02-16 NOTE — PROGRESS NOTES
CV ICU PROGRESS NOTE  February 16, 2020      CO-MORBIDITIES:   Dissection of aorta, unspecified portion of aorta (H)    ASSESSMENT: Vel Espana is a 74 year old male with PMH of HTN s/p Ascending aortic dissection repair using a 32 mm Dacron Gelweave graft, AVR with 27 mm [place] bovine pericardial valve, Coronary bypass to right coronary artery, Left greater saphenous vein harvest, Circulatory arrest, Left femoral artery exploration on 2/10, remains intubated for airway protection for slowly resolving metabolic encephalopathy.     TODAY'S PROGRESS:   -continue dobutamine, NE as needed for MAP >65 with low SVR  -continue vanc/zosyn  - PICC placement today  -hold diuresis today  -bronchoscopy today    PLAN:  Neuro/ pain/ sedation:  - Monitor neurological status. Notify the MD for any acute changes in exam.  - Fentanyl gtt for pain.  - propofol gtt  - Daily sedation holiday     Pulmonary care:   - PST as able with sedation wean  - MV  - Titrate FiO2 for SpO2 >92%-  - Dr. Mckeon requests PEEP no less than 8  - bronchoscopy 2/15, 2/16     Cardiovascular:    - Monitor hemodynamic status.   - MAP >65, SBP <120  - dobutamine, NE gtt  - Hold PTA anti-hypertensives    #Non-sustained VT. brief run of nonsustained VT, less than 30 seconds, in the operating room, was hypokalemic at the time, also was being paced, possible R-on-T phenomenon, has not had recurrence since temporary epicardial pacing has been discontinued.  Cardiology EP consult 2/12 recommended discontinuing amiodarone  #A-fib with RVR: noted overnight 2/14  - amiodarone gtt     GI care:   - NPO except ice chips and medications.     Fluids/ Electrolytes/ Nutrition:   - TKO for IV fluid hydration  - NJ placed, advance TF to goal - hold 2/16 with high output that appears to be residual TF from OG    Renal/ Fluid Balance:    #Nonoliguric HANNA  - Urine output is adequate so far.  - Will continue to monitor intake and output, Cr, electrolytes.   - Nephrology  consult     Endocrine:    # Stress hyperglycemia  - Insulin gtt     ID/ Antibiotics:  - Start vanc/zosyn 2/15     Heme:     - Hgb goal >7  - straight rate heparin gtt for a-fib, will discuss with CVTS about transitioning to low intensity     Prophylaxis:    - heparin as above  - Protonix qd     Lines/ tubes/ drains:  - MAC, Arterial line, quintero     Disposition:  - CV ICU.      Patient seen, findings and plan discussed with CVICU attending Dr. Reshma Alfonso MD  [phone]    ====================================    SUBJECTIVE:   No acute events overnight, continues to require two pressors.    OBJECTIVE:   1. VITAL SIGNS:   Temp:  [98.1  F (36.7  C)-100.8  F (38.2  C)] 99.4  F (37.4  C)  Heart Rate:  [] 95  Resp:  [16-22] 19  MAP:  [59 mmHg-96 mmHg] 78 mmHg  Arterial Line BP: ()/(45-78) 112/63  FiO2 (%):  [50 %-60 %] 60 %  SpO2:  [91 %-100 %] 97 %  Ventilation Mode: CMV/AC  (Continuous Mandatory Ventilation/ Assist Control)  FiO2 (%): 60 %  Rate Set (breaths/minute): 16 breaths/min  Tidal Volume Set (mL): 500 mL  PEEP (cm H2O): 12 cmH2O  Oxygen Concentration (%): 60 %  Resp: 19      2. INTAKE/ OUTPUT:   I/O last 3 completed shifts:  In: 4069.39 [I.V.:1459.39; NG/GT:1410]  Out: 3680 [Urine:2550; Emesis/NG output:900; Chest Tube:230]    3. PHYSICAL EXAMINATION:   General: intubated, sedated, propofol  Neuro: Sedated, ,moves upper extremities without purpose  Resp: mechanical ventilation  CV: a-fib  Abdomen: Soft, Non-distended, Non-tender  Incisions: c/d/i  Extremities: warm and well perfused with mild edema, dusky appearance to left middle and ring fingers resolved, dusky appearance to the right second and third toes, stable    4. INVESTIGATIONS:   Arterial Blood Gases   Recent Labs   Lab 02/16/20  0336 02/15/20  2157 02/15/20  1416 02/15/20  0815   PH 7.37 7.37 7.39 7.37   PCO2 33* 50* 48* 41   PO2 115* 113* 71* 70*   HCO3 19* 29* 29* 24     Complete Blood Count   Recent Labs   Lab  02/16/20  0336 02/15/20  1416 02/15/20  0337 02/14/20  2030   WBC 9.5 12.8* 9.1 8.3   HGB 8.5* 8.9* 8.8* 9.0*   * 109* 92* 87*     Basic Metabolic Panel  Recent Labs   Lab 02/16/20  0336 02/15/20  2157 02/15/20  1213 02/15/20  0337 02/14/20  2030 02/14/20  0416   * 148* 146* 147*  --  144   POTASSIUM 4.2  --  4.5 4.4 4.5 4.8   CHLORIDE 113*  --  112* 115*  --  114*   CO2 30  --  29 28  --  28   *  --  107* 110*  --  108*   CR 2.16*  --  2.09* 2.07*  --  2.44*   *  --  194* 169*  --  132*     Liver Function Tests  Recent Labs   Lab 02/15/20  1213 02/14/20  2030 02/13/20  0353 02/12/20  0343 02/11/20  0346 02/10/20  2052 02/10/20  1408 02/10/20  1000   AST 32 44 58* 91* 147*  --   --  123*   ALT 17 17 16 20 30  --   --  28   ALKPHOS 66  --  42 35* 36*  --   --  40   BILITOTAL 0.5  --  0.5 0.6 0.9  --   --  1.1   ALBUMIN 2.3*  --  2.8* 2.7* 3.1*  --   --  2.8*   INR  --   --  1.29*  --   --  1.12 1.05 0.98     Pancreatic Enzymes  No lab results found in last 7 days.  Coagulation Profile  Recent Labs   Lab 02/16/20  0336 02/13/20  0353 02/10/20  2052 02/10/20  1408 02/10/20  1000 02/10/20  0905 02/10/20  0749   INR  --  1.29* 1.12 1.05 0.98 0.95 0.90   PTT 48*  --   --   --  42* 41* 50*         5. RADIOLOGY:   Recent Results (from the past 24 hour(s))   XR Chest Port 1 View    Narrative    Exam: XR CHEST PORT 1 VW, 2/15/2020 4:39 PM    Indication: s/p bronch. eval left pleural space    Comparison: Chest x-ray 2/15/2020.    Findings:   AP portable single view of the chest. Tip of the endotracheal tube  projects over the midthoracic trachea. Tip of the right IJ [place]  catheter projects over the main pulmonary artery. Postsurgical changes  of aorta valve replacement. Enteric gastric and feeding tubes pass  below the diaphragm. Perihilar opacity with indistinct pulmonary  vasculature. Mildly improved left-sided pleural effusion. There is  persistent left retrocardiac opacity. Stable enlarged  cardiac  mediastinal silhouette.      Impression    Impression:   1. Moderately improved aeration of left lung with persistent left  retrocardiac opacity, atelectasis versus infection.  2. Ongoing pulmonary edema.  3. Lines and tubes as above.    I have personally reviewed the examination and initial interpretation  and I agree with the findings.    CHINA TAYLOR MD   XR Chest Port 1 View    Narrative    Exam: AP chest radiograph 2/16/2020 1:31 AM.    HISTORY: Intubated patient, interval change.    COMPARISON: 2/15/2020, 2/14/2020.    FINDINGS: AP chest radiograph. The patient is rotated to the left.  Endotracheal tube tip at the midthoracic trachea. Enteric tube extends  the field-of-view. [place] catheter removed. Right internal jugular  sheath projects over the right brachiocephalic vein. Right basilar  chest tube. Trachea is midline, cardiac silhouette is obscured. Aortic  valve prosthesis. Increased left pleural effusion with overlying  basilar retrocardiac opacity. Small right pleural effusion. Right  perihilar streaky opacities.      Impression    IMPRESSION:  1. Increased left pleural effusion with overlying basilar  atelectasis/consolidation.  2. Small right pleural effusion.  3. Moderate pulmonary edema.    I have personally reviewed the examination and initial interpretation  and I agree with the findings.    XIMENA FLETCHER, DO       =========================================

## 2020-02-16 NOTE — PROGRESS NOTES
Nephrology Progress Note  February 16, 2020      Vel Espana MRN:[id number] YOB: 1945  Date of Admission:2/9/2020    ASSESSMENT AND RECOMMENDATIONS:   74 yr male with Type A  Aortic dissection, S/p repair , AVR ,CABG, Left greater saphenous vein harvest, Circulatory arrest, Left femoral artery exploration on 2/10. Has NSVT.Nephrology consulted for HANNA.     Non Oliguric HANNA   Unknown baseline  Cr   Cr on admission 1.24  Etiology felt to be hypoperfusion on presentation with IV contrast use  resulting in ATN.  --  Overall, renal function relatively stable and he is non oliguric. However, fever and hypotension overnight and this AM. Concern for risk of  recurrent kidney injury.   He was net +1L yesterday (although includes D5 and TF), suspect he ran more on the even side with insensible losses.   He is already negative 700cc today. Given hypotension, would continue to hold diuretics, but given pulm edema this is challenging. If he is able to maintain MAP with neg 500cc to 1L fluid balance, that would be ideal.    If worsening hypoxia or large positive balance my afternoon/evening, would consider dose of Albumin 50gm with IV 80mg Lasix.    Volume /BP  As above, more hypotension and febrile. Now on Levo. Infectious work up ongoing.    Anemia   Hb stable ~ 8-9.     Acid Base,Electrolytes :   Hypernatremia - recommend increasing Free Water to 80cc/hr via NGT and trend Na q6 hrs.    Recommendations were communicated to primary team via verbal communication.    Patient seen and discussed with Dr Emanuel Puente MD  Neph Fellow  [phone]    INTERVAL HISTORY   Remains intubated and sedated on propofol. Ongoing fevers, infectious work up. On BS ABx. Remains non oliguric, 2.6L UOP with overall 1L positive balance. Creatinine stable ~2 mg/dL.     REVIEW OF SYSTEMS:  A review of systems was not obtained due to intubated status    Current Meds    acetylcysteine  2 mL Nebulization Q4H KAYLA     albuterol  6  "puff Inhalation Q4H     aspirin  81 mg Oral Daily     atorvastatin  40 mg Oral QPM     insulin aspart  1-6 Units Subcutaneous Q4H     lidocaine  1-3 patch Transdermal Q24H     lidocaine   Transdermal Q8H     multivitamins w/minerals  15 mL Per Feeding Tube Daily     pantoprazole  40 mg Oral or Feeding Tube Daily     piperacillin-tazobactam  3.375 g Intravenous Q6H     polyethylene glycol  17 g Oral or Feeding Tube Daily     protein modular  1 packet Per Feeding Tube TID     senna-docusate  1 tablet Oral or Feeding Tube BID    Or     senna-docusate  2 tablet Oral or Feeding Tube BID     sodium chloride (PF)  3 mL Intracatheter Q8H     vancomycin (VANCOCIN) IV  2,500 mg (central catheter) Intravenous Q24H     Infusion Meds    amiodarone 0.5 mg/min (20)     dextrose       DOBUTamine 2.5 mcg/kg/min (20)     fentaNYL 150 mcg/hr (20)     heparin 500 Units/hr (20)     norepinephrine 0.04 mcg/kg/min (20 08)     propofol (DIPRIVAN) infusion 10 mcg/kg/min (20)     BETA BLOCKER NOT PRESCRIBED       ALLERGIES:    No Known Allergies    PHYSICAL EXAM:   Temp  Av  F (37.8  C)  Min: 97.8  F (36.6  C)  Max: 102.4  F (39.1  C)  Arterial Line BP  Min: 69/43  Max: 152/57  Arterial Line MAP (mmHg)  Av.7 mmHg  Min: 50 mmHg  Max: 115 mmHg      Pulse  Av.9  Min: 71  Max: 96 Resp  Av.9  Min: 11  Max: 48  FiO2 (%)  Av.1 %  Min: 40 %  Max: 100 %  SpO2  Av.6 %  Min: 87 %  Max: 100 %    CVP (mmHg): 12 mmHg  BP 91/74   Pulse 96   Temp 99.4  F (37.4  C) (Axillary)   Resp 19   Ht 1.753 m (5' 9\")   Wt 138.6 kg (305 lb 8.9 oz)   SpO2 96%   BMI 45.12 kg/m     Date 20 0700 - 20 0659   Shift 3856-2808 1556-3153 0255-7352 24 Hour Total   INTAKE   I.V. 316.64   316.64   NG/GT 70   70   IV Piggyback 500   500   Enteral 490   490   Blood Components 300   300   Shift Total(mL/kg) 1676.64(12.02)   1676.64(12.02)   OUTPUT   Urine 1470   1470 "   Chest Tube(mL/kg) 60(0.43)   60(0.43)   Shift Total(mL/kg) 1530(10.97)   1530(10.97)   Weight (kg) 139.5 139.5 139.5 139.5      Admit Weight: 131.9 kg (290 lb 11.2 oz)(bed scale)     GENERAL APPEARANCE: intubated and sedated   EYES: no  scleral icterus, pupils equal  Pulmonary:  Decreased air entry in bases with basal rales   CV: regular rhythm, normal rate, no rub   - JVD no   - Edema 2+   GI: soft, nontender, normal bowel sounds  : has quintero  SKIN: no rash, warm, dry, no cyanosis  NEURO: sedated      LABS:   CMP  Recent Labs   Lab 02/16/20  0336 02/15/20  2157 02/15/20  1213 02/15/20  0337 02/14/20  2030 02/14/20  0416  02/13/20  1119  02/13/20  0353 02/12/20  0343  02/11/20  0346   * 148* 146* 147*  --  144  --  144  --  144 144   < > 148*   POTASSIUM 4.2  --  4.5 4.4 4.5 4.8   < > 5.1   < > 5.2 4.8   < > 4.5   CHLORIDE 113*  --  112* 115*  --  114*  --  115*  --  114* 114*   < > 115*   CO2 30  --  29 28  --  28  --  27  --  28 26   < > 24   ANIONGAP 3  --  5 4  --  3  --  2*  --  2* 4   < > 9   *  --  194* 169*  --  132*  --  119*  --  166* 152*   < > 146*   *  --  107* 110*  --  108*  --  97*  --  92* 52*   < > 32*   CR 2.16*  --  2.09* 2.07*  --  2.44*  --  2.77*  --  2.89* 2.77*   < > 2.42*   GFRESTIMATED 29*  --  30* 30*  --  25*  --  21*  --  20* 21*   < > 25*   GFRESTBLACK 33*  --  34* 35*  --  29*  --  24*  --  23* 24*   < > 29*   ELMER 8.2*  --  7.8* 7.8*  --  7.9*  --  7.8*  --  7.9* 7.4*   < > 7.9*   MAG  --   --   --   --   --   --   --  2.8*  --  2.9* 2.5*  --  2.5*   PHOS  --   --   --   --   --   --   --  3.6  --  3.8 4.7*  --  3.3   PROTTOTAL  --   --  5.8*  --   --   --   --   --   --  5.4* 5.1*  --  5.3*   ALBUMIN  --   --  2.3*  --   --   --   --   --   --  2.8* 2.7*  --  3.1*   BILITOTAL  --   --  0.5  --   --   --   --   --   --  0.5 0.6  --  0.9   ALKPHOS  --   --  66  --   --   --   --   --   --  42 35*  --  36*   AST  --   --  32  --  44  --   --   --   --  58* 91*   --  147*   ALT  --   --  17  --  17  --   --   --   --  16 20  --  30    < > = values in this interval not displayed.     CBC  Recent Labs   Lab 02/16/20  0336 02/15/20  1416 02/15/20  0337 02/14/20 2030   HGB 8.5* 8.9* 8.8* 9.0*   WBC 9.5 12.8* 9.1 8.3   RBC 2.71* 2.78* 2.82* 2.90*   HCT 28.3* 28.9* 28.9* 29.0*   * 104* 103* 100   MCH 31.4 32.0 31.2 31.0   MCHC 30.0* 30.8* 30.4* 31.0*   RDW 15.9* 15.9* 15.8* 15.9*   * 109* 92* 87*     INR  Recent Labs   Lab 02/16/20  0336 02/13/20  0353 02/10/20  2052 02/10/20  1408 02/10/20  1000 02/10/20  0905 02/10/20  0749   INR  --  1.29* 1.12 1.05 0.98 0.95 0.90   PTT 48*  --   --   --  42* 41* 50*     ABG  Recent Labs   Lab 02/16/20  0336 02/15/20  2157 02/15/20  1553 02/15/20  1416  02/15/20  0815   PH 7.37 7.37  --  7.39  --  7.37   PCO2 33* 50*  --  48*  --  41   PO2 115* 113*  --  71*  --  70*   HCO3 19* 29*  --  29*  --  24   O2PER 60 60.0 50.0 50   < > 60  60    < > = values in this interval not displayed.      URINE STUDIES  Recent Labs   Lab Test 02/14/20  2031 02/13/20  1447   COLOR Light Yellow Yellow   APPEARANCE Slightly Cloudy Clear   URINEGLC Negative Negative   URINEBILI Negative Negative   URINEKETONE Negative Negative   SG 1.009 1.012   UBLD Small* Small*   URINEPH 5.0 5.0   PROTEIN Negative Negative   NITRITE Negative Negative   LEUKEST Small* Small*   RBCU 1 9*   WBCU 3 4     IMAGING:  Pertinent test results reviewed    Westley Puente MD    Patient was seen and evaluated by me, Serge Castellanos MD. I have reviewed the note and agree with the the plan of care as documented by the fellow.

## 2020-02-16 NOTE — PROGRESS NOTES
CVTS PROGRESS NOTE  February 16, 2020      CO-MORBIDITIES:   Dissection of aorta, unspecified portion of aorta (H)    ASSESSMENT: Vel Esapna is a 74 year old male with PMH of HTN s/p Ascending aortic dissection repair using a 32 mm Dacron Gelweave graft, AVR with 27 mm Patterson bovine pericardial valve, Coronary bypass to right coronary artery, Left greater saphenous vein harvest, Circulatory arrest, Left femoral artery exploration on 2/10, remains intubated for airway protection for slowly resolving metabolic encephalopathy.     TODAY'S PROGRESS:   -continue dobutamine, NE as needed for MAP >65 with low SVR  -continue vanc/zosyn  - PICC placement today  -hold diuresis today  -bronchoscopy today    PLAN:  Neuro/ pain/ sedation:  - Monitor neurological status. Notify the MD for any acute changes in exam.  - Fentanyl gtt for pain.  - propofol gtt  - Daily sedation holiday     Pulmonary care:   - PST as able with sedation wean  - MV  - Titrate FiO2 for SpO2 >92%-  - Dr. Mckeon requests PEEP no less than 8  - bronchoscopy 2/15, 2/16     Cardiovascular:    - Monitor hemodynamic status.   - MAP >65, SBP <120  - dobutamine, NE gtt  - Hold PTA anti-hypertensives    #Non-sustained VT. brief run of nonsustained VT, less than 30 seconds, in the operating room, was hypokalemic at the time, also was being paced, possible R-on-T phenomenon, has not had recurrence since temporary epicardial pacing has been discontinued.  Cardiology EP consult 2/12 recommended discontinuing amiodarone  #A-fib with RVR: noted overnight 2/14  - amiodarone gtt     GI care:   - NPO except ice chips and medications.     Fluids/ Electrolytes/ Nutrition:   - TKO for IV fluid hydration  - NJ placed, advance TF to goal - hold 2/16 with high output that appears to be residual TF from OG    Renal/ Fluid Balance:    #Nonoliguric HANNA  - Urine output is adequate so far.  - Will continue to monitor intake and output, Cr, electrolytes.   - Nephrology  consult     Endocrine:    # Stress hyperglycemia  - Insulin gtt     ID/ Antibiotics:  - Start vanc/zosyn 2/15     Heme:     - Hgb goal >7  - straight rate heparin gtt for a-fib, will discuss with CVTS about transitioning to low intensity     Prophylaxis:    - heparin as above  - Protonix qd     Lines/ tubes/ drains:  - MAC, Arterial line, quintero     Disposition:  - CV ICU.      Patient seen, findings and plan discussed with CVTS fellow     Macario Alfonso MD  [phone]    ====================================    SUBJECTIVE:   No acute events overnight, continues to require two pressors.    OBJECTIVE:   1. VITAL SIGNS:   Temp:  [98.1  F (36.7  C)-100.8  F (38.2  C)] 99.4  F (37.4  C)  Heart Rate:  [] 95  Resp:  [16-22] 19  MAP:  [59 mmHg-96 mmHg] 78 mmHg  Arterial Line BP: ()/(45-78) 112/63  FiO2 (%):  [50 %-60 %] 60 %  SpO2:  [91 %-100 %] 97 %  Ventilation Mode: CMV/AC  (Continuous Mandatory Ventilation/ Assist Control)  FiO2 (%): 60 %  Rate Set (breaths/minute): 16 breaths/min  Tidal Volume Set (mL): 500 mL  PEEP (cm H2O): 12 cmH2O  Oxygen Concentration (%): 60 %  Resp: 19      2. INTAKE/ OUTPUT:   I/O last 3 completed shifts:  In: 4069.39 [I.V.:1459.39; NG/GT:1410]  Out: 3680 [Urine:2550; Emesis/NG output:900; Chest Tube:230]    3. PHYSICAL EXAMINATION:   General: intubated, sedated, propofol  Neuro: Sedated, ,moves upper extremities without purpose  Resp: mechanical ventilation  CV: a-fib  Abdomen: Soft, Non-distended, Non-tender  Incisions: c/d/i  Extremities: warm and well perfused with mild edema, dusky appearance to left middle and ring fingers resolved, dusky appearance to the right second and third toes, stable    4. INVESTIGATIONS:   Arterial Blood Gases   Recent Labs   Lab 02/16/20  0336 02/15/20  2157 02/15/20  1416 02/15/20  0815   PH 7.37 7.37 7.39 7.37   PCO2 33* 50* 48* 41   PO2 115* 113* 71* 70*   HCO3 19* 29* 29* 24     Complete Blood Count   Recent Labs   Lab 02/16/20  0336 02/15/20  1416  02/15/20  0337 02/14/20  2030   WBC 9.5 12.8* 9.1 8.3   HGB 8.5* 8.9* 8.8* 9.0*   * 109* 92* 87*     Basic Metabolic Panel  Recent Labs   Lab 02/16/20  0336 02/15/20  2157 02/15/20  1213 02/15/20  0337 02/14/20  2030 02/14/20  0416   * 148* 146* 147*  --  144   POTASSIUM 4.2  --  4.5 4.4 4.5 4.8   CHLORIDE 113*  --  112* 115*  --  114*   CO2 30  --  29 28  --  28   *  --  107* 110*  --  108*   CR 2.16*  --  2.09* 2.07*  --  2.44*   *  --  194* 169*  --  132*     Liver Function Tests  Recent Labs   Lab 02/15/20  1213 02/14/20  2030 02/13/20  0353 02/12/20  0343 02/11/20  0346 02/10/20  2052 02/10/20  1408 02/10/20  1000   AST 32 44 58* 91* 147*  --   --  123*   ALT 17 17 16 20 30  --   --  28   ALKPHOS 66  --  42 35* 36*  --   --  40   BILITOTAL 0.5  --  0.5 0.6 0.9  --   --  1.1   ALBUMIN 2.3*  --  2.8* 2.7* 3.1*  --   --  2.8*   INR  --   --  1.29*  --   --  1.12 1.05 0.98     Pancreatic Enzymes  No lab results found in last 7 days.  Coagulation Profile  Recent Labs   Lab 02/16/20  0336 02/13/20  0353 02/10/20  2052 02/10/20  1408 02/10/20  1000 02/10/20  0905 02/10/20  0749   INR  --  1.29* 1.12 1.05 0.98 0.95 0.90   PTT 48*  --   --   --  42* 41* 50*         5. RADIOLOGY:   Recent Results (from the past 24 hour(s))   XR Chest Port 1 View    Narrative    Exam: XR CHEST PORT 1 VW, 2/15/2020 4:39 PM    Indication: s/p bronch. eval left pleural space    Comparison: Chest x-ray 2/15/2020.    Findings:   AP portable single view of the chest. Tip of the endotracheal tube  projects over the midthoracic trachea. Tip of the right IJ [place]-Berhane  catheter projects over the main pulmonary artery. Postsurgical changes  of aorta valve replacement. Enteric gastric and feeding tubes pass  below the diaphragm. Perihilar opacity with indistinct pulmonary  vasculature. Mildly improved left-sided pleural effusion. There is  persistent left retrocardiac opacity. Stable enlarged cardiac  mediastinal  silhouette.      Impression    Impression:   1. Moderately improved aeration of left lung with persistent left  retrocardiac opacity, atelectasis versus infection.  2. Ongoing pulmonary edema.  3. Lines and tubes as above.    I have personally reviewed the examination and initial interpretation  and I agree with the findings.    CHINA TAYLOR MD   XR Chest Port 1 View    Narrative    Exam: AP chest radiograph 2/16/2020 1:31 AM.    HISTORY: Intubated patient, interval change.    COMPARISON: 2/15/2020, 2/14/2020.    FINDINGS: AP chest radiograph. The patient is rotated to the left.  Endotracheal tube tip at the midthoracic trachea. Enteric tube extends  the field-of-view. [place] catheter removed. Right internal jugular  sheath projects over the right brachiocephalic vein. Right basilar  chest tube. Trachea is midline, cardiac silhouette is obscured. Aortic  valve prosthesis. Increased left pleural effusion with overlying  basilar retrocardiac opacity. Small right pleural effusion. Right  perihilar streaky opacities.      Impression    IMPRESSION:  1. Increased left pleural effusion with overlying basilar  atelectasis/consolidation.  2. Small right pleural effusion.  3. Moderate pulmonary edema.    I have personally reviewed the examination and initial interpretation  and I agree with the findings.    XIMENA FLTECHER, DO       =========================================

## 2020-02-16 NOTE — PROGRESS NOTES
PROCEDURE NOTE - FLEXIBLE BRONCHOSCOPY  NAME: Vel Espana   MRN: [id number]  : 1945     Date: 2020       Performed by: Macario Alfonso MD ([id number])    Attending: Dr. Justice    Procedure: flexible bronchoscopy     Indications: respiratory failure     Sedation:  40 mg of propofol (bolused from pump)    Findings: thick, copious secretions and small clot throughout L lung. Erythematous, friable mucosa. Right sided lung normal in appearance.    Complications: none     Procedure:  A timeout was called to review the case and patient information. The patient was positioned supine. Bilateral tracheobronchial trees were inspected closely to the level of the subsegmental bronchi. Secretions were found to be copious throughout the left lung. These were lavaged and evacuated without difficulty. The procedure was completed and the patient tolerated the procedure well and without complications. Dr. Justice was available for assistance throughout the entire procedure.    Plan: Continues cares. Re-bronch PRN.

## 2020-02-16 NOTE — PROGRESS NOTES
"Bronchoscopy Risk Assessment Guidelines      A. Patient symptoms to consider when assessing pulmonary TB risk are:    I. Cough greater than 3 weeks; and fever, hemoptysis, pleuritic chest    pain, weight loss greater than 10 lbs, night sweats, fatigue, infiltrates on    upper lobes or superior segments of lower lobes, cavitation on chest    x-ray.   B. Patient risk factors to consider when assessing pulmonary TB risk are:    I. Exposure to known TB case, foreign-born persons (within 5 years of    arrival to [place]), residence in a crowded setting (correctional facility,     long-term care center, etc.), persons with HIV or immunosuppression.    Patients with symptoms and risk factors should generally be considered \"suspect risk\" and bronchoscopies should be performed in airborne precautions.    This patient has NO KNOWN RISK of Tuberculosis (proceed with bronchoscopy)    Specimens sent: no  Complications: None  Scope used: {scope numbers:[id number]  Attending Physician: Dr.Weinberg EDUARDO SHAW, RT on 2/16/2020 at 2:54 PM  "

## 2020-02-17 ENCOUNTER — APPOINTMENT (OUTPATIENT)
Dept: CT IMAGING | Facility: CLINIC | Age: 75
DRG: 219 | End: 2020-02-17
Attending: STUDENT IN AN ORGANIZED HEALTH CARE EDUCATION/TRAINING PROGRAM
Payer: COMMERCIAL

## 2020-02-17 ENCOUNTER — APPOINTMENT (OUTPATIENT)
Dept: GENERAL RADIOLOGY | Facility: CLINIC | Age: 75
DRG: 219 | End: 2020-02-17
Payer: COMMERCIAL

## 2020-02-17 ENCOUNTER — APPOINTMENT (OUTPATIENT)
Dept: ULTRASOUND IMAGING | Facility: CLINIC | Age: 75
DRG: 219 | End: 2020-02-17
Payer: COMMERCIAL

## 2020-02-17 LAB
ANION GAP SERPL CALCULATED.3IONS-SCNC: 3 MMOL/L (ref 3–14)
ANION GAP SERPL CALCULATED.3IONS-SCNC: 8 MMOL/L (ref 3–14)
APPEARANCE FLD: NORMAL
APTT PPP: 42 SEC (ref 22–37)
BACTERIA SPEC CULT: ABNORMAL
BASE EXCESS BLDA CALC-SCNC: 4 MMOL/L
BASE EXCESS BLDA CALC-SCNC: 4.5 MMOL/L
BUN SERPL-MCNC: 106 MG/DL (ref 7–30)
BUN SERPL-MCNC: 110 MG/DL (ref 7–30)
CALCIUM SERPL-MCNC: 7.8 MG/DL (ref 8.5–10.1)
CALCIUM SERPL-MCNC: 8.4 MG/DL (ref 8.5–10.1)
CHLORIDE SERPL-SCNC: 115 MMOL/L (ref 94–109)
CHLORIDE SERPL-SCNC: 120 MMOL/L (ref 94–109)
CO2 SERPL-SCNC: 25 MMOL/L (ref 20–32)
CO2 SERPL-SCNC: 30 MMOL/L (ref 20–32)
COLOR FLD: NORMAL
CREAT SERPL-MCNC: 2.28 MG/DL (ref 0.66–1.25)
CREAT SERPL-MCNC: 2.33 MG/DL (ref 0.66–1.25)
EOSINOPHIL NFR FLD MANUAL: 1 %
ERYTHROCYTE [DISTWIDTH] IN BLOOD BY AUTOMATED COUNT: 16.2 % (ref 10–15)
ERYTHROCYTE [DISTWIDTH] IN BLOOD BY AUTOMATED COUNT: 16.2 % (ref 10–15)
GFR SERPL CREATININE-BSD FRML MDRD: 26 ML/MIN/{1.73_M2}
GFR SERPL CREATININE-BSD FRML MDRD: 27 ML/MIN/{1.73_M2}
GLUCOSE BLDC GLUCOMTR-MCNC: 128 MG/DL (ref 70–99)
GLUCOSE BLDC GLUCOMTR-MCNC: 132 MG/DL (ref 70–99)
GLUCOSE BLDC GLUCOMTR-MCNC: 134 MG/DL (ref 70–99)
GLUCOSE BLDC GLUCOMTR-MCNC: 144 MG/DL (ref 70–99)
GLUCOSE BLDC GLUCOMTR-MCNC: 145 MG/DL (ref 70–99)
GLUCOSE BLDC GLUCOMTR-MCNC: 148 MG/DL (ref 70–99)
GLUCOSE SERPL-MCNC: 146 MG/DL (ref 70–99)
GLUCOSE SERPL-MCNC: 169 MG/DL (ref 70–99)
HCO3 BLD-SCNC: 28 MMOL/L (ref 21–28)
HCO3 BLD-SCNC: 28 MMOL/L (ref 21–28)
HCT VFR BLD AUTO: 27 % (ref 40–53)
HCT VFR BLD AUTO: 28.2 % (ref 40–53)
HGB BLD-MCNC: 8.3 G/DL (ref 13.3–17.7)
HGB BLD-MCNC: 8.4 G/DL (ref 13.3–17.7)
INTERPRETATION ECG - MUSE: NORMAL
INTERPRETATION ECG - MUSE: NORMAL
LACTATE BLD-SCNC: 1.3 MMOL/L (ref 0.7–2)
LYMPHOCYTES NFR FLD MANUAL: 2 %
Lab: ABNORMAL
Lab: ABNORMAL
MAGNESIUM SERPL-MCNC: 2.9 MG/DL (ref 1.6–2.3)
MCH RBC QN AUTO: 30.5 PG (ref 26.5–33)
MCH RBC QN AUTO: 31.4 PG (ref 26.5–33)
MCHC RBC AUTO-ENTMCNC: 29.8 G/DL (ref 31.5–36.5)
MCHC RBC AUTO-ENTMCNC: 30.7 G/DL (ref 31.5–36.5)
MCV RBC AUTO: 102 FL (ref 78–100)
MCV RBC AUTO: 103 FL (ref 78–100)
NEUTS BAND NFR FLD MANUAL: 90 %
O2/TOTAL GAS SETTING VFR VENT: 45 %
O2/TOTAL GAS SETTING VFR VENT: 60 %
OTHER CELLS FLD MANUAL: 7 %
OXYHGB MFR BLD: 96 % (ref 92–100)
OXYHGB MFR BLD: 97 % (ref 92–100)
PCO2 BLD: 36 MM HG (ref 35–45)
PCO2 BLD: 38 MM HG (ref 35–45)
PH BLD: 7.47 PH (ref 7.35–7.45)
PH BLD: 7.5 PH (ref 7.35–7.45)
PHOSPHATE SERPL-MCNC: 3.4 MG/DL (ref 2.5–4.5)
PLATELET # BLD AUTO: 193 10E9/L (ref 150–450)
PLATELET # BLD AUTO: 211 10E9/L (ref 150–450)
PO2 BLD: 83 MM HG (ref 80–105)
PO2 BLD: 98 MM HG (ref 80–105)
POTASSIUM SERPL-SCNC: 3.4 MMOL/L (ref 3.4–5.3)
POTASSIUM SERPL-SCNC: 3.7 MMOL/L (ref 3.4–5.3)
RBC # BLD AUTO: 2.64 10E12/L (ref 4.4–5.9)
RBC # BLD AUTO: 2.75 10E12/L (ref 4.4–5.9)
SODIUM SERPL-SCNC: 148 MMOL/L (ref 133–144)
SODIUM SERPL-SCNC: 153 MMOL/L (ref 133–144)
SPECIMEN SOURCE FLD: NORMAL
SPECIMEN SOURCE: ABNORMAL
SPECIMEN SOURCE: ABNORMAL
TRIGL SERPL-MCNC: 164 MG/DL
WBC # BLD AUTO: 11.1 10E9/L (ref 4–11)
WBC # BLD AUTO: 9.2 10E9/L (ref 4–11)
WBC # FLD AUTO: NORMAL /UL

## 2020-02-17 PROCEDURE — 20000004 ZZH R&B ICU UMMC

## 2020-02-17 PROCEDURE — 94668 MNPJ CHEST WALL SBSQ: CPT

## 2020-02-17 PROCEDURE — 83735 ASSAY OF MAGNESIUM: CPT | Performed by: STUDENT IN AN ORGANIZED HEALTH CARE EDUCATION/TRAINING PROGRAM

## 2020-02-17 PROCEDURE — 25000128 H RX IP 250 OP 636: Performed by: PHYSICIAN ASSISTANT

## 2020-02-17 PROCEDURE — 85730 THROMBOPLASTIN TIME PARTIAL: CPT | Performed by: STUDENT IN AN ORGANIZED HEALTH CARE EDUCATION/TRAINING PROGRAM

## 2020-02-17 PROCEDURE — 80048 BASIC METABOLIC PNL TOTAL CA: CPT | Performed by: STUDENT IN AN ORGANIZED HEALTH CARE EDUCATION/TRAINING PROGRAM

## 2020-02-17 PROCEDURE — 71045 X-RAY EXAM CHEST 1 VIEW: CPT

## 2020-02-17 PROCEDURE — 99291 CRITICAL CARE FIRST HOUR: CPT | Mod: GC | Performed by: INTERNAL MEDICINE

## 2020-02-17 PROCEDURE — 94640 AIRWAY INHALATION TREATMENT: CPT | Mod: 76

## 2020-02-17 PROCEDURE — 94002 VENT MGMT INPAT INIT DAY: CPT

## 2020-02-17 PROCEDURE — 25000128 H RX IP 250 OP 636: Performed by: NEUROLOGICAL SURGERY

## 2020-02-17 PROCEDURE — 83605 ASSAY OF LACTIC ACID: CPT | Performed by: PHYSICIAN ASSISTANT

## 2020-02-17 PROCEDURE — 25000128 H RX IP 250 OP 636: Performed by: STUDENT IN AN ORGANIZED HEALTH CARE EDUCATION/TRAINING PROGRAM

## 2020-02-17 PROCEDURE — 25000128 H RX IP 250 OP 636: Performed by: ANESTHESIOLOGY

## 2020-02-17 PROCEDURE — 93010 ELECTROCARDIOGRAM REPORT: CPT | Performed by: INTERNAL MEDICINE

## 2020-02-17 PROCEDURE — 93971 EXTREMITY STUDY: CPT | Mod: LT

## 2020-02-17 PROCEDURE — 25000125 ZZHC RX 250: Performed by: STUDENT IN AN ORGANIZED HEALTH CARE EDUCATION/TRAINING PROGRAM

## 2020-02-17 PROCEDURE — 40000281 ZZH STATISTIC TRANSPORT TIME EA 15 MIN

## 2020-02-17 PROCEDURE — 25800030 ZZH RX IP 258 OP 636: Performed by: STUDENT IN AN ORGANIZED HEALTH CARE EDUCATION/TRAINING PROGRAM

## 2020-02-17 PROCEDURE — 25000132 ZZH RX MED GY IP 250 OP 250 PS 637: Performed by: PHYSICIAN ASSISTANT

## 2020-02-17 PROCEDURE — 94003 VENT MGMT INPAT SUBQ DAY: CPT

## 2020-02-17 PROCEDURE — 25000132 ZZH RX MED GY IP 250 OP 250 PS 637: Performed by: THORACIC SURGERY (CARDIOTHORACIC VASCULAR SURGERY)

## 2020-02-17 PROCEDURE — 85027 COMPLETE CBC AUTOMATED: CPT | Performed by: STUDENT IN AN ORGANIZED HEALTH CARE EDUCATION/TRAINING PROGRAM

## 2020-02-17 PROCEDURE — 84478 ASSAY OF TRIGLYCERIDES: CPT | Performed by: STUDENT IN AN ORGANIZED HEALTH CARE EDUCATION/TRAINING PROGRAM

## 2020-02-17 PROCEDURE — 40000275 ZZH STATISTIC RCP TIME EA 10 MIN

## 2020-02-17 PROCEDURE — 84100 ASSAY OF PHOSPHORUS: CPT | Performed by: STUDENT IN AN ORGANIZED HEALTH CARE EDUCATION/TRAINING PROGRAM

## 2020-02-17 PROCEDURE — 70450 CT HEAD/BRAIN W/O DYE: CPT

## 2020-02-17 PROCEDURE — 40000986 XR ABDOMEN PORT 1 VW

## 2020-02-17 PROCEDURE — 93005 ELECTROCARDIOGRAM TRACING: CPT

## 2020-02-17 PROCEDURE — 94640 AIRWAY INHALATION TREATMENT: CPT

## 2020-02-17 PROCEDURE — 25000132 ZZH RX MED GY IP 250 OP 250 PS 637: Performed by: ANESTHESIOLOGY

## 2020-02-17 PROCEDURE — 00000146 ZZHCL STATISTIC GLUCOSE BY METER IP

## 2020-02-17 PROCEDURE — 25000125 ZZHC RX 250

## 2020-02-17 PROCEDURE — 25000132 ZZH RX MED GY IP 250 OP 250 PS 637: Performed by: STUDENT IN AN ORGANIZED HEALTH CARE EDUCATION/TRAINING PROGRAM

## 2020-02-17 PROCEDURE — 82805 BLOOD GASES W/O2 SATURATION: CPT | Performed by: PHYSICIAN ASSISTANT

## 2020-02-17 PROCEDURE — 27210432 ZZH NUTRITION PRODUCT RENAL BASIC LITER

## 2020-02-17 RX ORDER — DEXMEDETOMIDINE HYDROCHLORIDE 4 UG/ML
.2-1.2 INJECTION, SOLUTION INTRAVENOUS CONTINUOUS
Status: DISCONTINUED | OUTPATIENT
Start: 2020-02-17 | End: 2020-02-24

## 2020-02-17 RX ORDER — ALBUMIN, HUMAN INJ 5% 5 %
SOLUTION INTRAVENOUS
Status: DISCONTINUED
Start: 2020-02-17 | End: 2020-02-18 | Stop reason: HOSPADM

## 2020-02-17 RX ORDER — NOREPINEPHRINE BITARTRATE 0.06 MG/ML
0.03-0.4 INJECTION, SOLUTION INTRAVENOUS CONTINUOUS
Status: DISCONTINUED | OUTPATIENT
Start: 2020-02-17 | End: 2020-02-19

## 2020-02-17 RX ORDER — FUROSEMIDE 10 MG/ML
60 INJECTION INTRAMUSCULAR; INTRAVENOUS ONCE
Status: COMPLETED | OUTPATIENT
Start: 2020-02-17 | End: 2020-02-17

## 2020-02-17 RX ORDER — HEPARIN SODIUM 10000 [USP'U]/100ML
0-3500 INJECTION, SOLUTION INTRAVENOUS CONTINUOUS
Status: DISCONTINUED | OUTPATIENT
Start: 2020-02-17 | End: 2020-02-24

## 2020-02-17 RX ADMIN — Medication 1 PACKET: at 19:55

## 2020-02-17 RX ADMIN — PIPERACILLIN AND TAZOBACTAM 3.38 G: 3; .375 INJECTION, POWDER, FOR SOLUTION INTRAVENOUS at 08:24

## 2020-02-17 RX ADMIN — DEXMEDETOMIDINE 0.4 MCG/KG/HR: 100 INJECTION, SOLUTION, CONCENTRATE INTRAVENOUS at 11:20

## 2020-02-17 RX ADMIN — PIPERACILLIN AND TAZOBACTAM 3.38 G: 3; .375 INJECTION, POWDER, FOR SOLUTION INTRAVENOUS at 19:52

## 2020-02-17 RX ADMIN — Medication 40 MG: at 08:28

## 2020-02-17 RX ADMIN — SODIUM CHLORIDE, POTASSIUM CHLORIDE, SODIUM LACTATE AND CALCIUM CHLORIDE 500 ML: 600; 310; 30; 20 INJECTION, SOLUTION INTRAVENOUS at 15:23

## 2020-02-17 RX ADMIN — HEPARIN SODIUM 1200 UNITS/HR: 10000 INJECTION, SOLUTION INTRAVENOUS at 17:33

## 2020-02-17 RX ADMIN — Medication 100 MCG/HR: at 09:29

## 2020-02-17 RX ADMIN — ACETYLCYSTEINE 2 ML: 200 SOLUTION ORAL; RESPIRATORY (INHALATION) at 12:10

## 2020-02-17 RX ADMIN — ALBUTEROL SULFATE 2.5 MG: 2.5 SOLUTION RESPIRATORY (INHALATION) at 16:40

## 2020-02-17 RX ADMIN — PIPERACILLIN AND TAZOBACTAM 3.38 G: 3; .375 INJECTION, POWDER, FOR SOLUTION INTRAVENOUS at 02:58

## 2020-02-17 RX ADMIN — INSULIN ASPART 1 UNITS: 100 INJECTION, SOLUTION INTRAVENOUS; SUBCUTANEOUS at 23:12

## 2020-02-17 RX ADMIN — FUROSEMIDE 60 MG: 10 INJECTION, SOLUTION INTRAVENOUS at 13:01

## 2020-02-17 RX ADMIN — ACETYLCYSTEINE 2 ML: 200 SOLUTION ORAL; RESPIRATORY (INHALATION) at 16:40

## 2020-02-17 RX ADMIN — ALBUTEROL SULFATE 2.5 MG: 2.5 SOLUTION RESPIRATORY (INHALATION) at 01:31

## 2020-02-17 RX ADMIN — DEXMEDETOMIDINE 0.2 MCG/KG/HR: 100 INJECTION, SOLUTION, CONCENTRATE INTRAVENOUS at 19:51

## 2020-02-17 RX ADMIN — ALBUTEROL SULFATE 2.5 MG: 2.5 SOLUTION RESPIRATORY (INHALATION) at 12:10

## 2020-02-17 RX ADMIN — MULTIVITAMIN 15 ML: LIQUID ORAL at 08:24

## 2020-02-17 RX ADMIN — INSULIN ASPART 1 UNITS: 100 INJECTION, SOLUTION INTRAVENOUS; SUBCUTANEOUS at 20:01

## 2020-02-17 RX ADMIN — Medication 1 PACKET: at 13:07

## 2020-02-17 RX ADMIN — ACETYLCYSTEINE 2 ML: 200 SOLUTION ORAL; RESPIRATORY (INHALATION) at 21:51

## 2020-02-17 RX ADMIN — PIPERACILLIN AND TAZOBACTAM 3.38 G: 3; .375 INJECTION, POWDER, FOR SOLUTION INTRAVENOUS at 13:12

## 2020-02-17 RX ADMIN — INSULIN ASPART 1 UNITS: 100 INJECTION, SOLUTION INTRAVENOUS; SUBCUTANEOUS at 16:40

## 2020-02-17 RX ADMIN — POTASSIUM CHLORIDE 20 MEQ: 29.8 INJECTION, SOLUTION INTRAVENOUS at 04:19

## 2020-02-17 RX ADMIN — ALBUTEROL SULFATE 2.5 MG: 2.5 SOLUTION RESPIRATORY (INHALATION) at 08:13

## 2020-02-17 RX ADMIN — Medication 1 PACKET: at 08:28

## 2020-02-17 RX ADMIN — ATORVASTATIN CALCIUM 40 MG: 40 TABLET, FILM COATED ORAL at 19:56

## 2020-02-17 RX ADMIN — HEPARIN SODIUM 1200 UNITS/HR: 10000 INJECTION, SOLUTION INTRAVENOUS at 15:24

## 2020-02-17 RX ADMIN — PROPOFOL 15 MCG/KG/MIN: 10 INJECTION, EMULSION INTRAVENOUS at 05:39

## 2020-02-17 RX ADMIN — ALBUTEROL SULFATE 2.5 MG: 2.5 SOLUTION RESPIRATORY (INHALATION) at 21:51

## 2020-02-17 RX ADMIN — ASPIRIN 81 MG CHEWABLE TABLET 81 MG: 81 TABLET CHEWABLE at 08:24

## 2020-02-17 RX ADMIN — ACETYLCYSTEINE 2 ML: 200 SOLUTION ORAL; RESPIRATORY (INHALATION) at 01:32

## 2020-02-17 RX ADMIN — ACETYLCYSTEINE 2 ML: 200 SOLUTION ORAL; RESPIRATORY (INHALATION) at 08:13

## 2020-02-17 ASSESSMENT — ACTIVITIES OF DAILY LIVING (ADL)
ADLS_ACUITY_SCORE: 20

## 2020-02-17 ASSESSMENT — MIFFLIN-ST. JEOR: SCORE: 2109.38

## 2020-02-17 NOTE — PROGRESS NOTES
Dr. Austin notified of patient's Na level of 149 at 2000. Orders to increase FWF 60ml q1 hour. MD updated about patients BP - SBP climbing to 120 and MAP consistently > 65. Ordered to shut off dobutamine.

## 2020-02-17 NOTE — PROCEDURES
Small Bowel Feeding Tube Reposition  Reason for Feeding Tube Reposition: Concern pt aspirating on gastric TF. Feeds have been held yesterday. Team request to advance NGT to post-pyloric position   Cortrak Start Time: 1020  Cortrak End Time: 1030  Medicine Delivered During Procedure: None  Reposition Successful: Presume post-pyloric (pending AXR confirmation).   Procedure Complications: None  Final Placement Camilo at exit of nare: No cm marking on end of tube. Resecured with existing bridle approx 3 cm from purple hub cm  Face to Face time with patient: 10 min     Zina Austin RD, LD  [id number]  Pgr: [phone]

## 2020-02-17 NOTE — PLAN OF CARE
Major Shift Events:  ABD XR done to check small bore feeding tube as OG had thick yellow output that looking similar to TF. Small bore feeding tube was in the stomach, OG flushed and clamped per MD request and as pt had thought to have aspirated a few days ago, TF are being held until small bore feeding tube can be re-wired or re-placed post pyloric. MD continues to want q 30ml H2O flushes for elevated sodium level. Sodium to be checked daily now. Re-started norepi, was able to eventually titrate off and keep MAP at goal (>65). Attempted to decrease fentanyl to 50mcg/hr, pt's RR and BP increased, pt looked to possibly be in pain, fentanyl increased to 100 mcg/hr and BP  to goal and so did RR. Pt had a bronch today.    Plan: re-wire feeding tube, restart TF. continue to try to wake pt up.     For vital signs and complete assessments, please see documentation flowsheets.

## 2020-02-17 NOTE — PROGRESS NOTES
[facility] ICU PROGRESS NOTE  February 17, 2020      CO-MORBIDITIES:   Dissection of aorta, unspecified portion of aorta (H)    ASSESSMENT: Vel Espana is a 74 year old male with PMH of HTN s/p Ascending aortic dissection repair using a 32 mm Dacron Gelweave graft, AVR with 27 mm Patterson bovine pericardial valve, Coronary bypass to right coronary artery, Left greater saphenous vein harvest, Circulatory arrest, Left femoral artery exploration on 2/10, remains intubated for airway protection for slowly resolving metabolic encephalopathy.     TODAY'S PROGRESS:   -diuresis 60 mg lasix x 1 per nephrology  - discontinue vanc  - continue zosyn for total 7 day course  - US left arm due to size L>R  - add precedex for sedation    PLAN:  Neuro/ pain/ sedation:  - Monitor neurological status. Notify the MD for any acute changes in exam.  - Fentanyl gtt for pain.  - propofol/precedex gtt  - Daily sedation holiday     Pulmonary care:   #MV for airway protection post op  #ventilator associated pneumonia  - PST as able with sedation wean  - MV  - Titrate FiO2 for SpO2 >92%-  - Dr. Mckeon requests PEEP no less than 8  - bronchoscopy 2/15, 2/16     Cardiovascular:    - Monitor hemodynamic status.   - MAP >65, SBP <120  - dobutamine, NE gtt  - Hold PTA anti-hypertensives    #Non-sustained VT. brief run of nonsustained VT, less than 30 seconds, in the operating room, was hypokalemic at the time, also was being paced, possible R-on-T phenomenon, has not had recurrence since temporary epicardial pacing has been discontinued.  Cardiology EP consult 2/12 recommended discontinuing amiodarone  #A-fib with RVR: noted overnight 2/14  - amiodarone gtt     GI care:   - NPO except ice chips and medications.     Fluids/ Electrolytes/ Nutrition:   - TKO for IV fluid hydration  - NJ placed, advance TF to goal - hold 2/16 with high output that appears to be residual TF from OG    Renal/ Fluid Balance:    #Nonoliguric HANNA  - Urine output is adequate  so far.  - Will continue to monitor intake and output, Cr, electrolytes.   - Nephrology consult     Endocrine:    # Stress hyperglycemia  - Insulin gtt     ID/ Antibiotics:  # ventilator associated pneumonia  - zosyn started 2/15, end date 2/22  - s/p vanc 2/15 - 2/17     Heme:     - Hgb goal >7  - straight rate heparin gtt for a-fib, will discuss with CVTS about transitioning to low intensity     Prophylaxis:    - heparin as above  - Protonix qd     Lines/ tubes/ drains:  - PICC, Arterial line, quintero     Disposition:  - [facility] ICU.      Patient seen, findings and plan discussed with CV[facility] attending Dr. Reshma Alfonso MD  [phone]    ====================================    SUBJECTIVE:   No acute events overnight, weaned off pressors.    OBJECTIVE:   1. VITAL SIGNS:   Temp:  [99.1  F (37.3  C)-100.8  F (38.2  C)] 100.8  F (38.2  C)  Heart Rate:  [] 81  Resp:  [21-28] 26  MAP:  [63 mmHg-89 mmHg] 77 mmHg  Arterial Line BP: ()/(49-74) 94/62  FiO2 (%):  [45 %-60 %] 45 %  SpO2:  [85 %-99 %] 95 %  Ventilation Mode: CMV/AC  (Continuous Mandatory Ventilation/ Assist Control)  FiO2 (%): 45 %  Rate Set (breaths/minute): 16 breaths/min  Tidal Volume Set (mL): 500 mL  PEEP (cm H2O): (S) 10 cmH2O  Oxygen Concentration (%): 45 %  Resp: 26      2. INTAKE/ OUTPUT:   I/O last 3 completed shifts:  In: 2935.87 [I.V.:1580.87; NG/GT:1230]  Out: 3730 [Urine:2970; Emesis/NG output:700; Chest Tube:60]    3. PHYSICAL EXAMINATION:   General: intubated, sedated, propofol  Neuro: Sedated, ,moves upper extremities without purpose. No movement observed lower extremities b/l. No focal deficit observed  Resp: mechanical ventilation  CV: a-fib  Abdomen: Soft, Non-distended, Non-tender  Incisions: c/d/i  Extremities: warm and well perfused with mild edema, dusky appearance to left middle and ring fingers resolved, dusky appearance to the right second and third toes, stable    4. INVESTIGATIONS:   Arterial Blood Gases   Recent  Labs   Lab 02/17/20  0332 02/16/20  1516 02/16/20  0336 02/15/20  2157   PH 7.47* 7.48* 7.37 7.37   PCO2 38 38 33* 50*   PO2 98 82 115* 113*   HCO3 28 28 19* 29*     Complete Blood Count   Recent Labs   Lab 02/17/20  0332 02/16/20  0336 02/15/20  1416 02/15/20  0337   WBC 11.1* 9.5 12.8* 9.1   HGB 8.4* 8.5* 8.9* 8.8*    105* 109* 92*     Basic Metabolic Panel  Recent Labs   Lab 02/17/20 0332 02/16/20  1955 02/16/20  0336 02/15/20  2157 02/15/20  1213 02/15/20  0337   * 149* 146* 148* 146* 147*   POTASSIUM 3.7 3.9 4.2  --  4.5 4.4   CHLORIDE 115*  --  113*  --  112* 115*   CO2 30  --  30  --  29 28   *  --  113*  --  107* 110*   CR 2.28*  --  2.16*  --  2.09* 2.07*   *  --  170*  --  194* 169*     Liver Function Tests  Recent Labs   Lab 02/15/20  1213 02/14/20  2030 02/13/20  0353 02/12/20  0343 02/11/20  0346  02/10/20  2052 02/10/20  1408   AST 32 44 58* 91* 147*   < >  --   --    ALT 17 17 16 20 30   < >  --   --    ALKPHOS 66  --  42 35* 36*  --   --   --    BILITOTAL 0.5  --  0.5 0.6 0.9  --   --   --    ALBUMIN 2.3*  --  2.8* 2.7* 3.1*  --   --   --    INR  --   --  1.29*  --   --   --  1.12 1.05    < > = values in this interval not displayed.     Pancreatic Enzymes  No lab results found in last 7 days.  Coagulation Profile  Recent Labs   Lab 02/17/20  0332 02/16/20  0336 02/13/20  0353 02/10/20  2052 02/10/20  1408   INR  --   --  1.29* 1.12 1.05   PTT 42* 48*  --   --   --          5. RADIOLOGY:   Recent Results (from the past 24 hour(s))   XR Chest Port 1 View    Narrative    Exam: XR CHEST PORT 1 VW, 2/16/2020 3:29 PM    Indication: post bronchoscopy    Comparison: 2/16/2020    Findings:   Right IJ sheath, PICC, endotracheal tube, feeding tube and left  basilar chest tubes are again seen and unchanged. Heart is enlarged.  Mild pulmonary edema. Continued left lower lobe atelectasis.    No pneumothorax post bronchoscopy      Impression    Impression: No pneumothorax post  bronchoscopy.  1. Support devices are stable.  2. Continued cardiomegaly and mild edema.  3. Left lower lobe atelectasis unchanged.    CHINA TAYLOR MD   XR Chest Port 1 View    Narrative    Exam: XR CHEST PORT 1 VW, 2/17/2020 2:10 AM    Indication: intubated patient, interval change    Comparison: 2/16/2020    Findings:   Single portable AP view the chest. Patient is rotated to the left. ET  tube tip in the midthoracic trachea, stable. Enteric tubes are stable  with tips outside the field-of-view. Right basilar chest tube is again  noted. Right-sided PICC line with tip at the mid SVC. Left IJ sheath  is noted with tip near the innominate confluence, new. Stable lung  volumes. Stable enlarged cardiac silhouette with valvular prosthesis.  Diffuse interstitial opacities with bibasilar airspace opacities,  slightly increased. Increased small bilateral layering pleural  effusions. No pneumothorax. Midline sternotomy wires and surgical  clips over the mediastinum.      Impression    Impression:   1. Increased diffuse interstitial opacities and bibasilar airspace  opacities, likely worsening pulmonary edema with  atelectasis/consolidation.  2. Increased small bilateral pleural effusions, left greater than  right.  3. New left IJ sheath. Left IJ sheath removed. Additional support  devices are stable.    I have personally reviewed the examination and initial interpretation  and I agree with the findings.    RAJ CONNELL MD       =========================================

## 2020-02-17 NOTE — PROGRESS NOTES
CVTS PROGRESS NOTE  February 17, 2020      CO-MORBIDITIES:   Dissection of aorta, unspecified portion of aorta (H)    ASSESSMENT: Vel Espana is a 74 year old male with PMH of HTN s/p Ascending aortic dissection repair using a 32 mm Dacron Gelweave graft, AVR with 27 mm Patterson bovine pericardial valve, Coronary bypass to right coronary artery, Left greater saphenous vein harvest, Circulatory arrest, Left femoral artery exploration on 2/10, remains intubated for airway protection for slowly resolving metabolic encephalopathy.     TODAY'S PROGRESS:   -diuresis 60 mg lasix x 1 per nephrology  - discontinue vanc  - continue zosyn for total 7 day course  - US left arm due to size L>R  - add precedex for sedation    PLAN:  Neuro/ pain/ sedation:  - Monitor neurological status. Notify the MD for any acute changes in exam.  - Fentanyl gtt for pain.  - propofol/precedex gtt  - Daily sedation holiday     Pulmonary care:   #MV for airway protection post op  #ventilator associated pneumonia  - PST as able with sedation wean  - MV  - Titrate FiO2 for SpO2 >92%-  - Dr. Mckeon requests PEEP no less than 8  - bronchoscopy 2/15, 2/16     Cardiovascular:    - Monitor hemodynamic status.   - MAP >65, SBP <120  - dobutamine, NE gtt  - Hold PTA anti-hypertensives    #Non-sustained VT. brief run of nonsustained VT, less than 30 seconds, in the operating room, was hypokalemic at the time, also was being paced, possible R-on-T phenomenon, has not had recurrence since temporary epicardial pacing has been discontinued.  Cardiology EP consult 2/12 recommended discontinuing amiodarone  #A-fib with RVR: noted overnight 2/14  - amiodarone gtt     GI care:   - NPO except ice chips and medications.     Fluids/ Electrolytes/ Nutrition:   - TKO for IV fluid hydration  - NJ placed, advance TF to goal - hold 2/16 with high output that appears to be residual TF from OG    Renal/ Fluid Balance:    #Nonoliguric HANNA  - Urine output is adequate so  far.  - Will continue to monitor intake and output, Cr, electrolytes.   - Nephrology consult     Endocrine:    # Stress hyperglycemia  - Insulin gtt     ID/ Antibiotics:  # ventilator associated pneumonia  - zosyn started 2/15, end date 2/22  - s/p vanc 2/15 - 2/17     Heme:     - Hgb goal >7  - straight rate heparin gtt for a-fib, will discuss with CVTS about transitioning to low intensity     Prophylaxis:    - heparin as above  - Protonix qd     Lines/ tubes/ drains:  - PICC, Arterial line, quintero     Disposition:  - [facility] ICU.      Patient seen, findings and plan discussed with CVTS fellow  Macario Alfonso MD  [phone]    ====================================    SUBJECTIVE:   No acute events overnight, weaned off pressors.    OBJECTIVE:   1. VITAL SIGNS:   Temp:  [99.1  F (37.3  C)-100.8  F (38.2  C)] 100.8  F (38.2  C)  Heart Rate:  [] 81  Resp:  [21-28] 26  MAP:  [63 mmHg-89 mmHg] 77 mmHg  Arterial Line BP: ()/(49-74) 94/62  FiO2 (%):  [45 %-60 %] 45 %  SpO2:  [85 %-99 %] 95 %  Ventilation Mode: CMV/AC  (Continuous Mandatory Ventilation/ Assist Control)  FiO2 (%): 45 %  Rate Set (breaths/minute): 16 breaths/min  Tidal Volume Set (mL): 500 mL  PEEP (cm H2O): (S) 10 cmH2O  Oxygen Concentration (%): 45 %  Resp: 26      2. INTAKE/ OUTPUT:   I/O last 3 completed shifts:  In: 2935.87 [I.V.:1580.87; NG/GT:1230]  Out: 3730 [Urine:2970; Emesis/NG output:700; Chest Tube:60]    3. PHYSICAL EXAMINATION:   General: intubated, sedated, propofol  Neuro: Sedated, ,moves upper extremities without purpose. No movement observed lower extremities b/l. No focal deficit observed  Resp: mechanical ventilation  CV: a-fib  Abdomen: Soft, Non-distended, Non-tender  Incisions: c/d/i  Extremities: warm and well perfused with mild edema, dusky appearance to left middle and ring fingers resolved, dusky appearance to the right second and third toes, stable    4. INVESTIGATIONS:   Arterial Blood Gases   Recent Labs   Lab 02/17/20  0331  02/16/20  1516 02/16/20  0336 02/15/20  2157   PH 7.47* 7.48* 7.37 7.37   PCO2 38 38 33* 50*   PO2 98 82 115* 113*   HCO3 28 28 19* 29*     Complete Blood Count   Recent Labs   Lab 02/17/20  0332 02/16/20  0336 02/15/20  1416 02/15/20  0337   WBC 11.1* 9.5 12.8* 9.1   HGB 8.4* 8.5* 8.9* 8.8*    105* 109* 92*     Basic Metabolic Panel  Recent Labs   Lab 02/17/20  0332 02/16/20  1955 02/16/20  0336 02/15/20  2157 02/15/20  1213 02/15/20  0337   * 149* 146* 148* 146* 147*   POTASSIUM 3.7 3.9 4.2  --  4.5 4.4   CHLORIDE 115*  --  113*  --  112* 115*   CO2 30  --  30  --  29 28   *  --  113*  --  107* 110*   CR 2.28*  --  2.16*  --  2.09* 2.07*   *  --  170*  --  194* 169*     Liver Function Tests  Recent Labs   Lab 02/15/20  1213 02/14/20  2030 02/13/20  0353 02/12/20  0343 02/11/20  0346  02/10/20  2052 02/10/20  1408   AST 32 44 58* 91* 147*   < >  --   --    ALT 17 17 16 20 30   < >  --   --    ALKPHOS 66  --  42 35* 36*  --   --   --    BILITOTAL 0.5  --  0.5 0.6 0.9  --   --   --    ALBUMIN 2.3*  --  2.8* 2.7* 3.1*  --   --   --    INR  --   --  1.29*  --   --   --  1.12 1.05    < > = values in this interval not displayed.     Pancreatic Enzymes  No lab results found in last 7 days.  Coagulation Profile  Recent Labs   Lab 02/17/20  0332 02/16/20  0336 02/13/20  0353 02/10/20  2052 02/10/20  1408   INR  --   --  1.29* 1.12 1.05   PTT 42* 48*  --   --   --          5. RADIOLOGY:   Recent Results (from the past 24 hour(s))   XR Chest Port 1 View    Narrative    Exam: XR CHEST PORT 1 VW, 2/16/2020 3:29 PM    Indication: post bronchoscopy    Comparison: 2/16/2020    Findings:   Right IJ sheath, PICC, endotracheal tube, feeding tube and left  basilar chest tubes are again seen and unchanged. Heart is enlarged.  Mild pulmonary edema. Continued left lower lobe atelectasis.    No pneumothorax post bronchoscopy      Impression    Impression: No pneumothorax post bronchoscopy.  1. Support devices  are stable.  2. Continued cardiomegaly and mild edema.  3. Left lower lobe atelectasis unchanged.    CHINA TAYLOR MD   XR Chest Port 1 View    Narrative    Exam: XR CHEST PORT 1 VW, 2/17/2020 2:10 AM    Indication: intubated patient, interval change    Comparison: 2/16/2020    Findings:   Single portable AP view the chest. Patient is rotated to the left. ET  tube tip in the midthoracic trachea, stable. Enteric tubes are stable  with tips outside the field-of-view. Right basilar chest tube is again  noted. Right-sided PICC line with tip at the mid SVC. Left IJ sheath  is noted with tip near the innominate confluence, new. Stable lung  volumes. Stable enlarged cardiac silhouette with valvular prosthesis.  Diffuse interstitial opacities with bibasilar airspace opacities,  slightly increased. Increased small bilateral layering pleural  effusions. No pneumothorax. Midline sternotomy wires and surgical  clips over the mediastinum.      Impression    Impression:   1. Increased diffuse interstitial opacities and bibasilar airspace  opacities, likely worsening pulmonary edema with  atelectasis/consolidation.  2. Increased small bilateral pleural effusions, left greater than  right.  3. New left IJ sheath. Left IJ sheath removed. Additional support  devices are stable.    I have personally reviewed the examination and initial interpretation  and I agree with the findings.    RAJ CONNELL MD       =========================================

## 2020-02-17 NOTE — PROGRESS NOTES
Arrival to Stroke Code: 1538    Stroke Team/de-escalate interventions: 1554 CT, No CTA    Bedside Nurse providing handoff: Holly    Time left for CT: 1550    Time Returned to /Unit: 1620    /Bedside Nurse given handoff to & Time: Holly 1625

## 2020-02-17 NOTE — CONSULTS
[facility], [place]    Stroke Consult Note    Reason for Consult: Pinpoint pupils    Chief Complaint: Chest Pain      HPI  Vel Espana is a 74 year old male admitted 2/9 for syncope and was found to have an aortic dissection, which was repaired with graft. Post-operative course has been complicated by hypotension requiring pressors and metabolic encephalopathy. Stroke code was called this afternoon when patient became acutely hypotensive and unresponsive with pinpoint, minimally reactive pupils.     Patient was initially unresponsive to verbal and noxious stimuli on our examination. Pupils were 3mm, symmetric, and reactive to light. Of note, sedation with fentanyl and propofol had been turned off 10 minutes prior to stroke code and patient had been started on low-dose heparin gtt for afib. Shortly before going to CT, he was observed spontaneously opening his eyes and moving his upper extremities. He did not track or respond to verbal stimuli. Exam limited by recent sedation and intubation.     TPA Treatment   Not given due to stroke mimic: hypotension, sedation.    Endovascular Treatment  Not initiated due to absence of proximal vessel occlusion  - low suspicion for proximal vessel occlusion based on clinical exam.     Impression  Patient with transient hemodynamic instability and change in responsiveness that improved without intervention. CT Head is negative for acute intracranial process, which is reassuring in the context of recent initiation of therapeutic heparin gtt. There is low concern for CVA/TIA at this time.    Recommendations  - q4 neuro-checks  - establish baseline neurological exam when off sedation for > 24 hours    Patient Follow-up    Per primary    Thank you for this consult. We will staff with stroke attending in the AM. Please do not hesitate to page with any questions.     The patient was discussed with Neuro-ICU Fellow, Dr. Alfaro. The Stroke Staff is   Bari.  __________________________________  ADDENDUM 2/18 AM   Patient seen and evaluated with Dr. Candelaria this AM. Patient appears comfortable and remains intubated with light-moderate sedation. Per nurse, no acute events overnight and patient oscillated between sedation and mild agitation. On examination today, pupils are 3 millimeters and reactive to light.  Patient withdraws to noxious stimuli in upper extremities.  He is observed spontaneously moving his upper extremities.  He opens his eyes to verbal stimulation but frequently falls back as during the interview.  Per nursing this is been his baseline for the past few days while on sedation.  Low clinical suspicion for CVA or TIA at this time.  Neurology will sign off at this time.  Please do not hesitate to contact us if there are any questions or concerns.    Azael Dockery MD  PGY-1 Neurology Resident  Pager: [phone]  ___________________________________      ______________________________________________________    Past Medical History   Past Medical History:   Diagnosis Date     Psychosexual dysfunction with inhibited sexual excitement      Unspecified essential hypertension      Unspecified sleep apnea      Past Surgical History   Past Surgical History:   Procedure Laterality Date     REPAIR ANEURYSM ASCENDING AORTA N/A 2/9/2020    Procedure: Median Sternotomy, repair of ascending aorta using 32mm gelweave graft, deep circulatory arrest,  aortic valve repair using 27mm inspiris valve, on-pump oxygenator, open saphenous vein harvest, coronary artery bypass x1, transesophageal echocardiogram done by anestheisa;  Surgeon: Nivia Mckeon MD;  Location: [facility] OR     Medications   Home Meds  Prior to Admission medications    Medication Sig Start Date End Date Taking? Authorizing Provider   aspirin 81 MG tablet Take 81 mg by mouth every other day     Reported, Patient   atenolol (TENORMIN) 25 MG tablet Take 25 mg by mouth daily    Reported, Patient    azithromycin (ZITHROMAX) 250 MG tablet Two tablets first day, then one tablet daily for four days. 12/16/19   Michael Medrano MD   benzonatate (TESSALON) 100 MG capsule Take 1 capsule (100 mg) by mouth 3 times daily as needed 12/16/19   Michael Medrano MD   predniSONE (DELTASONE) 20 MG tablet Take 2 tablets (40 mg) by mouth daily for 7 days 12/16/19 12/23/19  Michael Medrano MD       Scheduled Meds    acetylcysteine  2 mL Nebulization Q4H KAYLA     albumin human         albuterol  2.5 mg Nebulization Q4H     aspirin  81 mg Oral Daily     atorvastatin  40 mg Oral QPM     heparin lock flush  5-10 mL Intracatheter Q24H     insulin aspart  1-6 Units Subcutaneous Q4H     lidocaine  1-3 patch Transdermal Q24H     lidocaine  5 mL Topical Once     lidocaine   Transdermal Q8H     multivitamins w/minerals  15 mL Per Feeding Tube Daily     pantoprazole  40 mg Oral or Feeding Tube Daily     piperacillin-tazobactam  3.375 g Intravenous Q6H     polyethylene glycol  17 g Oral or Feeding Tube Daily     protein modular  1 packet Per Feeding Tube TID     senna-docusate  1 tablet Oral or Feeding Tube BID    Or     senna-docusate  2 tablet Oral or Feeding Tube BID     sodium chloride (PF)  3 mL Intracatheter Q8H       Infusion Meds    amiodarone 0.5 mg/min (02/17/20 1400)     dexmedetomidine 0.6 mcg/kg/hr (02/17/20 1400)     dextrose       DOBUTamine Stopped (02/16/20 2115)     fentaNYL 100 mcg/hr (02/17/20 1400)     HEParin 1,200 Units/hr (02/17/20 1524)     norepinephrine Stopped (02/17/20 1533)     propofol (DIPRIVAN) infusion Stopped (02/17/20 1145)     BETA BLOCKER NOT PRESCRIBED         PRN Meds  acetaminophen, albuterol, albuterol, carboxymethylcellulose PF, dextrose, glucose **OR** dextrose **OR** glucagon, diphenhydrAMINE, heparin, heparin lock flush, hydrALAZINE, HYDROmorphone, lidocaine 4%, lidocaine 4%, lidocaine (buffered or not buffered), lidocaine (buffered or not buffered), magnesium sulfate, magnesium sulfate,  methocarbamol, naloxone, ondansetron **OR** ondansetron, oxyCODONE IR, potassium chloride, potassium chloride with lidocaine, potassium chloride, potassium chloride, potassium chloride, potassium phosphate (KPHOS) in D5W IV, potassium phosphate (KPHOS) in D5W IV, potassium phosphate (KPHOS) in D5W IV, potassium phosphate (KPHOS) in D5W IV, potassium phosphate (KPHOS) in D5W IV, prochlorperazine **OR** prochlorperazine, BETA BLOCKER NOT PRESCRIBED, sodium chloride (PF), sodium chloride (PF)    Allergies   No Known Allergies  Family History   History reviewed. No pertinent family history.  Social History   Social History     Tobacco Use     Smoking status: Never Smoker     Smokeless tobacco: Never Used   Substance Use Topics     Alcohol use: No     Drug use: No       Review of Systems   Unable to ask as patient intubated and sedated.     PHYSICAL EXAMINATION  Temp:  [99.1  F (37.3  C)-100.8  F (38.2  C)] 100.8  F (38.2  C)  Heart Rate:  [] 90  Resp:  [21-28] 22  MAP:  [63 mmHg-93 mmHg] 81 mmHg  Arterial Line BP: ()/(49-74) 100/63  FiO2 (%):  [45 %-60 %] 45 %  SpO2:  [85 %-99 %] 95 %     General:  patient lying in bed without any acute distress    HEENT:  intubated  Cardio:  RRR  Pulmonary:  on mechanical ventilation  Abdomen:  obese  Extremities:  Purple digits  Skin:  intact, warm/dry         Dysphagia Screen  Per Nursing    Stroke Scales    NIHSS    Note: Exam was done with recent  propofol and fentanyl still in his system - stopped 10 minutes before stroke code called   Interval     Interval Comments     1a. Level of Consciousness 3-->Responds only with reflex motor or autonomic effects or totally unresponsive, flaccid, and areflexic   1b. LOC Questions 2-->Answers neither question correctly   1c. LOC Commands 2-->Performs neither task correctly   2.   Best Gaze 1-->Partial gaze palsy, gaze is abnormal in one or both eyes, but forced deviation or total gaze paresis is not present   3.   Visual  3-->Bilateral hemianopia (blind including cortical blindness)   4.   Facial Palsy 3-->Complete paralysis of one or both sides (absence of facial movement in the upper and lower face)   5a. Motor Arm, Left 2-->Some effort against gravity, limb cannot get to or maintain (if cued) 90 (or 45) degrees, drifts down to bed, but has some effort against gravity   5b. Motor Arm, Right 2-->Some effort against gravity, limb cannot get to or maintain (if cued) 90 (or 45) degrees, drifts down to bed, but has some effort against gravity   6a. Motor Leg, Left 4-->No movement   6b. Motor Leg, right 4-->No movement   7.   Limb Ataxia 0-->Absent   8.   Sensory 2-->Severe to total sensory loss, patient is not aware of being touched in the face, arm, and leg   9.   Best Language 3-->Mute, global aphasia, no usable speech or auditory comprehension   10. Dysarthria (UN) Intubated or other physical barrier   11. Extinction and Inattention  0-->No abnormality   Total 31 (02/17/20 1540)       Imaging  I personally reviewed all imaging; relevant findings per HPI.     Lab Results Data   CBC  Recent Labs   Lab 02/17/20 0332 02/16/20  0336 02/15/20  1416   WBC 11.1* 9.5 12.8*   RBC 2.75* 2.71* 2.78*   HGB 8.4* 8.5* 8.9*   HCT 28.2* 28.3* 28.9*    105* 109*     Basic Metabolic Panel    Recent Labs   Lab 02/17/20 0332 02/16/20  1955 02/16/20  0336  02/15/20  1213   * 149* 146*   < > 146*   POTASSIUM 3.7 3.9 4.2  --  4.5   CHLORIDE 115*  --  113*  --  112*   CO2 30  --  30  --  29   *  --  113*  --  107*   CR 2.28*  --  2.16*  --  2.09*   *  --  170*  --  194*   ELMER 8.4*  --  8.2*  --  7.8*    < > = values in this interval not displayed.     Liver Panel  Recent Labs   Lab Test 02/15/20  1213 02/14/20  2030 02/13/20  0353 02/12/20  0343   PROTTOTAL 5.8*  --  5.4* 5.1*   ALBUMIN 2.3*  --  2.8* 2.7*   BILITOTAL 0.5  --  0.5 0.6   ALKPHOS 66  --  42 35*   AST 32 44 58* 91*   ALT 17 17 16 20     INR  Recent Labs   Lab Test  02/13/20  0353 02/10/20  2052 02/10/20  1408   INR 1.29* 1.12 1.05      Lipid Profile  Recent Labs   Lab Test 02/17/20  0332   TRIG 164*     A1C  Recent Labs   Lab Test 02/10/20  0905   A1C 6.0*     Troponin Elijah results for input(s): TROPI in the last 168 hours.       Stroke Code / Stroke Consult Data Data   Stroke Code Data  (for stroke code without tele)  Stroke code activated 02/17/20   1535   First stroke provider response 02/17/20   1536   Last known normal 02/17/20   1345   Time of discovery   (or onset of symptoms) 02/17/20   1530   Head CT read by me 02/17/20       Was stroke code de-escalated? Yes 02/17/20 1555  presence of contraindications for both intravenous and intra-arterial stroke treatments

## 2020-02-17 NOTE — PLAN OF CARE
Major Shift Events:  Patient remains sedated on Propofol 15 mcg/kg/min and fentanyl 100 mcg/hour. Patient remains in a fib with lots of PVC's, Amio at 0.5 mg/min, heparin at 500U/hour. Overnight dobutamine was shut off. MAPS remain > 65 and SBP <120. FWF increased to 60 per hour via NG.    Plan: Rewire FT to [place].    For vital signs and complete assessments, please see documentation flowsheets.

## 2020-02-18 ENCOUNTER — APPOINTMENT (OUTPATIENT)
Dept: OCCUPATIONAL THERAPY | Facility: CLINIC | Age: 75
DRG: 219 | End: 2020-02-18
Payer: COMMERCIAL

## 2020-02-18 ENCOUNTER — APPOINTMENT (OUTPATIENT)
Dept: GENERAL RADIOLOGY | Facility: CLINIC | Age: 75
DRG: 219 | End: 2020-02-18
Payer: COMMERCIAL

## 2020-02-18 LAB
ABO + RH BLD: NORMAL
ABO + RH BLD: NORMAL
ALBUMIN UR-MCNC: 10 MG/DL
AMORPH CRY #/AREA URNS HPF: ABNORMAL /HPF
ANION GAP SERPL CALCULATED.3IONS-SCNC: 5 MMOL/L (ref 3–14)
APPEARANCE UR: ABNORMAL
BASE EXCESS BLDA CALC-SCNC: 3.9 MMOL/L
BILIRUB UR QL STRIP: NEGATIVE
BLD GP AB SCN SERPL QL: NORMAL
BLOOD BANK CMNT PATIENT-IMP: NORMAL
BUN SERPL-MCNC: 106 MG/DL (ref 7–30)
CALCIUM SERPL-MCNC: 8 MG/DL (ref 8.5–10.1)
CHLORIDE SERPL-SCNC: 119 MMOL/L (ref 94–109)
CO2 SERPL-SCNC: 27 MMOL/L (ref 20–32)
COLOR UR AUTO: YELLOW
CREAT SERPL-MCNC: 2.36 MG/DL (ref 0.66–1.25)
ERYTHROCYTE [DISTWIDTH] IN BLOOD BY AUTOMATED COUNT: 16.1 % (ref 10–15)
GFR SERPL CREATININE-BSD FRML MDRD: 26 ML/MIN/{1.73_M2}
GLUCOSE BLDC GLUCOMTR-MCNC: 124 MG/DL (ref 70–99)
GLUCOSE BLDC GLUCOMTR-MCNC: 144 MG/DL (ref 70–99)
GLUCOSE BLDC GLUCOMTR-MCNC: 149 MG/DL (ref 70–99)
GLUCOSE BLDC GLUCOMTR-MCNC: 150 MG/DL (ref 70–99)
GLUCOSE BLDC GLUCOMTR-MCNC: 150 MG/DL (ref 70–99)
GLUCOSE SERPL-MCNC: 161 MG/DL (ref 70–99)
GLUCOSE UR STRIP-MCNC: NEGATIVE MG/DL
GRAN CASTS #/AREA URNS LPF: 4 /LPF
HCO3 BLD-SCNC: 27 MMOL/L (ref 21–28)
HCT VFR BLD AUTO: 28.1 % (ref 40–53)
HGB BLD-MCNC: 8.7 G/DL (ref 13.3–17.7)
HGB UR QL STRIP: ABNORMAL
KETONES UR STRIP-MCNC: NEGATIVE MG/DL
LEUKOCYTE ESTERASE UR QL STRIP: NEGATIVE
LMWH PPP CHRO-ACNC: 0.29 IU/ML
LMWH PPP CHRO-ACNC: <0.1 IU/ML
MAGNESIUM SERPL-MCNC: 2.9 MG/DL (ref 1.6–2.3)
MCH RBC QN AUTO: 31.3 PG (ref 26.5–33)
MCHC RBC AUTO-ENTMCNC: 31 G/DL (ref 31.5–36.5)
MCV RBC AUTO: 101 FL (ref 78–100)
MUCOUS THREADS #/AREA URNS LPF: PRESENT /LPF
NITRATE UR QL: NEGATIVE
O2/TOTAL GAS SETTING VFR VENT: 40 %
OXYHGB MFR BLD: 90 % (ref 92–100)
PCO2 BLD: 34 MM HG (ref 35–45)
PH BLD: 7.51 PH (ref 7.35–7.45)
PH UR STRIP: 5.5 PH (ref 5–7)
PHOSPHATE SERPL-MCNC: 2.9 MG/DL (ref 2.5–4.5)
PLATELET # BLD AUTO: 238 10E9/L (ref 150–450)
PO2 BLD: 58 MM HG (ref 80–105)
POTASSIUM SERPL-SCNC: 3.1 MMOL/L (ref 3.4–5.3)
POTASSIUM SERPL-SCNC: 3.4 MMOL/L (ref 3.4–5.3)
POTASSIUM SERPL-SCNC: 3.4 MMOL/L (ref 3.4–5.3)
RBC # BLD AUTO: 2.78 10E12/L (ref 4.4–5.9)
RBC #/AREA URNS AUTO: 1 /HPF (ref 0–2)
SODIUM SERPL-SCNC: 150 MMOL/L (ref 133–144)
SOURCE: ABNORMAL
SP GR UR STRIP: 1.01 (ref 1–1.03)
SPECIMEN EXP DATE BLD: NORMAL
TRANS CELLS #/AREA URNS HPF: <1 /HPF (ref 0–1)
UROBILINOGEN UR STRIP-MCNC: NORMAL MG/DL (ref 0–2)
WBC # BLD AUTO: 9.9 10E9/L (ref 4–11)
WBC #/AREA URNS AUTO: 1 /HPF (ref 0–5)

## 2020-02-18 PROCEDURE — 71045 X-RAY EXAM CHEST 1 VIEW: CPT

## 2020-02-18 PROCEDURE — 40000275 ZZH STATISTIC RCP TIME EA 10 MIN

## 2020-02-18 PROCEDURE — 87252 VIRUS INOCULATION TISSUE: CPT | Performed by: STUDENT IN AN ORGANIZED HEALTH CARE EDUCATION/TRAINING PROGRAM

## 2020-02-18 PROCEDURE — 84999 UNLISTED CHEMISTRY PROCEDURE: CPT

## 2020-02-18 PROCEDURE — 84100 ASSAY OF PHOSPHORUS: CPT | Performed by: STUDENT IN AN ORGANIZED HEALTH CARE EDUCATION/TRAINING PROGRAM

## 2020-02-18 PROCEDURE — 25000125 ZZHC RX 250

## 2020-02-18 PROCEDURE — 94681 O2 UPTK CO2 OUTP % O2 XTRC: CPT

## 2020-02-18 PROCEDURE — 31622 DX BRONCHOSCOPE/WASH: CPT

## 2020-02-18 PROCEDURE — 94640 AIRWAY INHALATION TREATMENT: CPT | Mod: 76

## 2020-02-18 PROCEDURE — 84295 ASSAY OF SERUM SODIUM: CPT | Performed by: STUDENT IN AN ORGANIZED HEALTH CARE EDUCATION/TRAINING PROGRAM

## 2020-02-18 PROCEDURE — 86850 RBC ANTIBODY SCREEN: CPT | Performed by: INTERNAL MEDICINE

## 2020-02-18 PROCEDURE — 83735 ASSAY OF MAGNESIUM: CPT | Performed by: STUDENT IN AN ORGANIZED HEALTH CARE EDUCATION/TRAINING PROGRAM

## 2020-02-18 PROCEDURE — 94668 MNPJ CHEST WALL SBSQ: CPT

## 2020-02-18 PROCEDURE — 99291 CRITICAL CARE FIRST HOUR: CPT | Mod: 25 | Performed by: INTERNAL MEDICINE

## 2020-02-18 PROCEDURE — 94003 VENT MGMT INPAT SUBQ DAY: CPT

## 2020-02-18 PROCEDURE — 00000146 ZZHCL STATISTIC GLUCOSE BY METER IP

## 2020-02-18 PROCEDURE — 85520 HEPARIN ASSAY: CPT | Performed by: STUDENT IN AN ORGANIZED HEALTH CARE EDUCATION/TRAINING PROGRAM

## 2020-02-18 PROCEDURE — 87253 VIRUS INOCULATE TISSUE ADDL: CPT

## 2020-02-18 PROCEDURE — 25000132 ZZH RX MED GY IP 250 OP 250 PS 637: Performed by: STUDENT IN AN ORGANIZED HEALTH CARE EDUCATION/TRAINING PROGRAM

## 2020-02-18 PROCEDURE — 97110 THERAPEUTIC EXERCISES: CPT | Mod: GO

## 2020-02-18 PROCEDURE — 20000004 ZZH R&B ICU UMMC

## 2020-02-18 PROCEDURE — 81001 URINALYSIS AUTO W/SCOPE: CPT | Performed by: STUDENT IN AN ORGANIZED HEALTH CARE EDUCATION/TRAINING PROGRAM

## 2020-02-18 PROCEDURE — 25000131 ZZH RX MED GY IP 250 OP 636 PS 637: Performed by: ANESTHESIOLOGY

## 2020-02-18 PROCEDURE — 80048 BASIC METABOLIC PNL TOTAL CA: CPT | Performed by: STUDENT IN AN ORGANIZED HEALTH CARE EDUCATION/TRAINING PROGRAM

## 2020-02-18 PROCEDURE — 82805 BLOOD GASES W/O2 SATURATION: CPT | Performed by: PHYSICIAN ASSISTANT

## 2020-02-18 PROCEDURE — 25000125 ZZHC RX 250: Performed by: STUDENT IN AN ORGANIZED HEALTH CARE EDUCATION/TRAINING PROGRAM

## 2020-02-18 PROCEDURE — 97530 THERAPEUTIC ACTIVITIES: CPT | Mod: GO

## 2020-02-18 PROCEDURE — 25800030 ZZH RX IP 258 OP 636: Performed by: STUDENT IN AN ORGANIZED HEALTH CARE EDUCATION/TRAINING PROGRAM

## 2020-02-18 PROCEDURE — 25000132 ZZH RX MED GY IP 250 OP 250 PS 637: Performed by: ANESTHESIOLOGY

## 2020-02-18 PROCEDURE — 25000128 H RX IP 250 OP 636: Performed by: STUDENT IN AN ORGANIZED HEALTH CARE EDUCATION/TRAINING PROGRAM

## 2020-02-18 PROCEDURE — 27210432 ZZH NUTRITION PRODUCT RENAL BASIC LITER

## 2020-02-18 PROCEDURE — 86901 BLOOD TYPING SEROLOGIC RH(D): CPT | Performed by: INTERNAL MEDICINE

## 2020-02-18 PROCEDURE — 40000986 XR CHEST PORT 1 VW

## 2020-02-18 PROCEDURE — 84132 ASSAY OF SERUM POTASSIUM: CPT | Performed by: THORACIC SURGERY (CARDIOTHORACIC VASCULAR SURGERY)

## 2020-02-18 PROCEDURE — 87040 BLOOD CULTURE FOR BACTERIA: CPT | Performed by: STUDENT IN AN ORGANIZED HEALTH CARE EDUCATION/TRAINING PROGRAM

## 2020-02-18 PROCEDURE — 40000014 ZZH STATISTIC ARTERIAL MONITORING DAILY

## 2020-02-18 PROCEDURE — 94640 AIRWAY INHALATION TREATMENT: CPT

## 2020-02-18 PROCEDURE — 25000132 ZZH RX MED GY IP 250 OP 250 PS 637: Performed by: PHYSICIAN ASSISTANT

## 2020-02-18 PROCEDURE — 25000132 ZZH RX MED GY IP 250 OP 250 PS 637: Performed by: THORACIC SURGERY (CARDIOTHORACIC VASCULAR SURGERY)

## 2020-02-18 PROCEDURE — 36415 COLL VENOUS BLD VENIPUNCTURE: CPT | Performed by: STUDENT IN AN ORGANIZED HEALTH CARE EDUCATION/TRAINING PROGRAM

## 2020-02-18 PROCEDURE — 86900 BLOOD TYPING SEROLOGIC ABO: CPT | Performed by: INTERNAL MEDICINE

## 2020-02-18 PROCEDURE — 85027 COMPLETE CBC AUTOMATED: CPT | Performed by: STUDENT IN AN ORGANIZED HEALTH CARE EDUCATION/TRAINING PROGRAM

## 2020-02-18 RX ORDER — AMIODARONE HYDROCHLORIDE 200 MG/1
200 TABLET ORAL DAILY
Status: DISCONTINUED | OUTPATIENT
Start: 2020-02-20 | End: 2020-02-21

## 2020-02-18 RX ORDER — FENTANYL CITRATE 50 UG/ML
100 INJECTION, SOLUTION INTRAMUSCULAR; INTRAVENOUS ONCE
Status: COMPLETED | OUTPATIENT
Start: 2020-02-18 | End: 2020-02-18

## 2020-02-18 RX ORDER — AMINO AC/PROTEIN HYDR/WHEY PRO 10G-100/30
1 LIQUID (ML) ORAL DAILY
Status: DISCONTINUED | OUTPATIENT
Start: 2020-02-19 | End: 2020-02-20

## 2020-02-18 RX ORDER — FUROSEMIDE 10 MG/ML
60 INJECTION INTRAMUSCULAR; INTRAVENOUS ONCE
Status: COMPLETED | OUTPATIENT
Start: 2020-02-18 | End: 2020-02-18

## 2020-02-18 RX ORDER — AMIODARONE HYDROCHLORIDE 200 MG/1
400 TABLET ORAL 2 TIMES DAILY
Status: COMPLETED | OUTPATIENT
Start: 2020-02-18 | End: 2020-02-19

## 2020-02-18 RX ORDER — LIDOCAINE HYDROCHLORIDE 10 MG/ML
INJECTION, SOLUTION EPIDURAL; INFILTRATION; INTRACAUDAL; PERINEURAL
Status: COMPLETED
Start: 2020-02-18 | End: 2020-02-18

## 2020-02-18 RX ADMIN — POTASSIUM CHLORIDE 20 MEQ: 1.5 POWDER, FOR SOLUTION ORAL at 00:54

## 2020-02-18 RX ADMIN — FENTANYL CITRATE 100 MCG: 50 INJECTION, SOLUTION INTRAMUSCULAR; INTRAVENOUS at 13:26

## 2020-02-18 RX ADMIN — Medication 40 MG: at 07:41

## 2020-02-18 RX ADMIN — PIPERACILLIN AND TAZOBACTAM 3.38 G: 3; .375 INJECTION, POWDER, FOR SOLUTION INTRAVENOUS at 13:35

## 2020-02-18 RX ADMIN — ACETYLCYSTEINE 2 ML: 200 SOLUTION ORAL; RESPIRATORY (INHALATION) at 12:45

## 2020-02-18 RX ADMIN — PIPERACILLIN AND TAZOBACTAM 3.38 G: 3; .375 INJECTION, POWDER, FOR SOLUTION INTRAVENOUS at 07:41

## 2020-02-18 RX ADMIN — DEXMEDETOMIDINE 0.5 MCG/KG/HR: 100 INJECTION, SOLUTION, CONCENTRATE INTRAVENOUS at 00:49

## 2020-02-18 RX ADMIN — INSULIN ASPART 1 UNITS: 100 INJECTION, SOLUTION INTRAVENOUS; SUBCUTANEOUS at 07:46

## 2020-02-18 RX ADMIN — ACETAMINOPHEN 650 MG: 325 TABLET, FILM COATED ORAL at 21:30

## 2020-02-18 RX ADMIN — AMIODARONE HYDROCHLORIDE 400 MG: 200 TABLET ORAL at 20:20

## 2020-02-18 RX ADMIN — MIDAZOLAM 2 MG: 1 INJECTION INTRAMUSCULAR; INTRAVENOUS at 13:26

## 2020-02-18 RX ADMIN — ALBUTEROL SULFATE 2.5 MG: 2.5 SOLUTION RESPIRATORY (INHALATION) at 16:35

## 2020-02-18 RX ADMIN — POTASSIUM CHLORIDE 20 MEQ: 1.5 POWDER, FOR SOLUTION ORAL at 11:37

## 2020-02-18 RX ADMIN — PIPERACILLIN AND TAZOBACTAM 3.38 G: 3; .375 INJECTION, POWDER, FOR SOLUTION INTRAVENOUS at 02:04

## 2020-02-18 RX ADMIN — AMIODARONE HYDROCHLORIDE 400 MG: 200 TABLET ORAL at 11:37

## 2020-02-18 RX ADMIN — POTASSIUM CHLORIDE 40 MEQ: 1.5 POWDER, FOR SOLUTION ORAL at 06:06

## 2020-02-18 RX ADMIN — ALBUTEROL SULFATE 2.5 MG: 2.5 SOLUTION RESPIRATORY (INHALATION) at 20:32

## 2020-02-18 RX ADMIN — INSULIN ASPART 1 UNITS: 100 INJECTION, SOLUTION INTRAVENOUS; SUBCUTANEOUS at 05:07

## 2020-02-18 RX ADMIN — INSULIN ASPART 1 UNITS: 100 INJECTION, SOLUTION INTRAVENOUS; SUBCUTANEOUS at 20:20

## 2020-02-18 RX ADMIN — ACETYLCYSTEINE 2 ML: 200 SOLUTION ORAL; RESPIRATORY (INHALATION) at 07:58

## 2020-02-18 RX ADMIN — POTASSIUM CHLORIDE 20 MEQ: 1.5 POWDER, FOR SOLUTION ORAL at 07:55

## 2020-02-18 RX ADMIN — ATORVASTATIN CALCIUM 40 MG: 40 TABLET, FILM COATED ORAL at 20:20

## 2020-02-18 RX ADMIN — MULTIVITAMIN 15 ML: LIQUID ORAL at 07:41

## 2020-02-18 RX ADMIN — ACETYLCYSTEINE 2 ML: 200 SOLUTION ORAL; RESPIRATORY (INHALATION) at 16:35

## 2020-02-18 RX ADMIN — LIDOCAINE HYDROCHLORIDE 30 MG: 10 INJECTION, SOLUTION EPIDURAL; INFILTRATION; INTRACAUDAL; PERINEURAL at 13:32

## 2020-02-18 RX ADMIN — POTASSIUM CHLORIDE 20 MEQ: 1.5 POWDER, FOR SOLUTION ORAL at 18:07

## 2020-02-18 RX ADMIN — ACETAMINOPHEN 650 MG: 325 TABLET, FILM COATED ORAL at 07:55

## 2020-02-18 RX ADMIN — ACETYLCYSTEINE 2 ML: 200 SOLUTION ORAL; RESPIRATORY (INHALATION) at 01:49

## 2020-02-18 RX ADMIN — ALBUTEROL SULFATE 2.5 MG: 2.5 SOLUTION RESPIRATORY (INHALATION) at 01:49

## 2020-02-18 RX ADMIN — ACETYLCYSTEINE 2 ML: 200 SOLUTION ORAL; RESPIRATORY (INHALATION) at 20:32

## 2020-02-18 RX ADMIN — DEXMEDETOMIDINE 0.8 MCG/KG/HR: 100 INJECTION, SOLUTION, CONCENTRATE INTRAVENOUS at 21:02

## 2020-02-18 RX ADMIN — PIPERACILLIN AND TAZOBACTAM 3.38 G: 3; .375 INJECTION, POWDER, FOR SOLUTION INTRAVENOUS at 20:41

## 2020-02-18 RX ADMIN — ASPIRIN 81 MG CHEWABLE TABLET 81 MG: 81 TABLET CHEWABLE at 07:41

## 2020-02-18 RX ADMIN — HEPARIN SODIUM 1500 UNITS/HR: 10000 INJECTION, SOLUTION INTRAVENOUS at 13:07

## 2020-02-18 RX ADMIN — Medication 1 PACKET: at 07:42

## 2020-02-18 RX ADMIN — ACETAMINOPHEN 650 MG: 325 TABLET, FILM COATED ORAL at 00:45

## 2020-02-18 RX ADMIN — FUROSEMIDE 60 MG: 10 INJECTION, SOLUTION INTRAVENOUS at 13:14

## 2020-02-18 RX ADMIN — DEXMEDETOMIDINE 0.4 MCG/KG/HR: 100 INJECTION, SOLUTION, CONCENTRATE INTRAVENOUS at 12:09

## 2020-02-18 RX ADMIN — ALBUTEROL SULFATE 2.5 MG: 2.5 SOLUTION RESPIRATORY (INHALATION) at 12:44

## 2020-02-18 RX ADMIN — INSULIN ASPART 1 UNITS: 100 INJECTION, SOLUTION INTRAVENOUS; SUBCUTANEOUS at 11:37

## 2020-02-18 RX ADMIN — ALBUTEROL SULFATE 2.5 MG: 2.5 SOLUTION RESPIRATORY (INHALATION) at 07:58

## 2020-02-18 RX ADMIN — OXYCODONE HYDROCHLORIDE 5 MG: 5 TABLET ORAL at 07:55

## 2020-02-18 ASSESSMENT — ACTIVITIES OF DAILY LIVING (ADL)
ADLS_ACUITY_SCORE: 18
ADLS_ACUITY_SCORE: 20

## 2020-02-18 ASSESSMENT — MIFFLIN-ST. JEOR
SCORE: 2079.38
SCORE: 2067.38

## 2020-02-18 NOTE — CONSULTS
[facility]  General ([place]) Infectious Disease Consult Note - New Patient     Patient:  Vel Espana, Date of birth 1945, Medical record number [id number]  Date of Visit:  02/18/2020  Consult requested by Dr. Nivia Mckeon for evaluation of ventilator-acquired pneumonia and persistent fever.         Assessment and Recommendations:   Recommendations:  - Continue the empiric Zosyn through 2/22/20 to complete a seven day presumptive course for the possibility of a left lower lobe ventilator-acquired pneumonia (or a right-sided tube-induced sinusitis).  - Would not treat the 2/15/20 BAL C albicans isolate -- it is non-pathogenic.  - Agree he does not need vancomycin -- there is no indication of either MRSA carriage or of an active Staph aureus process.  - Would not add additional antimicrobial agents now (without any definitive target-pathogen isolated).    - Send a procalcitonin -- if it is negative, it is helpful; if it is positive, it will need to be interpreted as questionable in light of his renal insufficiency.  - Send another two repeat surveillance blood cultures tomorrow (if fevers persist).  - Send repeat urinalysis / reflux urine culture.  - Send a nares respiratory virus PCR panel.  - If fever persists, consider performing a left or bilateral diagnostic thoracentesis, to make certain there is no empyemic infection.  - If fever persists, will need to entertain the possibility of a post-surgical site infection and obtain a chest CT scan and ultrasound of the left medial thigh incision.  - Send serum CRPs every other day x 3.    Thanks for the consult on this complex patient.  General ID will follow with you.    Assessment:  A 74 year old previously generally healthy gentleman with a medical history of history of hypertension who was admitted in transfer to the [facility] Cardiothoracic Surgery service on 2/9/20 late evening an acute type A aortic dissection for which he was  taken to the OR on 2/10/20 morning for a Dacron Gelweave graft repair of the ascending aorta with a Patterson aortic valve replacement and saphenouys vein CABG x 1 to the right coronary artery, as well as a left femoral artery exploration.  Post-operatively, he has been encephalopathic for uncertain reason so has remained persistently intubated, although his obtundation is starting to lighten a bit.  He developed a new fever of 102.7 degrees F on 2/14/20 PM and since then has continued to have idiopathic, intermittent fever (including to 101.6 degrees F last night) despite ongoing empiric Zosyn therapy since 2/15/20.    ID issues:    - Fever of unclear source:  He developed a new fever on 2/14/20 PM, seemed to defervesce for 36 hours after Zosyn was initiated on 2/15/20, and has now again had fevers 2/17 - 18/20.  Empiric Zosyn has been given since 2/15/20 AM and he also received empiric IV vancomycin on 2/15/20 and 2/16/20, although has no history of MRSA carriage (with a negative nares MRSA PCR screen on 2/10/20) nor any indication of a current Staph aureus infection.  At present, there is no exam evidence of a surgical site infection and the chest x-rays are not suggestive of that, but at this point (five to eight days post-op) that needs to be considered if the fevers persist.  The initial 2/15/20 fever seemed most consistent with a left lower ventilator-acquired pneumonia -- Zosyn should be quite apt therapy for that and would be expected to result in defervescence within ~ 72 hours (ie, ~ now).  He has bilateral (lfet > right) pleural effusions which are likely due to fluid overload, but could have been seeded and need to be diagnositically tapped if fever persists.  He has suggestion of right maxillary / paranasal sinusitis on the 2/9/20 head CT scan, but the Zosyn should also be effective and soon-defervescing therapy for that.  There is little likelihood of a CNS infection.  The acquisition of a nosocomial  respiratory virus infection is heightened at this time of year.  Need to double check for a Olmstead-induced UTI.  There is no indication for broadened antibiotic (or other antimicrobial) therapy beyond Zosyn at this point.    - Quite-possible LLL ventilator-acquired pneumonia:  The 2/15/20 BAL fluid WBC was high with a neutrophil predominance, suggestive for an acute bacterial pneumonia.  Being appropriately treated with a week (2/15 - 22/20) of IV Zosyn.  Pavithra albicans isolated in the 2/14/20 ETT sputum and the 2/15/20 BAL cultures is normal, non-pathogenic jc -- there is no indication for antifungal therapy.     - Persistent post-operative obtundation:  He has been quite unresponsive (so stuck on the ventilator) since his 2/10/20 thoracic surgery, but his mental status may be now starting to improve on 2/18/20.  There is little likelihood the etiology of his mental status compromise is infectious.    - QTc interval:  485 msec on 2/17/20 EKG.  - Immunization status: Undocumented.  - Isolation status: Routine.         History of Infectious Disease Illness:   Mr. Espana is a 74 year old previously generally healthy gentleman with a medical history of history of hypertension who was admitted in transfer to the [facility] Cardiothoracic Surgery service on 2/9/20 late evening after presenting to his local emergency room with acute dizzinenss / syncope and chest pain due to what was diagnosed as a type A aortic dissection.  Upon admission, he was taken to the OR on 2/10/20 morning for a Dacron Gelweave graft repair of the ascending aorta with a Patterson aortic valve replacement and saphenouys vein CABG x 1 to the right coronary artery, as well as a left femoral artery exploration.  Post-operatively he has had poor neurological function and is now only today starting to improve cognitively (now intermittently moving to request) despite daily sedation holidays since 2/13/20, so he has remained intubated on FiO2 0.4, PEEP 10)  due to his encephalopathy.  He had an early post-operative fever on 2/10/20, but otherwise initially did well post-operatively and received no antibiotics after his ander-operative vancomycin / cefuroxime / cefazolin from 2/11/20 midday through 2/15/20 early AM, when he commenced empiric Zosyn and IV vancomycin (for a suspected ventilator-acquired pneumonia) in response to spiking a new fever up to 102.7 degrees F on 2/14/20 PM with a new leukocytosis of 12.7 on 2/14/20 afternoon.  The vancomycin was only given for two days through 2/16/20, but the empiric Zosyn has continued until now.  At that point, on 2/14 - 15/20, the chest x-rays showed an increased left pleural effusion with an associated retrocardiac left basilar infiltrate against a background of moderate pulmonary edema.  In addition to blood cultures from 2/14/20 that are without growth, he underwent an ETT bronchoscopy on 2/15/20 early afternoon with copious secretions in the left lung, a BAL WBC of 25,952 with 90% PMNs, but only C albicans growing on BAL fluid culture (and also 2/15/20 ETT sputum culture).  A PICC ws placed on 2/16/20.  He developed atrial fibrillation on about 2/14/20 PM and is on amiodarone.  He has been off pressors since 2/16/20 overnight.  He has been followed by Nephrology Consult since 2/13/20 for non-oliguric HANNA felt likely due to post-operative hypoperfusion plus IV contrast but has recently had stable renal function and is responsive to diuretics.  Due to his prolonged intubation, he is being considered for possible tracheostomy and PEG placement.  Infectious Disease Consult was requested regarding antibiotic therapy for the gyylerma-fyesbnkegq-lqcjwcbr LLL pneumonia and because he has continued to have fevers up to 101.6 degrees F yesterday / overnight (although decreased 99.9 degrees F this afternoon) despite the ongoing Zosyn.  His peripheral WBC remains normal today at 8.8.    Review of Systems:  As per HPI.  Additional  ROS is unobtainable due to decreased cognition.    Past Medical History:   Diagnosis Date     Psychosexual dysfunction with inhibited sexual excitement      Unspecified essential hypertension      Unspecified sleep apnea      Past Surgical History:   Procedure Laterality Date     REPAIR ANEURYSM ASCENDING AORTA N/A 2/9/2020    Procedure: Median Sternotomy, repair of ascending aorta using 32mm gelweave graft, deep circulatory arrest,  aortic valve repair using 27mm inspiris valve, on-pump oxygenator, open saphenous vein harvest, coronary artery bypass x1, transesophageal echocardiogram done by anestheisa;  Surgeon: Nivia Mckeon MD;  Location:  OR     Family History:  Not presently obtainable due to compromised mental status.    Social History     Tobacco Use     Smoking status: Never Smoker     Smokeless tobacco: Never Used   Substance Use Topics     Alcohol use: No     Drug use: No   His wife is in hospice.  [facility]    There is no immunization history on file for this patient.    Patient Active Problem List   Diagnosis     CARDIOVASCULAR SCREENING; LDL GOAL LESS THAN 160     HTN (hypertension)     S/P ascending aortic replacement     Acute kidney failure with tubular necrosis (H)          Current Medications & Allergies:       acetylcysteine  2 mL Nebulization Q4H KAYLA     albuterol  2.5 mg Nebulization Q4H     amiodarone  400 mg Oral BID    Followed by     [START ON 2/20/2020] amiodarone  200 mg Oral Daily     aspirin  81 mg Oral Daily     atorvastatin  40 mg Oral QPM     heparin lock flush  5-10 mL Intracatheter Q24H     insulin aspart  1-6 Units Subcutaneous Q4H     lidocaine  1-3 patch Transdermal Q24H     lidocaine  5 mL Topical Once     lidocaine   Transdermal Q8H     multivitamins w/minerals  15 mL Per Feeding Tube Daily     pantoprazole  40 mg Oral or Feeding Tube Daily     piperacillin-tazobactam  3.375 g Intravenous Q6H     polyethylene  glycol  17 g Oral or Feeding Tube Daily     [START ON 2/19/2020] protein modular  1 packet Per Feeding Tube Daily     senna-docusate  1 tablet Oral or Feeding Tube BID    Or     senna-docusate  2 tablet Oral or Feeding Tube BID     sodium chloride (PF)  3 mL Intracatheter Q8H     Infusions/Drips:      amiodarone 0.5 mg/min (02/18/20 1000)     dexmedetomidine 0.4 mcg/kg/hr (02/18/20 1000)     dextrose       fentaNYL Stopped (02/17/20 1545)     HEParin 1,500 Units/hr (02/18/20 1000)     norepinephrine Stopped (02/17/20 1533)     propofol (DIPRIVAN) infusion Stopped (02/17/20 1145)     BETA BLOCKER NOT PRESCRIBED       No Known Allergies         Physical Exam:     Patient Vitals for the past 24 hrs:   Temp Temp src Resp SpO2 Weight   02/18/20 1200 98.8  F (37.1  C) Axillary -- -- --   02/18/20 1100 -- -- 23 98 % --   02/18/20 1000 -- -- 25 (!) 85 % --   02/18/20 0900 -- -- 26 99 % --   02/18/20 0820 -- -- -- 97 % --   02/18/20 0800 100.4  F (38  C) Axillary 28 94 % --   02/18/20 0730 -- -- -- 99 % --   02/18/20 0700 -- -- -- 96 % --   02/18/20 0600 99.9  F (37.7  C) -- -- 94 % --   02/18/20 0500 -- -- -- 93 % --   02/18/20 0400 100.6  F (38.1  C) Axillary 30 93 % 135.9 kg (299 lb 9.7 oz)   02/18/20 0300 -- -- 27 91 % --   02/18/20 0200 100.3  F (37.9  C) -- 28 95 % --   02/18/20 0149 -- -- -- 95 % --   02/18/20 0100 -- -- 27 93 % --   02/18/20 0000 101.3  F (38.5  C) Axillary 28 93 % --   02/17/20 2300 -- -- 28 93 % --   02/17/20 2200 -- -- 27 95 % --   02/17/20 2151 -- -- -- 94 % --   02/17/20 2100 -- -- 25 93 % --   02/17/20 2000 100.3  F (37.9  C) Axillary 28 94 % --   02/17/20 1900 -- -- -- 91 % --   02/17/20 1800 -- -- 24 92 % --   02/17/20 1700 -- -- (!) 31 93 % --   02/17/20 1545 101.6  F (38.7  C) Axillary 19 94 % --   02/17/20 1530 -- -- -- 95 % --   02/17/20 1500 -- -- 21 98 % --   02/17/20 1400 -- -- 22 95 % --   02/17/20 1300 -- -- 24 96 % --     Ranges for vital signs over the past 24 hours:   Temp:  [98.8   F (37.1  C)-101.6  F (38.7  C)] 98.8  F (37.1  C)  Heart Rate:  [78-98] 93  Resp:  [19-31] 23  MAP:  [60 mmHg-104 mmHg] 72 mmHg  Arterial Line BP: ()/(46-82) 97/61  FiO2 (%):  [40 %-45 %] 40 %  SpO2:  [85 %-99 %] 98 %  Vitals:    02/16/20 0000 02/17/20 0400 02/18/20 0400   Weight: 138.6 kg (305 lb 8.9 oz) 138.9 kg (306 lb 3.5 oz) 135.9 kg (299 lb 9.7 oz)     Intake/Output Summary (Last 24 hours) at 2/18/2020 1204  Last data filed at 2/18/2020 1100  Gross per 24 hour   Intake 3883 ml   Output 3575 ml   Net 308 ml     Physical Examination:  GENERAL:  Sleeping, briefly arousable and interactive, opening eyes and moving to request, fatigued-appearing, 74 year old man siitting up in a chair in no acute distress on the ventilator.  HEAD:  Normocephalic, atraumatic.  EYES:  EOMI, PERRL, anicteric sclerae without conjunctival injection.  ENT:  External auditory canals patent without discharge.  Oral ETT.  NJ tube present.  Oropharynx is moist without exudates or ulcers.  NECK:  Supple.  Right removed internal jugular Baldwin-Berhane sheath line site lacks inflammation.  LYMPH:  No cervical or supraclavicular lymphadenopathy  LUNGS:  Clear to auscultation bilaterally.  CARDIOVASCULAR:  Irregular rate and rhythm, normal S2, without murmur, gallop, or rub.  Sternotomy incision lacks inflammation.  ABDOMEN:  Normal bowel sounds, soft, nontender, no hepatosplenomegaly to percussion.  BACK:  No CVA tenderness.  :  Olmstead catheter with viviane urine.  EXTREMITIES:  Distally warm hands.  Left medial thigh vertical saphenous graft harvest site dressed, nontender, no marginal inflammation.  Feet with 1+ bipedal edema in boots and pressure wraps, right distal toe phalanges (especially second and third) darker than the the left toes (reportedly unchanged).  Some dusky spots of the finger tips.  SKIN:  No acute rash or lesion.  Right arm PICC line site lacks inflammation.  NEUROLOGIC:  Alert, oriented, moves extremities x 4  intermittently to request.         Laboratory Data:     Inflammatory Markers  No lab results found.    Invalid input(s): AUTO, WESR    Metabolic Studies       Recent Labs   Lab Test 02/18/20  1039 02/18/20  0409 02/17/20  2307 02/17/20  1350  02/13/20  2233  02/10/20  0905   NA  --  150* 153*  --    < >  --    < >  --    POTASSIUM 3.4 3.1* 3.4  --    < >  --    < >  --    CHLORIDE  --  119* 120*  --    < >  --    < >  --    CO2  --  27 25  --    < >  --    < >  --    ANIONGAP  --  5 8  --    < >  --    < >  --    BUN  --  106* 106*  --    < >  --    < >  --    CR  --  2.36* 2.33*  --    < >  --    < >  --    GFRESTIMATED  --  26* 26*  --    < >  --    < >  --    GLC  --  161* 169*  --    < >  --    < >  --    A1C  --   --   --   --   --   --   --  6.0*   ELMER  --  8.0* 7.8*  --    < >  --    < >  --    PHOS  --  2.9  --   --    < >  --    < >  --    MAG  --  2.9*  --   --    < >  --    < >  --    LACT  --   --   --  1.3  --  0.8   < >  --     < > = values in this interval not displayed.     Hepatic Studies    Recent Labs   Lab Test 02/15/20  1213 02/14/20  2030 02/13/20  0353 02/12/20  0343   BILITOTAL 0.5  --  0.5 0.6   ALKPHOS 66  --  42 35*   PROTTOTAL 5.8*  --  5.4* 5.1*   ALBUMIN 2.3*  --  2.8* 2.7*   AST 32 44 58* 91*   ALT 17 17 16 20     Pancreatitis testing    Recent Labs   Lab Test 02/17/20  0332   TRIG 164*     Hematology Studies      Recent Labs   Lab Test 02/18/20  0409 02/17/20  1550 02/17/20  0332 02/16/20  0336 02/15/20  1416 02/15/20  0337  02/09/20 2203 02/09/20  1852   WBC 9.9 9.2 11.1* 9.5 12.8* 9.1   < > 9.2 11.3*   ANEU  --   --   --   --   --   --   --  8.0 7.9   ALYM  --   --   --   --   --   --   --  0.5* 2.2   BARBIE  --   --   --   --   --   --   --  0.5 0.8   AEOS  --   --   --   --   --   --   --  0.0 0.2   HGB 8.7* 8.3* 8.4* 8.5* 8.9* 8.8*   < > 12.8* 12.9*   HCT 28.1* 27.0* 28.2* 28.3* 28.9* 28.9*   < > 39.5* 39.8*    211 193 105* 109* 92*   < > 161 175    < > = values in this  interval not displayed.     Clotting Studies    Recent Labs   Lab Test 02/17/20  0332  02/13/20  0353 02/10/20  2052 02/10/20  1408 02/10/20  1000   INR  --   --  1.29* 1.12 1.05 0.98   PTT 42*   < >  --   --   --  42*    < > = values in this interval not displayed.     Arterial Blood Gas Testing    Recent Labs   Lab Test 02/17/20  1350 02/17/20  0332 02/16/20  1516 02/16/20  0336 02/15/20  2157   PH 7.50* 7.47* 7.48* 7.37 7.37   PCO2 36 38 38 33* 50*   PO2 83 98 82 115* 113*   HCO3 28 28 28 19* 29*   O2PER 45.0 60.0 60 60 60.0     Thyroid Studies     Recent Labs   Lab Test 02/09/20  1852   TSH 4.06*   T4 0.74*     Urine Studies     Recent Labs   Lab Test 02/14/20 2031 02/13/20  1447   URINEPH 5.0 5.0   NITRITE Negative Negative   LEUKEST Small* Small*   WBCU 3 4     Body fluid stats    Recent Labs   Lab Test 02/15/20  1342   FTYP Bronchoalveolar Lavage   FCOL Red   FAPR [place]paque   FWBC 42190   FNEU 90   FLYM 2   GS >25 PMNs/low power field  Moderate  Yeast  *  Few  Gram positive rods  *     Microbiology:    Last Culture results with specimen source  Culture Micro   Date Value Ref Range Status   02/15/2020 (A)  Final    Heavy growth  Pavithra albicans / dubliniensis  Candida albicans and Candida dubliniensis are not routinely speciated  Susceptibility testing not routinely done     02/15/2020 Moderate growth  Normal jc    Final   02/15/2020 (A)  Final    Heavy growth  Pavithra albicans / dubliniensis  Candida albicans and Candida dubliniensis are not routinely speciated  Susceptibility testing not routinely done     02/14/2020 No growth after 4 days  Preliminary   02/14/2020 No growth after 4 days  Preliminary   02/10/2020 Culture negative monitoring continues  Preliminary    Specimen Description   Date Value Ref Range Status   02/15/2020 Bronchial washing  Final   02/15/2020 Bronchial washing  Final   02/15/2020 Sputum  Final   02/14/2020 Blood Left Arm  Final   02/14/2020 Blood  Final   02/10/2020 Other  Transfusion Reaction Donor Unit  Final   02/10/2020 Other Transfusion Reaction Donor Unit  Final   02/10/2020 Nares  Final        Last check of C difficile  No results found for: CDBPCT    Virology:    Hepatitis B Testing   No lab results found.   No results found for: HCVAB, HQTG, HCGENO, HCPCR, HQTRNA, HEPRNA, CRYOG    Imaging:  Recent Results (from the past 48 hour(s))   XR Chest Port 1 View    Narrative    Exam: XR CHEST PORT 1 VW, 2/16/2020 3:29 PM    Indication: post bronchoscopy    Comparison: 2/16/2020    Findings:   Right IJ sheath, PICC, endotracheal tube, feeding tube and left  basilar chest tubes are again seen and unchanged. Heart is enlarged.  Mild pulmonary edema. Continued left lower lobe atelectasis.    No pneumothorax post bronchoscopy      Impression    Impression: No pneumothorax post bronchoscopy.  1. Support devices are stable.  2. Continued cardiomegaly and mild edema.  3. Left lower lobe atelectasis unchanged.    CHINA TAYLOR MD   XR Chest Port 1 View    Narrative    Exam: XR CHEST PORT 1 VW, 2/17/2020 2:10 AM    Indication: intubated patient, interval change    Comparison: 2/16/2020    Findings:   Single portable AP view the chest. Patient is rotated to the left. ET  tube tip in the midthoracic trachea, stable. Enteric tubes are stable  with tips outside the field-of-view. Right basilar chest tube is again  noted. Right-sided PICC line with tip at the mid SVC. Left IJ sheath  is noted with tip near the innominate confluence, new. Stable lung  volumes. Stable enlarged cardiac silhouette with valvular prosthesis.  Diffuse interstitial opacities with bibasilar airspace opacities,  slightly increased. Increased small bilateral layering pleural  effusions. No pneumothorax. Midline sternotomy wires and surgical  clips over the mediastinum.      Impression    Impression:   1. Increased diffuse interstitial opacities and bibasilar airspace  opacities, likely worsening pulmonary edema  with  atelectasis/consolidation.  2. Increased small bilateral pleural effusions, left greater than  right.  3. New left IJ sheath. Left IJ sheath removed. Additional support  devices are stable.    I have personally reviewed the examination and initial interpretation  and I agree with the findings.    RAJ CONNELL MD   XR Abdomen Port 1 View    Narrative    EXAMINATION:  XR ABDOMEN PORT 1 VW 2/17/2020 11:12 AM.    COMPARISON: 2/16/2020.    HISTORY:  verify NJ placement    FINDINGS: Single portable supine view of the upper abdomen. Gastric  tube tip and sidehole project over the stomach. Feeding tube tip  projects over the fourth segment of the duodenum near the ligament of  Treitz. Partially visualized cardiac valvular prosthesis and left  basilar opacities. No abnormal air filled bowel in the upper abdomen.  Mild gaseous distention of the stomach. At least mild colonic stool  burden. Multilevel degenerative changes in the spine. Partially  visualize sternotomy wires and right sided chest tube.      Impression    IMPRESSION:   1.  Feeding tube tip projects at the ligament of Treitz. Gastric tube  tip and sidehole project over the stomach.   2.  No abnormal air filled bowel.  At least mild colonic stool burden.  3.  Partially visualized left basilar opacities.    JAYNA BARRIGA MD   US Upper Extremity Venous Duplex Left    Narrative    EXAMINATION: DOPPLER VENOUS ULTRASOUND OF THE LEFT UPPER EXTREMITY,  2/17/2020 3:29 PM     COMPARISON: None available    HISTORY: Left upper extremity edema, concern for DVT.    TECHNIQUE:  Gray-scale evaluation with compression, spectral flow and  color Doppler assessment of the deep venous system of the left upper  extremity.    FINDINGS:  Left: Normal blood flow and waveforms are demonstrated in the internal  jugular, innominate, subclavian, and axillary veins. There is normal  compressibility of the brachial and basilic veins. Occlusive thrombus  in the cephalic vein  extending from the proximal forearm to the  confluence with the subclavian vein.  Distal to the antecubital fossa  the cephalic vein is patent.      Impression    IMPRESSION:  1. No evidence of left upper extremity deep venous thrombosis.  2. Superficial venous thrombosis of the left cephalic vein, extending  from the proximal forearm to the confluence with the subclavian vein.    Imaging findings discussed with Dr. Pandey at 4:47PM on 2/17/2020 by  Dr. Barriga.    I have personally reviewed the examination and initial interpretation  and I agree with the findings.    JAYNA BARRIGA MD   CT Head w/o Contrast    Narrative    CT HEAD W/O CONTRAST 2/17/2020 4:17 PM    Provided History: Altered level of consciousness (LOC), unexplained  ICD-10:    Comparison: Head CT 2/9/2020.    Technique: Using multidetector thin collimation helical acquisition  technique, axial, coronal and sagittal CT images from the skull base  to the vertex were obtained without intravenous contrast.     Findings:    No intracranial hemorrhage, mass effect, or midline shift. No change  in moderate patchy and confluent regions of hypoattenuation in the  subcortical and deep white matter of both cerebral hemispheres. Mild  generalized parenchymal volume loss. 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 The ventricles are proportionate to the  cerebral sulci. The gray to white matter differentiation of the  cerebral hemispheres is preserved. The basal cisterns are patent.    Partially visualized endotracheal and enteric tubes. Moderate  paranasal sinus mucosal thickening, almost exclusively right-sided.  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  Mastoid air cells are clear.       Impression    Impression:   1. No acute intracranial pathology.  2. Mild generalized parenchymal volume loss and moderate chronic small  vessel ischemic disease.  3. Chronic sinusitis.    MARC HERRERA MD   XR Chest Port 1 View     Narrative    Exam: XR CHEST PORT 1 VW, 2/18/2020 2:16 AM    Indication: intubated patient, interval change    Comparison: 2017 2020    Findings:   AP view of the chest. Patient is rotated to the left. ET tube tip in  the high thoracic trachea approximately 6.5 cm from the daniel.  Feeding tube tip is outside the field-of-view. Gastric tube and left  IJ sheath have been removed. Right basilar chest tube is stable.  Right-sided PICC with tip at the high SVC. Lung volumes and cardiac  silhouette are unchanged. Cardiac valvular prosthesis and midline  sternotomy wires. Bibasilar predominant opacities and bilateral  pleural effusions, left greater than right, are stable. No  pneumothorax.      Impression    Impression:   1. Stable bilateral pleural effusions, left rib the right, with  overlying atelectasis or infection, left greater than right.  2. Gastric tube and left IJ sheath been removed. Additional support  devices are stable.    I have personally reviewed the examination and initial interpretation  and I agree with the findings.    XIMENA FLETCHER, DO     2/18 CXR:  1. Stable bilateral pleural effusions, left rib the right, with overlying atelectasis or infection, left greater than right.  2. Gastric tube and left IJ sheath been removed. Additional support devices are stable.  2/17 LUE U/S:  1. No evidence of left upper extremity deep venous thrombosis.  2. Superficial venous thrombosis of the left cephalic vein, extending from the proximal forearm to the confluence with the subclavian vein.  2/17 Head CT:  1. No acute intracranial pathology.  2. Mild generalized parenchymal volume loss and moderate chronic small vessel ischemic disease.  3. Chronic sinusitis.  (Moderate paranasal sinus mucosal thickening, almost exclusively right-sided.  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  Mastoid air cells are clear.)  2/17, 2/16 ABXs:  Feeding tube.  Gastric tube.  No abnormal air filled bowel.   At least mild colonic stool burden.  Partially visualized left basilar opacities.  2/17 CXR:  1. Increased diffuse interstitial opacities and bibasilar airspace opacities, likely worsening pulmonary edema with atelectasis/consolidation.  2. Increased small bilateral pleural effusions, left greater than right.  3. New left IJ sheath. Left IJ sheath removed. Additional support devices are stable.  2/16 CXRs:  No pneumothorax post bronchoscopy.  Support devices are stable.  Continued cardiomegaly and mild edema.  Increased left pleural effusion with overlying left lower lobe basilar atelectasis/consolidation.  2/15 CXRs:  [place].  Moderately improved aeration of left lung with persistent left pleural effusion, left retrocardiac opacity, atelectasis versus infection.  Ongoing moderate pulmonary edema.  2/14 CXRs:  Stable left pleural effusion with overlying basilar retrocardiac atelectasis.  Right pleural effusion has resolved.  Cardiomegaly with moderate pulmonary edema.  2/13 TTE:  Technically difficult limited study.  The Ejection Fraction is estimated at 55-60% (visually estimated). Regional wall motion is probably normal.  Moderate right ventricular dilation is present.  Global right ventricular function is moderately reduced.  No significant valvular abnormalities were noted.  This study was compared with the study from 2/10/2020 .RV function and size probably worsened.  2/13, 2/12, 2/11 CXRs:  Cardiomegaly with moderate pulmonary edema.  Bilateral pleural effusions with overlying basilar atelectasis/consolidation.  2/10 CXR:  Postsurgical changes of aortic dissection repair with multiple support devices.  Endotracheal tube.  Enteric tube tip.  Small layering left pleural effusion.  2/9 Aortic CT:  1. [place] type A/[place] type II ascending thoracic aortic aneurysm (with dissection extending to the mid thoracic aortic arch but not involving the great vessels of the neck). The false lumen does contain some  contrast-containing blood in the thoracic arch but does not contain contrast containing blood at the aortic root or in the ascending aorta. The false lumen also causes mass effect and narrowing of the true lumen of the aorta narrowing the cross-sectional dimension to as little as 1.4 cm in the ascending aorta.  2. There are aortic valve annular and possible leaflet calcifications which can be associated with aortic valvular stenosis which can be associated in an increased risk of poststenotic dilatation and dissection of the ascending aorta.  3. Renal scarring and mild renal atrophy.  2/9 Head CT:  No intracranial hemorrhage, mass, or definite CT evidence of recent ischemia.  Changes of acute on chronic sinusitis.  2/9 Pelvic x-rays:  Negative pelvis. No evidence for fracture. Degenerative disc disease lower lumbar spine. Constipation.  2/9 CXR:  Cardiac silhouette appears enlarged which could be due to technique.  No other evidence of acute cardiopulmonary disease is seen.

## 2020-02-18 NOTE — PROGRESS NOTES
CVTS PROGRESS NOTE  February 18, 2020      CO-MORBIDITIES:   Dissection of aorta, unspecified portion of aorta (H)    ASSESSMENT: Vel Espana is a 74 year old male with PMH of HTN s/p Ascending aortic dissection repair using a 32 mm Dacron Gelweave graft, AVR with 27 mm Patterson bovine pericardial valve, Coronary bypass to right coronary artery, Left greater saphenous vein harvest, Circulatory arrest, Left femoral artery exploration on 2/10, remains intubated for airway protection for slowly resolving metabolic encephalopathy.     TODAY'S PROGRESS:   - mental status improving, now intermittently following commands in all 4 extremities  - transition to oral amiodarone  - bronchoscopy today  - consult surgery for trach/peg per Dr. Mckeon  - ID consult (ongoing fevers despite treatment of pneumonia)    PLAN:  Neuro/ pain/ sedation:  - Monitor neurological status. Notify the MD for any acute changes in exam.  - Fentanyl gtt for pain.  - precedex gtt  - Daily sedation holiday     Pulmonary care:   #MV for airway protection post op  #ventilator associated pneumonia  - PST as able with sedation wean  - MV  - Titrate FiO2 for SpO2 >92%-  - Dr. Mckeon requests PEEP no less than 8  - bronchoscopy 2/15, 2/16     Cardiovascular:    - Monitor hemodynamic status.   - MAP >65, SBP <120  - dobutamine, NE gtt  - Hold PTA anti-hypertensives    #Non-sustained VT. brief run of nonsustained VT, less than 30 seconds, in the operating room, was hypokalemic at the time, also was being paced, possible R-on-T phenomenon, has not had recurrence since temporary epicardial pacing has been discontinued.  Cardiology EP consult 2/12 recommended discontinuing amiodarone  #A-fib with RVR: noted overnight 2/14  - amiodarone PO     GI care:   - NPO except ice chips and medications.     Fluids/ Electrolytes/ Nutrition:   - TKO for IV fluid hydration  - NJ placed, TF at goal  - free water 100/hr for ongoing hypernatremia with q6 hr Na  checks    Renal/ Fluid Balance:    #Nonoliguric HANNA  - Urine output is adequate so far.  - Will continue to monitor intake and output, Cr, electrolytes.   - Nephrology consult     Endocrine:    # Stress hyperglycemia  - Insulin gtt     ID/ Antibiotics:  # ventilator associated pneumonia  - zosyn started 2/15, end date 2/22  - ID consult  - s/p vanc 2/15 - 2/17     Heme:     - Hgb goal >7  - low int heparin gtt for a-fib     Prophylaxis:    - heparin as above  - Protonix qd     Lines/ tubes/ drains:  - PICC, Arterial line, quintero     Disposition:  - CV ICU.      Patient seen, findings and plan discussed with CVTS fellow     Macario Alfonso MD  [phone]    ====================================    SUBJECTIVE:   No acute events overnight. Mental status improving this AM.    OBJECTIVE:   1. VITAL SIGNS:   Temp:  [98.8  F (37.1  C)-101.6  F (38.7  C)] 98.8  F (37.1  C)  Heart Rate:  [78-98] 93  Resp:  [19-31] 23  MAP:  [60 mmHg-104 mmHg] 72 mmHg  Arterial Line BP: ()/(46-82) 97/61  FiO2 (%):  [40 %-45 %] 40 %  SpO2:  [85 %-99 %] 98 %  Ventilation Mode: CMV/AC  (Continuous Mandatory Ventilation/ Assist Control)  FiO2 (%): 40 %  Rate Set (breaths/minute): 16 breaths/min  Tidal Volume Set (mL): 500 mL  PEEP (cm H2O): 10 cmH2O  Oxygen Concentration (%): 40 %  Resp: 23      2. INTAKE/ OUTPUT:   I/O last 3 completed shifts:  In: 3785.07 [I.V.:1205.07; NG/GT:1680; IV Piggyback:500]  Out: 3640 [Urine:3580; Chest Tube:60]    3. PHYSICAL EXAMINATION:   General: intubated, sedated. Sitting in chair  Neuro: Sedated, opens eyes to voice. Follows commands intermittently in upper and lower extremities  Resp: mechanical ventilation  CV: a-fib  Abdomen: Soft, Non-distended, Non-tender  Incisions: c/d/i  Extremities: warm and well perfused with mild edema, dusky appearance to left middle and ring fingers resolved, dusky appearance to the right second and third toes, stable    4. INVESTIGATIONS:   Arterial Blood Gases   Recent Labs   Lab  02/17/20  1350 02/17/20  0332 02/16/20  1516 02/16/20  0336   PH 7.50* 7.47* 7.48* 7.37   PCO2 36 38 38 33*   PO2 83 98 82 115*   HCO3 28 28 28 19*     Complete Blood Count   Recent Labs   Lab 02/18/20  0409 02/17/20  1550 02/17/20  0332 02/16/20  0336   WBC 9.9 9.2 11.1* 9.5   HGB 8.7* 8.3* 8.4* 8.5*    211 193 105*     Basic Metabolic Panel  Recent Labs   Lab 02/18/20  1039 02/18/20  0409 02/17/20  2307 02/17/20  0332 02/16/20  1955 02/16/20  0336   NA  --  150* 153* 148* 149* 146*   POTASSIUM 3.4 3.1* 3.4 3.7 3.9 4.2   CHLORIDE  --  119* 120* 115*  --  113*   CO2  --  27 25 30  --  30   BUN  --  106* 106* 110*  --  113*   CR  --  2.36* 2.33* 2.28*  --  2.16*   GLC  --  161* 169* 146*  --  170*     Liver Function Tests  Recent Labs   Lab 02/15/20  1213 02/14/20 2030 02/13/20  0353 02/12/20  0343   AST 32 44 58* 91*   ALT 17 17 16 20   ALKPHOS 66  --  42 35*   BILITOTAL 0.5  --  0.5 0.6   ALBUMIN 2.3*  --  2.8* 2.7*   INR  --   --  1.29*  --      Pancreatic Enzymes  No lab results found in last 7 days.  Coagulation Profile  Recent Labs   Lab 02/17/20  0332 02/16/20  0336 02/13/20  0353   INR  --   --  1.29*   PTT 42* 48*  --          5. RADIOLOGY:   Recent Results (from the past 24 hour(s))   US Upper Extremity Venous Duplex Left    Narrative    EXAMINATION: DOPPLER VENOUS ULTRASOUND OF THE LEFT UPPER EXTREMITY,  2/17/2020 3:29 PM     COMPARISON: None available    HISTORY: Left upper extremity edema, concern for DVT.    TECHNIQUE:  Gray-scale evaluation with compression, spectral flow and  color Doppler assessment of the deep venous system of the left upper  extremity.    FINDINGS:  Left: Normal blood flow and waveforms are demonstrated in the internal  jugular, innominate, subclavian, and axillary veins. There is normal  compressibility of the brachial and basilic veins. Occlusive thrombus  in the cephalic vein extending from the proximal forearm to the  confluence with the subclavian vein.  Distal to the  antecubital fossa  the cephalic vein is patent.      Impression    IMPRESSION:  1. No evidence of left upper extremity deep venous thrombosis.  2. Superficial venous thrombosis of the left cephalic vein, extending  from the proximal forearm to the confluence with the subclavian vein.    Imaging findings discussed with Dr. Pandey at 4:47PM on 2/17/2020 by  Dr. Barriga.    I have personally reviewed the examination and initial interpretation  and I agree with the findings.    JAYNA BARRIGA MD   CT Head w/o Contrast    Narrative    CT HEAD W/O CONTRAST 2/17/2020 4:17 PM    Provided History: Altered level of consciousness (LOC), unexplained  ICD-10:    Comparison: Head CT 2/9/2020.    Technique: Using multidetector thin collimation helical acquisition  technique, axial, coronal and sagittal CT images from the skull base  to the vertex were obtained without intravenous contrast.     Findings:    No intracranial hemorrhage, mass effect, or midline shift. No change  in moderate patchy and confluent regions of hypoattenuation in the  subcortical and deep white matter of both cerebral hemispheres. Mild  generalized parenchymal volume loss. 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 The ventricles are proportionate to the  cerebral sulci. The gray to white matter differentiation of the  cerebral hemispheres is preserved. The basal cisterns are patent.    Partially visualized endotracheal and enteric tubes. Moderate  paranasal sinus mucosal thickening, almost exclusively right-sided.  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  Mastoid air cells are clear.       Impression    Impression:   1. No acute intracranial pathology.  2. Mild generalized parenchymal volume loss and moderate chronic small  vessel ischemic disease.  3. Chronic sinusitis.    MARC HERRERA MD   XR Chest Port 1 View    Narrative    Exam: XR CHEST PORT 1 VW, 2/18/2020 2:16 AM    Indication: intubated patient,  interval change    Comparison: 2017 2020    Findings:   AP view of the chest. Patient is rotated to the left. ET tube tip in  the high thoracic trachea approximately 6.5 cm from the daniel.  Feeding tube tip is outside the field-of-view. Gastric tube and left  IJ sheath have been removed. Right basilar chest tube is stable.  Right-sided PICC with tip at the high SVC. Lung volumes and cardiac  silhouette are unchanged. Cardiac valvular prosthesis and midline  sternotomy wires. Bibasilar predominant opacities and bilateral  pleural effusions, left greater than right, are stable. No  pneumothorax.      Impression    Impression:   1. Stable bilateral pleural effusions, left rib the right, with  overlying atelectasis or infection, left greater than right.  2. Gastric tube and left IJ sheath been removed. Additional support  devices are stable.    I have personally reviewed the examination and initial interpretation  and I agree with the findings.    XIMENA FLETCHER, DO       =========================================

## 2020-02-18 NOTE — PROGRESS NOTES
Nephrology Progress Note  February 18, 2020      Vel Espana MRN:[id number] YOB: 1945  Date of Admission:2/9/2020    ASSESSMENT AND RECOMMENDATIONS:   74 yr male with Type A  Aortic dissection, S/p repair , AVR ,CABG, Left greater saphenous vein harvest, Circulatory arrest, Left femoral artery exploration on 2/10. Has NSVT. Nephrology consulted for HANNA.     Non Oliguric HANNA   Unknown baseline  Cr   Cr on admission 1.24  Etiology felt to be hypoperfusion on presentation with IV contrast use  resulting in ATN.  --  Overall, renal function relatively stable, eGFr 25-30, and he is non oliguric. He was net negative 300cc yesterday, but weight is coming down, now 135 kg.     At this time, will continue to use diuretics as needed for volume management as he continues to req ventilatory support, but otherwise stable kidney function.     Volume /BP  BP's remain stable off pressors. Still with volume overload, but improving, weight coming down. Recommend another dose IV Lasix 60mg today.    Anemia   Hb stable ~ 8-9.     Acid Base,Electrolytes :   Hypernatremia - worsened overnight, improving this AM to 150. Recommend increasing Free Water to 100cc/hr via NGT and trend Na q6 hrs. The free water will distribute mostly intracellularly, and have minimal impact in terms of volume overload.    Fevers  PNA  Bronch with significant WBC. ID to evaluate as still febrile desp Abx.     Recommendations were communicated to primary team via this note.    Patient seen and discussed with Dr Sara Puente MD  Neph Fellow  [phone]    INTERVAL HISTORY   Remains intubated, but more alert. Moving extremities, following some commands. Still with fevers. Remains non oliguric, with stable BP off pressors.      REVIEW OF SYSTEMS:  A review of systems was not obtained due to intubated status    Current Meds    acetylcysteine  2 mL Nebulization Q4H KAYLA     albuterol  2.5 mg Nebulization Q4H     amiodarone  400 mg Oral  "BID    Followed by     [START ON 2020] amiodarone  200 mg Oral Daily     aspirin  81 mg Oral Daily     atorvastatin  40 mg Oral QPM     heparin lock flush  5-10 mL Intracatheter Q24H     insulin aspart  1-6 Units Subcutaneous Q4H     lidocaine  1-3 patch Transdermal Q24H     lidocaine  5 mL Topical Once     lidocaine   Transdermal Q8H     multivitamins w/minerals  15 mL Per Feeding Tube Daily     pantoprazole  40 mg Oral or Feeding Tube Daily     piperacillin-tazobactam  3.375 g Intravenous Q6H     polyethylene glycol  17 g Oral or Feeding Tube Daily     [START ON 2020] protein modular  1 packet Per Feeding Tube Daily     senna-docusate  1 tablet Oral or Feeding Tube BID    Or     senna-docusate  2 tablet Oral or Feeding Tube BID     sodium chloride (PF)  3 mL Intracatheter Q8H     Infusion Meds    amiodarone 0.5 mg/min (20 1200)     dexmedetomidine 0.4 mcg/kg/hr (20 1209)     dextrose       fentaNYL Stopped (20 1545)     HEParin 1,500 Units/hr (20 1200)     norepinephrine Stopped (20 1533)     propofol (DIPRIVAN) infusion Stopped (20 1145)     BETA BLOCKER NOT PRESCRIBED       ALLERGIES:    No Known Allergies    PHYSICAL EXAM:   Temp  Av  F (37.8  C)  Min: 97.8  F (36.6  C)  Max: 102.4  F (39.1  C)  Arterial Line BP  Min: 69/43  Max: 152/57  Arterial Line MAP (mmHg)  Av.7 mmHg  Min: 50 mmHg  Max: 115 mmHg      Pulse  Av.9  Min: 71  Max: 96 Resp  Av.9  Min: 11  Max: 48  FiO2 (%)  Av.1 %  Min: 40 %  Max: 100 %  SpO2  Av.6 %  Min: 87 %  Max: 100 %    CVP (mmHg): 12 mmHg  BP 91/74   Pulse 96   Temp 98.8  F (37.1  C) (Axillary)   Resp (!) 32   Ht 1.753 m (5' 9\")   Wt 135.9 kg (299 lb 9.7 oz)   SpO2 97%   BMI 44.24 kg/m     Date 20 0700 - 20 0659   Shift 4511-8795 8231-2590 3496-7868 24 Hour Total   INTAKE   I.V. 316.64   316.64   NG/GT 70   70   IV Piggyback 500   500   Enteral 490   490   Blood Components 300   300   Shift " Total(mL/kg) 1676.64(12.02)   1676.64(12.02)   OUTPUT   Urine 1470   1470   Chest Tube(mL/kg) 60(0.43)   60(0.43)   Shift Total(mL/kg) 1530(10.97)   1530(10.97)   Weight (kg) 139.5 139.5 139.5 139.5      Admit Weight: 131.9 kg (290 lb 11.2 oz)(bed scale)     GENERAL APPEARANCE: intubated   EYES: no scleral icterus  Pulmonary:  Decreased air entry in bases with basal rales   CV: irr rhythm, no rub   - JVD no   - Edema 2+   GI: soft, nontender, normal bowel sounds  : has quintero  SKIN: no rash, warm, dry, no cyanosis  NEURO: intubated, alert to voice, moving ext    LABS:   CMP  Recent Labs   Lab 02/18/20  1039 02/18/20  0409 02/17/20  2307 02/17/20  0332 02/16/20  1955 02/16/20  0336  02/15/20  1213  02/14/20  2030  02/13/20  1119  02/13/20  0353 02/12/20  0343   NA  --  150* 153* 148* 149* 146*   < > 146*   < >  --    < > 144  --  144 144   POTASSIUM 3.4 3.1* 3.4 3.7 3.9 4.2  --  4.5   < > 4.5   < > 5.1   < > 5.2 4.8   CHLORIDE  --  119* 120* 115*  --  113*  --  112*   < >  --    < > 115*  --  114* 114*   CO2  --  27 25 30  --  30  --  29   < >  --    < > 27  --  28 26   ANIONGAP  --  5 8 3  --  3  --  5   < >  --    < > 2*  --  2* 4   GLC  --  161* 169* 146*  --  170*  --  194*   < >  --    < > 119*  --  166* 152*   BUN  --  106* 106* 110*  --  113*  --  107*   < >  --    < > 97*  --  92* 52*   CR  --  2.36* 2.33* 2.28*  --  2.16*  --  2.09*   < >  --    < > 2.77*  --  2.89* 2.77*   GFRESTIMATED  --  26* 26* 27*  --  29*  --  30*   < >  --    < > 21*  --  20* 21*   GFRESTBLACK  --  30* 30* 31*  --  33*  --  34*   < >  --    < > 24*  --  23* 24*   ELMER  --  8.0* 7.8* 8.4*  --  8.2*  --  7.8*   < >  --    < > 7.8*  --  7.9* 7.4*   MAG  --  2.9*  --  2.9* 2.7*  --   --   --   --   --   --  2.8*  --  2.9* 2.5*   PHOS  --  2.9  --  3.4  --   --   --   --   --   --   --  3.6  --  3.8 4.7*   PROTTOTAL  --   --   --   --   --   --   --  5.8*  --   --   --   --   --  5.4* 5.1*   ALBUMIN  --   --   --   --   --   --   --  2.3*   --   --   --   --   --  2.8* 2.7*   BILITOTAL  --   --   --   --   --   --   --  0.5  --   --   --   --   --  0.5 0.6   ALKPHOS  --   --   --   --   --   --   --  66  --   --   --   --   --  42 35*   AST  --   --   --   --   --   --   --  32  --  44  --   --   --  58* 91*   ALT  --   --   --   --   --   --   --  17  --  17  --   --   --  16 20    < > = values in this interval not displayed.     CBC  Recent Labs   Lab 02/18/20  0409 02/17/20  1550 02/17/20  0332 02/16/20  0336   HGB 8.7* 8.3* 8.4* 8.5*   WBC 9.9 9.2 11.1* 9.5   RBC 2.78* 2.64* 2.75* 2.71*   HCT 28.1* 27.0* 28.2* 28.3*   * 102* 103* 104*   MCH 31.3 31.4 30.5 31.4   MCHC 31.0* 30.7* 29.8* 30.0*   RDW 16.1* 16.2* 16.2* 15.9*    211 193 105*     INR  Recent Labs   Lab 02/17/20  0332 02/16/20  0336 02/13/20  0353   INR  --   --  1.29*   PTT 42* 48*  --      ABG  Recent Labs   Lab 02/17/20  1350 02/17/20  0332 02/16/20  1516 02/16/20  0336   PH 7.50* 7.47* 7.48* 7.37   PCO2 36 38 38 33*   PO2 83 98 82 115*   HCO3 28 28 28 19*   O2PER 45.0 60.0 60 60      URINE STUDIES  Recent Labs   Lab Test 02/14/20  2031 02/13/20  1447   COLOR Light Yellow Yellow   APPEARANCE Slightly Cloudy Clear   URINEGLC Negative Negative   URINEBILI Negative Negative   URINEKETONE Negative Negative   SG 1.009 1.012   UBLD Small* Small*   URINEPH 5.0 5.0   PROTEIN Negative Negative   NITRITE Negative Negative   LEUKEST Small* Small*   RBCU 1 9*   WBCU 3 4     IMAGING:  Pertinent test results reviewed    Westley Puente MD

## 2020-02-18 NOTE — PROGRESS NOTES
PROCEDURE NOTE - FLEXIBLE BRONCHOSCOPY  NAME: Vel Espana   MRN: [id number]  : 1945     Date: 2020       Performed by: Macario Alfonso MD       Procedure: flexible bronchoscopy     Indications: respiratory failure     Sedation:  2 mg versed, 100 mcg fentanyl    Findings: Scant secretions (drastically decreased from prior bronchoscopy). Bronchial mucosa improved in appearance from prior exam, still with some inflammation.    Complications: none     Procedure:  A timeout was called to review the case and patient information. The patient was positioned supine. Bilateral tracheobronchial trees were inspected closely to the level of the subsegmental bronchi. Secretions were found to be decreased from prior bronchoscopy. These were lavaged and evacuated without difficulty. The procedure was completed and the patient tolerated the procedure well and without complications. Dr. Justice was available for assistance throughout the entire procedure.    Plan: Continues cares. Re-bronch PRN.

## 2020-02-18 NOTE — PLAN OF CARE
D/I:  Neuro: Opened eyes spontaneously but does not track, will turn head towards writer when he hears his name. Moves BUE spontaneously, does not follow commands. No movements noted in BLE.  Precedex on overnight for restlessness.  Off at 0600 due to soft BPs/MAPs.  Cardiac: Hemodynamically stable overnight. SBP <120 when resting, up to 140s when agitated and restless, MAPs >65.  No PRNs needed since SBP does not remain elevated for >1-2 seconds per ART line.  Tmax 101.3, tylenol given x1.  Last temp check, 99.9.  Electrolytes replaced per protocol.  Chest tubes draining minimally.  Resp: Continued on CMV settings, tachypneic with RR 26-30s.  O2 stat >94%.  Patient bites/chews on ETT tube.  GI/: TF increased to 35 mL/hr since 0500, continue to increase Q8H for goal of 50.  60 mL Q1H flushes for hypernatremia, Na 150 this am.  OG clamped, no residuals.    Patient's daughter called a couple times overnight.  She was updated with overnight events including febrile temp.  She expressed she will dss later.    Plan: Bronchoscopy today?  Continue to monitor and follow POC.

## 2020-02-18 NOTE — PROGRESS NOTES
CV ICU PROGRESS NOTE  February 18, 2020      CO-MORBIDITIES:   Dissection of aorta, unspecified portion of aorta (H)    ASSESSMENT: Vel Espana is a 74 year old male with PMH of HTN s/p Ascending aortic dissection repair using a 32 mm Dacron Gelweave graft, AVR with 27 mm Patterson bovine pericardial valve, Coronary bypass to right coronary artery, Left greater saphenous vein harvest, Circulatory arrest, Left femoral artery exploration on 2/10, remains intubated for airway protection for slowly resolving metabolic encephalopathy.     TODAY'S PROGRESS:   - mental status improving, now intermittently following commands in all 4 extremities  - transition to oral amiodarone  - bronchoscopy today  - consult surgery for trach/peg per Dr. Mckeon  - ID consult (ongoing fevers despite treatment of pneumonia)    PLAN:  Neuro/ pain/ sedation:  - Monitor neurological status. Notify the MD for any acute changes in exam.  - Fentanyl gtt for pain.  - precedex gtt  - Daily sedation holiday     Pulmonary care:   #MV for airway protection post op  #ventilator associated pneumonia  - PST as able with sedation wean  - MV  - Titrate FiO2 for SpO2 >92%-  - Dr. Mckeon requests PEEP no less than 8  - bronchoscopy 2/15, 2/16     Cardiovascular:    - Monitor hemodynamic status.   - MAP >65, SBP <120  - dobutamine, NE gtt  - Hold PTA anti-hypertensives    #Non-sustained VT. brief run of nonsustained VT, less than 30 seconds, in the operating room, was hypokalemic at the time, also was being paced, possible R-on-T phenomenon, has not had recurrence since temporary epicardial pacing has been discontinued.  Cardiology EP consult 2/12 recommended discontinuing amiodarone  #A-fib with RVR: noted overnight 2/14  - amiodarone PO     GI care:   - NPO except ice chips and medications.     Fluids/ Electrolytes/ Nutrition:   - TKO for IV fluid hydration  - NJ placed, TF at goal  - free water 100/hr for ongoing hypernatremia with q6 hr Na  checks    Renal/ Fluid Balance:    #Nonoliguric HANNA  - Urine output is adequate so far.  - Will continue to monitor intake and output, Cr, electrolytes.   - Nephrology consult     Endocrine:    # Stress hyperglycemia  - Insulin gtt     ID/ Antibiotics:  # ventilator associated pneumonia  - zosyn started 2/15, end date 2/22  - ID consult  - s/p vanc 2/15 - 2/17     Heme:     - Hgb goal >7  - low int heparin gtt for a-fib     Prophylaxis:    - heparin as above  - Protonix qd     Lines/ tubes/ drains:  - PICC, Arterial line, quintero     Disposition:  - CV ICU.      Patient seen, findings and plan discussed with CVICU attending Dr. Reshma Alfonso MD  [phone]    ====================================    SUBJECTIVE:   No acute events overnight. Mental status improving this AM.    OBJECTIVE:   1. VITAL SIGNS:   Temp:  [99.9  F (37.7  C)-101.6  F (38.7  C)] 100.4  F (38  C)  Heart Rate:  [78-98] 89  Resp:  [19-31] 25  MAP:  [60 mmHg-104 mmHg] 69 mmHg  Arterial Line BP: ()/(46-82) 92/59  FiO2 (%):  [40 %-45 %] 40 %  SpO2:  [85 %-99 %] 85 %  Ventilation Mode: CMV/AC  (Continuous Mandatory Ventilation/ Assist Control)  FiO2 (%): 40 %  Rate Set (breaths/minute): 16 breaths/min  Tidal Volume Set (mL): 500 mL  PEEP (cm H2O): 10 cmH2O  Oxygen Concentration (%): 40 %  Resp: 25      2. INTAKE/ OUTPUT:   I/O last 3 completed shifts:  In: 3785.07 [I.V.:1205.07; NG/GT:1680; IV Piggyback:500]  Out: 3640 [Urine:3580; Chest Tube:60]    3. PHYSICAL EXAMINATION:   General: intubated, sedated. Sitting in chair  Neuro: Sedated, opens eyes to voice. Follows commands intermittently in upper and lower extremities  Resp: mechanical ventilation  CV: a-fib  Abdomen: Soft, Non-distended, Non-tender  Incisions: c/d/i  Extremities: warm and well perfused with mild edema, dusky appearance to left middle and ring fingers resolved, dusky appearance to the right second and third toes, stable    4. INVESTIGATIONS:   Arterial Blood Gases    Recent Labs   Lab 02/17/20  1350 02/17/20  0332 02/16/20  1516 02/16/20  0336   PH 7.50* 7.47* 7.48* 7.37   PCO2 36 38 38 33*   PO2 83 98 82 115*   HCO3 28 28 28 19*     Complete Blood Count   Recent Labs   Lab 02/18/20  0409 02/17/20  1550 02/17/20  0332 02/16/20  0336   WBC 9.9 9.2 11.1* 9.5   HGB 8.7* 8.3* 8.4* 8.5*    211 193 105*     Basic Metabolic Panel  Recent Labs   Lab 02/18/20  0409 02/17/20  2307 02/17/20  0332 02/16/20  1955 02/16/20  0336   * 153* 148* 149* 146*   POTASSIUM 3.1* 3.4 3.7 3.9 4.2   CHLORIDE 119* 120* 115*  --  113*   CO2 27 25 30  --  30   * 106* 110*  --  113*   CR 2.36* 2.33* 2.28*  --  2.16*   * 169* 146*  --  170*     Liver Function Tests  Recent Labs   Lab 02/15/20  1213 02/14/20  2030 02/13/20  0353 02/12/20  0343   AST 32 44 58* 91*   ALT 17 17 16 20   ALKPHOS 66  --  42 35*   BILITOTAL 0.5  --  0.5 0.6   ALBUMIN 2.3*  --  2.8* 2.7*   INR  --   --  1.29*  --      Pancreatic Enzymes  No lab results found in last 7 days.  Coagulation Profile  Recent Labs   Lab 02/17/20  0332 02/16/20  0336 02/13/20  0353   INR  --   --  1.29*   PTT 42* 48*  --          5. RADIOLOGY:   Recent Results (from the past 24 hour(s))   US Upper Extremity Venous Duplex Left    Narrative    EXAMINATION: DOPPLER VENOUS ULTRASOUND OF THE LEFT UPPER EXTREMITY,  2/17/2020 3:29 PM     COMPARISON: None available    HISTORY: Left upper extremity edema, concern for DVT.    TECHNIQUE:  Gray-scale evaluation with compression, spectral flow and  color Doppler assessment of the deep venous system of the left upper  extremity.    FINDINGS:  Left: Normal blood flow and waveforms are demonstrated in the internal  jugular, innominate, subclavian, and axillary veins. There is normal  compressibility of the brachial and basilic veins. Occlusive thrombus  in the cephalic vein extending from the proximal forearm to the  confluence with the subclavian vein.  Distal to the antecubital fossa  the  cephalic vein is patent.      Impression    IMPRESSION:  1. No evidence of left upper extremity deep venous thrombosis.  2. Superficial venous thrombosis of the left cephalic vein, extending  from the proximal forearm to the confluence with the subclavian vein.    Imaging findings discussed with Dr. Pandey at 4:47PM on 2/17/2020 by  Dr. Barriga.    I have personally reviewed the examination and initial interpretation  and I agree with the findings.    JAYNA BARRIGA MD   CT Head w/o Contrast    Narrative    CT HEAD W/O CONTRAST 2/17/2020 4:17 PM    Provided History: Altered level of consciousness (LOC), unexplained  ICD-10:    Comparison: Head CT 2/9/2020.    Technique: Using multidetector thin collimation helical acquisition  technique, axial, coronal and sagittal CT images from the skull base  to the vertex were obtained without intravenous contrast.     Findings:    No intracranial hemorrhage, mass effect, or midline shift. No change  in moderate patchy and confluent regions of hypoattenuation in the  subcortical and deep white matter of both cerebral hemispheres. Mild  generalized parenchymal volume loss. 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 The ventricles are proportionate to the  cerebral sulci. The gray to white matter differentiation of the  cerebral hemispheres is preserved. The basal cisterns are patent.    Partially visualized endotracheal and enteric tubes. Moderate  paranasal sinus mucosal thickening, almost exclusively right-sided.  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  Mastoid air cells are clear.       Impression    Impression:   1. No acute intracranial pathology.  2. Mild generalized parenchymal volume loss and moderate chronic small  vessel ischemic disease.  3. Chronic sinusitis.    MARC HERRERA MD   XR Chest Port 1 View    Narrative    Exam: XR CHEST PORT 1 VW, 2/18/2020 2:16 AM    Indication: intubated patient, interval  change    Comparison: 2017 2020    Findings:   AP view of the chest. Patient is rotated to the left. ET tube tip in  the high thoracic trachea approximately 6.5 cm from the daniel.  Feeding tube tip is outside the field-of-view. Gastric tube and left  IJ sheath have been removed. Right basilar chest tube is stable.  Right-sided PICC with tip at the high SVC. Lung volumes and cardiac  silhouette are unchanged. Cardiac valvular prosthesis and midline  sternotomy wires. Bibasilar predominant opacities and bilateral  pleural effusions, left greater than right, are stable. No  pneumothorax.      Impression    Impression:   1. Stable bilateral pleural effusions, left rib the right, with  overlying atelectasis or infection, left greater than right.  2. Gastric tube and left IJ sheath been removed. Additional support  devices are stable.    I have personally reviewed the examination and initial interpretation  and I agree with the findings.    XIMENA FLETCHER, DO       =========================================

## 2020-02-18 NOTE — PROGRESS NOTES
Care Coordinator Progress Note    Admission Date/Time:  2/9/2020  Attending MD:  Nivia Mckeon MD    Data  Chart reviewed, discussed with interdisciplinary team.   Patient was admitted for: Dissection of aorta, unspecified portion of aorta (H).     Assessment /Coordination of Care  Pt remain in ICU vented and doing PS.  [facility] have called [facility] last week and informed them pt hospitalization per pt dtr.,Rosa request.  RNCC received a voice message from [facility] RN, Sonali # [phone] the other day.  Sonali wright pt is not service connected with [facility] and he is not eligible for [facility] benefit and [facility] contracted [facility].    RNCC visited pt dtr, Rosa and son in- law for support and to update them about the above message.  Pt family stated the team have been updating them well about the plan of care.  RNCC informed Rosa about the above message I received from [facility].  RNCC informed Rosa RNCC/SW will cont to follow up.     Plan  Anticipated Discharge Date:  TBD.  Anticipated Discharge Plan:   TBD.  RNCC will cont to follow plan of care.      Chris Isaac RN, PHN, BSN  4A and 4E/ ICU  Care Coordinator  Phone: [phone]  Pager: [phone]

## 2020-02-18 NOTE — PROGRESS NOTES
CLINICAL NUTRITION SERVICES - REASSESSMENT NOTE     Nutrition Prescription    Malnutrition Status:    Does not meet criteria for malnutrition     Interventions by Registered Dietitian (RD):  - Continue Nepro @ 50 ml/hr via NDT   - Reduced protein modulars, Prosource (1 pkt daily)   - Ordered metabolic cart study to assess energy needs    Nepro @ 50 ml/hr + Prosource (1 pkt daily) to provide 2200 kcals (25 kcal/kg/day), 108 g PRO (1.2 g/kg/day), 876 ml free H2O, 193 g CHO and 15 g Fiber daily.    Future Recommendations:  If non-renal formula indicated, recommend Nutren 1.5 @ 60 mL/hr + Prosource (1 pkt BID) to provide 2240 kcals (25 kcal/kg/day), 120 g PRO (1.4 g/kg/day), 1094 mL H2O, 253 g CHO and no fiber daily.       EVALUATION OF THE PROGRESS TOWARD GOALS   Diet: NPO, remains intubated   Nutrition Support: Nepro @ 50 ml/hr + Prosource (1 pkt TID) via NDT to provide 26 kcal/kg/day and 1.5 g protein/kg/day  Intake: Over the past 6-days, % minimum protein-energy needs respectively.       NEW FINDINGS   Concern for aspiration of enteral formula (2/16), thus TF via NGT held. FT advanced to duodenum (2/17). Today, feeds at goal rate and tolerating well.      Renal: Lasix. K 3.1 with IV replacement, Na 150 with FWF of 100 ml/hr,  with potential excessive protein intake.    Meds: Certavite daily     Updated assessed nutrition needs  Dosing weight: 88 kg (adjusted)   Energy needs: 1800 - 2200 kcal/day (20 - 25+ kcal/kg)   Justification: Obese   Protein needs: 105 - 130+ g protein/day (1.2 - 1.5 g/kg)   Justification: Obese, increased needs post-op, pending renal status     MALNUTRITION  % Intake: Decreased intake does not meet criteria  % Weight Loss: None noted  Subcutaneous Fat Loss: None observed  Muscle Loss: None observed  Fluid Accumulation/Edema: Does not meet criteria  Malnutrition Diagnosis: Patient does not meet two of the established criteria necessary for diagnosing malnutrition    Previous Goals    Total avg nutritional intake to meet a minimum of 25 kcal/kg and 1.5 g PRO/kg daily (per dosing wt 88 kg).  Evaluation: Not met consistently     Previous Nutrition Diagnosis  Predicted inadequate nutrient intake (protein/energy) related to pt vented and sedated, TF to meet nutritional needs with potential for disruptions in regimen.   Evaluation: No change    CURRENT NUTRITION DIAGNOSIS  Predicted inadequate nutrient intake (protein/energy) related to pt dependent on enteral nutrition support to meet entire nutrition needs while vented, with potential for interruptions vs changes in medical course.      INTERVENTIONS  Implementation  Collaboration with other providers - ICU rounds   Enteral Nutrition - Modified   Metabolic cart study     Goals  Total avg nutritional intake to meet a minimum of 20 kcal/kg and 1.2 g PRO/kg daily (per dosing wt 88 kg).    Monitoring/Evaluation  Progress toward goals will be monitored and evaluated per protocol.    Zina Austin, MIS, LD  [id number]  Pgr: [phone]

## 2020-02-18 NOTE — ED PROVIDER NOTES
"  History     Chief Complaint   Patient presents with     Chest Pain     The history is provided by the patient and the EMS personnel.   Chest Pain   Pain location:  Substernal area  Pain quality: pressure    Pain radiates to:  Does not radiate  Pain severity:  Mild  Onset quality:  Sudden  Timing:  Constant  Progression:  Improving  Chronicity:  New  Risk factors: male sex and obesity      Vel Espana is a 74 year old male who is transferred from [facility] for ctsx eval for acute aortic dissection. Pt c/o mild chest pain at this time, much better than earlier in the day. Denies sob, abd pain, n/v/d, or h/o similar chest pain.    I have reviewed the Medications, Allergies, Past Medical and Surgical History, and Social History in the Epic system.    Review of Systems   Cardiovascular: Positive for chest pain.   All other systems reviewed and are negative.      Physical Exam   BP: (!) 137/97  Pulse: 94  Heart Rate: 92  Temp: 97.9  F (36.6  C)  Resp: 11  Height: 175.3 cm (5' 9\")  Weight: 131.9 kg (290 lb 11.2 oz)(bed scale)  SpO2: 99 %      Physical Exam  Constitutional:       Appearance: He is well-developed. He is obese. He is not ill-appearing.   HENT:      Head: Normocephalic and atraumatic.   Cardiovascular:      Rate and Rhythm: Normal rate.   Pulmonary:      Effort: No tachypnea or accessory muscle usage.   Abdominal:      Palpations: Abdomen is soft.   Musculoskeletal:      Right lower leg: No edema.      Left lower leg: No edema.   Skin:     General: Skin is warm and dry.   Neurological:      General: No focal deficit present.      Mental Status: He is alert.   Psychiatric:         Mood and Affect: Mood normal.         Behavior: Behavior normal.         ED Course        Procedures             EKG Interpretation:      Interpreted by Fly Banks MD  Time reviewed: 2204  Symptoms at time of EKG: chest pain   Rhythm: normal sinus   Rate: Normal  Axis: Left Axis Deviation  Ectopy: none  Conduction: normal  ST " Segments/ T Waves: No ST-T wave changes  Q Waves: none  Comparison to prior: No old EKG available    Clinical Impression: non-specific EKG                              Labs Ordered and Resulted from Time of ED Arrival Up to the Time of Departure from the ED   CBC WITH PLATELETS DIFFERENTIAL - Abnormal; Notable for the following components:       Result Value    RBC Count 4.04 (*)     Hemoglobin 12.8 (*)     Hematocrit 39.5 (*)     Absolute Lymphocytes 0.5 (*)     All other components within normal limits   LACTIC ACID WHOLE BLOOD - Abnormal; Notable for the following components:    Lactic Acid 2.1 (*)     All other components within normal limits   ISTAT GASES ELEC ICA GLUC ISABEL POCT - Abnormal; Notable for the following components:    PO2 Venous 19 (*)     Glucose 142 (*)     Hematocrit - POCT 39 (*)     All other components within normal limits   TROPONIN POCT - Abnormal; Notable for the following components:    Troponin I 0.75 (*)     All other components within normal limits   ISTAT INR POCT       Consults  Surgery: At Bedside (02/09/20 1817)    Assessments & Plan (with Medical Decision Making)     73 yo m here w/ aortic paresh, ctsx to see pt then OR, stable at this time    I have reviewed the nursing notes.    I have reviewed the findings, diagnosis, plan and need for follow up with the patient.    Current Discharge Medication List          Final diagnoses:   Dissection of aorta, unspecified portion of aorta (H)       2/9/2020   UNIT 4E [facility]     Fly Banks MD  02/18/20 1910

## 2020-02-18 NOTE — PLAN OF CARE
VSS apart from short episode of hypotension around 1540 - pupils became 1mm and pt was unresponsive to stimuli. Stroke code called - de-escalated by neuro crit, but sent for CT head anyway. Tmax 101.6. Diuresis x1 with adequate UOP response. TF restarted at noon after tube advanced to NJ. Heparin gtt increased to be titratable to hep 10A - will recheck level at 2130. Will continue to monitor pt and update team with any changes or concerns.

## 2020-02-18 NOTE — PROGRESS NOTES
"Bronchoscopy Risk Assessment Guidelines      A. Patient symptoms to consider when assessing pulmonary TB risk are:    I. Cough greater than 3 weeks; and fever, hemoptysis, pleuritic chest    pain, weight loss greater than 10 lbs, night sweats, fatigue, infiltrates on    upper lobes or superior segments of lower lobes, cavitation on chest    x-ray.   B. Patient risk factors to consider when assessing pulmonary TB risk are:    I. Exposure to known TB case, foreign-born persons (within 5 years of    arrival to [place]), residence in a crowded setting (correctional facility,     long-term care center, etc.), persons with HIV or immunosuppression.    Patients with symptoms and risk factors should generally be considered \"suspect risk\" and bronchoscopies should be performed in airborne precautions.    This patient has NO KNOWN RISK of Tuberculosis (proceed with bronchoscopy)    Specimens sent: no  Complications: None  Scope used: #[id number] Adult  Attending Physician: Dr. Reshma Sandy, RT on 2/18/2020 at 1:40 PM  "

## 2020-02-19 ENCOUNTER — APPOINTMENT (OUTPATIENT)
Dept: GENERAL RADIOLOGY | Facility: CLINIC | Age: 75
DRG: 219 | End: 2020-02-19
Attending: STUDENT IN AN ORGANIZED HEALTH CARE EDUCATION/TRAINING PROGRAM
Payer: COMMERCIAL

## 2020-02-19 ENCOUNTER — APPOINTMENT (OUTPATIENT)
Dept: ULTRASOUND IMAGING | Facility: CLINIC | Age: 75
DRG: 219 | End: 2020-02-19
Payer: COMMERCIAL

## 2020-02-19 ENCOUNTER — APPOINTMENT (OUTPATIENT)
Dept: CT IMAGING | Facility: CLINIC | Age: 75
DRG: 219 | End: 2020-02-19
Payer: COMMERCIAL

## 2020-02-19 LAB
ALBUMIN UR-MCNC: 10 MG/DL
ANION GAP SERPL CALCULATED.3IONS-SCNC: 6 MMOL/L (ref 3–14)
APPEARANCE UR: ABNORMAL
BACTERIA #/AREA URNS HPF: ABNORMAL /HPF
BASE DEFICIT BLDA-SCNC: 0.6 MMOL/L
BASE EXCESS BLDA CALC-SCNC: 0.8 MMOL/L
BASE EXCESS BLDA CALC-SCNC: 2.8 MMOL/L
BILIRUB UR QL STRIP: NEGATIVE
BUN SERPL-MCNC: 104 MG/DL (ref 7–30)
CALCIUM SERPL-MCNC: 7.7 MG/DL (ref 8.5–10.1)
CHLORIDE SERPL-SCNC: 122 MMOL/L (ref 94–109)
CO2 SERPL-SCNC: 25 MMOL/L (ref 20–32)
COLOR UR AUTO: YELLOW
CREAT SERPL-MCNC: 2.38 MG/DL (ref 0.66–1.25)
CRP SERPL-MCNC: 130 MG/L (ref 0–8)
ERYTHROCYTE [DISTWIDTH] IN BLOOD BY AUTOMATED COUNT: 15.9 % (ref 10–15)
GFR SERPL CREATININE-BSD FRML MDRD: 25 ML/MIN/{1.73_M2}
GLUCOSE BLDC GLUCOMTR-MCNC: 140 MG/DL (ref 70–99)
GLUCOSE BLDC GLUCOMTR-MCNC: 157 MG/DL (ref 70–99)
GLUCOSE BLDC GLUCOMTR-MCNC: 158 MG/DL (ref 70–99)
GLUCOSE BLDC GLUCOMTR-MCNC: 158 MG/DL (ref 70–99)
GLUCOSE BLDC GLUCOMTR-MCNC: 160 MG/DL (ref 70–99)
GLUCOSE BLDC GLUCOMTR-MCNC: 162 MG/DL (ref 70–99)
GLUCOSE BLDC GLUCOMTR-MCNC: 168 MG/DL (ref 70–99)
GLUCOSE SERPL-MCNC: 180 MG/DL (ref 70–99)
GLUCOSE UR STRIP-MCNC: NEGATIVE MG/DL
GRAM STN SPEC: NORMAL
GRAM STN SPEC: NORMAL
HCO3 BLD-SCNC: 22 MMOL/L (ref 21–28)
HCO3 BLD-SCNC: 24 MMOL/L (ref 21–28)
HCO3 BLD-SCNC: 26 MMOL/L (ref 21–28)
HCT VFR BLD AUTO: 26.2 % (ref 40–53)
HGB BLD-MCNC: 8 G/DL (ref 13.3–17.7)
HGB UR QL STRIP: ABNORMAL
KETONES UR STRIP-MCNC: NEGATIVE MG/DL
LEUKOCYTE ESTERASE UR QL STRIP: ABNORMAL
LMWH PPP CHRO-ACNC: 0.26 IU/ML
MAGNESIUM SERPL-MCNC: 2.7 MG/DL (ref 1.6–2.3)
MCH RBC QN AUTO: 30.9 PG (ref 26.5–33)
MCHC RBC AUTO-ENTMCNC: 30.5 G/DL (ref 31.5–36.5)
MCV RBC AUTO: 101 FL (ref 78–100)
MUCOUS THREADS #/AREA URNS LPF: PRESENT /LPF
NITRATE UR QL: NEGATIVE
O2/TOTAL GAS SETTING VFR VENT: 50 %
O2/TOTAL GAS SETTING VFR VENT: 55 %
O2/TOTAL GAS SETTING VFR VENT: 60 %
OXYHGB MFR BLD: 94 % (ref 92–100)
OXYHGB MFR BLD: 97 % (ref 92–100)
OXYHGB MFR BLD: 97 % (ref 92–100)
PCO2 BLD: 30 MM HG (ref 35–45)
PCO2 BLD: 33 MM HG (ref 35–45)
PCO2 BLD: 33 MM HG (ref 35–45)
PH BLD: 7.48 PH (ref 7.35–7.45)
PH BLD: 7.49 PH (ref 7.35–7.45)
PH BLD: 7.5 PH (ref 7.35–7.45)
PH UR STRIP: 5 PH (ref 5–7)
PHOSPHATE SERPL-MCNC: 3.5 MG/DL (ref 2.5–4.5)
PLATELET # BLD AUTO: 262 10E9/L (ref 150–450)
PO2 BLD: 101 MM HG (ref 80–105)
PO2 BLD: 67 MM HG (ref 80–105)
PO2 BLD: 97 MM HG (ref 80–105)
POTASSIUM BLD-SCNC: 3.4 MMOL/L (ref 3.4–5.3)
POTASSIUM BLD-SCNC: 3.6 MMOL/L (ref 3.4–5.3)
POTASSIUM SERPL-SCNC: 3.7 MMOL/L (ref 3.4–5.3)
PROCALCITONIN SERPL-MCNC: 0.29 NG/ML
RBC # BLD AUTO: 2.59 10E12/L (ref 4.4–5.9)
RBC #/AREA URNS AUTO: 5 /HPF (ref 0–2)
SODIUM BLD-SCNC: 155 MMOL/L (ref 133–144)
SODIUM BLD-SCNC: 156 MMOL/L (ref 133–144)
SODIUM SERPL-SCNC: 154 MMOL/L (ref 133–144)
SODIUM SERPL-SCNC: 155 MMOL/L (ref 133–144)
SOURCE: ABNORMAL
SP GR UR STRIP: 1.03 (ref 1–1.03)
SPECIMEN SOURCE: NORMAL
UROBILINOGEN UR STRIP-MCNC: NORMAL MG/DL (ref 0–2)
WBC # BLD AUTO: 8.1 10E9/L (ref 4–11)
WBC #/AREA URNS AUTO: 9 /HPF (ref 0–5)

## 2020-02-19 PROCEDURE — 85520 HEPARIN ASSAY: CPT | Performed by: STUDENT IN AN ORGANIZED HEALTH CARE EDUCATION/TRAINING PROGRAM

## 2020-02-19 PROCEDURE — 25000132 ZZH RX MED GY IP 250 OP 250 PS 637: Performed by: STUDENT IN AN ORGANIZED HEALTH CARE EDUCATION/TRAINING PROGRAM

## 2020-02-19 PROCEDURE — 82805 BLOOD GASES W/O2 SATURATION: CPT | Performed by: PHYSICIAN ASSISTANT

## 2020-02-19 PROCEDURE — 85027 COMPLETE CBC AUTOMATED: CPT | Performed by: STUDENT IN AN ORGANIZED HEALTH CARE EDUCATION/TRAINING PROGRAM

## 2020-02-19 PROCEDURE — 25000125 ZZHC RX 250: Performed by: STUDENT IN AN ORGANIZED HEALTH CARE EDUCATION/TRAINING PROGRAM

## 2020-02-19 PROCEDURE — 84132 ASSAY OF SERUM POTASSIUM: CPT | Performed by: PHYSICIAN ASSISTANT

## 2020-02-19 PROCEDURE — 40000275 ZZH STATISTIC RCP TIME EA 10 MIN

## 2020-02-19 PROCEDURE — 36415 COLL VENOUS BLD VENIPUNCTURE: CPT | Performed by: THORACIC SURGERY (CARDIOTHORACIC VASCULAR SURGERY)

## 2020-02-19 PROCEDURE — 74177 CT ABD & PELVIS W/CONTRAST: CPT

## 2020-02-19 PROCEDURE — 81001 URINALYSIS AUTO W/SCOPE: CPT | Performed by: STUDENT IN AN ORGANIZED HEALTH CARE EDUCATION/TRAINING PROGRAM

## 2020-02-19 PROCEDURE — 25000132 ZZH RX MED GY IP 250 OP 250 PS 637: Performed by: ANESTHESIOLOGY

## 2020-02-19 PROCEDURE — 25000125 ZZHC RX 250

## 2020-02-19 PROCEDURE — 84100 ASSAY OF PHOSPHORUS: CPT | Performed by: STUDENT IN AN ORGANIZED HEALTH CARE EDUCATION/TRAINING PROGRAM

## 2020-02-19 PROCEDURE — 94640 AIRWAY INHALATION TREATMENT: CPT

## 2020-02-19 PROCEDURE — 84295 ASSAY OF SERUM SODIUM: CPT | Performed by: PHYSICIAN ASSISTANT

## 2020-02-19 PROCEDURE — 84145 PROCALCITONIN (PCT): CPT | Performed by: STUDENT IN AN ORGANIZED HEALTH CARE EDUCATION/TRAINING PROGRAM

## 2020-02-19 PROCEDURE — 25000128 H RX IP 250 OP 636: Performed by: PHYSICIAN ASSISTANT

## 2020-02-19 PROCEDURE — 76882 US LMTD JT/FCL EVL NVASC XTR: CPT | Mod: LT

## 2020-02-19 PROCEDURE — 71045 X-RAY EXAM CHEST 1 VIEW: CPT

## 2020-02-19 PROCEDURE — 25800030 ZZH RX IP 258 OP 636: Performed by: STUDENT IN AN ORGANIZED HEALTH CARE EDUCATION/TRAINING PROGRAM

## 2020-02-19 PROCEDURE — 25000132 ZZH RX MED GY IP 250 OP 250 PS 637: Performed by: PHYSICIAN ASSISTANT

## 2020-02-19 PROCEDURE — 84295 ASSAY OF SERUM SODIUM: CPT | Performed by: STUDENT IN AN ORGANIZED HEALTH CARE EDUCATION/TRAINING PROGRAM

## 2020-02-19 PROCEDURE — 25000128 H RX IP 250 OP 636: Performed by: STUDENT IN AN ORGANIZED HEALTH CARE EDUCATION/TRAINING PROGRAM

## 2020-02-19 PROCEDURE — 25000128 H RX IP 250 OP 636: Performed by: THORACIC SURGERY (CARDIOTHORACIC VASCULAR SURGERY)

## 2020-02-19 PROCEDURE — 94003 VENT MGMT INPAT SUBQ DAY: CPT

## 2020-02-19 PROCEDURE — 82805 BLOOD GASES W/O2 SATURATION: CPT | Performed by: THORACIC SURGERY (CARDIOTHORACIC VASCULAR SURGERY)

## 2020-02-19 PROCEDURE — 86140 C-REACTIVE PROTEIN: CPT | Performed by: STUDENT IN AN ORGANIZED HEALTH CARE EDUCATION/TRAINING PROGRAM

## 2020-02-19 PROCEDURE — 00000146 ZZHCL STATISTIC GLUCOSE BY METER IP

## 2020-02-19 PROCEDURE — 25800030 ZZH RX IP 258 OP 636: Performed by: THORACIC SURGERY (CARDIOTHORACIC VASCULAR SURGERY)

## 2020-02-19 PROCEDURE — 87205 SMEAR GRAM STAIN: CPT | Performed by: STUDENT IN AN ORGANIZED HEALTH CARE EDUCATION/TRAINING PROGRAM

## 2020-02-19 PROCEDURE — 87106 FUNGI IDENTIFICATION YEAST: CPT | Performed by: STUDENT IN AN ORGANIZED HEALTH CARE EDUCATION/TRAINING PROGRAM

## 2020-02-19 PROCEDURE — 83735 ASSAY OF MAGNESIUM: CPT | Performed by: STUDENT IN AN ORGANIZED HEALTH CARE EDUCATION/TRAINING PROGRAM

## 2020-02-19 PROCEDURE — 87040 BLOOD CULTURE FOR BACTERIA: CPT | Performed by: THORACIC SURGERY (CARDIOTHORACIC VASCULAR SURGERY)

## 2020-02-19 PROCEDURE — 20000004 ZZH R&B ICU UMMC

## 2020-02-19 PROCEDURE — 84132 ASSAY OF SERUM POTASSIUM: CPT | Performed by: STUDENT IN AN ORGANIZED HEALTH CARE EDUCATION/TRAINING PROGRAM

## 2020-02-19 PROCEDURE — 99291 CRITICAL CARE FIRST HOUR: CPT | Mod: GC | Performed by: ANESTHESIOLOGY

## 2020-02-19 PROCEDURE — 87070 CULTURE OTHR SPECIMN AEROBIC: CPT | Performed by: STUDENT IN AN ORGANIZED HEALTH CARE EDUCATION/TRAINING PROGRAM

## 2020-02-19 PROCEDURE — 25000132 ZZH RX MED GY IP 250 OP 250 PS 637: Performed by: THORACIC SURGERY (CARDIOTHORACIC VASCULAR SURGERY)

## 2020-02-19 PROCEDURE — 94640 AIRWAY INHALATION TREATMENT: CPT | Mod: 76

## 2020-02-19 PROCEDURE — 80048 BASIC METABOLIC PNL TOTAL CA: CPT | Performed by: STUDENT IN AN ORGANIZED HEALTH CARE EDUCATION/TRAINING PROGRAM

## 2020-02-19 RX ORDER — QUETIAPINE FUMARATE 25 MG/1
25 TABLET, FILM COATED ORAL AT BEDTIME
Status: DISCONTINUED | OUTPATIENT
Start: 2020-02-19 | End: 2020-02-19

## 2020-02-19 RX ORDER — IOPAMIDOL 755 MG/ML
135 INJECTION, SOLUTION INTRAVASCULAR ONCE
Status: COMPLETED | OUTPATIENT
Start: 2020-02-19 | End: 2020-02-19

## 2020-02-19 RX ORDER — FUROSEMIDE 10 MG/ML
80 INJECTION INTRAMUSCULAR; INTRAVENOUS ONCE
Status: COMPLETED | OUTPATIENT
Start: 2020-02-19 | End: 2020-02-19

## 2020-02-19 RX ORDER — QUETIAPINE FUMARATE 50 MG/1
50 TABLET, FILM COATED ORAL EVERY EVENING
Status: DISCONTINUED | OUTPATIENT
Start: 2020-02-19 | End: 2020-02-25

## 2020-02-19 RX ADMIN — DEXMEDETOMIDINE 0.8 MCG/KG/HR: 100 INJECTION, SOLUTION, CONCENTRATE INTRAVENOUS at 04:31

## 2020-02-19 RX ADMIN — ACETYLCYSTEINE 600 MG: 200 SOLUTION ORAL; RESPIRATORY (INHALATION) at 17:02

## 2020-02-19 RX ADMIN — DEXMEDETOMIDINE 0.5 MCG/KG/HR: 100 INJECTION, SOLUTION, CONCENTRATE INTRAVENOUS at 18:30

## 2020-02-19 RX ADMIN — VANCOMYCIN HYDROCHLORIDE 3000 MG: 1 INJECTION, POWDER, LYOPHILIZED, FOR SOLUTION INTRAVENOUS at 15:33

## 2020-02-19 RX ADMIN — ACETYLCYSTEINE 2 ML: 200 SOLUTION ORAL; RESPIRATORY (INHALATION) at 07:57

## 2020-02-19 RX ADMIN — DEXMEDETOMIDINE 0.9 MCG/KG/HR: 100 INJECTION, SOLUTION, CONCENTRATE INTRAVENOUS at 00:25

## 2020-02-19 RX ADMIN — FUROSEMIDE 80 MG: 10 INJECTION, SOLUTION INTRAVENOUS at 12:18

## 2020-02-19 RX ADMIN — ACETYLCYSTEINE 2 ML: 200 SOLUTION ORAL; RESPIRATORY (INHALATION) at 11:28

## 2020-02-19 RX ADMIN — ALBUTEROL SULFATE 2.5 MG: 2.5 SOLUTION RESPIRATORY (INHALATION) at 15:53

## 2020-02-19 RX ADMIN — ACETYLCYSTEINE 2 ML: 200 SOLUTION ORAL; RESPIRATORY (INHALATION) at 01:01

## 2020-02-19 RX ADMIN — ASPIRIN 81 MG CHEWABLE TABLET 81 MG: 81 TABLET CHEWABLE at 07:53

## 2020-02-19 RX ADMIN — ALBUTEROL SULFATE 2.5 MG: 2.5 SOLUTION RESPIRATORY (INHALATION) at 19:49

## 2020-02-19 RX ADMIN — PIPERACILLIN AND TAZOBACTAM 3.38 G: 3; .375 INJECTION, POWDER, FOR SOLUTION INTRAVENOUS at 08:07

## 2020-02-19 RX ADMIN — LIDOCAINE 2 PATCH: 560 PATCH PERCUTANEOUS; TOPICAL; TRANSDERMAL at 08:07

## 2020-02-19 RX ADMIN — DEXMEDETOMIDINE 0.8 MCG/KG/HR: 100 INJECTION, SOLUTION, CONCENTRATE INTRAVENOUS at 08:05

## 2020-02-19 RX ADMIN — INSULIN ASPART 1 UNITS: 100 INJECTION, SOLUTION INTRAVENOUS; SUBCUTANEOUS at 04:07

## 2020-02-19 RX ADMIN — PIPERACILLIN AND TAZOBACTAM 3.38 G: 3; .375 INJECTION, POWDER, FOR SOLUTION INTRAVENOUS at 14:13

## 2020-02-19 RX ADMIN — PIPERACILLIN AND TAZOBACTAM 3.38 G: 3; .375 INJECTION, POWDER, FOR SOLUTION INTRAVENOUS at 21:00

## 2020-02-19 RX ADMIN — INSULIN ASPART 1 UNITS: 100 INJECTION, SOLUTION INTRAVENOUS; SUBCUTANEOUS at 00:09

## 2020-02-19 RX ADMIN — INSULIN ASPART 1 UNITS: 100 INJECTION, SOLUTION INTRAVENOUS; SUBCUTANEOUS at 12:21

## 2020-02-19 RX ADMIN — ACETYLCYSTEINE 2 ML: 200 SOLUTION ORAL; RESPIRATORY (INHALATION) at 19:49

## 2020-02-19 RX ADMIN — ALBUTEROL SULFATE 2.5 MG: 2.5 SOLUTION RESPIRATORY (INHALATION) at 07:57

## 2020-02-19 RX ADMIN — AMIODARONE HYDROCHLORIDE 400 MG: 200 TABLET ORAL at 07:44

## 2020-02-19 RX ADMIN — QUETIAPINE FUMARATE 50 MG: 50 TABLET ORAL at 21:00

## 2020-02-19 RX ADMIN — ACETYLCYSTEINE 2 ML: 200 SOLUTION ORAL; RESPIRATORY (INHALATION) at 15:53

## 2020-02-19 RX ADMIN — POTASSIUM CHLORIDE 20 MEQ: 1.5 POWDER, FOR SOLUTION ORAL at 16:34

## 2020-02-19 RX ADMIN — Medication 1 PACKET: at 08:22

## 2020-02-19 RX ADMIN — INSULIN ASPART 1 UNITS: 100 INJECTION, SOLUTION INTRAVENOUS; SUBCUTANEOUS at 23:51

## 2020-02-19 RX ADMIN — POTASSIUM CHLORIDE 20 MEQ: 29.8 INJECTION, SOLUTION INTRAVENOUS at 05:04

## 2020-02-19 RX ADMIN — HEPARIN SODIUM 1500 UNITS/HR: 10000 INJECTION, SOLUTION INTRAVENOUS at 22:48

## 2020-02-19 RX ADMIN — PIPERACILLIN AND TAZOBACTAM 3.38 G: 3; .375 INJECTION, POWDER, FOR SOLUTION INTRAVENOUS at 02:05

## 2020-02-19 RX ADMIN — INSULIN ASPART 1 UNITS: 100 INJECTION, SOLUTION INTRAVENOUS; SUBCUTANEOUS at 20:20

## 2020-02-19 RX ADMIN — DEXMEDETOMIDINE 0.5 MCG/KG/HR: 100 INJECTION, SOLUTION, CONCENTRATE INTRAVENOUS at 12:18

## 2020-02-19 RX ADMIN — ALBUTEROL SULFATE 2.5 MG: 2.5 SOLUTION RESPIRATORY (INHALATION) at 11:28

## 2020-02-19 RX ADMIN — INSULIN ASPART 1 UNITS: 100 INJECTION, SOLUTION INTRAVENOUS; SUBCUTANEOUS at 07:45

## 2020-02-19 RX ADMIN — HEPARIN SODIUM 1500 UNITS/HR: 10000 INJECTION, SOLUTION INTRAVENOUS at 06:54

## 2020-02-19 RX ADMIN — ATORVASTATIN CALCIUM 40 MG: 40 TABLET, FILM COATED ORAL at 21:00

## 2020-02-19 RX ADMIN — ALBUTEROL SULFATE 2.5 MG: 2.5 SOLUTION RESPIRATORY (INHALATION) at 01:01

## 2020-02-19 RX ADMIN — MULTIVITAMIN 15 ML: LIQUID ORAL at 08:07

## 2020-02-19 RX ADMIN — INSULIN ASPART 1 UNITS: 100 INJECTION, SOLUTION INTRAVENOUS; SUBCUTANEOUS at 15:56

## 2020-02-19 RX ADMIN — SODIUM CHLORIDE, POTASSIUM CHLORIDE, SODIUM LACTATE AND CALCIUM CHLORIDE 250 ML: 600; 310; 30; 20 INJECTION, SOLUTION INTRAVENOUS at 17:02

## 2020-02-19 RX ADMIN — POTASSIUM CHLORIDE 20 MEQ: 29.8 INJECTION, SOLUTION INTRAVENOUS at 00:25

## 2020-02-19 RX ADMIN — ACETAMINOPHEN 650 MG: 325 TABLET, FILM COATED ORAL at 09:12

## 2020-02-19 RX ADMIN — ACETYLCYSTEINE 2 ML: 200 SOLUTION ORAL; RESPIRATORY (INHALATION) at 04:59

## 2020-02-19 RX ADMIN — ACETYLCYSTEINE 600 MG: 200 SOLUTION ORAL; RESPIRATORY (INHALATION) at 21:39

## 2020-02-19 RX ADMIN — Medication 40 MG: at 07:48

## 2020-02-19 RX ADMIN — ALBUTEROL SULFATE 2.5 MG: 2.5 SOLUTION RESPIRATORY (INHALATION) at 04:59

## 2020-02-19 RX ADMIN — AMIODARONE HYDROCHLORIDE 400 MG: 200 TABLET ORAL at 21:00

## 2020-02-19 RX ADMIN — IOPAMIDOL 135 ML: 755 INJECTION, SOLUTION INTRAVENOUS at 17:22

## 2020-02-19 ASSESSMENT — ACTIVITIES OF DAILY LIVING (ADL)
ADLS_ACUITY_SCORE: 18

## 2020-02-19 NOTE — PLAN OF CARE
Major Shift Events: Sodium in 150s. Free water flushes increased to 100 mL/hr. MD notified. Attempted to wean precedex, but pt restless and agitated while respiratory rates increase into the 30's. ABG pH 7.5, Co2 33, PEEP increased to 10 and tidal volume decreased to 450. MD notified. Rate at 16. Unable to wean FiO2 down from 60%. Pt following commands and nods head to answer questions but unable to redirect when agitated.  mL/hr. Heparin gtt therapeutic at 1500 units/hr. K replaced twice overnight.   Plan: continue to attempt to wean sedation in hopes of PST. Continue to monitor pt condition

## 2020-02-19 NOTE — PHARMACY-VANCOMYCIN DOSING SERVICE
"Pharmacy Vancomycin Consult Initial Note    Date of Service 2020  Patient's  1945  74 year old, male    Indication: Intra-abdominal infection    Current estimated CrCl = Estimated Creatinine Clearance: 36 mL/min (A) (based on SCr of 2.38 mg/dL (H)).    Creatinine for last 3 days  2020:  3:32 AM Creatinine 2.28 mg/dL; 11:07 PM Creatinine 2.33 mg/dL  2020:  4:09 AM Creatinine 2.36 mg/dL  2020:  3:57 AM Creatinine 2.38 mg/dL    Recent vancomycin level(s) for last 3 days  No results found for requested labs within last 72 hours.    Body mass index is 43.85 kg/m .  Height is 5' 9\".   Wt Readings from Last 2 Encounters:   20 134.7 kg (296 lb 15.4 oz)   19 128.4 kg (283 lb)         Vancomycin IV Administrations (past 72 hours)                   vancomycin (VANCOCIN) 3,000 mg in sodium chloride 0.9 % 500 mL intermittent infusion (mg) 3,000 mg New Bag 20 1533              Nephrotoxins and other renal medications (From now, onward)    Start     Dose/Rate Route Frequency Ordered Stop    20 1530  vancomycin (VANCOCIN) 2,000 mg in sodium chloride 0.9 % 250 mL intermittent infusion      2,000 mg (central catheter)  over 60 Minutes Intravenous EVERY 24 HOURS 20 1452      02/15/20 0700  piperacillin-tazobactam (ZOSYN) 3.375 g vial to attach to  mL bag      3.375 g  over 1 Hours Intravenous EVERY 6 HOURS 02/15/20 0612 20 0759        Contrast Orders - past 72 hours (72h ago, onward)    Start     Dose/Rate Route Frequency Ordered Stop    20 1715  iopamidol (ISOVUE-370) solution 135 mL      135 mL Intravenous ONCE 20 1707 20 1722          Plan:  1.  Start vancomycin 3000 mg IV x 1 followed by maintenance dose of 2000 mg IV q24h (dosed 20 mg/kg on an AdjBW=96.3 kg given BMI=43.85 kg/m2).   2.  Goal Trough Level: 15-20 mg/L   3.  Pharmacy will check trough levels as appropriate in 3-5 Days.    4.  Serum creatinine levels will be ordered daily " for the first week of therapy and at least twice weekly for subsequent weeks.    5.  Kimberly method utilized to dose vancomycin therapy: Method 2 using AdjBW    Zeenat Montoya, PharmD  February 19, 2020

## 2020-02-19 NOTE — PROGRESS NOTES
Pt's wife and daughter to bedside around 1300- pt immediately woke up, opened eyes and began to interact- even mouthing words and asking for mouthwash or swab, etc. Wife and daughter left room after 1 hour- where he was awake and interacting- to have lunch and anticipate returning before CT this afternoon.

## 2020-02-19 NOTE — PLAN OF CARE
VSS. Follows commands, but unable to redirect agitated behaviors (throws head back and forth, attempts self-extubation without restraints). Up to chair with lift, tolerated well. Bronchoscopy at bedside - nothing to clean out per MD, so will monitor daily xrays. Chest tube and wound vac removed per service. Tmax 100.4 - tylenol given with minimal improvement; pan cultured. Will continue to monitor pt and update team with any changes or concerns.      Problem: Hemodynamic Instability (Aortic Aneurysm/Dissection Repair)  Goal: Vital Signs Remain in Desired Range  2/18/2020 1847 by Holly Garrison, RN  Outcome: No Change  2/18/2020 0643 by Erica Madrigal, AYDIN  Outcome: No Change     Problem: Tissue Perfusion Impaired (Aortic Aneurysm/Dissection Repair)  Goal: Effective Peripheral Tissue Perfusion  2/18/2020 1847 by Holly Garrison, RN  Outcome: No Change  2/18/2020 0643 by Erica Madrigal, AYDIN  Outcome: No Change     Problem: Restraint for Non-Violent/Non-Self-Destructive Behavior  Goal: Prevent/Manage Potential Problems  Description  Maintain safety of patient and others during period of restraint.  Promote psychological and physical wellbeing.  Prevent injury to skin and involved body parts.  Promote nutrition, hydration, and elimination.  Outcome: No Change

## 2020-02-19 NOTE — PROGRESS NOTES
Mr. Espana continues to be febrile despite treatment for pneumonia with zosyn. Infectious Disease had suggested CT scan to look for surgical site infection. Discussed the risks vs benefits of this with nephrology (Dr. Puente). Agreed benefits of identifying possible infection outweigh risks of further harm to the kidneys by contrast in this patient. They recommended against fluid bolus (feel patient is already fluid overload, additional fluid will not provide benefit to kidneys) and also giving NAC pre and post scan.    Macario Alfonso MD

## 2020-02-19 NOTE — PROGRESS NOTES
CV ICU PROGRESS NOTE  February 19, 2020      CO-MORBIDITIES:   Dissection of aorta, unspecified portion of aorta (H)    ASSESSMENT: Vel Espana is a 74 year old male with PMH of HTN s/p Ascending aortic dissection repair using a 32 mm Dacron Gelweave graft, AVR with 27 mm Patterson bovine pericardial valve, Coronary bypass to right coronary artery, Left greater saphenous vein harvest, Circulatory arrest, Left femoral artery exploration on 2/10, remains intubated for airway protection for slowly resolving metabolic encephalopathy.     TODAY'S PROGRESS:   - mental status improving, now intermittently following commands in all 4 extremities  - continue to treat hypernatremia, increase free water flushes  - diuresis with lasix, goal net negative 1 L    PLAN:  Neuro/ pain/ sedation:  - Monitor neurological status. Notify the MD for any acute changes in exam.  - Fentanyl gtt for pain.  - precedex gtt  - Daily sedation holiday  - seroquel 50 mg at bedtime      Pulmonary care:   #MV for airway protection post op  #ventilator associated pneumonia  - PST as able with sedation wean  - MV  - Titrate FiO2 for SpO2 >92%-  - Dr. Mckeon requests PEEP no less than 8  - bronchoscopy 2/15, 2/16     Cardiovascular:    - Monitor hemodynamic status.   - MAP >65, SBP <120  - Hold PTA anti-hypertensives    #Non-sustained VT. brief run of nonsustained VT, less than 30 seconds, in the operating room, was hypokalemic at the time, also was being paced, possible R-on-T phenomenon, has not had recurrence since temporary epicardial pacing has been discontinued.  Cardiology EP consult 2/12 recommended discontinuing amiodarone  #A-fib with RVR: noted overnight 2/14  - amiodarone PO     GI care:   - NPO except ice chips and medications.     Fluids/ Electrolytes/ Nutrition:   - TKO for IV fluid hydration  - NJ placed, TF at goal  - free water 120/hr for ongoing hypernatremia with q6 hr Na checks    Renal/ Fluid Balance:    #Nonoliguric HANNA  -  Urine output is adequate so far.  - Will continue to monitor intake and output, Cr, electrolytes.   - Nephrology consult     Endocrine:    # Stress hyperglycemia  - Insulin gtt     ID/ Antibiotics:  # ventilator associated pneumonia  - zosyn started 2/15, end date 2/22  - ID consult  - s/p vanc 2/15 - 2/17     Heme:     - Hgb goal >7  - low int heparin gtt for a-fib     Prophylaxis:    - heparin as above  - Protonix qd     Lines/ tubes/ drains:  - PICC, Arterial line, quintero     Disposition:  - CV ICU.      Patient seen, findings and plan discussed with CVICU attending Dr. Kristin Alfonso MD  [phone]    ====================================    SUBJECTIVE:   Mental status continues to gradually improve. Anxious at times, resulting in tachypnea.    OBJECTIVE:   1. VITAL SIGNS:   Temp:  [98.4  F (36.9  C)-100.3  F (37.9  C)] 100.1  F (37.8  C)  Heart Rate:  [72-99] 74  Resp:  [21-36] 34  BP: (95)/(64) 95/64  MAP:  [60 mmHg-86 mmHg] 65 mmHg  Arterial Line BP: ()/(48-68) 87/55  FiO2 (%):  [40 %-60 %] 55 %  SpO2:  [93 %-100 %] 100 %  Ventilation Mode: CMV/AC  (Continuous Mandatory Ventilation/ Assist Control)  FiO2 (%): (S) 55 %  Rate Set (breaths/minute): 16 breaths/min  Tidal Volume Set (mL): 450 mL  PEEP (cm H2O): 10 cmH2O  Oxygen Concentration (%): 60 %  Resp: (!) 34      2. INTAKE/ OUTPUT:   I/O last 3 completed shifts:  In: 4774.61 [I.V.:1444.61; NG/GT:2140]  Out: 3220 [Urine:3210; Chest Tube:10]    3. PHYSICAL EXAMINATION:   General: intubated, sedated.  Neuro: Sedated, opens eyes to voice. Follows commands intermittently in upper and lower extremities after several attempts  Resp: mechanical ventilation  CV: a-fib  Abdomen: Soft, Non-distended, Non-tender  Incisions: c/d/i  Extremities: warm and well perfused with mild edema, dusky appearance to the right second and third toes, stable    4. INVESTIGATIONS:   Arterial Blood Gases   Recent Labs   Lab 02/19/20  0357 02/19/20  0000 02/18/20  2210  02/17/20  1350   PH 7.50* 7.48* 7.51* 7.50*   PCO2 33* 33* 34* 36   PO2 67* 101 58* 83   HCO3 26 24 27 28     Complete Blood Count   Recent Labs   Lab 02/19/20  0357 02/18/20  0409 02/17/20  1550 02/17/20  0332   WBC 8.1 9.9 9.2 11.1*   HGB 8.0* 8.7* 8.3* 8.4*    238 211 193     Basic Metabolic Panel  Recent Labs   Lab 02/19/20  0357 02/19/20  0000 02/18/20  2210 02/18/20  1528 02/18/20  1039 02/18/20  0409 02/17/20  2307 02/17/20  0332   * 155* 156*  --   --  150* 153* 148*   POTASSIUM 3.7 3.4  --  3.4 3.4 3.1* 3.4 3.7   CHLORIDE 122*  --   --   --   --  119* 120* 115*   CO2 25  --   --   --   --  27 25 30   *  --   --   --   --  106* 106* 110*   CR 2.38*  --   --   --   --  2.36* 2.33* 2.28*   *  --   --   --   --  161* 169* 146*     Liver Function Tests  Recent Labs   Lab 02/15/20  1213 02/14/20  2030 02/13/20  0353   AST 32 44 58*   ALT 17 17 16   ALKPHOS 66  --  42   BILITOTAL 0.5  --  0.5   ALBUMIN 2.3*  --  2.8*   INR  --   --  1.29*     Pancreatic Enzymes  No lab results found in last 7 days.  Coagulation Profile  Recent Labs   Lab 02/17/20  0332 02/16/20  0336 02/13/20  0353   INR  --   --  1.29*   PTT 42* 48*  --          5. RADIOLOGY:   Recent Results (from the past 24 hour(s))   XR Chest Port 1 View    Narrative    EXAM: XR CHEST PORT 1 VW  2/18/2020 3:03 PM     HISTORY:  post bronchoscopy       COMPARISON:  Chest x-ray from same date at 0100 hours    FINDINGS:   Portable semiupright view of the chest. Endotracheal tube tip projects  over the mid thoracic trachea. Enteric tube courses below the  diaphragm and terminates inferior to the field of view. Right PICC  line catheter tip at the SVC. Previously seen right basilar chest tube  is not visualized on this exam. Aortic valve prosthesis. Midline  sternotomy wires intact. Stable enlarged cardiac silhouette with  bilateral pleural effusions, left greater than right. Left basilar  atelectasis/consolidation. No new focal air space  opacities. No  pneumothorax.      Impression    IMPRESSION:   Stable cardiomegaly with bilateral pleural effusions, left greater  than right.    I have personally reviewed the examination and initial interpretation  and I agree with the findings.    DERICK BESS MD   XR Chest Port 1 View    Narrative    Exam: XR CHEST PORT 1 VW, 2/19/2020 9:32 AM    Indication: Evaluate tubes/lines    Comparison: 2/18/2020, 2/17/2020, 2/16/2020    Findings:   A single AP view of the chest was obtained. The right arm PICC tip  projects over the mid/upper SVC. The endotracheal tube tip projects  over the mid trachea. The enteric tubes extend below the  field-of-view. Post surgical changes of prosthetic aortic valve. The  cardiomediastinal silhouette is unchanged and enlarged. No  pneumothorax. Stable small left pleural effusion and left lower lobe  opacities. Extensive degenerative changes in the right glenohumeral  joint.      Impression    Impression:   1. Stable support devices.  2. Stable cardiomegaly, pulmonary vascular congestion, and small left  pleural effusion.  3. Unchanged left lower lobe atelectasis/consolidation.    MARC BATISTA MD       =========================================

## 2020-02-19 NOTE — PROGRESS NOTES
Daughter, Rosa- called this morning and I updated her on his fever.  At this time wife and second daughter have arrived- updated by myslef and resident with addidtional info that he will have CT this afternoon.

## 2020-02-19 NOTE — PROGRESS NOTES
Nephrology Progress Note  February 18, 2020      Vel Espana MRN:[id number] YOB: 1945  Date of Admission:2/9/2020    ASSESSMENT AND RECOMMENDATIONS:   74 yr male with Type A  Aortic dissection, S/p repair , AVR ,CABG, Left greater saphenous vein harvest, Circulatory arrest, Left femoral artery exploration on 2/10. Has NSVT. Nephrology consulted for HANNA.     Non Oliguric HANNA   Unknown baseline  Cr   Cr on admission 1.24  Etiology felt to be hypoperfusion on presentation with IV contrast use  resulting in ATN.  --  Overall, renal function relatively stable, eGFr 25-30, and he is non oliguric. He was net positive 1L yesterday by I/O, but 2L of intake was Free H20. Weight is appropriately coming down.    Recommend continued use of diuretics as needed for volume management as he continues to req ventilatory support, but otherwise stable kidney function.     Volume /BP  BP's remain stable off pressors. Still with volume overload, but improving, weight coming down. Recommend continued IV Lasix 60mg today, he has had good response with 3-4L UOP.     Anemia   Hb stable ~ 8-9.     Acid Base,Electrolytes :   Hypernatremia - worsened now 154. Recommend increasing Free Water to 125cc/hr via NGT and trend Na q6 hrs.  If Na continues to rise, would continue to increase Free Water by 50cc/hr.    Fevers  PNA  Bronch with significant WBC. Blood Cx negative.  On PipTazo per ID.    Recommendations were communicated to primary team via this note.    Patient seen and discussed with Dr Sara Puente MD  Neph Fellow  [phone]    INTERVAL HISTORY   Remains intubated, but alert to voice and tactile stimuli. Following some commands. Remains intermittently febrile. He continues to make large volume UOP with diuretics. BP relatively stable, not on vasopressors. On TF for nutrition.    REVIEW OF SYSTEMS:  A review of systems was not obtained due to intubated status    Current Meds    acetylcysteine  2 mL  "Nebulization Q4H KAYLA     albuterol  2.5 mg Nebulization Q4H     amiodarone  400 mg Oral BID    Followed by     [START ON 2020] amiodarone  200 mg Oral Daily     aspirin  81 mg Oral Daily     atorvastatin  40 mg Oral QPM     furosemide  80 mg Intravenous Once     heparin lock flush  5-10 mL Intracatheter Q24H     insulin aspart  1-6 Units Subcutaneous Q4H     lidocaine  1-3 patch Transdermal Q24H     lidocaine  5 mL Topical Once     lidocaine   Transdermal Q8H     multivitamins w/minerals  15 mL Per Feeding Tube Daily     pantoprazole  40 mg Oral or Feeding Tube Daily     piperacillin-tazobactam  3.375 g Intravenous Q6H     polyethylene glycol  17 g Oral or Feeding Tube Daily     protein modular  1 packet Per Feeding Tube Daily     QUEtiapine  50 mg Oral QPM     senna-docusate  1 tablet Oral or Feeding Tube BID    Or     senna-docusate  2 tablet Oral or Feeding Tube BID     sodium chloride (PF)  3 mL Intracatheter Q8H     Infusion Meds    dexmedetomidine 0.8 mcg/kg/hr (20 1100)     dextrose       fentaNYL Stopped (20 1545)     HEParin 1,500 Units/hr (20 1100)     norepinephrine Stopped (20 1533)     propofol (DIPRIVAN) infusion Stopped (20 1145)     BETA BLOCKER NOT PRESCRIBED       ALLERGIES:    No Known Allergies    PHYSICAL EXAM:   Temp  Av  F (37.8  C)  Min: 97.8  F (36.6  C)  Max: 102.4  F (39.1  C)  Arterial Line BP  Min: 69/43  Max: 152/57  Arterial Line MAP (mmHg)  Av.7 mmHg  Min: 50 mmHg  Max: 115 mmHg      Pulse  Av.9  Min: 71  Max: 96 Resp  Av.9  Min: 11  Max: 48  FiO2 (%)  Av.1 %  Min: 40 %  Max: 100 %  SpO2  Av.6 %  Min: 87 %  Max: 100 %    CVP (mmHg): 12 mmHg  BP 95/64   Pulse 96   Temp 101.5  F (38.6  C) (Axillary)   Resp (!) 34   Ht 1.753 m (5' 9\")   Wt 134.7 kg (296 lb 15.4 oz)   SpO2 96%   BMI 43.85 kg/m     Date 20 0700 - 20 0659   Shift 1196-1874 6276-6157 6040-0659 24 Hour Total   INTAKE   I.V. 316.64   316.64 "   NG/GT 70   70   IV Piggyback 500   500   Enteral 490   490   Blood Components 300   300   Shift Total(mL/kg) 1676.64(12.02)   1676.64(12.02)   OUTPUT   Urine 1470   1470   Chest Tube(mL/kg) 60(0.43)   60(0.43)   Shift Total(mL/kg) 1530(10.97)   1530(10.97)   Weight (kg) 139.5 139.5 139.5 139.5      Admit Weight: 131.9 kg (290 lb 11.2 oz)(bed scale)     GENERAL APPEARANCE: intubated   EYES: no scleral icterus  Pulmonary:  Decreased air entry in bases with basal rales   CV: irr rhythm, no rub   - JVD no   - Edema 2+   GI: soft, nontender, normal bowel sounds  : has quintero  SKIN: no rash, warm, dry, no cyanosis  NEURO: intubated, alert to voice, moving ext    LABS:   CMP  Recent Labs   Lab 02/19/20  0357 02/19/20  0000 02/18/20  2210 02/18/20  1528 02/18/20  1039 02/18/20  0409 02/17/20  2307 02/17/20  0332 02/16/20  1955  02/15/20  1213  02/14/20  2030  02/13/20  1119  02/13/20  0353   * 155* 156*  --   --  150* 153* 148* 149*   < > 146*   < >  --    < > 144  --  144   POTASSIUM 3.7 3.4  --  3.4 3.4 3.1* 3.4 3.7 3.9   < > 4.5   < > 4.5   < > 5.1   < > 5.2   CHLORIDE 122*  --   --   --   --  119* 120* 115*  --    < > 112*   < >  --    < > 115*  --  114*   CO2 25  --   --   --   --  27 25 30  --    < > 29   < >  --    < > 27  --  28   ANIONGAP 6  --   --   --   --  5 8 3  --    < > 5   < >  --    < > 2*  --  2*   *  --   --   --   --  161* 169* 146*  --    < > 194*   < >  --    < > 119*  --  166*   *  --   --   --   --  106* 106* 110*  --    < > 107*   < >  --    < > 97*  --  92*   CR 2.38*  --   --   --   --  2.36* 2.33* 2.28*  --    < > 2.09*   < >  --    < > 2.77*  --  2.89*   GFRESTIMATED 25*  --   --   --   --  26* 26* 27*  --    < > 30*   < >  --    < > 21*  --  20*   GFRESTBLACK 29*  --   --   --   --  30* 30* 31*  --    < > 34*   < >  --    < > 24*  --  23*   ELMER 7.7*  --   --   --   --  8.0* 7.8* 8.4*  --    < > 7.8*   < >  --    < > 7.8*  --  7.9*   MAG 2.7*  --   --   --   --  2.9*  --   2.9* 2.7*  --   --   --   --   --  2.8*  --  2.9*   PHOS 3.5  --   --   --   --  2.9  --  3.4  --   --   --   --   --   --  3.6  --  3.8   PROTTOTAL  --   --   --   --   --   --   --   --   --   --  5.8*  --   --   --   --   --  5.4*   ALBUMIN  --   --   --   --   --   --   --   --   --   --  2.3*  --   --   --   --   --  2.8*   BILITOTAL  --   --   --   --   --   --   --   --   --   --  0.5  --   --   --   --   --  0.5   ALKPHOS  --   --   --   --   --   --   --   --   --   --  66  --   --   --   --   --  42   AST  --   --   --   --   --   --   --   --   --   --  32  --  44  --   --   --  58*   ALT  --   --   --   --   --   --   --   --   --   --  17  --  17  --   --   --  16    < > = values in this interval not displayed.     CBC  Recent Labs   Lab 02/19/20 0357 02/18/20 0409 02/17/20  1550 02/17/20 0332   HGB 8.0* 8.7* 8.3* 8.4*   WBC 8.1 9.9 9.2 11.1*   RBC 2.59* 2.78* 2.64* 2.75*   HCT 26.2* 28.1* 27.0* 28.2*   * 101* 102* 103*   MCH 30.9 31.3 31.4 30.5   MCHC 30.5* 31.0* 30.7* 29.8*   RDW 15.9* 16.1* 16.2* 16.2*    238 211 193     INR  Recent Labs   Lab 02/17/20 0332 02/16/20  0336 02/13/20 0353   INR  --   --  1.29*   PTT 42* 48*  --      ABG  Recent Labs   Lab 02/19/20 0357 02/19/20  0000 02/18/20  2210 02/17/20  1350   PH 7.50* 7.48* 7.51* 7.50*   PCO2 33* 33* 34* 36   PO2 67* 101 58* 83   HCO3 26 24 27 28   O2PER 50 60 40 45.0      URINE STUDIES  Recent Labs   Lab Test 02/18/20  1817 02/14/20  2031 02/13/20  1447   COLOR Yellow Light Yellow Yellow   APPEARANCE Slightly Cloudy Slightly Cloudy Clear   URINEGLC Negative Negative Negative   URINEBILI Negative Negative Negative   URINEKETONE Negative Negative Negative   SG 1.014 1.009 1.012   UBLD Trace* Small* Small*   URINEPH 5.5 5.0 5.0   PROTEIN 10* Negative Negative   NITRITE Negative Negative Negative   LEUKEST Negative Small* Small*   RBCU 1 1 9*   WBCU 1 3 4     IMAGING:  Pertinent test results reviewed    Westley Puente,  MD

## 2020-02-19 NOTE — PROGRESS NOTES
Notified Resident at 11:45 AM regarding change in condition; specifically that he was febrile this AM @ 0800 (100.3 ax) and despite giving tylenol is now 101.5 ax. In addition- he is more somnolent, not following ever basic commands to squeeze hands, move feet- which he was this AM.    He has tolerated PS 12/10 vent settings @ 55% for 2 hours with RR 25-30, now in creasing to 35-40 so will return to initial settings    Spoke with: Dr Alfonso    Orders were obtained. Just came to bedside after review of ID consult yesterday- will order CT.   Blood Cultures can be repeated after 1700 today as he was pan cultured yesterday for same presentation.    Comments: Will continue to reassess and update team as needed

## 2020-02-19 NOTE — PROGRESS NOTES
CLINICAL NUTRITION SERVICES - BRIEF NOTE   (see RD note on 2/18 for full assessment)    Obtained metabolic cart study on 2/18 @ 900. MREE = 3405 kcal/day (39 kcal/kg) with RQ = 0.69. In the 24 hours preceding the study, pt received 940 kcals (11 kcal/kg or 28% MREE) from TF and propofol. RQ is within physiologic range and logical given provisions received prior to study.     Based on study results will aim energy needs at 60-80% MREE or 2000 -2600 kcal/day  (23-30 kcal/kg). Current TF regimen, Nepro @ 50 ml/hr + Prosource (1 pkt daily) provides 2200 kcal/day (25 kcal/kg) or 65% MREE.     Nutrition changes today:  Will increase TF regimen to better meet energy needs.     - Nepro @ 60 ml/hr + Prosource (1 pkt daily) to provide 2632 kcals (30 kcal/kg/day of 75% MREE), 128 g PRO (1.5 g/kg/day), 1051 ml free H2O, 232 g CHO and 18 g Fiber daily.    Zina Austin, RD, LD  [id number]  Pgr: [phone]

## 2020-02-19 NOTE — PROGRESS NOTES
CVTS PROGRESS NOTE  February 19, 2020      CO-MORBIDITIES:   Dissection of aorta, unspecified portion of aorta (H)    ASSESSMENT: Vel Espana is a 74 year old male with PMH of HTN s/p Ascending aortic dissection repair using a 32 mm Dacron Gelweave graft, AVR with 27 mm Patterson bovine pericardial valve, Coronary bypass to right coronary artery, Left greater saphenous vein harvest, Circulatory arrest, Left femoral artery exploration on 2/10, remains intubated for airway protection for slowly resolving metabolic encephalopathy.     TODAY'S PROGRESS:   - mental status improving, now intermittently following commands in all 4 extremities  - continue to treat hypernatremia, increase free water flushes  - diuresis with lasix, goal net negative 1 L    PLAN:  Neuro/ pain/ sedation:  - Monitor neurological status. Notify the MD for any acute changes in exam.  - Fentanyl gtt for pain.  - precedex gtt  - Daily sedation holiday  - seroquel 50 mg at bedtime      Pulmonary care:   #MV for airway protection post op  #ventilator associated pneumonia  - PST as able with sedation wean  - MV  - Titrate FiO2 for SpO2 >92%-  - Dr. Mckeon requests PEEP no less than 8  - bronchoscopy 2/15, 2/16     Cardiovascular:    - Monitor hemodynamic status.   - MAP >65, SBP <120  - Hold PTA anti-hypertensives    #Non-sustained VT. brief run of nonsustained VT, less than 30 seconds, in the operating room, was hypokalemic at the time, also was being paced, possible R-on-T phenomenon, has not had recurrence since temporary epicardial pacing has been discontinued.  Cardiology EP consult 2/12 recommended discontinuing amiodarone  #A-fib with RVR: noted overnight 2/14  - amiodarone PO     GI care:   - NPO except ice chips and medications.     Fluids/ Electrolytes/ Nutrition:   - TKO for IV fluid hydration  - NJ placed, TF at goal  - free water 120/hr for ongoing hypernatremia with q6 hr Na checks    Renal/ Fluid Balance:    #Nonoliguric HANNA  -  Urine output is adequate so far.  - Will continue to monitor intake and output, Cr, electrolytes.   - Nephrology consult     Endocrine:    # Stress hyperglycemia  - Insulin gtt     ID/ Antibiotics:  # ventilator associated pneumonia  - zosyn started 2/15, end date 2/22  - ID consult  - s/p vanc 2/15 - 2/17     Heme:     - Hgb goal >7  - low int heparin gtt for a-fib     Prophylaxis:    - heparin as above  - Protonix qd     Lines/ tubes/ drains:  - PICC, Arterial line, quintero     Disposition:  - CV ICU.      Patient seen, findings and plan discussed with CVTS fellow     Macario Alfonso MD  [phone]    ====================================    SUBJECTIVE:   Mental status continues to gradually improve. Anxious at times, resulting in tachypnea.    OBJECTIVE:   1. VITAL SIGNS:   Temp:  [98.4  F (36.9  C)-100.3  F (37.9  C)] 100.1  F (37.8  C)  Heart Rate:  [72-99] 74  Resp:  [21-36] 34  BP: (95)/(64) 95/64  MAP:  [60 mmHg-86 mmHg] 65 mmHg  Arterial Line BP: ()/(48-68) 87/55  FiO2 (%):  [40 %-60 %] 55 %  SpO2:  [93 %-100 %] 100 %  Ventilation Mode: CMV/AC  (Continuous Mandatory Ventilation/ Assist Control)  FiO2 (%): (S) 55 %  Rate Set (breaths/minute): 16 breaths/min  Tidal Volume Set (mL): 450 mL  PEEP (cm H2O): 10 cmH2O  Oxygen Concentration (%): 60 %  Resp: (!) 34      2. INTAKE/ OUTPUT:   I/O last 3 completed shifts:  In: 4774.61 [I.V.:1444.61; NG/GT:2140]  Out: 3220 [Urine:3210; Chest Tube:10]    3. PHYSICAL EXAMINATION:   General: intubated, sedated.  Neuro: Sedated, opens eyes to voice. Follows commands intermittently in upper and lower extremities after several attempts  Resp: mechanical ventilation  CV: a-fib  Abdomen: Soft, Non-distended, Non-tender  Incisions: c/d/i  Extremities: warm and well perfused with mild edema, dusky appearance to the right second and third toes, stable    4. INVESTIGATIONS:   Arterial Blood Gases   Recent Labs   Lab 02/19/20  0357 02/19/20  0000 02/18/20  2210 02/17/20  1350   PH  7.50* 7.48* 7.51* 7.50*   PCO2 33* 33* 34* 36   PO2 67* 101 58* 83   HCO3 26 24 27 28     Complete Blood Count   Recent Labs   Lab 02/19/20  0357 02/18/20  0409 02/17/20  1550 02/17/20  0332   WBC 8.1 9.9 9.2 11.1*   HGB 8.0* 8.7* 8.3* 8.4*    238 211 193     Basic Metabolic Panel  Recent Labs   Lab 02/19/20  0357 02/19/20  0000 02/18/20  2210 02/18/20  1528 02/18/20  1039 02/18/20  0409 02/17/20  2307 02/17/20  0332   * 155* 156*  --   --  150* 153* 148*   POTASSIUM 3.7 3.4  --  3.4 3.4 3.1* 3.4 3.7   CHLORIDE 122*  --   --   --   --  119* 120* 115*   CO2 25  --   --   --   --  27 25 30   *  --   --   --   --  106* 106* 110*   CR 2.38*  --   --   --   --  2.36* 2.33* 2.28*   *  --   --   --   --  161* 169* 146*     Liver Function Tests  Recent Labs   Lab 02/15/20  1213 02/14/20  2030 02/13/20  0353   AST 32 44 58*   ALT 17 17 16   ALKPHOS 66  --  42   BILITOTAL 0.5  --  0.5   ALBUMIN 2.3*  --  2.8*   INR  --   --  1.29*     Pancreatic Enzymes  No lab results found in last 7 days.  Coagulation Profile  Recent Labs   Lab 02/17/20  0332 02/16/20  0336 02/13/20  0353   INR  --   --  1.29*   PTT 42* 48*  --          5. RADIOLOGY:   Recent Results (from the past 24 hour(s))   XR Chest Port 1 View    Narrative    EXAM: XR CHEST PORT 1 VW  2/18/2020 3:03 PM     HISTORY:  post bronchoscopy       COMPARISON:  Chest x-ray from same date at 0100 hours    FINDINGS:   Portable semiupright view of the chest. Endotracheal tube tip projects  over the mid thoracic trachea. Enteric tube courses below the  diaphragm and terminates inferior to the field of view. Right PICC  line catheter tip at the SVC. Previously seen right basilar chest tube  is not visualized on this exam. Aortic valve prosthesis. Midline  sternotomy wires intact. Stable enlarged cardiac silhouette with  bilateral pleural effusions, left greater than right. Left basilar  atelectasis/consolidation. No new focal air space opacities.  No  pneumothorax.      Impression    IMPRESSION:   Stable cardiomegaly with bilateral pleural effusions, left greater  than right.    I have personally reviewed the examination and initial interpretation  and I agree with the findings.    DERICK BESS MD   XR Chest Port 1 View    Narrative    Exam: XR CHEST PORT 1 VW, 2/19/2020 9:32 AM    Indication: Evaluate tubes/lines    Comparison: 2/18/2020, 2/17/2020, 2/16/2020    Findings:   A single AP view of the chest was obtained. The right arm PICC tip  projects over the mid/upper SVC. The endotracheal tube tip projects  over the mid trachea. The enteric tubes extend below the  field-of-view. Post surgical changes of prosthetic aortic valve. The  cardiomediastinal silhouette is unchanged and enlarged. No  pneumothorax. Stable small left pleural effusion and left lower lobe  opacities. Extensive degenerative changes in the right glenohumeral  joint.      Impression    Impression:   1. Stable support devices.  2. Stable cardiomegaly, pulmonary vascular congestion, and small left  pleural effusion.  3. Unchanged left lower lobe atelectasis/consolidation.    MARC BATISTA MD       =========================================

## 2020-02-20 LAB
ANION GAP SERPL CALCULATED.3IONS-SCNC: 7 MMOL/L (ref 3–14)
BACTERIA SPEC CULT: NO GROWTH
BASE DEFICIT BLDA-SCNC: 1.6 MMOL/L
BUN SERPL-MCNC: 99 MG/DL (ref 7–30)
CALCIUM SERPL-MCNC: 7.6 MG/DL (ref 8.5–10.1)
CHLORIDE SERPL-SCNC: 121 MMOL/L (ref 94–109)
CO2 SERPL-SCNC: 24 MMOL/L (ref 20–32)
CREAT SERPL-MCNC: 2.4 MG/DL (ref 0.66–1.25)
ERYTHROCYTE [DISTWIDTH] IN BLOOD BY AUTOMATED COUNT: 16.3 % (ref 10–15)
GFR SERPL CREATININE-BSD FRML MDRD: 25 ML/MIN/{1.73_M2}
GLUCOSE BLDC GLUCOMTR-MCNC: 143 MG/DL (ref 70–99)
GLUCOSE BLDC GLUCOMTR-MCNC: 148 MG/DL (ref 70–99)
GLUCOSE BLDC GLUCOMTR-MCNC: 151 MG/DL (ref 70–99)
GLUCOSE BLDC GLUCOMTR-MCNC: 173 MG/DL (ref 70–99)
GLUCOSE BLDC GLUCOMTR-MCNC: 177 MG/DL (ref 70–99)
GLUCOSE SERPL-MCNC: 161 MG/DL (ref 70–99)
HCO3 BLD-SCNC: 21 MMOL/L (ref 21–28)
HCT VFR BLD AUTO: 27.3 % (ref 40–53)
HGB BLD-MCNC: 8.3 G/DL (ref 13.3–17.7)
LMWH PPP CHRO-ACNC: 0.27 IU/ML
Lab: NORMAL
Lab: NORMAL
MAGNESIUM SERPL-MCNC: 2.5 MG/DL (ref 1.6–2.3)
MCH RBC QN AUTO: 31 PG (ref 26.5–33)
MCHC RBC AUTO-ENTMCNC: 30.4 G/DL (ref 31.5–36.5)
MCV RBC AUTO: 102 FL (ref 78–100)
O2/TOTAL GAS SETTING VFR VENT: 55 %
OXYHGB MFR BLD: 93 % (ref 92–100)
PCO2 BLD: 28 MM HG (ref 35–45)
PH BLD: 7.49 PH (ref 7.35–7.45)
PLATELET # BLD AUTO: 307 10E9/L (ref 150–450)
PO2 BLD: 64 MM HG (ref 80–105)
POTASSIUM SERPL-SCNC: 3.4 MMOL/L (ref 3.4–5.3)
POTASSIUM SERPL-SCNC: 3.5 MMOL/L (ref 3.4–5.3)
POTASSIUM SERPL-SCNC: 3.8 MMOL/L (ref 3.4–5.3)
RADIOLOGIST FLAGS: ABNORMAL
RBC # BLD AUTO: 2.68 10E12/L (ref 4.4–5.9)
SODIUM SERPL-SCNC: 152 MMOL/L (ref 133–144)
SODIUM SERPL-SCNC: 153 MMOL/L (ref 133–144)
SODIUM SERPL-SCNC: 153 MMOL/L (ref 133–144)
SODIUM SERPL-SCNC: 154 MMOL/L (ref 133–144)
SODIUM SERPL-SCNC: 154 MMOL/L (ref 133–144)
SPECIMEN SOURCE: NORMAL
WBC # BLD AUTO: 10.8 10E9/L (ref 4–11)

## 2020-02-20 PROCEDURE — 25000128 H RX IP 250 OP 636: Performed by: STUDENT IN AN ORGANIZED HEALTH CARE EDUCATION/TRAINING PROGRAM

## 2020-02-20 PROCEDURE — 94640 AIRWAY INHALATION TREATMENT: CPT

## 2020-02-20 PROCEDURE — 25000132 ZZH RX MED GY IP 250 OP 250 PS 637: Performed by: ANESTHESIOLOGY

## 2020-02-20 PROCEDURE — 25000132 ZZH RX MED GY IP 250 OP 250 PS 637: Performed by: THORACIC SURGERY (CARDIOTHORACIC VASCULAR SURGERY)

## 2020-02-20 PROCEDURE — 94640 AIRWAY INHALATION TREATMENT: CPT | Mod: 76

## 2020-02-20 PROCEDURE — 85027 COMPLETE CBC AUTOMATED: CPT | Performed by: STUDENT IN AN ORGANIZED HEALTH CARE EDUCATION/TRAINING PROGRAM

## 2020-02-20 PROCEDURE — 00000146 ZZHCL STATISTIC GLUCOSE BY METER IP

## 2020-02-20 PROCEDURE — 40000014 ZZH STATISTIC ARTERIAL MONITORING DAILY

## 2020-02-20 PROCEDURE — 80048 BASIC METABOLIC PNL TOTAL CA: CPT | Performed by: STUDENT IN AN ORGANIZED HEALTH CARE EDUCATION/TRAINING PROGRAM

## 2020-02-20 PROCEDURE — 25800030 ZZH RX IP 258 OP 636: Performed by: THORACIC SURGERY (CARDIOTHORACIC VASCULAR SURGERY)

## 2020-02-20 PROCEDURE — 25000125 ZZHC RX 250

## 2020-02-20 PROCEDURE — 25000128 H RX IP 250 OP 636: Performed by: PHYSICIAN ASSISTANT

## 2020-02-20 PROCEDURE — 82805 BLOOD GASES W/O2 SATURATION: CPT | Performed by: PHYSICIAN ASSISTANT

## 2020-02-20 PROCEDURE — 25000132 ZZH RX MED GY IP 250 OP 250 PS 637: Performed by: STUDENT IN AN ORGANIZED HEALTH CARE EDUCATION/TRAINING PROGRAM

## 2020-02-20 PROCEDURE — 20000004 ZZH R&B ICU UMMC

## 2020-02-20 PROCEDURE — 94003 VENT MGMT INPAT SUBQ DAY: CPT

## 2020-02-20 PROCEDURE — 99221 1ST HOSP IP/OBS SF/LOW 40: CPT | Mod: GC | Performed by: SURGERY

## 2020-02-20 PROCEDURE — 99232 SBSQ HOSP IP/OBS MODERATE 35: CPT | Mod: GC | Performed by: ANESTHESIOLOGY

## 2020-02-20 PROCEDURE — 83735 ASSAY OF MAGNESIUM: CPT | Performed by: STUDENT IN AN ORGANIZED HEALTH CARE EDUCATION/TRAINING PROGRAM

## 2020-02-20 PROCEDURE — 25000128 H RX IP 250 OP 636: Performed by: THORACIC SURGERY (CARDIOTHORACIC VASCULAR SURGERY)

## 2020-02-20 PROCEDURE — 40000275 ZZH STATISTIC RCP TIME EA 10 MIN

## 2020-02-20 PROCEDURE — 25000125 ZZHC RX 250: Performed by: STUDENT IN AN ORGANIZED HEALTH CARE EDUCATION/TRAINING PROGRAM

## 2020-02-20 PROCEDURE — 84132 ASSAY OF SERUM POTASSIUM: CPT | Performed by: STUDENT IN AN ORGANIZED HEALTH CARE EDUCATION/TRAINING PROGRAM

## 2020-02-20 PROCEDURE — 84295 ASSAY OF SERUM SODIUM: CPT | Performed by: STUDENT IN AN ORGANIZED HEALTH CARE EDUCATION/TRAINING PROGRAM

## 2020-02-20 PROCEDURE — 85520 HEPARIN ASSAY: CPT | Performed by: STUDENT IN AN ORGANIZED HEALTH CARE EDUCATION/TRAINING PROGRAM

## 2020-02-20 PROCEDURE — 27210429 ZZH NUTRITION PRODUCT INTERMEDIATE LITER

## 2020-02-20 PROCEDURE — 25000132 ZZH RX MED GY IP 250 OP 250 PS 637: Performed by: PHYSICIAN ASSISTANT

## 2020-02-20 PROCEDURE — 25800030 ZZH RX IP 258 OP 636: Performed by: STUDENT IN AN ORGANIZED HEALTH CARE EDUCATION/TRAINING PROGRAM

## 2020-02-20 RX ORDER — FUROSEMIDE 10 MG/ML
60 INJECTION INTRAMUSCULAR; INTRAVENOUS EVERY 8 HOURS
Status: COMPLETED | OUTPATIENT
Start: 2020-02-20 | End: 2020-02-21

## 2020-02-20 RX ADMIN — INSULIN ASPART 1 UNITS: 100 INJECTION, SOLUTION INTRAVENOUS; SUBCUTANEOUS at 12:44

## 2020-02-20 RX ADMIN — POTASSIUM CHLORIDE 40 MEQ: 1.5 POWDER, FOR SOLUTION ORAL at 14:01

## 2020-02-20 RX ADMIN — CEFEPIME HYDROCHLORIDE 2 G: 2 INJECTION, POWDER, FOR SOLUTION INTRAVENOUS at 09:28

## 2020-02-20 RX ADMIN — ALBUTEROL SULFATE 2.5 MG: 2.5 SOLUTION RESPIRATORY (INHALATION) at 21:24

## 2020-02-20 RX ADMIN — INSULIN ASPART 1 UNITS: 100 INJECTION, SOLUTION INTRAVENOUS; SUBCUTANEOUS at 15:55

## 2020-02-20 RX ADMIN — INSULIN ASPART 1 UNITS: 100 INJECTION, SOLUTION INTRAVENOUS; SUBCUTANEOUS at 09:18

## 2020-02-20 RX ADMIN — INSULIN ASPART 1 UNITS: 100 INJECTION, SOLUTION INTRAVENOUS; SUBCUTANEOUS at 20:15

## 2020-02-20 RX ADMIN — ALBUTEROL SULFATE 2.5 MG: 2.5 SOLUTION RESPIRATORY (INHALATION) at 09:06

## 2020-02-20 RX ADMIN — QUETIAPINE FUMARATE 50 MG: 50 TABLET ORAL at 19:45

## 2020-02-20 RX ADMIN — ATORVASTATIN CALCIUM 40 MG: 40 TABLET, FILM COATED ORAL at 19:45

## 2020-02-20 RX ADMIN — CEFEPIME HYDROCHLORIDE 2 G: 2 INJECTION, POWDER, FOR SOLUTION INTRAVENOUS at 21:35

## 2020-02-20 RX ADMIN — AMIODARONE HYDROCHLORIDE 200 MG: 200 TABLET ORAL at 08:17

## 2020-02-20 RX ADMIN — ACETYLCYSTEINE 2 ML: 200 SOLUTION ORAL; RESPIRATORY (INHALATION) at 16:06

## 2020-02-20 RX ADMIN — ACETYLCYSTEINE 2 ML: 200 SOLUTION ORAL; RESPIRATORY (INHALATION) at 12:47

## 2020-02-20 RX ADMIN — ALBUTEROL SULFATE 2.5 MG: 2.5 SOLUTION RESPIRATORY (INHALATION) at 03:59

## 2020-02-20 RX ADMIN — ACETYLCYSTEINE 2 ML: 200 SOLUTION ORAL; RESPIRATORY (INHALATION) at 09:06

## 2020-02-20 RX ADMIN — Medication 1 PACKET: at 08:18

## 2020-02-20 RX ADMIN — PIPERACILLIN AND TAZOBACTAM 3.38 G: 3; .375 INJECTION, POWDER, FOR SOLUTION INTRAVENOUS at 02:04

## 2020-02-20 RX ADMIN — INSULIN ASPART 1 UNITS: 100 INJECTION, SOLUTION INTRAVENOUS; SUBCUTANEOUS at 03:54

## 2020-02-20 RX ADMIN — METRONIDAZOLE 500 MG: 500 INJECTION, SOLUTION INTRAVENOUS at 10:17

## 2020-02-20 RX ADMIN — POLYETHYLENE GLYCOL 3350 17 G: 17 POWDER, FOR SOLUTION ORAL at 08:17

## 2020-02-20 RX ADMIN — ALBUTEROL SULFATE 2.5 MG: 2.5 SOLUTION RESPIRATORY (INHALATION) at 12:47

## 2020-02-20 RX ADMIN — ALBUTEROL SULFATE 2.5 MG: 2.5 SOLUTION RESPIRATORY (INHALATION) at 16:06

## 2020-02-20 RX ADMIN — FUROSEMIDE 60 MG: 20 INJECTION, SOLUTION INTRAMUSCULAR; INTRAVENOUS at 17:08

## 2020-02-20 RX ADMIN — POTASSIUM CHLORIDE 20 MEQ: 29.8 INJECTION, SOLUTION INTRAVENOUS at 19:45

## 2020-02-20 RX ADMIN — POTASSIUM CHLORIDE 20 MEQ: 1.5 POWDER, FOR SOLUTION ORAL at 05:34

## 2020-02-20 RX ADMIN — ACETYLCYSTEINE 2 ML: 200 SOLUTION ORAL; RESPIRATORY (INHALATION) at 03:59

## 2020-02-20 RX ADMIN — Medication 40 MG: at 08:17

## 2020-02-20 RX ADMIN — DEXMEDETOMIDINE 0.6 MCG/KG/HR: 100 INJECTION, SOLUTION, CONCENTRATE INTRAVENOUS at 07:04

## 2020-02-20 RX ADMIN — VANCOMYCIN HYDROCHLORIDE 2000 MG: 10 INJECTION, POWDER, LYOPHILIZED, FOR SOLUTION INTRAVENOUS at 15:46

## 2020-02-20 RX ADMIN — ACETYLCYSTEINE 2 ML: 200 SOLUTION ORAL; RESPIRATORY (INHALATION) at 21:24

## 2020-02-20 RX ADMIN — HEPARIN SODIUM 1500 UNITS/HR: 10000 INJECTION, SOLUTION INTRAVENOUS at 17:00

## 2020-02-20 RX ADMIN — ALBUTEROL SULFATE 2.5 MG: 2.5 SOLUTION RESPIRATORY (INHALATION) at 00:37

## 2020-02-20 RX ADMIN — MULTIVITAMIN 15 ML: LIQUID ORAL at 08:17

## 2020-02-20 RX ADMIN — Medication 40 MG: at 19:45

## 2020-02-20 RX ADMIN — ASPIRIN 81 MG CHEWABLE TABLET 81 MG: 81 TABLET CHEWABLE at 08:17

## 2020-02-20 RX ADMIN — METRONIDAZOLE 500 MG: 500 INJECTION, SOLUTION INTRAVENOUS at 14:05

## 2020-02-20 RX ADMIN — DEXMEDETOMIDINE 0.5 MCG/KG/HR: 100 INJECTION, SOLUTION, CONCENTRATE INTRAVENOUS at 00:40

## 2020-02-20 RX ADMIN — DEXMEDETOMIDINE 0.6 MCG/KG/HR: 100 INJECTION, SOLUTION, CONCENTRATE INTRAVENOUS at 13:05

## 2020-02-20 RX ADMIN — ACETYLCYSTEINE 2 ML: 200 SOLUTION ORAL; RESPIRATORY (INHALATION) at 00:37

## 2020-02-20 RX ADMIN — DEXMEDETOMIDINE 0.6 MCG/KG/HR: 100 INJECTION, SOLUTION, CONCENTRATE INTRAVENOUS at 18:58

## 2020-02-20 RX ADMIN — METRONIDAZOLE 500 MG: 500 INJECTION, SOLUTION INTRAVENOUS at 21:35

## 2020-02-20 RX ADMIN — FUROSEMIDE 60 MG: 20 INJECTION, SOLUTION INTRAMUSCULAR; INTRAVENOUS at 09:27

## 2020-02-20 ASSESSMENT — ACTIVITIES OF DAILY LIVING (ADL)
ADLS_ACUITY_SCORE: 18

## 2020-02-20 ASSESSMENT — MIFFLIN-ST. JEOR: SCORE: 2067.38

## 2020-02-20 NOTE — CONSULTS
General Surgery Consultation    Vle Espana  MRN#: [id number]    Date of Admission:  2/9/2020    Date of Consult: 2/20/2020    Reason for consult: Tracheostomy placement       Requesting service: CVICU       Requesting provider: Macario Alfonso MD                   Assessment and Plan:   Assessment:   Vel Espana is a 74 year old male with history of HTN who was admitted with a Type A aortic dissection now s/p median sternotomy with ascending aortic repair, AVR, CABG on 2/10/2020. Postop with metabolic encephalopathy and VAP, unable to wean from the ventilator. Primary team requesting tracheostomy.       Plan:   - Trach at bedside tomorrow (Friday 2/21) at 1 pm    Discussed with staff, Dr. Oswald.  - - - - - - - - - - - - - - - - - -  Leticia Mireles MD (PGY-2)  General Surgery Resident  [id number]            Chief Complaint:   Tracheostomy/PEG tube placement         History of Present Illness:   Vel Espana is a 74 year old male with history of HTN who presented to an outside hospital with chest pain after an episode of lightheadedness/syncope on 2/9/2020. He was found to have a Type A aortic dissection and was then transferred to [facility] for definitive management. He underwent median sternotomy, repair of ascending aortic dissection, AVR, CABG to right coronary artery, left greater saphenous vein harvest, and left femoral artery exploration on 2/10/2020. Postoperatively he developed a metabolic encephalopathy that has been slow to resolve. He had ventilator associated pneumonia and has been unable to wean from the vent. After discussions with the family, and the primary team have requested tracheostomy placement.         Past Medical History:     Past Medical History:   Diagnosis Date     Psychosexual dysfunction with inhibited sexual excitement      Unspecified essential hypertension      Unspecified sleep apnea              Past Surgical History:     Past Surgical History:   Procedure Laterality Date      REPAIR ANEURYSM ASCENDING AORTA N/A 2020    Procedure: Median Sternotomy, repair of ascending aorta using 32mm gelweave graft, deep circulatory arrest,  aortic valve repair using 27mm inspiris valve, on-pump oxygenator, open saphenous vein harvest, coronary artery bypass x1, transesophageal echocardiogram done by anestheisa;  Surgeon: Nivia Mckeon MD;  Location:  OR             Social History:     Social History     Tobacco Use     Smoking status: Never Smoker     Smokeless tobacco: Never Used   Substance Use Topics     Alcohol use: No             Family History:     History reviewed. No pertinent family history.             Allergies:   No Known Allergies          Medications:     No current facility-administered medications on file prior to encounter.   aspirin 81 MG tablet, Take 81 mg by mouth every other day   atenolol (TENORMIN) 25 MG tablet, Take 25 mg by mouth daily  azithromycin (ZITHROMAX) 250 MG tablet, Two tablets first day, then one tablet daily for four days.  benzonatate (TESSALON) 100 MG capsule, Take 1 capsule (100 mg) by mouth 3 times daily as needed  [] predniSONE (DELTASONE) 20 MG tablet, Take 2 tablets (40 mg) by mouth daily for 7 days              Review of Systems:   Unable to obtain due to patient condition.          Physical Exam:     Temp:  [96.9  F (36.1  C)-100.2  F (37.9  C)] 96.9  F (36.1  C)  Heart Rate:  [] 83  Resp:  [30-36] 36  MAP:  [65 mmHg-84 mmHg] 70 mmHg  Arterial Line BP: ()/(52-68) 85/56  FiO2 (%):  [55 %] 55 %  SpO2:  [93 %-100 %] 100 %     General: laying in bed, intubated and sedated, non distress, critically ill  HEENT: thick neck, palpable cricoid and thyroid cartilage, no thyromegaly or scars  CV: irregularly irregular rhythm, rate controlled, warm, well-perfused but dusky finger tips and toes. Incisions c/d/i with staples  Pulm: no dyspnea, breathing comfortably with ventilator  Abd: soft, round, non-tender  Extremities: no  edema    I/O last 3 completed shifts:  In: 5440.66 [I.V.:1260.66; NG/GT:2920]  Out: 3930 [Urine:3930]          Data:   Labs:  Arterial Blood Gases   Recent Labs   Lab 02/20/20  1145 02/19/20  1228 02/19/20  0357 02/19/20  0000   PH 7.49* 7.49* 7.50* 7.48*   PCO2 28* 30* 33* 33*   PO2 64* 97 67* 101   HCO3 21 22 26 24        Complete Blood Count   Recent Labs   Lab 02/20/20  0421 02/19/20  0357 02/18/20  0409 02/17/20  1550   WBC 10.8 8.1 9.9 9.2   HGB 8.3* 8.0* 8.7* 8.3*    262 238 211       Basic Metabolic Panel  Recent Labs   Lab 02/20/20  1557 02/20/20  1018 02/20/20  0421 02/19/20  2350 02/19/20  1540 02/19/20  0357  02/18/20  0409 02/17/20  2307 02/17/20  0332   * 154* 152* 154* 155* 154*   < > 150* 153* 148*   POTASSIUM 3.8 3.4 3.5  --  3.6 3.7   < > 3.1* 3.4 3.7   CHLORIDE  --   --  121*  --   --  122*  --  119* 120* 115*   CO2  --   --  24  --   --  25  --  27 25 30   BUN  --   --  99*  --   --  104*  --  106* 106* 110*   CR  --   --  2.40*  --   --  2.38*  --  2.36* 2.33* 2.28*   GLC  --   --  161*  --   --  180*  --  161* 169* 146*   ELMER  --   --  7.6*  --   --  7.7*  --  8.0* 7.8* 8.4*   MAG  --  2.5*  --   --   --  2.7*  --  2.9*  --  2.9*   PHOS  --   --   --   --   --  3.5  --  2.9  --  3.4    < > = values in this interval not displayed.       Liver Function Tests  Recent Labs   Lab 02/15/20  1213 02/14/20  2030   AST 32 44   ALT 17 17   ALKPHOS 66  --    BILITOTAL 0.5  --    ALBUMIN 2.3*  --        Pancreatic Enzymes  No lab results found in last 7 days.    Coagulation Profile  Recent Labs   Lab 02/17/20  0332 02/16/20  0336   PTT 42* 48*       Lactate  Recent Labs   Lab 02/17/20  1350 02/13/20  2233   LACT 1.3 0.8       Imaging:   Results for orders placed or performed during the hospital encounter of 02/09/20   XR Chest Port 1 View    Narrative    EXAM: XR CHEST PORT 1 VW  2/10/2020 11:00 AM     HISTORY:  post op       COMPARISON:  Chest x-ray 2/9/2020    FINDINGS:   Portable supine  view of the chest. Postsurgical changes of ascending  aortic dissection repair with median sternotomy wires in place. Aortic  valve prosthesis noted. Endotracheal tube tip projects over the mid  thoracic trachea, approximately 4 cm above the daniel. Right IJ  [place]-Berhane catheter tip projects at the main pulmonary artery. Enteric  tube tip terminates near the gastroesophageal junction. Multiple  mediastinal drains and right basilar chest tube. Cardiac mediastinal  borders are obscured due to overlying atelectasis/edema. Layering left  pleural effusion. Right costophrenic angle outside the field-of-view.  No evidence of pneumothorax.      Impression    IMPRESSION:  1. Postsurgical changes of aortic dissection repair with multiple  support devices as described above. Endotracheal tube is in good  position.  2. Enteric tube tip terminates near the gastroesophageal junction.  Consider advancing 3 to 4 cm.   3. Small layering left pleural effusion.    I have personally reviewed the examination and initial interpretation  and I agree with the findings.    DERICK BESS MD   XR Chest Port 1 View    Narrative    Exam: AP chest radiograph 2/11/2020 1:43 AM.    HISTORY: Postop.    COMPARISON: 2/10/2020, chest CT 2/9/2020.    FINDINGS: AP chest radiograph. Endotracheal tube tip projects low  thoracic trachea, approximately 2 cm above the daniel. Enteric tube  extends beyond the field-of-view. [place]-Berhane catheter projects over the  main pulmonary artery. Sternotomy wires. Valvular prosthesis.  Mediastinal drain. Trachea is midline, cardiomegaly. Bilateral pleural  effusions with overlying basilar opacities and bilateral perihilar  opacities. No acute osseous or abdominal abnormality.      Impression    IMPRESSION:  1. Endotracheal tube tip at the low thoracic trachea, approximately 2  cm above the daniel.  2. Cardiomegaly with moderate pulmonary edema.  3. Bilateral pleural effusions with overlying basilar and  retrocardiac  atelectasis/consolidation.    I have personally reviewed the examination and initial interpretation  and I agree with the findings.    ROWAN ROSAS MD   XR Abdomen Port 1 View    Narrative    Exam: XR ABDOMEN PORT 1 VW, 2/10/2020 11:08 AM    Indication: OG placement    Comparison: Chest x-ray 2/10/2020    Findings:   Portable supine AP radiograph of the abdomen. Gastric tube tip and  sidehole project over the stomach. Partially visualized mediastinal  drains and right chest tube. No dilated loops of small and large bowel  visualized portions of the abdomen. No pneumatosis or portal venous  gas. Bibasilar opacities. No acute bony abnormalities.      Impression    Impression: Gastric tube tip and sidehole project over the stomach.    I have personally reviewed the examination and initial interpretation  and I agree with the findings.    JOEL SANTANA MD   XR Abdomen Port 1 View    Narrative    Exam: XR ABDOMEN PORT 1 VW, 2/11/2020 12:18 PM    Indication: NJ tube placement    Comparison: 2/10/2020    Findings:   A single supine AP view of the abdomen is obtained. The gastric tube  tip projects over the distal stomach near the expected location of the  pylorus. The feeding tube appears coiled in the stomach with the tip  projecting over the proximal stomach. Nonobstructive bowel gas pattern  without pneumatosis. Grossly stable poorly visualized chest tubes,  [place]-Berhane catheter, epicardial pacing leads, and prosthetic aortic  valve. Linear metallic densities projecting over L2 are presumed  external to the patient. Stable bibasilar opacities.      Impression    Impression:   1. The feeding tube tip projects over the proximal stomach. Recommend  repositioning.  2. The gastric tube tip projects over the distal stomach at the  expected location of the pylorus. Consider slight retraction.    MARC BATISTA MD   XR Chest Port 1 View    Narrative    Exam: AP chest radiograph 2/12/2020 7:03 AM  .    HISTORY: Intubated patient, interval change.    COMPARISON: 2/11/2020, 2/10/2020, chest CT 9/20/2020.    FINDINGS: AP chest radiograph. Costophrenic angles are collimated out  of the field-of-view. Endotracheal tube tip projects over the mid/low  thoracic trachea. Postoperative chest. Valvular prosthesis. [place]-Berhane  catheter tip projects over the main pulmonary artery. Enteric tube is  again outside the field-of-view. Right basilar chest tube. Mediastinal  drains. Cardiomegaly. Bibasilar and retrocardiac consolidative  opacities. Probable bilateral pleural effusions. No pneumothorax.       Impression    IMPRESSION:  1. Cardiomegaly with moderate pulmonary edema, stable.  2. Bilateral pleural effusions with overlying basilar  atelectasis/consolidation.    I have personally reviewed the examination and initial interpretation  and I agree with the findings.    XIMENA FLETCHER DO   XR Chest Port 1 View    Narrative    XR CHEST PORT 1 VW  2/13/2020 1:33 AM      HISTORY: intubated patient, interval change    COMPARISON: 2/12/2020, 2/11/2020    FINDINGS: AP chest radiograph. [place]-Berhane catheter tip at the main  pulmonary artery. Endotracheal tube tip projects over the mid/lower  thoracic trachea. Surgical clips. Valvular prosthesis. Sternotomy  wires. Mediastinal drain. Trachea is midline, cardiomegaly. Bilateral  pleural effusions with overlying basilar retrocardiac opacities.  Bilateral perihilar streaky opacities. No pneumothorax. No acute  osseous abnormality.      Impression    IMPRESSION:  1. Cardiomegaly with moderate pulmonary edema.  2. Bilateral pleural effusions overlying basilar and retrocardiac  atelectasis/consolidation.    I have personally reviewed the examination and initial interpretation  and I agree with the findings.    JOEL SANTANA MD   XR Chest Port 1 View    Narrative    Exam: AP chest radiograph 2/14/2020 1:50 AM.    HISTORY: Intubated patient, interval change.    COMPARISON: 2/13/2020,  2/12/2020.    FINDINGS: AP chest radiograph. Postoperative chest. Endotracheal tube  tip at the midthoracic trachea. Enteric tubes extend beyond the  field-of-view. Valvular prosthesis. Pulmonary arterial catheter tip at  the main pulmonary artery. Mediastinal drains. Trachea is midline,  cardiomegaly. Stable left pleural effusion with overlying basilar and  retrocardiac opacity. Bilateral perihilar streaky opacities. No  pneumothorax.      Impression    IMPRESSION:  1. Stable left pleural effusion with overlying basilar retrocardiac  atelectasis.  2. Right pleural effusion has resolved.  3. Cardiomegaly with moderate pulmonary edema.    I have personally reviewed the examination and initial interpretation  and I agree with the findings.    MAGDA KEENAN MD   XR Chest Port 1 View    Narrative    Exam: AP chest radiograph 2/14/2020 9:48 PM.    HISTORY: Evaluate for pneumonia.    COMPARISON: 2/14/2020, 2/13/2020, 2/12/2020.    FINDINGS: AP chest radiograph. Endotracheal tube tip projects at the  mid/low thoracic trachea. Enteric tubes extend beyond the  field-of-view. [place]-Berhane catheter tip at the main pulmonary artery.  Aortic valvular prosthesis. Trachea is midline, cardiac silhouette is  partially obscured. Increased left pleural effusion with overlying  basilar retrocardiac opacities. Bilateral perihilar streaky opacities  and presumed small right pleural effusion. No acute osseous or  abdominal abnormality.      Impression    IMPRESSION:  1. Increased left pleural effusion with overlying basilar retrocardiac  consolidation/atelectasis.  2. Cardiomegaly with moderate pulmonary edema, slightly increased.    I have personally reviewed the examination and initial interpretation  and I agree with the findings.    CHINA TAYLOR MD   XR Chest Port 1 View    Narrative    Exam: AP chest radiograph 2/15/2020 4:17 AM .    COMPARISON: 2/14/2020, 2/13/2020.    HISTORY: Intubated patient, interval change.    FINDINGS: AP  chest radiograph. Endotracheal tube tip approximately at  the midthoracic trachea. Postoperative chest, aortic valvular  prosthesis. [place]-Berhane catheter tip at the main pulmonary artery.  Substantially increased left pleural effusion and/or atelectasis with  near complete opacification of the left hemithorax. Right pulmonary  perihilar streaky opacities. No pneumothorax.      Impression    IMPRESSION:  1. Increased left pleural effusion and/or atelectasis with near  complete opacification of the left hemithorax. Mucous plugging is a  consideration.  2. Moderate pulmonary edema.    I have personally reviewed the examination and initial interpretation  and I agree with the findings.    XIMENA FLETCHER, DO   XR Chest Port 1 View    Narrative    Exam: XR CHEST PORT 1 VW, 2/15/2020 4:39 PM    Indication: s/p bronch. eval left pleural space    Comparison: Chest x-ray 2/15/2020.    Findings:   AP portable single view of the chest. Tip of the endotracheal tube  projects over the midthoracic trachea. Tip of the right IJ [place]-Berhane  catheter projects over the main pulmonary artery. Postsurgical changes  of aorta valve replacement. Enteric gastric and feeding tubes pass  below the diaphragm. Perihilar opacity with indistinct pulmonary  vasculature. Mildly improved left-sided pleural effusion. There is  persistent left retrocardiac opacity. Stable enlarged cardiac  mediastinal silhouette.      Impression    Impression:   1. Moderately improved aeration of left lung with persistent left  retrocardiac opacity, atelectasis versus infection.  2. Ongoing pulmonary edema.  3. Lines and tubes as above.    I have personally reviewed the examination and initial interpretation  and I agree with the findings.    CHINA TAYLOR MD   XR Chest Port 1 View    Narrative    Exam: AP chest radiograph 2/16/2020 1:31 AM.    HISTORY: Intubated patient, interval change.    COMPARISON: 2/15/2020, 2/14/2020.    FINDINGS: AP chest radiograph. The patient  is rotated to the left.  Endotracheal tube tip at the midthoracic trachea. Enteric tube extends  the field-of-view. [place]-Berhane catheter removed. Right internal jugular  sheath projects over the right brachiocephalic vein. Right basilar  chest tube. Trachea is midline, cardiac silhouette is obscured. Aortic  valve prosthesis. Increased left pleural effusion with overlying  basilar retrocardiac opacity. Small right pleural effusion. Right  perihilar streaky opacities.      Impression    IMPRESSION:  1. Increased left pleural effusion with overlying basilar  atelectasis/consolidation.  2. Small right pleural effusion.  3. Moderate pulmonary edema.    I have personally reviewed the examination and initial interpretation  and I agree with the findings.    XIMENA FLETCHER, DO   XR Abdomen Port 1 View    Narrative    EXAMINATION: XR ABDOMEN PORT 1 , 2/16/2020 11:16 AM     COMPARISON: 2/11/2020    HISTORY: confirm NJ    FINDINGS:    Supine frontal views of the abdomen. Enteric tube with redundant loops  within the gastric lumen. Additional gastric tube with tip and  sidehole in the stomach. Partially visualized mediastinal/chest tube.  Bowel gas pattern is nonobstructive.       Impression    IMPRESSION:    Enteric tube coils within the stomach. Additional gastric tube with  tip and sidehole in the stomach.    I have personally reviewed the examination and initial interpretation  and I agree with the findings.    XIMENA FLETCHER, DO   XR Chest Port 1 View    Narrative    Exam: XR CHEST PORT 1 , 2/16/2020 11:16 AM    Indication: picc verification    Comparison: 2/16/2020 chest x-ray    Findings:   Portable, AP view of the chest. Right PICC tip at the mid SVC. Right  internal jugular sheath tip projects at the brachiocephalic  confluence. Endotracheal tube terminates in the midthoracic trachea.  Stable right basilar chest tube. Median sternotomy wires are intact.  Aortic valve prosthesis. Enteric tube passes inferior to the  field of  view. Trachea is midline. Cardiac silhouette is enlarged. Unchanged  left-sided pleural effusion with adjacent basilar opacity. No new  opacities are noted.      Impression    Impression:    1. Right PICC tip at the mid SVC.  2. Stable left pleural effusion and adjacent basilar atelectasis  versus consolidation.    I have personally reviewed the examination and initial interpretation  and I agree with the findings.    XIMENA FLETCHER, DO   XR Chest Port 1 View    Narrative    Exam: XR CHEST PORT 1 VW, 2/16/2020 3:29 PM    Indication: post bronchoscopy    Comparison: 2/16/2020    Findings:   Right IJ sheath, PICC, endotracheal tube, feeding tube and left  basilar chest tubes are again seen and unchanged. Heart is enlarged.  Mild pulmonary edema. Continued left lower lobe atelectasis.    No pneumothorax post bronchoscopy      Impression    Impression: No pneumothorax post bronchoscopy.  1. Support devices are stable.  2. Continued cardiomegaly and mild edema.  3. Left lower lobe atelectasis unchanged.    CHINA TAYLOR MD   XR Chest Port 1 View    Narrative    Exam: XR CHEST PORT 1 VW, 2/17/2020 2:10 AM    Indication: intubated patient, interval change    Comparison: 2/16/2020    Findings:   Single portable AP view the chest. Patient is rotated to the left. ET  tube tip in the midthoracic trachea, stable. Enteric tubes are stable  with tips outside the field-of-view. Right basilar chest tube is again  noted. Right-sided PICC line with tip at the mid SVC. Left IJ sheath  is noted with tip near the innominate confluence, new. Stable lung  volumes. Stable enlarged cardiac silhouette with valvular prosthesis.  Diffuse interstitial opacities with bibasilar airspace opacities,  slightly increased. Increased small bilateral layering pleural  effusions. No pneumothorax. Midline sternotomy wires and surgical  clips over the mediastinum.      Impression    Impression:   1. Increased diffuse interstitial opacities and  bibasilar airspace  opacities, likely worsening pulmonary edema with  atelectasis/consolidation.  2. Increased small bilateral pleural effusions, left greater than  right.  3. New left IJ sheath. Left IJ sheath removed. Additional support  devices are stable.    I have personally reviewed the examination and initial interpretation  and I agree with the findings.    RJA CONNELL MD   US Upper Extremity Venous Duplex Left    Narrative    EXAMINATION: DOPPLER VENOUS ULTRASOUND OF THE LEFT UPPER EXTREMITY,  2/17/2020 3:29 PM     COMPARISON: None available    HISTORY: Left upper extremity edema, concern for DVT.    TECHNIQUE:  Gray-scale evaluation with compression, spectral flow and  color Doppler assessment of the deep venous system of the left upper  extremity.    FINDINGS:  Left: Normal blood flow and waveforms are demonstrated in the internal  jugular, innominate, subclavian, and axillary veins. There is normal  compressibility of the brachial and basilic veins. Occlusive thrombus  in the cephalic vein extending from the proximal forearm to the  confluence with the subclavian vein.  Distal to the antecubital fossa  the cephalic vein is patent.      Impression    IMPRESSION:  1. No evidence of left upper extremity deep venous thrombosis.  2. Superficial venous thrombosis of the left cephalic vein, extending  from the proximal forearm to the confluence with the subclavian vein.    Imaging findings discussed with Dr. Pandey at 4:47PM on 2/17/2020 by  Dr. Barriga.    I have personally reviewed the examination and initial interpretation  and I agree with the findings.    JAYNA BARRIGA MD   XR Abdomen Port 1 View    Narrative    EXAMINATION:  XR ABDOMEN PORT 1 VW 2/17/2020 11:12 AM.    COMPARISON: 2/16/2020.    HISTORY:  verify NJ placement    FINDINGS: Single portable supine view of the upper abdomen. Gastric  tube tip and sidehole project over the stomach. Feeding tube tip  projects over the fourth segment of the  duodenum near the ligament of  Treitz. Partially visualized cardiac valvular prosthesis and left  basilar opacities. No abnormal air filled bowel in the upper abdomen.  Mild gaseous distention of the stomach. At least mild colonic stool  burden. Multilevel degenerative changes in the spine. Partially  visualize sternotomy wires and right sided chest tube.      Impression    IMPRESSION:   1.  Feeding tube tip projects at the ligament of Treitz. Gastric tube  tip and sidehole project over the stomach.   2.  No abnormal air filled bowel.  At least mild colonic stool burden.  3.  Partially visualized left basilar opacities.    JAYNA BARRIGA MD   CT Head w/o Contrast    Narrative    CT HEAD W/O CONTRAST 2/17/2020 4:17 PM    Provided History: Altered level of consciousness (LOC), unexplained  ICD-10:    Comparison: Head CT 2/9/2020.    Technique: Using multidetector thin collimation helical acquisition  technique, axial, coronal and sagittal CT images from the skull base  to the vertex were obtained without intravenous contrast.     Findings:    No intracranial hemorrhage, mass effect, or midline shift. No change  in moderate patchy and confluent regions of hypoattenuation in the  subcortical and deep white matter of both cerebral hemispheres. Mild  generalized parenchymal volume loss. 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 The ventricles are proportionate to the  cerebral sulci. The gray to white matter differentiation of the  cerebral hemispheres is preserved. The basal cisterns are patent.    Partially visualized endotracheal and enteric tubes. Moderate  paranasal sinus mucosal thickening, almost exclusively right-sided.  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  Mastoid air cells are clear.       Impression    Impression:   1. No acute intracranial pathology.  2. Mild generalized parenchymal volume loss and moderate chronic small  vessel ischemic disease.  3. Chronic  sinusitis.    MARC HERRERA MD   XR Chest Port 1 View    Narrative    Exam: XR CHEST PORT 1 VW, 2/18/2020 2:16 AM    Indication: intubated patient, interval change    Comparison: 2017 2020    Findings:   AP view of the chest. Patient is rotated to the left. ET tube tip in  the high thoracic trachea approximately 6.5 cm from the daniel.  Feeding tube tip is outside the field-of-view. Gastric tube and left  IJ sheath have been removed. Right basilar chest tube is stable.  Right-sided PICC with tip at the high SVC. Lung volumes and cardiac  silhouette are unchanged. Cardiac valvular prosthesis and midline  sternotomy wires. Bibasilar predominant opacities and bilateral  pleural effusions, left greater than right, are stable. No  pneumothorax.      Impression    Impression:   1. Stable bilateral pleural effusions, left rib the right, with  overlying atelectasis or infection, left greater than right.  2. Gastric tube and left IJ sheath been removed. Additional support  devices are stable.    I have personally reviewed the examination and initial interpretation  and I agree with the findings.    XIMENA FLETCHER,    XR Chest Port 1 View    Narrative    EXAM: XR CHEST PORT 1 VW  2/18/2020 3:03 PM     HISTORY:  post bronchoscopy       COMPARISON:  Chest x-ray from same date at 0100 hours    FINDINGS:   Portable semiupright view of the chest. Endotracheal tube tip projects  over the mid thoracic trachea. Enteric tube courses below the  diaphragm and terminates inferior to the field of view. Right PICC  line catheter tip at the SVC. Previously seen right basilar chest tube  is not visualized on this exam. Aortic valve prosthesis. Midline  sternotomy wires intact. Stable enlarged cardiac silhouette with  bilateral pleural effusions, left greater than right. Left basilar  atelectasis/consolidation. No new focal air space opacities. No  pneumothorax.      Impression    IMPRESSION:   Stable cardiomegaly with bilateral pleural  effusions, left greater  than right.    I have personally reviewed the examination and initial interpretation  and I agree with the findings.    DERICK BESS MD   XR Chest Port 1 View    Narrative    Exam: XR CHEST PORT 1 VW, 2/19/2020 9:32 AM    Indication: Evaluate tubes/lines    Comparison: 2/18/2020, 2/17/2020, 2/16/2020    Findings:   A single AP view of the chest was obtained. The right arm PICC tip  projects over the mid/upper SVC. The endotracheal tube tip projects  over the mid trachea. The enteric tubes extend below the  field-of-view. Post surgical changes of prosthetic aortic valve. The  cardiomediastinal silhouette is unchanged and enlarged. No  pneumothorax. Stable small left pleural effusion and left lower lobe  opacities. Extensive degenerative changes in the right glenohumeral  joint.      Impression    Impression:   1. Stable support devices.  2. Stable cardiomegaly, pulmonary vascular congestion, and small left  pleural effusion.  3. Unchanged left lower lobe atelectasis/consolidation.    MARC BATISTA MD   US Extremity Non Vascular Left    Narrative    US EXTREMITY NON VASCULAR LEFT  2/19/2020 3:23 PM      HISTORY: please US left thigh incision, suspect post surgical site  infection    COMPARISON: None available    TECHNIQUE: Focused grayscale and color Doppler evaluation of the left  medial thigh and leg along the previous incision for greater saphenous  vein harvest.    FINDINGS/    Impression    IMPRESSION:   Minimal soft tissue edema along the left medial thigh and leg incision  with no drainable fluid collections or other focal sonographic  abnormalities.    I have personally reviewed the examination and initial interpretation  and I agree with the findings.    RAJ CONNELL MD   CT Chest/Abdomen/Pelvis w Contrast     Value    Radiologist flags Possible surgical site infection, developing lung (Urgent)    Narrative    EXAMINATION: CT CHEST/ABDOMEN/PELVIS W CONTRAST, 2/19/2020 5:41  PM    TECHNIQUE:  Helical CT of the chest abdomen and pelvis with contrast.   Coronal and sagittal reformatted images were reviewed at the  workstation for further assessment.    CONTRAST:  135 ml Isovue 370 IV.     COMPARISON: 2/9/2020.    HISTORY: ongoing fever despite treatment with broad spectrum  antibiotics, suspect infectious process in chest in patient post  aortic dissection repair    FINDINGS:     Lines/Tubes: Prosthetic aortic valve. Endotracheal tube projects over  the midthoracic trachea. Enteric tube is in the stomach. Feeding tube  is in the fourth portion duodenum..     Chest: Postsurgical changes of aortic root/ascending aortic  reconstruction. Soft tissue density fluid completely encircling the  descending aorta at the takeoff of the aortic root. The adjacent  overlying right atrial aspect of the pericardium is thickened and  enhancing. There are trace foci of gas in this region (for example  series 4, image 77). No defect seen, surgically repaired aorta.  Conventional three-vessel branching pattern. All vessels appear  patent. Lungs demonstrate low lung volumes, bilateral lower lobe  atelectasis and bilateral pleural effusions with bibasilar airspace  opacities most conspicuous in the dependent right middle lobe.    Otherwise the thyroid, airways, lung parenchyma, mediastinal  structures, lymph nodes, and major vascular structures are within  normal limits.    Abdomen/ Pelvis: Homogeneous enhancement of the hepatic, splenic  parenchyma. Fatty changes in the pancreas. No intra-abdominal  peripheral enhancing fluid collection. Unremarkable cortical medullary  phase enhancement of the kidneys bilaterally. Bilateral indeterminate  renal cysts. No free fluid in the pelvis. No dilated loops of large or  small bowel. The major vascular structures in the abdomen are patent..    Otherwise the stomach, liver, biliary system, pancreas, spleen,  adrenal glands, large and small bowel, kidneys, ureters,  bladder,  pelvic organs, major vascular structures, peritoneum and lymph nodes  are within normal limits.     Bones, abdominal wall and Soft Tissues: No acute or aggressive osseous  lesions. Unhealed sternotomy defect. No surrounding well-circumscribed  fluid collection.      Impression    IMPRESSION:    1. Persistent soft tissue density pericardial effusion which also  surrounding the ascending aortic vascular repair site. Thickening of  the pericardium foci of gas. This possibly represents surgical site  infection. The aorta itself appears intact.  2. Bibasilar atelectasis, airspace opacities and small pleural  effusions consistent with pulmonary infection.  3. Indeterminant renal cysts. At an appropriate clinical time,   recommend nonemergent ultrasound or MRI for further evaluation or  comparison with prior studies.    [Urgent Result: Possible surgical site infection, developing lung  infection]    Finding was identified on 2020 6:02 PM.     Dr. Pandey was contacted by Dr. Hall at 2020 6:23 PM and  verbalized understanding of the urgent finding.     I have personally reviewed the examination and initial interpretation  and I agree with the findings.    RAJ CONNELL MD   Echocardiogram Limited    Narrative    [id number]  [id number]  [id number]  [id number]^TYLOR^SHAGUFTA           [facility],[place]  Echocardiography Laboratory  [address] [place] [address]     Name: RAJ ARCINIEGA  MRN: [id number]  : 1945  Study Date: 02/10/2020 01:44 PM  Age: 74 yrs  Gender: Male  [facility]  Reason For Study: Right side function  Ordering Physician: SHAGUFTA SNIDER  Performed By: Svitlana Sandy RDCS     BSA: 2.4 m2  Height: 69 in  Weight: 290 lb  HR: 110  BP: 97/43 mmHg  _____________________________________________________________________________  __        Procedure  Limited Portable Echo Adult. Contrast Optison. Technically difficult  study.Extremely poor  acoustic windows. Optison (NDC #3853-9046-78) given  intravenously. Patient was given 6 ml mixture of 3 ml Optison and 6 ml saline.  3 ml wasted.  _____________________________________________________________________________  __        Interpretation Summary  Technically difficult study.Extremely poor acoustic windows.     Global and regional left ventricular function is normal with an EF of 60-65%.  Based on limited view the right ventricle appears mildly dilated with mildly  reduced global function.     There is no prior study for direct comparison.  _____________________________________________________________________________  __        Left Ventricle  Global and regional left ventricular function is normal with an EF of 60-65%.     Right Ventricle  Based on limited view the right ventricle appears mildly dilated with mildly  reduced global function. A right heart catheter is noted in the right  ventricle.     Atria  The atria cannot be assessed.     Mitral Valve  The mitral valve cannot be assessed.        Aortic Valve  s/p AVR with 27mm Patterson bioprosthetic valve. Aortic valve is not completely  assessed on this study.     Tricuspid Valve  The valve leaflets are not well visualized. Trace tricuspid insufficiency is  present. Estimated pulmonary artery systolic pressure is 22 mmHg plus right  atrial pressure.     Pulmonic Valve  The valve leaflets are not well visualized. On Doppler interrogation, there is  no significant stenosis or regurgitation.     Vessels  Unable to assess mean RA pressure given the patient is on a ventilator.     Compared to Previous Study  There is no prior study for direct comparison.     _____________________________________________________________________________  __        Doppler Measurements & Calculations     TV max P.1 mmHg  TR max glen: 240.3 cm/sec  TR max P.1 mmHg     _____________________________________________________________________________  __           Report  approved by: Eliel Velásquez 02/10/2020 03:05 PM      Echocardiogram Limited    Narrative    [id number]  [id number]  [id number]  [id number]^EYAL^AMANDA           [facility],[place]  Echocardiography Laboratory  [address] [place] [id number]     Name: RAJ ARCINIEGA  MRN: [id number]  : 1945  Study Date: 2020 01:35 PM  Age: 74 yrs  Gender: Male  Patient Location: [facility]  Reason For Study: CABG  Ordering Physician: AMANDA BIRCH  Referring Physician: HELEN RUIZ  Performed By: Sidra Torrez RDCS     BSA: 2.5 m2  Height: 69 in  Weight: 307 lb  BP: 117/60 mmHg  _____________________________________________________________________________  __        Procedure  Limited Portable Echo Adult. Contrast Optison. Technically difficult study.  Poor acoustic windows. Optison (NDC #7281-6932-02) given intravenously.  Patient was given 5 ml mixture of 3 ml Optison and 6 ml saline. 4 ml wasted.  _____________________________________________________________________________  __        Interpretation Summary  Technically difficult limited study.  The Ejection Fraction is estimated at 55-60% (visually estimated). Regional  wall motion is probably normal.  Moderate right ventricular dilation is present.  Global right ventricular function is moderately reduced.  No significant valvular abnormalities were noted.     This study was compared with the study from 2/10/2020 .RV function and size  probably worsened.     _____________________________________________________________________________  __        Left Ventricle  Left ventricular size is normal. The Ejection Fraction was visually estimated.  The Ejection Fraction is estimated at 55-60%. Regional wall motion is probably  normal.     Right Ventricle  Moderate right ventricular dilation is present. Global right ventricular  function is moderately reduced. A right heart catheter is noted in the right  ventricle.     Mitral  Valve  Moderate mitral annular calcification is present.     Aortic Valve  The valve leaflets are not well visualized. On Doppler interrogation, there is  no significant stenosis or regurgitation.        Tricuspid Valve  The tricuspid valve is normal.     Pulmonic Valve  The pulmonic valve cannot be assessed.     Vessels  The aorta root is normal. Unable to assess mean RA pressure given the patient  is on a ventilator.     Compared to Previous Study  This study was compared with the study from 2/10/2020 . RV function and size  probably worsened.     _____________________________________________________________________________  __        Doppler Measurements & Calculations  Ao V2 max: 215.2 cm/sec  Ao max P.5 mmHg  Ao V2 mean: 137.6 cm/sec  Ao mean P.9 mmHg  Ao V2 VTI: 30.8 cm  LV V1 max P.1 mmHg  LV V1 max: 123.7 cm/sec  LV V1 VTI: 17.5 cm  AV Maurizio Ratio (DI): 0.57        _____________________________________________________________________________  __           Report approved by: Moncho CRANDALL 2020 04:45 PM            I personally examined he patient and reviewed the records. Will evaluate the patient daily and discuss with Primary team to determine if and/or when patient if appropriate for tracheostomy. Family at bedside and discussed with the the plan.    Swetha Arguello MD

## 2020-02-20 NOTE — PLAN OF CARE
Patient up to chair with assist. PST today for 3 hours. ABG shows respiratory alkalosis with low oxygenation. No orders to extubate. Remains on Heparin at 1500 units/hr and Precedex gtts. Antibiotics and tube feeding changed today. Goal rate now 70 ml/hr. Water flushes increased to 150 ml/hr. BM x2. Mucous-like, blood tinged. MDs aware and following. Lytes replaced per protocol. Diuresed with 60 mg IV lasix this morning.

## 2020-02-20 NOTE — PROGRESS NOTES
CVTS PROGRESS NOTE  February 20, 2020      CO-MORBIDITIES:   Dissection of aorta, unspecified portion of aorta (H)    ASSESSMENT: Vel Espana is a 74 year old male with PMH of HTN s/p Ascending aortic dissection repair using a 32 mm Dacron Gelweave graft, AVR with 27 mm Patterson bovine pericardial valve, Coronary bypass to right coronary artery, Left greater saphenous vein harvest, Circulatory arrest, Left femoral artery exploration on 2/10, remains intubated for airway protection for slowly resolving metabolic encephalopathy.     TODAY'S PROGRESS:   - mental status continues to improve  - continue to treat hypernatremia  - diuresis with lasix, goal net negative 1 L  - PST    PLAN:  Neuro/ pain/ sedation:  #Encephalopathy, improving  - Monitor neurological status. Notify the MD for any acute changes in exam.  - Fentanyl gtt for pain.  - precedex gtt  - Daily sedation holiday  - seroquel 50 mg at bedtime      Pulmonary care:   #MV for airway protection post op  #ventilator associated pneumonia  - PST as able with sedation wean  - MV  - Titrate FiO2 for SpO2 >92%-  - Dr. Mckeon requests PEEP no less than 8  - bronchoscopy 2/15, 2/16     Cardiovascular:    - Monitor hemodynamic status.   - MAP >65, SBP <120  - Hold PTA anti-hypertensives    #Non-sustained VT. brief run of nonsustained VT, less than 30 seconds, in the operating room, was hypokalemic at the time, also was being paced, possible R-on-T phenomenon, has not had recurrence since temporary epicardial pacing has been discontinued.  Cardiology EP consult 2/12 recommended discontinuing amiodarone  #A-fib with RVR: noted overnight 2/14  - amiodarone PO     GI care:   - NPO except ice chips and medications.     Fluids/ Electrolytes/ Nutrition:   - TKO for IV fluid hydration  - NJ placed, TF at goal  - free water 150/hr for ongoing hypernatremia with q6 hr Na checks    Renal/ Fluid Balance:    #Nonoliguric HANNA  - Urine output is adequate so far.  - Will  continue to monitor intake and output, Cr, electrolytes.   - Nephrology consult  - diuresis with lasix 60 q8 x 3 today      Endocrine:    # Stress hyperglycemia  - Insulin gtt     ID/ Antibiotics:  # ventilator associated pneumonia  - zosyn started 2/15, end date 2/22  - ID consult  - s/p vanc 2/15 - 2/17, restarted 2/18 for ongoing fevers  - CT CAP 2/19 - nothing to do for pericardial effusion per CVTS     Heme:     - Hgb goal >7  - low int heparin gtt for a-fib     Prophylaxis:    - heparin as above  - Protonix qd     Lines/ tubes/ drains:  - PICC, Arterial line, quintero     Disposition:  - CV ICU.      Patient seen, findings and plan discussed with CVTS fellow     Macario Alfonso MD  [phone]    ====================================    SUBJECTIVE:   Mental status continues to gradually improve. No acute overnight events    OBJECTIVE:   1. VITAL SIGNS:   Temp:  [98.5  F (36.9  C)-101.5  F (38.6  C)] 100.2  F (37.9  C)  Heart Rate:  [] 93  Resp:  [30-34] 32  BP: (84)/(52) 84/52  MAP:  [61 mmHg-84 mmHg] 71 mmHg  Arterial Line BP: ()/(43-68) 96/59  FiO2 (%):  [55 %] 55 %  SpO2:  [93 %-99 %] 96 %  Ventilation Mode: CPAP/PS  (Continuous positive airway pressure with Pressure Support) (PS 8/8 per MD order)  FiO2 (%): 55 %  Rate Set (breaths/minute): 16 breaths/min  Tidal Volume Set (mL): 450 mL  PEEP (cm H2O): 8 cmH2O  Pressure Support (cm H2O): 8 cmH2O  Oxygen Concentration (%): 55 %  Resp: (!) 32      2. INTAKE/ OUTPUT:   I/O last 3 completed shifts:  In: 5505.14 [I.V.:1305.14; NG/GT:2970]  Out: 4070 [Urine:4070]    3. PHYSICAL EXAMINATION:   General: intubated, sedated.  Neuro: Opens eyes to voice, mouths full sentences, follows commands in all 4 extremities  Resp: mechanical ventilation  CV: a-fib  Abdomen: Soft, Non-distended, Non-tender  Incisions: c/d/i  Extremities: warm and well perfused with mild edema, dusky appearance to the right second and third toes, stable    4. INVESTIGATIONS:   Arterial Blood  Gases   Recent Labs   Lab 02/19/20  1228 02/19/20  0357 02/19/20  0000 02/18/20  2210   PH 7.49* 7.50* 7.48* 7.51*   PCO2 30* 33* 33* 34*   PO2 97 67* 101 58*   HCO3 22 26 24 27     Complete Blood Count   Recent Labs   Lab 02/20/20  0421 02/19/20  0357 02/18/20  0409 02/17/20  1550   WBC 10.8 8.1 9.9 9.2   HGB 8.3* 8.0* 8.7* 8.3*    262 238 211     Basic Metabolic Panel  Recent Labs   Lab 02/20/20  1018 02/20/20  0421 02/19/20  2350 02/19/20  1540 02/19/20  0357  02/18/20  0409 02/17/20  2307   * 152* 154* 155* 154*   < > 150* 153*   POTASSIUM 3.4 3.5  --  3.6 3.7   < > 3.1* 3.4   CHLORIDE  --  121*  --   --  122*  --  119* 120*   CO2  --  24  --   --  25  --  27 25   BUN  --  99*  --   --  104*  --  106* 106*   CR  --  2.40*  --   --  2.38*  --  2.36* 2.33*   GLC  --  161*  --   --  180*  --  161* 169*    < > = values in this interval not displayed.     Liver Function Tests  Recent Labs   Lab 02/15/20  1213 02/14/20  2030   AST 32 44   ALT 17 17   ALKPHOS 66  --    BILITOTAL 0.5  --    ALBUMIN 2.3*  --      Pancreatic Enzymes  No lab results found in last 7 days.  Coagulation Profile  Recent Labs   Lab 02/17/20  0332 02/16/20  0336   PTT 42* 48*         5. RADIOLOGY:   Recent Results (from the past 24 hour(s))   US Extremity Non Vascular Left    Narrative    US EXTREMITY NON VASCULAR LEFT  2/19/2020 3:23 PM      HISTORY: please US left thigh incision, suspect post surgical site  infection    COMPARISON: None available    TECHNIQUE: Focused grayscale and color Doppler evaluation of the left  medial thigh and leg along the previous incision for greater saphenous  vein harvest.    FINDINGS/    Impression    IMPRESSION:   Minimal soft tissue edema along the left medial thigh and leg incision  with no drainable fluid collections or other focal sonographic  abnormalities.    I have personally reviewed the examination and initial interpretation  and I agree with the findings.    RAJ CONNELL MD   CT  Chest/Abdomen/Pelvis w Contrast   Result Value    Radiologist flags Possible surgical site infection, developing lung (Urgent)    Narrative    EXAMINATION: CT CHEST/ABDOMEN/PELVIS W CONTRAST, 2/19/2020 5:41 PM    TECHNIQUE:  Helical CT of the chest abdomen and pelvis with contrast.   Coronal and sagittal reformatted images were reviewed at the  workstation for further assessment.    CONTRAST:  135 ml Isovue 370 IV.     COMPARISON: 2/9/2020.    HISTORY: ongoing fever despite treatment with broad spectrum  antibiotics, suspect infectious process in chest in patient post  aortic dissection repair    FINDINGS:     Lines/Tubes: Prosthetic aortic valve. Endotracheal tube projects over  the midthoracic trachea. Enteric tube is in the stomach. Feeding tube  is in the fourth portion duodenum..     Chest: Postsurgical changes of aortic root/ascending aortic  reconstruction. Soft tissue density fluid completely encircling the  descending aorta at the takeoff of the aortic root. The adjacent  overlying right atrial aspect of the pericardium is thickened and  enhancing. There are trace foci of gas in this region (for example  series 4, image 77). No defect seen, surgically repaired aorta.  Conventional three-vessel branching pattern. All vessels appear  patent. Lungs demonstrate low lung volumes, bilateral lower lobe  atelectasis and bilateral pleural effusions with bibasilar airspace  opacities most conspicuous in the dependent right middle lobe.    Otherwise the thyroid, airways, lung parenchyma, mediastinal  structures, lymph nodes, and major vascular structures are within  normal limits.    Abdomen/ Pelvis: Homogeneous enhancement of the hepatic, splenic  parenchyma. Fatty changes in the pancreas. No intra-abdominal  peripheral enhancing fluid collection. Unremarkable cortical medullary  phase enhancement of the kidneys bilaterally. Bilateral indeterminate  renal cysts. No free fluid in the pelvis. No dilated loops of large  or  small bowel. The major vascular structures in the abdomen are patent..    Otherwise the stomach, liver, biliary system, pancreas, spleen,  adrenal glands, large and small bowel, kidneys, ureters, bladder,  pelvic organs, major vascular structures, peritoneum and lymph nodes  are within normal limits.     Bones, abdominal wall and Soft Tissues: No acute or aggressive osseous  lesions. Unhealed sternotomy defect. No surrounding well-circumscribed  fluid collection.      Impression    IMPRESSION:    1. Persistent soft tissue density pericardial effusion which also  surrounding the ascending aortic vascular repair site. Thickening of  the pericardium foci of gas. This possibly represents surgical site  infection. The aorta itself appears intact.  2. Bibasilar atelectasis, airspace opacities and small pleural  effusions consistent with pulmonary infection.  3. Indeterminant renal cysts. At an appropriate clinical time,   recommend nonemergent ultrasound or MRI for further evaluation or  comparison with prior studies.    [Urgent Result: Possible surgical site infection, developing lung  infection]    Finding was identified on 2/19/2020 6:02 PM.     Dr. Pandey was contacted by Dr. Hall at 2/19/2020 6:23 PM and  verbalized understanding of the urgent finding.     I have personally reviewed the examination and initial interpretation  and I agree with the findings.    RAJ CONNELL MD       =========================================

## 2020-02-20 NOTE — PROGRESS NOTES
Nephrology Progress Note  February 20, 2020      Vel Espana MRN:[id number] YOB: 1945  Date of Admission:2/9/2020    ASSESSMENT AND RECOMMENDATIONS:   74 yr male with Type A  Aortic dissection, S/p repair , AVR ,CABG, Left greater saphenous vein harvest, Circulatory arrest, Left femoral artery exploration on 2/10. Has NSVT. Nephrology consulted for HANNA.     Non Oliguric HANNA   Unknown baseline  Cr   Cr on admission 1.24  Etiology felt to be hypoperfusion on presentation with IV contrast use  resulting in ATN.  --  Overall, renal function relatively stable, eGFr 25-30, and he is non oliguric. He was net positive 1.8L yesterday by I/O, but 3L of intake was Free H20.      Recommend continued use of diuretics as needed for volume management as he continues to req ventilatory support, but otherwise stable kidney function.     Will need to monitor for potential MILLA over the next 48-72 hrs.    Volume /BP  BP's remain stable off pressors. Still with volume overload, but improving overall and O2 req coming down as well.  Target Weight of ~130 kg potentially.     Recommend continued IV Lasix 80mg today, he has had good response with 3-4L UOP.     Anemia   Hb stable ~ 8-9.     Acid Base,Electrolytes :   Hypernatremia - improving, 152. Continue Free H20 at 120cc/hr via NGT and trend Na q6 hrs.  If Na continues to rise, would continue to increase Free Water by 50cc/hr.    Fevers  PNA  Surgical Site Infection  Blood Cx negative.  On Vanc/PipTazo per ID. Management per CT Surg.    Recommendations were communicated to primary team via this note.    Patient seen and discussed with Dr Sara Puente MD  Neph Fellow  [phone]    INTERVAL HISTORY   Remains intubated, but in chair. Alert, relatively stable neuro status. CT scan yesterday with concerns for PNA and Surgical Site infection.  Remains intermittently febrile. He continues to make large volume UOP (3.7L) with diuretics. BP stable, not on  "vasopressors. On TF for nutrition.    REVIEW OF SYSTEMS:  A review of systems was not obtained due to intubated status    Current Meds    acetylcysteine  2 mL Nebulization Q4H KAYLA     albuterol  2.5 mg Nebulization Q4H     amiodarone  200 mg Oral Daily     aspirin  81 mg Oral Daily     atorvastatin  40 mg Oral QPM     ceFEPIme (MAXIPIME) IV  2 g Intravenous Q12H     furosemide  60 mg Intravenous Q8H     heparin lock flush  5-10 mL Intracatheter Q24H     insulin aspart  1-6 Units Subcutaneous Q4H     lidocaine  1-3 patch Transdermal Q24H     lidocaine  5 mL Topical Once     lidocaine   Transdermal Q8H     metroNIDAZOLE  500 mg Intravenous Q6H     multivitamins w/minerals  15 mL Per Feeding Tube Daily     pantoprazole  40 mg Oral or Feeding Tube BID     polyethylene glycol  17 g Oral or Feeding Tube Daily     QUEtiapine  50 mg Oral QPM     senna-docusate  1 tablet Oral or Feeding Tube BID    Or     senna-docusate  2 tablet Oral or Feeding Tube BID     sodium chloride (PF)  3 mL Intracatheter Q8H     vancomycin (VANCOCIN) IV  2,000 mg (central catheter) Intravenous Q24H     Infusion Meds    dexmedetomidine 0.6 mcg/kg/hr (20 0900)     dextrose       fentaNYL Stopped (20 1545)     HEParin 1,500 Units/hr (20 0900)     propofol (DIPRIVAN) infusion Stopped (20 1145)     BETA BLOCKER NOT PRESCRIBED       ALLERGIES:    No Known Allergies    PHYSICAL EXAM:   Temp  Av  F (37.8  C)  Min: 97.8  F (36.6  C)  Max: 102.4  F (39.1  C)  Arterial Line BP  Min: 69/43  Max: 152/57  Arterial Line MAP (mmHg)  Av.7 mmHg  Min: 50 mmHg  Max: 115 mmHg      Pulse  Av.9  Min: 71  Max: 96 Resp  Av.9  Min: 11  Max: 48  FiO2 (%)  Av.1 %  Min: 40 %  Max: 100 %  SpO2  Av.6 %  Min: 87 %  Max: 100 %    CVP (mmHg): 12 mmHg  BP (!) 84/52 (BP Location: Right arm)   Pulse 96   Temp 100.2  F (37.9  C) (Axillary)   Resp (!) 32   Ht 1.753 m (5' 9\")   Wt 134.7 kg (296 lb 15.4 oz)   SpO2 99%   BMI " 43.85 kg/m     Date 02/13/20 0700 - 02/14/20 0659   Shift 6264-8004 5491-1876 3031-7841 24 Hour Total   INTAKE   I.V. 316.64   316.64   NG/GT 70   70   IV Piggyback 500   500   Enteral 490   490   Blood Components 300   300   Shift Total(mL/kg) 1676.64(12.02)   1676.64(12.02)   OUTPUT   Urine 1470   1470   Chest Tube(mL/kg) 60(0.43)   60(0.43)   Shift Total(mL/kg) 1530(10.97)   1530(10.97)   Weight (kg) 139.5 139.5 139.5 139.5      Admit Weight: 131.9 kg (290 lb 11.2 oz)(bed scale)     GENERAL APPEARANCE: intubated   EYES: no scleral icterus  Pulmonary:  Decreased air entry in bases with basal rales   CV: irr rhythm, no rub   - JVD no   - Edema 2+   GI: soft, nontender, normal bowel sounds  : has quintero  SKIN: no rash, warm, dry, no cyanosis  NEURO: intubated, alert to voice, moving ext    LABS:   CMP  Recent Labs   Lab 02/20/20  0421 02/19/20  2350 02/19/20  1540 02/19/20  0357 02/19/20  0000  02/18/20  0409 02/17/20  2307 02/17/20  0332 02/16/20  1955  02/15/20  1213  02/14/20  2030  02/13/20  1119   * 154* 155* 154* 155*   < > 150* 153* 148* 149*   < > 146*   < >  --    < > 144   POTASSIUM 3.5  --  3.6 3.7 3.4   < > 3.1* 3.4 3.7 3.9   < > 4.5   < > 4.5   < > 5.1   CHLORIDE 121*  --   --  122*  --   --  119* 120* 115*  --    < > 112*   < >  --    < > 115*   CO2 24  --   --  25  --   --  27 25 30  --    < > 29   < >  --    < > 27   ANIONGAP 7  --   --  6  --   --  5 8 3  --    < > 5   < >  --    < > 2*   *  --   --  180*  --   --  161* 169* 146*  --    < > 194*   < >  --    < > 119*   BUN 99*  --   --  104*  --   --  106* 106* 110*  --    < > 107*   < >  --    < > 97*   CR 2.40*  --   --  2.38*  --   --  2.36* 2.33* 2.28*  --    < > 2.09*   < >  --    < > 2.77*   GFRESTIMATED 25*  --   --  25*  --   --  26* 26* 27*  --    < > 30*   < >  --    < > 21*   GFRESTBLACK 29*  --   --  29*  --   --  30* 30* 31*  --    < > 34*   < >  --    < > 24*   ELMER 7.6*  --   --  7.7*  --   --  8.0* 7.8* 8.4*  --    < >  7.8*   < >  --    < > 7.8*   MAG  --   --   --  2.7*  --   --  2.9*  --  2.9* 2.7*  --   --   --   --   --  2.8*   PHOS  --   --   --  3.5  --   --  2.9  --  3.4  --   --   --   --   --   --  3.6   PROTTOTAL  --   --   --   --   --   --   --   --   --   --   --  5.8*  --   --   --   --    ALBUMIN  --   --   --   --   --   --   --   --   --   --   --  2.3*  --   --   --   --    BILITOTAL  --   --   --   --   --   --   --   --   --   --   --  0.5  --   --   --   --    ALKPHOS  --   --   --   --   --   --   --   --   --   --   --  66  --   --   --   --    AST  --   --   --   --   --   --   --   --   --   --   --  32  --  44  --   --    ALT  --   --   --   --   --   --   --   --   --   --   --  17  --  17  --   --     < > = values in this interval not displayed.     CBC  Recent Labs   Lab 02/20/20  0421 02/19/20 0357 02/18/20  0409 02/17/20  1550   HGB 8.3* 8.0* 8.7* 8.3*   WBC 10.8 8.1 9.9 9.2   RBC 2.68* 2.59* 2.78* 2.64*   HCT 27.3* 26.2* 28.1* 27.0*   * 101* 101* 102*   MCH 31.0 30.9 31.3 31.4   MCHC 30.4* 30.5* 31.0* 30.7*   RDW 16.3* 15.9* 16.1* 16.2*    262 238 211     INR  Recent Labs   Lab 02/17/20  0332 02/16/20  0336   PTT 42* 48*     ABG  Recent Labs   Lab 02/19/20  1228 02/19/20  0357 02/19/20  0000 02/18/20  2210   PH 7.49* 7.50* 7.48* 7.51*   PCO2 30* 33* 33* 34*   PO2 97 67* 101 58*   HCO3 22 26 24 27   O2PER 55 50 60 40      URINE STUDIES  Recent Labs   Lab Test 02/19/20  1859 02/18/20  1817 02/14/20  2031 02/13/20  1447   COLOR Yellow Yellow Light Yellow Yellow   APPEARANCE Slightly Cloudy Slightly Cloudy Slightly Cloudy Clear   URINEGLC Negative Negative Negative Negative   URINEBILI Negative Negative Negative Negative   URINEKETONE Negative Negative Negative Negative   SG 1.029 1.014 1.009 1.012   UBLD Small* Trace* Small* Small*   URINEPH 5.0 5.5 5.0 5.0   PROTEIN 10* 10* Negative Negative   NITRITE Negative Negative Negative Negative   LEUKEST Small* Negative Small* Small*   RBCU 5*  1 1 9*   WBCU 9* 1 3 4     IMAGING:  Pertinent test results reviewed    Westley Puente MD

## 2020-02-20 NOTE — PLAN OF CARE
Major Shift Events:  Adequate urine output throughout shift. Patient following commands intermittently, on precedex per MAR. Patient had one red streaked mucous bowel movement overnight. Replaced potassium per MAR.    Plan: Update primary team with any acute changes, continue to monitor hemodynamics, continue with plan of care.    For vital signs and complete assessments, please see documentation flowsheets.     Problem: Adult Inpatient Plan of Care  Goal: Plan of Care Review  Outcome: No Change  Goal: Patient-Specific Goal (Individualization)  Outcome: No Change  Goal: Absence of Hospital-Acquired Illness or Injury  Outcome: No Change  Goal: Optimal Comfort and Wellbeing  Outcome: No Change  Goal: Readiness for Transition of Care  Outcome: No Change  Goal: Rounds/Family Conference  Outcome: No Change     Problem: Bleeding (Aortic Aneurysm/Dissection Repair)  Goal: Absence of Bleeding  Outcome: No Change     Problem: Hemodynamic Instability (Aortic Aneurysm/Dissection Repair)  Goal: Vital Signs Remain in Desired Range  Outcome: No Change     Problem: Infection (Aortic Aneurysm/Dissection Repair)  Goal: Absence of Infection Signs/Symptoms  Outcome: No Change     Problem: Pain (Aortic Aneurysm/Dissection Repair)  Goal: Acceptable Pain Control  Outcome: No Change     Problem: Tissue Perfusion Impaired (Aortic Aneurysm/Dissection Repair)  Goal: Effective Peripheral Tissue Perfusion  Outcome: No Change     Problem: OT General Care Plan  Goal: Toilet Transfer/Toileting (OT)  Description  Toilet Transfer/Toileting (OT)  Outcome: No Change     Problem: Restraint for Non-Violent/Non-Self-Destructive Behavior  Goal: Prevent/Manage Potential Problems  Description  Maintain safety of patient and others during period of restraint.  Promote psychological and physical wellbeing.  Prevent injury to skin and involved body parts.  Promote nutrition, hydration, and elimination.  Outcome: No Change

## 2020-02-20 NOTE — PROGRESS NOTES
CLINICAL NUTRITION SERVICES - BRIEF NOTE   (see RD note on 2/18 for full assessment)    K+ trending low-end normal, requiring enteral supplementation. Today, K 3.5. Currently receiving renal formula, Nepro @ 60 ml/hr + Prosource (1 pkt daily) (goal) via NDT.     Nutrition changes today:  Changed to non-renal formula:  - Nutren 1.5 @ 70 mL/hr to provide 2520 kcals (29 kcal/kg/day), 114 g PRO (1.3 g/kg/day), 1277 mL H2O, 296 g CHO and no fiber daily.    Zina Austin, RD, LD  [id number]  Pgr: [phone]

## 2020-02-20 NOTE — PROGRESS NOTES
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     TODAY'S PROGRESS:   - mental status continues to improve  - continue to treat hypernatremia  - diuresis with lasix, goal net negative 1 L  - PST    PLAN:  Neuro/ pain/ sedation:  #Encephalopathy, improving  - Monitor neurological status. Notify the MD for any acute changes in exam.  - Fentanyl gtt for pain.  - precedex gtt  - Daily sedation holiday  - seroquel 50 mg at bedtime      Pulmonary care:   #MV for airway protection post op  #ventilator associated pneumonia  - PST as able with sedation wean  - MV  - Titrate FiO2 for SpO2 >92%-  - Dr. Mckeon requests PEEP no less than 8  - bronchoscopy 2/15, 2/16     Cardiovascular:    - Monitor hemodynamic status.   - MAP >65, SBP <120  - Hold PTA anti-hypertensives    #Non-sustained VT. brief run of nonsustained VT, less than 30 seconds, in the operating room, was hypokalemic at the time, also was being paced, possible R-on-T phenomenon, has not had recurrence since temporary epicardial pacing has been discontinued.  Cardiology EP consult 2/12 recommended discontinuing amiodarone  #A-fib with RVR: noted overnight 2/14  - amiodarone PO     GI care:   - NPO except ice chips and medications.     Fluids/ Electrolytes/ Nutrition:   - TKO for IV fluid hydration  - NJ placed, TF at goal  - free water 150/hr for ongoing hypernatremia with q6 hr Na checks    Renal/ Fluid Balance:    #Nonoliguric HANNA  - Urine output is adequate so far.  - Will  continue to monitor intake and output, Cr, electrolytes.   - Nephrology consult  - diuresis with lasix 60 q8 x 3 today      Endocrine:    # Stress hyperglycemia  - Insulin gtt     ID/ Antibiotics:  # ventilator associated pneumonia  - zosyn started 2/15, end date 2/22  - ID consult  - s/p vanc 2/15 - 2/17, restarted 2/18 for ongoing fevers  - CT CAP 2/19 - nothing to do for pericardial effusion per CVTS     Heme:     - Hgb goal >7  - low int heparin gtt for a-fib     Prophylaxis:    - heparin as above  - Protonix qd     Lines/ tubes/ drains:  - PICC, Arterial line, quintero     Disposition:  - CV ICU.      Patient seen, findings and plan discussed with CVICU attending Dr. Kristin Alfonso MD  [phone]    ====================================    SUBJECTIVE:   Mental status continues to gradually improve. No acute overnight events    OBJECTIVE:   1. VITAL SIGNS:   Temp:  [98.5  F (36.9  C)-101.5  F (38.6  C)] 100.2  F (37.9  C)  Heart Rate:  [] 93  Resp:  [30-34] 32  BP: (84)/(52) 84/52  MAP:  [61 mmHg-84 mmHg] 71 mmHg  Arterial Line BP: ()/(43-68) 96/59  FiO2 (%):  [55 %] 55 %  SpO2:  [93 %-100 %] 96 %  Ventilation Mode: CPAP/PS  (Continuous positive airway pressure with Pressure Support) (PS 8/8 per MD order)  FiO2 (%): 55 %  Rate Set (breaths/minute): 16 breaths/min  Tidal Volume Set (mL): 450 mL  PEEP (cm H2O): 8 cmH2O  Pressure Support (cm H2O): 8 cmH2O  Oxygen Concentration (%): 55 %  Resp: (!) 32      2. INTAKE/ OUTPUT:   I/O last 3 completed shifts:  In: 5505.14 [I.V.:1305.14; NG/GT:2970]  Out: 4070 [Urine:4070]    3. PHYSICAL EXAMINATION:   General: intubated, sedated.  Neuro: Opens eyes to voice, mouths full sentences, follows commands in all 4 extremities  Resp: mechanical ventilation  CV: a-fib  Abdomen: Soft, Non-distended, Non-tender  Incisions: c/d/i  Extremities: warm and well perfused with mild edema, dusky appearance to the right second and third toes, stable    4. INVESTIGATIONS:    Arterial Blood Gases   Recent Labs   Lab 02/19/20  1228 02/19/20  0357 02/19/20  0000 02/18/20  2210   PH 7.49* 7.50* 7.48* 7.51*   PCO2 30* 33* 33* 34*   PO2 97 67* 101 58*   HCO3 22 26 24 27     Complete Blood Count   Recent Labs   Lab 02/20/20  0421 02/19/20  0357 02/18/20  0409 02/17/20  1550   WBC 10.8 8.1 9.9 9.2   HGB 8.3* 8.0* 8.7* 8.3*    262 238 211     Basic Metabolic Panel  Recent Labs   Lab 02/20/20  1018 02/20/20  0421 02/19/20  2350 02/19/20  1540 02/19/20  0357  02/18/20  0409 02/17/20  2307   * 152* 154* 155* 154*   < > 150* 153*   POTASSIUM 3.4 3.5  --  3.6 3.7   < > 3.1* 3.4   CHLORIDE  --  121*  --   --  122*  --  119* 120*   CO2  --  24  --   --  25  --  27 25   BUN  --  99*  --   --  104*  --  106* 106*   CR  --  2.40*  --   --  2.38*  --  2.36* 2.33*   GLC  --  161*  --   --  180*  --  161* 169*    < > = values in this interval not displayed.     Liver Function Tests  Recent Labs   Lab 02/15/20  1213 02/14/20 2030   AST 32 44   ALT 17 17   ALKPHOS 66  --    BILITOTAL 0.5  --    ALBUMIN 2.3*  --      Pancreatic Enzymes  No lab results found in last 7 days.  Coagulation Profile  Recent Labs   Lab 02/17/20  0332 02/16/20  0336   PTT 42* 48*         5. RADIOLOGY:   Recent Results (from the past 24 hour(s))   US Extremity Non Vascular Left    Narrative    US EXTREMITY NON VASCULAR LEFT  2/19/2020 3:23 PM      HISTORY: please US left thigh incision, suspect post surgical site  infection    COMPARISON: None available    TECHNIQUE: Focused grayscale and color Doppler evaluation of the left  medial thigh and leg along the previous incision for greater saphenous  vein harvest.    FINDINGS/    Impression    IMPRESSION:   Minimal soft tissue edema along the left medial thigh and leg incision  with no drainable fluid collections or other focal sonographic  abnormalities.    I have personally reviewed the examination and initial interpretation  and I agree with the findings.    RAJ  MD KO   CT Chest/Abdomen/Pelvis w Contrast   Result Value    Radiologist flags Possible surgical site infection, developing lung (Urgent)    Narrative    EXAMINATION: CT CHEST/ABDOMEN/PELVIS W CONTRAST, 2/19/2020 5:41 PM    TECHNIQUE:  Helical CT of the chest abdomen and pelvis with contrast.   Coronal and sagittal reformatted images were reviewed at the  workstation for further assessment.    CONTRAST:  135 ml Isovue 370 IV.     COMPARISON: 2/9/2020.    HISTORY: ongoing fever despite treatment with broad spectrum  antibiotics, suspect infectious process in chest in patient post  aortic dissection repair    FINDINGS:     Lines/Tubes: Prosthetic aortic valve. Endotracheal tube projects over  the midthoracic trachea. Enteric tube is in the stomach. Feeding tube  is in the fourth portion duodenum..     Chest: Postsurgical changes of aortic root/ascending aortic  reconstruction. Soft tissue density fluid completely encircling the  descending aorta at the takeoff of the aortic root. The adjacent  overlying right atrial aspect of the pericardium is thickened and  enhancing. There are trace foci of gas in this region (for example  series 4, image 77). No defect seen, surgically repaired aorta.  Conventional three-vessel branching pattern. All vessels appear  patent. Lungs demonstrate low lung volumes, bilateral lower lobe  atelectasis and bilateral pleural effusions with bibasilar airspace  opacities most conspicuous in the dependent right middle lobe.    Otherwise the thyroid, airways, lung parenchyma, mediastinal  structures, lymph nodes, and major vascular structures are within  normal limits.    Abdomen/ Pelvis: Homogeneous enhancement of the hepatic, splenic  parenchyma. Fatty changes in the pancreas. No intra-abdominal  peripheral enhancing fluid collection. Unremarkable cortical medullary  phase enhancement of the kidneys bilaterally. Bilateral indeterminate  renal cysts. No free fluid in the pelvis. No  dilated loops of large or  small bowel. The major vascular structures in the abdomen are patent..    Otherwise the stomach, liver, biliary system, pancreas, spleen,  adrenal glands, large and small bowel, kidneys, ureters, bladder,  pelvic organs, major vascular structures, peritoneum and lymph nodes  are within normal limits.     Bones, abdominal wall and Soft Tissues: No acute or aggressive osseous  lesions. Unhealed sternotomy defect. No surrounding well-circumscribed  fluid collection.      Impression    IMPRESSION:    1. Persistent soft tissue density pericardial effusion which also  surrounding the ascending aortic vascular repair site. Thickening of  the pericardium foci of gas. This possibly represents surgical site  infection. The aorta itself appears intact.  2. Bibasilar atelectasis, airspace opacities and small pleural  effusions consistent with pulmonary infection.  3. Indeterminant renal cysts. At an appropriate clinical time,   recommend nonemergent ultrasound or MRI for further evaluation or  comparison with prior studies.    [Urgent Result: Possible surgical site infection, developing lung  infection]    Finding was identified on 2/19/2020 6:02 PM.     Dr. Pandey was contacted by Dr. Hall at 2/19/2020 6:23 PM and  verbalized understanding of the urgent finding.     I have personally reviewed the examination and initial interpretation  and I agree with the findings.    RAJ CONNELL MD       =========================================

## 2020-02-21 ENCOUNTER — APPOINTMENT (OUTPATIENT)
Dept: GENERAL RADIOLOGY | Facility: CLINIC | Age: 75
DRG: 219 | End: 2020-02-21
Attending: STUDENT IN AN ORGANIZED HEALTH CARE EDUCATION/TRAINING PROGRAM
Payer: COMMERCIAL

## 2020-02-21 ENCOUNTER — APPOINTMENT (OUTPATIENT)
Dept: OCCUPATIONAL THERAPY | Facility: CLINIC | Age: 75
DRG: 219 | End: 2020-02-21
Payer: COMMERCIAL

## 2020-02-21 ENCOUNTER — APPOINTMENT (OUTPATIENT)
Dept: GENERAL RADIOLOGY | Facility: CLINIC | Age: 75
DRG: 219 | End: 2020-02-21
Payer: COMMERCIAL

## 2020-02-21 LAB
ANION GAP SERPL CALCULATED.3IONS-SCNC: 6 MMOL/L (ref 3–14)
BACTERIA SPEC CULT: ABNORMAL
BASE EXCESS BLDA CALC-SCNC: 0.7 MMOL/L
BASE EXCESS BLDA CALC-SCNC: 1.3 MMOL/L
BUN SERPL-MCNC: 99 MG/DL (ref 7–30)
CALCIUM SERPL-MCNC: 7.8 MG/DL (ref 8.5–10.1)
CHLORIDE SERPL-SCNC: 121 MMOL/L (ref 94–109)
CO2 SERPL-SCNC: 25 MMOL/L (ref 20–32)
CREAT SERPL-MCNC: 2.4 MG/DL (ref 0.66–1.25)
ERYTHROCYTE [DISTWIDTH] IN BLOOD BY AUTOMATED COUNT: 16.2 % (ref 10–15)
GFR SERPL CREATININE-BSD FRML MDRD: 25 ML/MIN/{1.73_M2}
GLUCOSE BLDC GLUCOMTR-MCNC: 112 MG/DL (ref 70–99)
GLUCOSE BLDC GLUCOMTR-MCNC: 127 MG/DL (ref 70–99)
GLUCOSE BLDC GLUCOMTR-MCNC: 145 MG/DL (ref 70–99)
GLUCOSE BLDC GLUCOMTR-MCNC: 170 MG/DL (ref 70–99)
GLUCOSE BLDC GLUCOMTR-MCNC: 170 MG/DL (ref 70–99)
GLUCOSE BLDC GLUCOMTR-MCNC: 186 MG/DL (ref 70–99)
GLUCOSE BLDC GLUCOMTR-MCNC: 188 MG/DL (ref 70–99)
GLUCOSE SERPL-MCNC: 198 MG/DL (ref 70–99)
HCO3 BLD-SCNC: 24 MMOL/L (ref 21–28)
HCO3 BLD-SCNC: 25 MMOL/L (ref 21–28)
HCT VFR BLD AUTO: 26.5 % (ref 40–53)
HGB BLD-MCNC: 7.9 G/DL (ref 13.3–17.7)
LMWH PPP CHRO-ACNC: 0.14 IU/ML
LMWH PPP CHRO-ACNC: 0.23 IU/ML
MAGNESIUM SERPL-MCNC: 2.4 MG/DL (ref 1.6–2.3)
MCH RBC QN AUTO: 30.7 PG (ref 26.5–33)
MCHC RBC AUTO-ENTMCNC: 29.8 G/DL (ref 31.5–36.5)
MCV RBC AUTO: 103 FL (ref 78–100)
O2/TOTAL GAS SETTING VFR VENT: 50 %
O2/TOTAL GAS SETTING VFR VENT: 55 %
OXYHGB MFR BLD: 98 % (ref 92–100)
PCO2 BLD: 30 MM HG (ref 35–45)
PCO2 BLD: 35 MM HG (ref 35–45)
PH BLD: 7.47 PH (ref 7.35–7.45)
PH BLD: 7.51 PH (ref 7.35–7.45)
PHOSPHATE SERPL-MCNC: 3.6 MG/DL (ref 2.5–4.5)
PHOSPHATE SERPL-MCNC: 3.8 MG/DL (ref 2.5–4.5)
PLATELET # BLD AUTO: 282 10E9/L (ref 150–450)
PO2 BLD: 125 MM HG (ref 80–105)
PO2 BLD: 95 MM HG (ref 80–105)
POTASSIUM SERPL-SCNC: 3.6 MMOL/L (ref 3.4–5.3)
POTASSIUM SERPL-SCNC: 3.7 MMOL/L (ref 3.4–5.3)
POTASSIUM SERPL-SCNC: 3.8 MMOL/L (ref 3.4–5.3)
RBC # BLD AUTO: 2.57 10E12/L (ref 4.4–5.9)
SODIUM SERPL-SCNC: 152 MMOL/L (ref 133–144)
SODIUM SERPL-SCNC: 153 MMOL/L (ref 133–144)
SPECIMEN SOURCE: ABNORMAL
VANCOMYCIN SERPL-MCNC: 24 MG/L
WBC # BLD AUTO: 12.6 10E9/L (ref 4–11)

## 2020-02-21 PROCEDURE — 25000128 H RX IP 250 OP 636: Performed by: ANESTHESIOLOGY

## 2020-02-21 PROCEDURE — 40000275 ZZH STATISTIC RCP TIME EA 10 MIN

## 2020-02-21 PROCEDURE — 84132 ASSAY OF SERUM POTASSIUM: CPT | Performed by: THORACIC SURGERY (CARDIOTHORACIC VASCULAR SURGERY)

## 2020-02-21 PROCEDURE — 84295 ASSAY OF SERUM SODIUM: CPT | Performed by: THORACIC SURGERY (CARDIOTHORACIC VASCULAR SURGERY)

## 2020-02-21 PROCEDURE — 25800025 ZZH RX 258: Performed by: STUDENT IN AN ORGANIZED HEALTH CARE EDUCATION/TRAINING PROGRAM

## 2020-02-21 PROCEDURE — 84100 ASSAY OF PHOSPHORUS: CPT | Performed by: STUDENT IN AN ORGANIZED HEALTH CARE EDUCATION/TRAINING PROGRAM

## 2020-02-21 PROCEDURE — 84100 ASSAY OF PHOSPHORUS: CPT | Performed by: THORACIC SURGERY (CARDIOTHORACIC VASCULAR SURGERY)

## 2020-02-21 PROCEDURE — 83735 ASSAY OF MAGNESIUM: CPT | Performed by: STUDENT IN AN ORGANIZED HEALTH CARE EDUCATION/TRAINING PROGRAM

## 2020-02-21 PROCEDURE — 25000125 ZZHC RX 250: Performed by: STUDENT IN AN ORGANIZED HEALTH CARE EDUCATION/TRAINING PROGRAM

## 2020-02-21 PROCEDURE — 84295 ASSAY OF SERUM SODIUM: CPT | Performed by: STUDENT IN AN ORGANIZED HEALTH CARE EDUCATION/TRAINING PROGRAM

## 2020-02-21 PROCEDURE — 99221 1ST HOSP IP/OBS SF/LOW 40: CPT | Performed by: INTERNAL MEDICINE

## 2020-02-21 PROCEDURE — 82803 BLOOD GASES ANY COMBINATION: CPT | Performed by: STUDENT IN AN ORGANIZED HEALTH CARE EDUCATION/TRAINING PROGRAM

## 2020-02-21 PROCEDURE — 97110 THERAPEUTIC EXERCISES: CPT | Mod: GO

## 2020-02-21 PROCEDURE — 25000132 ZZH RX MED GY IP 250 OP 250 PS 637: Performed by: STUDENT IN AN ORGANIZED HEALTH CARE EDUCATION/TRAINING PROGRAM

## 2020-02-21 PROCEDURE — 20000004 ZZH R&B ICU UMMC

## 2020-02-21 PROCEDURE — 25000128 H RX IP 250 OP 636: Performed by: STUDENT IN AN ORGANIZED HEALTH CARE EDUCATION/TRAINING PROGRAM

## 2020-02-21 PROCEDURE — 82805 BLOOD GASES W/O2 SATURATION: CPT | Performed by: PHYSICIAN ASSISTANT

## 2020-02-21 PROCEDURE — 74018 RADEX ABDOMEN 1 VIEW: CPT

## 2020-02-21 PROCEDURE — 80048 BASIC METABOLIC PNL TOTAL CA: CPT | Performed by: STUDENT IN AN ORGANIZED HEALTH CARE EDUCATION/TRAINING PROGRAM

## 2020-02-21 PROCEDURE — 94003 VENT MGMT INPAT SUBQ DAY: CPT

## 2020-02-21 PROCEDURE — 94640 AIRWAY INHALATION TREATMENT: CPT | Mod: 76

## 2020-02-21 PROCEDURE — 85027 COMPLETE CBC AUTOMATED: CPT | Performed by: STUDENT IN AN ORGANIZED HEALTH CARE EDUCATION/TRAINING PROGRAM

## 2020-02-21 PROCEDURE — 25000128 H RX IP 250 OP 636: Performed by: PHYSICIAN ASSISTANT

## 2020-02-21 PROCEDURE — 25000128 H RX IP 250 OP 636

## 2020-02-21 PROCEDURE — 93010 ELECTROCARDIOGRAM REPORT: CPT | Performed by: INTERNAL MEDICINE

## 2020-02-21 PROCEDURE — 83735 ASSAY OF MAGNESIUM: CPT | Performed by: THORACIC SURGERY (CARDIOTHORACIC VASCULAR SURGERY)

## 2020-02-21 PROCEDURE — 27210429 ZZH NUTRITION PRODUCT INTERMEDIATE LITER

## 2020-02-21 PROCEDURE — 36415 COLL VENOUS BLD VENIPUNCTURE: CPT | Performed by: STUDENT IN AN ORGANIZED HEALTH CARE EDUCATION/TRAINING PROGRAM

## 2020-02-21 PROCEDURE — 84132 ASSAY OF SERUM POTASSIUM: CPT | Performed by: STUDENT IN AN ORGANIZED HEALTH CARE EDUCATION/TRAINING PROGRAM

## 2020-02-21 PROCEDURE — 99291 CRITICAL CARE FIRST HOUR: CPT | Mod: GC | Performed by: ANESTHESIOLOGY

## 2020-02-21 PROCEDURE — 00000146 ZZHCL STATISTIC GLUCOSE BY METER IP

## 2020-02-21 PROCEDURE — 94640 AIRWAY INHALATION TREATMENT: CPT

## 2020-02-21 PROCEDURE — 93005 ELECTROCARDIOGRAM TRACING: CPT

## 2020-02-21 PROCEDURE — 25000125 ZZHC RX 250

## 2020-02-21 PROCEDURE — 85520 HEPARIN ASSAY: CPT | Performed by: STUDENT IN AN ORGANIZED HEALTH CARE EDUCATION/TRAINING PROGRAM

## 2020-02-21 PROCEDURE — 80202 ASSAY OF VANCOMYCIN: CPT | Performed by: THORACIC SURGERY (CARDIOTHORACIC VASCULAR SURGERY)

## 2020-02-21 PROCEDURE — 25000132 ZZH RX MED GY IP 250 OP 250 PS 637: Performed by: PHYSICIAN ASSISTANT

## 2020-02-21 PROCEDURE — 25000132 ZZH RX MED GY IP 250 OP 250 PS 637: Performed by: ANESTHESIOLOGY

## 2020-02-21 PROCEDURE — 25800030 ZZH RX IP 258 OP 636: Performed by: STUDENT IN AN ORGANIZED HEALTH CARE EDUCATION/TRAINING PROGRAM

## 2020-02-21 PROCEDURE — 25000132 ZZH RX MED GY IP 250 OP 250 PS 637: Performed by: THORACIC SURGERY (CARDIOTHORACIC VASCULAR SURGERY)

## 2020-02-21 PROCEDURE — 40000014 ZZH STATISTIC ARTERIAL MONITORING DAILY

## 2020-02-21 PROCEDURE — 71045 X-RAY EXAM CHEST 1 VIEW: CPT

## 2020-02-21 RX ORDER — BUMETANIDE 0.25 MG/ML
3 INJECTION INTRAMUSCULAR; INTRAVENOUS EVERY 6 HOURS
Status: COMPLETED | OUTPATIENT
Start: 2020-02-21 | End: 2020-02-21

## 2020-02-21 RX ORDER — BUMETANIDE 0.25 MG/ML
2.5 INJECTION INTRAMUSCULAR; INTRAVENOUS EVERY 6 HOURS
Status: DISCONTINUED | OUTPATIENT
Start: 2020-02-21 | End: 2020-02-21

## 2020-02-21 RX ADMIN — ACETYLCYSTEINE 2 ML: 200 SOLUTION ORAL; RESPIRATORY (INHALATION) at 05:00

## 2020-02-21 RX ADMIN — MULTIVITAMIN 15 ML: LIQUID ORAL at 07:52

## 2020-02-21 RX ADMIN — CEFEPIME HYDROCHLORIDE 2 G: 2 INJECTION, POWDER, FOR SOLUTION INTRAVENOUS at 08:24

## 2020-02-21 RX ADMIN — POTASSIUM CHLORIDE 20 MEQ: 29.8 INJECTION, SOLUTION INTRAVENOUS at 06:47

## 2020-02-21 RX ADMIN — ALBUTEROL SULFATE 2.5 MG: 2.5 SOLUTION RESPIRATORY (INHALATION) at 05:00

## 2020-02-21 RX ADMIN — AMIODARONE HYDROCHLORIDE 150 MG: 1.5 INJECTION, SOLUTION INTRAVENOUS at 08:56

## 2020-02-21 RX ADMIN — POTASSIUM CHLORIDE 20 MEQ: 29.8 INJECTION, SOLUTION INTRAVENOUS at 23:26

## 2020-02-21 RX ADMIN — HEPARIN SODIUM 1500 UNITS/HR: 10000 INJECTION, SOLUTION INTRAVENOUS at 18:08

## 2020-02-21 RX ADMIN — INSULIN ASPART 1 UNITS: 100 INJECTION, SOLUTION INTRAVENOUS; SUBCUTANEOUS at 05:37

## 2020-02-21 RX ADMIN — DIPHENHYDRAMINE HYDROCHLORIDE 50 MG: 50 INJECTION, SOLUTION INTRAMUSCULAR; INTRAVENOUS at 22:51

## 2020-02-21 RX ADMIN — METRONIDAZOLE 500 MG: 500 INJECTION, SOLUTION INTRAVENOUS at 15:26

## 2020-02-21 RX ADMIN — AMIODARONE HYDROCHLORIDE 200 MG: 200 TABLET ORAL at 07:52

## 2020-02-21 RX ADMIN — ACETYLCYSTEINE 2 ML: 200 SOLUTION ORAL; RESPIRATORY (INHALATION) at 12:06

## 2020-02-21 RX ADMIN — AMIODARONE HYDROCHLORIDE 150 MG: 1.5 INJECTION, SOLUTION INTRAVENOUS at 13:39

## 2020-02-21 RX ADMIN — ALBUTEROL SULFATE 2.5 MG: 2.5 SOLUTION RESPIRATORY (INHALATION) at 16:44

## 2020-02-21 RX ADMIN — ACETAMINOPHEN 650 MG: 325 TABLET, FILM COATED ORAL at 16:33

## 2020-02-21 RX ADMIN — QUETIAPINE FUMARATE 50 MG: 50 TABLET ORAL at 20:33

## 2020-02-21 RX ADMIN — METRONIDAZOLE 500 MG: 500 INJECTION, SOLUTION INTRAVENOUS at 08:57

## 2020-02-21 RX ADMIN — DEXMEDETOMIDINE 0.4 MCG/KG/HR: 100 INJECTION, SOLUTION, CONCENTRATE INTRAVENOUS at 22:50

## 2020-02-21 RX ADMIN — ATORVASTATIN CALCIUM 40 MG: 40 TABLET, FILM COATED ORAL at 20:33

## 2020-02-21 RX ADMIN — OXYCODONE HYDROCHLORIDE 5 MG: 5 TABLET ORAL at 16:34

## 2020-02-21 RX ADMIN — ALBUTEROL SULFATE 2.5 MG: 2.5 SOLUTION RESPIRATORY (INHALATION) at 01:17

## 2020-02-21 RX ADMIN — ALBUTEROL SULFATE 2.5 MG: 2.5 SOLUTION RESPIRATORY (INHALATION) at 12:06

## 2020-02-21 RX ADMIN — ASPIRIN 81 MG CHEWABLE TABLET 81 MG: 81 TABLET CHEWABLE at 07:52

## 2020-02-21 RX ADMIN — CEFEPIME HYDROCHLORIDE 2 G: 2 INJECTION, POWDER, FOR SOLUTION INTRAVENOUS at 20:33

## 2020-02-21 RX ADMIN — INSULIN ASPART 1 UNITS: 100 INJECTION, SOLUTION INTRAVENOUS; SUBCUTANEOUS at 21:27

## 2020-02-21 RX ADMIN — Medication 40 MG: at 20:00

## 2020-02-21 RX ADMIN — ACETYLCYSTEINE 2 ML: 200 SOLUTION ORAL; RESPIRATORY (INHALATION) at 01:18

## 2020-02-21 RX ADMIN — BUMETANIDE 2.5 MG: 0.25 INJECTION INTRAMUSCULAR; INTRAVENOUS at 14:02

## 2020-02-21 RX ADMIN — ACETYLCYSTEINE 2 ML: 200 SOLUTION ORAL; RESPIRATORY (INHALATION) at 16:44

## 2020-02-21 RX ADMIN — DEXTROSE AND SODIUM CHLORIDE: 5; 450 INJECTION, SOLUTION INTRAVENOUS at 10:08

## 2020-02-21 RX ADMIN — AMIODARONE HYDROCHLORIDE 150 MG: 1.5 INJECTION, SOLUTION INTRAVENOUS at 13:38

## 2020-02-21 RX ADMIN — METRONIDAZOLE 500 MG: 500 INJECTION, SOLUTION INTRAVENOUS at 02:58

## 2020-02-21 RX ADMIN — AMIODARONE HYDROCHLORIDE 1 MG/MIN: 50 INJECTION, SOLUTION INTRAVENOUS at 14:33

## 2020-02-21 RX ADMIN — BUMETANIDE 2.5 MG: 0.25 INJECTION INTRAMUSCULAR; INTRAVENOUS at 07:52

## 2020-02-21 RX ADMIN — POTASSIUM CHLORIDE 20 MEQ: 29.8 INJECTION, SOLUTION INTRAVENOUS at 10:41

## 2020-02-21 RX ADMIN — METRONIDAZOLE 500 MG: 500 INJECTION, SOLUTION INTRAVENOUS at 20:33

## 2020-02-21 RX ADMIN — Medication 40 MG: at 07:54

## 2020-02-21 RX ADMIN — INSULIN ASPART 1 UNITS: 100 INJECTION, SOLUTION INTRAVENOUS; SUBCUTANEOUS at 01:41

## 2020-02-21 RX ADMIN — FUROSEMIDE 60 MG: 20 INJECTION, SOLUTION INTRAMUSCULAR; INTRAVENOUS at 01:42

## 2020-02-21 RX ADMIN — DEXMEDETOMIDINE 0.4 MCG/KG/HR: 100 INJECTION, SOLUTION, CONCENTRATE INTRAVENOUS at 12:17

## 2020-02-21 RX ADMIN — ACETYLCYSTEINE 2 ML: 200 SOLUTION ORAL; RESPIRATORY (INHALATION) at 20:16

## 2020-02-21 RX ADMIN — ALBUTEROL SULFATE 2.5 MG: 2.5 SOLUTION RESPIRATORY (INHALATION) at 20:17

## 2020-02-21 RX ADMIN — INSULIN ASPART 1 UNITS: 100 INJECTION, SOLUTION INTRAVENOUS; SUBCUTANEOUS at 17:03

## 2020-02-21 RX ADMIN — BUMETANIDE 3 MG: 0.25 INJECTION INTRAMUSCULAR; INTRAVENOUS at 18:08

## 2020-02-21 ASSESSMENT — MIFFLIN-ST. JEOR: SCORE: 2104.38

## 2020-02-21 ASSESSMENT — ACTIVITIES OF DAILY LIVING (ADL)
ADLS_ACUITY_SCORE: 18

## 2020-02-21 NOTE — PROGRESS NOTES
Social Work Services Progress Note    Hospital Day: 12  Date of Initial Social Work Evaluation:  2/11/2020  Collaborated with:  Rosa (Daughter), Ronda (Spouse) and Jordyn (Ronda's Daughter)    Data:  Vel Espana is a 74 year old male with PMH of HTN s/p Ascending aortic dissection repair using a 32 mm Dacron Gelweave graft, AVR with 27 mm Patterson bovine pericardial valve, Coronary bypass to right coronary artery, Left greater saphenous vein harvest, Circulatory arrest, Left femoral artery exploration on 2/10.  He remains intubated at this time.       Intervention:  Discharge Planning-  Family again expressed concerns regarding VA coverage/ coordination.  They have been in communication with the VA, as has Chris (RNCC).  Both have received different information.  Ultimately, pt's VA# was written down and Chris will give UR this number/ notify them that pt's hospitalization has been approved through the VA.  Then, either RNCC or family will contact the VA if/ when pt is ready to transfer off of the ICU to see if they would like pt to remain here, or if they want pt to transfer to the VA at that point.    It was also discussed that it is unknown at this time what level of care pt will need at discharge (LTACH vs. TCU).  Per conversation with the VA, it does not appear that pt has any post hospital TCU/ LTACH coverage with the VA and would need to utilize [employer].  JOSE ANGEL has already provided family a list of [employer] TCUs near pt's home.      Pt's spouse and her daughter have been in contact with her [facility] social worker to discuss potential SNF hospice placement for her in the future, her daughter is currently caring for her at home.  Ronda (Spouse) is agreeable to SNF placement with hospice if she can go where and when pt goes (for rehab).  At present, family is hoping to eventually get both pt and Ronda (pt for rehab, Ronda for hospice) into [facility].  JOSE ANGEL encouraged continued coordination w/  hospice SW.      Assessment:  Pt remains intubated at this time.    Plan:    Anticipated Disposition:  LTACH vs. TCU    Barriers to d/c plan:  Medical Stability    Follow Up:   will continue to follow to provide support and continue discharge plans as identified.    RUSH Pelayo, Health system  ICU   [facility]  Phone:  [phone]  Pager:  [phone]

## 2020-02-21 NOTE — PROGRESS NOTES
Nephrology Progress Note  February 21, 2020      Vel Espana MRN:[id number] YOB: 1945  Date of Admission:2/9/2020    ASSESSMENT AND RECOMMENDATIONS:   74 yr male with Type A  Aortic dissection, S/p repair , AVR ,CABG, Left greater saphenous vein harvest, Circulatory arrest, Left femoral artery exploration on 2/10. Has NSVT. Nephrology consulted for HANNA.     Non Oliguric HANNA   Unknown baseline  Cr   Cr on admission 1.24  Etiology felt to be hypoperfusion on presentation with IV contrast use resulting in ATN.  --  Overall, renal function remains stable, eGFr 25-30, and he is non oliguric. He was net positive 2L yesterday by I/O, but 3L of intake was Free H20.      Recommend continued use of diuretics as needed for volume management as he continues to req ventilatory support. Reasonable to trial higher dose diuretics today to augment UOP and achieve neg balance.     Will need to monitor for potential MILLA over the next 48-72 hrs.    Volume /BP  BP's remain stable off pressors. Still with volume overload, but vent req stable/coming down (decreasing FiO2, PEEP 8) Target Weight of ~130 kg potentially.     Will follow UOP today with Bumex TID per Ct Surg.    Anemia   Hb stable ~ 8-9.     Acid Base,Electrolytes :   Hypernatremia - stable, 153. Continue Free H20 at 120cc/hr via NGT and trend Na q8 hrs.  If Na continues to rise, would continue to increase Free Water by 50cc/hr.    Fevers  PNA, Resp Failure  Surgical Site Infection  Blood Cx negative.  On Cef/Flagyl, Plan for Trach Today. Management per CT Surg/ID.    Recommendations were communicated to primary team via this note.    Patient seen and discussed with Dr Sara Puente MD  Neph Fellow  [phone]    INTERVAL HISTORY   Remains intubated, but for me, this AM much more alert. Follows commands. Nods appropriately. T Max 100.2. He remains non oliguric, UOP (3.2L) with diuretics. BP stable, not on vasopressors. On TF for nutrition.  "    REVIEW OF SYSTEMS:  A review of systems was not obtained due to intubated status    Current Meds    acetylcysteine  2 mL Nebulization Q4H KAYLA     albuterol  2.5 mg Nebulization Q4H     amiodarone  200 mg Oral Daily     aspirin  81 mg Oral Daily     atorvastatin  40 mg Oral QPM     bumetanide  2.5 mg Intravenous Q6H     ceFEPIme (MAXIPIME) IV  2 g Intravenous Q12H     heparin lock flush  5-10 mL Intracatheter Q24H     insulin aspart  1-6 Units Subcutaneous Q4H     lidocaine  1-3 patch Transdermal Q24H     lidocaine  5 mL Topical Once     lidocaine   Transdermal Q8H     metroNIDAZOLE  500 mg Intravenous Q6H     multivitamins w/minerals  15 mL Per Feeding Tube Daily     pantoprazole  40 mg Oral or Feeding Tube BID     polyethylene glycol  17 g Oral or Feeding Tube Daily     QUEtiapine  50 mg Oral QPM     senna-docusate  1 tablet Oral or Feeding Tube BID    Or     senna-docusate  2 tablet Oral or Feeding Tube BID     sodium chloride (PF)  3 mL Intracatheter Q8H     vancomycin (VANCOCIN) IV  2,000 mg (central catheter) Intravenous Q24H     Infusion Meds    dexmedetomidine 0.4 mcg/kg/hr (20 1217)     dextrose       dextrose 5% and 0.45% NaCl 10 mL/hr at 20 1008     fentaNYL Stopped (20 1545)     [Held by provider] HEParin Stopped (20 0630)     propofol (DIPRIVAN) infusion Stopped (20 1145)     BETA BLOCKER NOT PRESCRIBED       ALLERGIES:    No Known Allergies    PHYSICAL EXAM:   Temp  Av  F (37.8  C)  Min: 97.8  F (36.6  C)  Max: 102.4  F (39.1  C)  Arterial Line BP  Min: 69/43  Max: 152/57  Arterial Line MAP (mmHg)  Av.7 mmHg  Min: 50 mmHg  Max: 115 mmHg      Pulse  Av.9  Min: 71  Max: 96 Resp  Av.9  Min: 11  Max: 48  FiO2 (%)  Av.1 %  Min: 40 %  Max: 100 %  SpO2  Av.6 %  Min: 87 %  Max: 100 %    CVP (mmHg): 12 mmHg  BP (!) 84/52 (BP Location: Right arm)   Pulse 96   Temp 97.8  F (36.6  C) (Axillary)   Resp (!) 31   Ht 1.753 m (5' 9\")   Wt 138.4 kg " (305 lb 1.9 oz)   SpO2 99%   BMI 45.06 kg/m     Date 02/13/20 0700 - 02/14/20 0659   Shift 2513-0261 6660-3755 1588-1146 24 Hour Total   INTAKE   I.V. 316.64   316.64   NG/GT 70   70   IV Piggyback 500   500   Enteral 490   490   Blood Components 300   300   Shift Total(mL/kg) 1676.64(12.02)   1676.64(12.02)   OUTPUT   Urine 1470   1470   Chest Tube(mL/kg) 60(0.43)   60(0.43)   Shift Total(mL/kg) 1530(10.97)   1530(10.97)   Weight (kg) 139.5 139.5 139.5 139.5      Admit Weight: 131.9 kg (290 lb 11.2 oz)(bed scale)     GENERAL APPEARANCE: intubated, alert  EYES: no scleral icterus  Pulmonary:  Decreased air entry in bases with basal rales   CV: irr rhythm, no rub   - JVD no   - Edema 2+   GI: soft, nontender, normal bowel sounds  : has quintero  SKIN: no rash, warm, dry, no cyanosis  NEURO: intubated, alert to voice, moving ext    LABS:   CMP  Recent Labs   Lab 02/21/20  0852 02/21/20  0521 02/20/20  2136 02/20/20  1557 02/20/20  1018 02/20/20  0421  02/19/20  0357  02/18/20  0409  02/15/20  1213  02/14/20  2030   * 152* 153* 153* 154* 152*   < > 154*   < > 150*   < > 146*   < >  --    POTASSIUM 3.8 3.6  --  3.8 3.4 3.5   < > 3.7   < > 3.1*   < > 4.5   < > 4.5   CHLORIDE  --  121*  --   --   --  121*  --  122*  --  119*   < > 112*   < >  --    CO2  --  25  --   --   --  24  --  25  --  27   < > 29   < >  --    ANIONGAP  --  6  --   --   --  7  --  6  --  5   < > 5   < >  --    GLC  --  198*  --   --   --  161*  --  180*  --  161*   < > 194*   < >  --    BUN  --  99*  --   --   --  99*  --  104*  --  106*   < > 107*   < >  --    CR  --  2.40*  --   --   --  2.40*  --  2.38*  --  2.36*   < > 2.09*   < >  --    GFRESTIMATED  --  25*  --   --   --  25*  --  25*  --  26*   < > 30*   < >  --    GFRESTBLACK  --  29*  --   --   --  29*  --  29*  --  30*   < > 34*   < >  --    ELMER  --  7.8*  --   --   --  7.6*  --  7.7*  --  8.0*   < > 7.8*   < >  --    MAG 2.4* 2.4*  --   --  2.5*  --   --  2.7*  --  2.9*   < >  --    --   --    PHOS 3.8 3.6  --   --   --   --   --  3.5  --  2.9   < >  --   --   --    PROTTOTAL  --   --   --   --   --   --   --   --   --   --   --  5.8*  --   --    ALBUMIN  --   --   --   --   --   --   --   --   --   --   --  2.3*  --   --    BILITOTAL  --   --   --   --   --   --   --   --   --   --   --  0.5  --   --    ALKPHOS  --   --   --   --   --   --   --   --   --   --   --  66  --   --    AST  --   --   --   --   --   --   --   --   --   --   --  32  --  44   ALT  --   --   --   --   --   --   --   --   --   --   --  17  --  17    < > = values in this interval not displayed.     CBC  Recent Labs   Lab 02/21/20  0521 02/20/20  0421 02/19/20  0357 02/18/20  0409   HGB 7.9* 8.3* 8.0* 8.7*   WBC 12.6* 10.8 8.1 9.9   RBC 2.57* 2.68* 2.59* 2.78*   HCT 26.5* 27.3* 26.2* 28.1*   * 102* 101* 101*   MCH 30.7 31.0 30.9 31.3   MCHC 29.8* 30.4* 30.5* 31.0*   RDW 16.2* 16.3* 15.9* 16.1*    307 262 238     INR  Recent Labs   Lab 02/17/20  0332 02/16/20  0336   PTT 42* 48*     ABG  Recent Labs   Lab 02/21/20  0521 02/20/20  1145 02/19/20  1228 02/19/20  0357   PH 7.47* 7.49* 7.49* 7.50*   PCO2 35 28* 30* 33*   PO2 125* 64* 97 67*   HCO3 25 21 22 26   O2PER 55 55.0 55 50      URINE STUDIES  Recent Labs   Lab Test 02/19/20  1859 02/18/20  1817 02/14/20  2031 02/13/20  1447   COLOR Yellow Yellow Light Yellow Yellow   APPEARANCE Slightly Cloudy Slightly Cloudy Slightly Cloudy Clear   URINEGLC Negative Negative Negative Negative   URINEBILI Negative Negative Negative Negative   URINEKETONE Negative Negative Negative Negative   SG 1.029 1.014 1.009 1.012   UBLD Small* Trace* Small* Small*   URINEPH 5.0 5.5 5.0 5.0   PROTEIN 10* 10* Negative Negative   NITRITE Negative Negative Negative Negative   LEUKEST Small* Negative Small* Small*   RBCU 5* 1 1 9*   WBCU 9* 1 3 4     IMAGING:  Pertinent test results reviewed    Westley Puenet MD

## 2020-02-21 NOTE — CONSULTS
Electrophysiology Consultation Note   EP Attending: .   Reason for consultation: VT.   Provider requesting consultation: , CVICU Service.  Date of Service: 2/21/2020      HPI:   Mr. Espana is a 74 year old male who has a past medical history significant for HTN and PRUDENCIO. On 2/9/20, he reported to an ER with lightheadedness/syncope earlier and associated chest pain. He was evaluated at a local hospital and was found to have a Type A aortic dissection. He was then transferred here for definitive management. He then underwent ascending aortic dissection s/p repair with 32mm Dacron Gelweave graft, AVR with 27 mm Patterson bovine pericardial valve, CABG SVG-RCA on 2/10/20. Post procedure course has been complicated by metabolic encephalopathy, prolonged intubation, HANNA, and AF. He had some reported NSVT in the OR for which he was on amiodarone. Amiodarone has later been stopped on 2/12/20. He was then noted to have AF with RVR on 2/14/20 and amiodarone was resumed. He was noted to have run of WCT this morning lasting around 4 minutes. There was concern this was VT. Patient was asymptomatic and hemodynamically stable during this. No prior history of cardiac issues including now known arrhythmias. Some hypernatremia, 2.4, GFR 25, other electrolytes stable. Current cardiac medications include: ASA, Lipitor, Amiodarone gtt, and Heparin gtt.     Past Medical History:   Past Medical History:   Diagnosis Date     Psychosexual dysfunction with inhibited sexual excitement      Unspecified essential hypertension      Unspecified sleep apnea      Past Surgical History:   Past Surgical History:   Procedure Laterality Date     REPAIR ANEURYSM ASCENDING AORTA N/A 2/9/2020    Procedure: Median Sternotomy, repair of ascending aorta using 32mm gelweave graft, deep circulatory arrest,  aortic valve repair using 27mm inspiris valve, on-pump oxygenator, open saphenous vein harvest, coronary artery bypass x1,  transesophageal echocardiogram done by anestheisa;  Surgeon: Nivia Mckeon MD;  Location: [facility] OR     Allergies: Per MAR   No Known Allergies  Medications:   Per MAR current outpatient cardiovascular medications include:   Medications Prior to Admission   Medication Sig Dispense Refill Last Dose     aspirin 81 MG tablet Take 81 mg by mouth every other day    Taking     atenolol (TENORMIN) 25 MG tablet Take 25 mg by mouth daily   Taking     azithromycin (ZITHROMAX) 250 MG tablet Two tablets first day, then one tablet daily for four days. 6 tablet 0      benzonatate (TESSALON) 100 MG capsule Take 1 capsule (100 mg) by mouth 3 times daily as needed 42 capsule 0      [] predniSONE (DELTASONE) 20 MG tablet Take 2 tablets (40 mg) by mouth daily for 7 days 14 tablet 0      No current outpatient medications on file.     Current Facility-Administered Medications   Medication Dose Route Frequency     acetylcysteine  2 mL Nebulization Q4H KAYLA     albuterol  2.5 mg Nebulization Q4H     amiodarone  200 mg Oral Daily     aspirin  81 mg Oral Daily     atorvastatin  40 mg Oral QPM     bumetanide  2.5 mg Intravenous Q6H     ceFEPIme (MAXIPIME) IV  2 g Intravenous Q12H     heparin lock flush  5-10 mL Intracatheter Q24H     insulin aspart  1-6 Units Subcutaneous Q4H     lidocaine  1-3 patch Transdermal Q24H     lidocaine  5 mL Topical Once     lidocaine   Transdermal Q8H     metroNIDAZOLE  500 mg Intravenous Q6H     multivitamins w/minerals  15 mL Per Feeding Tube Daily     pantoprazole  40 mg Oral or Feeding Tube BID     polyethylene glycol  17 g Oral or Feeding Tube Daily     QUEtiapine  50 mg Oral QPM     senna-docusate  1 tablet Oral or Feeding Tube BID    Or     senna-docusate  2 tablet Oral or Feeding Tube BID     sodium chloride (PF)  3 mL Intracatheter Q8H     vancomycin (VANCOCIN) IV  2,000 mg (central catheter) Intravenous Q24H     Family History:   History reviewed. No pertinent family history.  Social History:  "  Social History     Tobacco Use     Smoking status: Never Smoker     Smokeless tobacco: Never Used   Substance Use Topics     Alcohol use: No       ROS:   A comprehensive 10 point ROS was negative other than as mentioned in HPI.    Physical Examination:   VITALS: BP (!) 84/52 (BP Location: Right arm)   Pulse 96   Temp 97.8  F (36.6  C) (Axillary)   Resp (!) 31   Ht 1.753 m (5' 9\")   Wt 138.4 kg (305 lb 1.9 oz)   SpO2 99%   BMI 45.06 kg/m    GENERAL APPEARANCE: Intubated, sedated, follows some commands.    HEENT: PERRLA.  MMM.   NECK: Supple.  CHEST: intubated, good airflow, crackles in bases.   CARDIOVASCULAR: irregular, tachy.   ABDOMEN: BS+, soft, NT, ND.    EXTREMITIES: No pedal edema. Distal pulses intact.   NEURO: sedated, follows some commands.   PSYCH: sedated.   SKIN: Warm and dry.   Data:   Labs:  BMP  Recent Labs   Lab 02/21/20  0852 02/21/20  0521 02/20/20  2136 02/20/20  1557 02/20/20  1018 02/20/20  0421  02/19/20  0357  02/18/20  0409   * 152* 153* 153* 154* 152*   < > 154*   < > 150*   POTASSIUM 3.8 3.6  --  3.8 3.4 3.5   < > 3.7   < > 3.1*   CHLORIDE  --  121*  --   --   --  121*  --  122*  --  119*   ELMER  --  7.8*  --   --   --  7.6*  --  7.7*  --  8.0*   CO2  --  25  --   --   --  24  --  25  --  27   BUN  --  99*  --   --   --  99*  --  104*  --  106*   CR  --  2.40*  --   --   --  2.40*  --  2.38*  --  2.36*   GLC  --  198*  --   --   --  161*  --  180*  --  161*    < > = values in this interval not displayed.     CBC  Recent Labs   Lab 02/21/20  0521 02/20/20  0421 02/19/20  0357 02/18/20  0409   WBC 12.6* 10.8 8.1 9.9   RBC 2.57* 2.68* 2.59* 2.78*   HGB 7.9* 8.3* 8.0* 8.7*   HCT 26.5* 27.3* 26.2* 28.1*   * 102* 101* 101*   MCH 30.7 31.0 30.9 31.3   MCHC 29.8* 30.4* 30.5* 31.0*   RDW 16.2* 16.3* 15.9* 16.1*    307 262 238     EKG:     Tele:     2/13/20 ECHO:   Interpretation Summary  Technically difficult limited study.  The Ejection Fraction is estimated at 55-60% " (visually estimated). Regional  wall motion is probably normal.  Moderate right ventricular dilation is present.  Global right ventricular function is moderately reduced.  No significant valvular abnormalities were noted.     This study was compared with the study from 2/10/2020 .RV function and size  probably worsened.  Assessment:   Mr. Espana is a 74 year old male who has a past medical history significant for HTN and PRUDENCIO. On 2/9/20, he reported to an ER with lightheadedness/syncope earlier and associated chest pain. He was evaluated at a local hospital and was found to have a Type A aortic dissection. He was then transferred here for definitive management. He then underwent ascending aortic dissection s/p repair with 32mm Dacron Gelweave graft, AVR with 27 mm Patterson bovine pericardial valve, CABG SVG-RCA on 2/10/20. Post procedure course has been complicated by metabolic encephalopathy, prolonged intubation, HANNA, and AF. He had some reported NSVT in the OR for which he was on amiodarone. Amiodarone has later been stopped on 2/12/20. He was then noted to have AF with RVR on 2/14/20 and amiodarone was resumed. He was noted to have run of WCT this morning lasting around 4 minutes. There was concern this was VT. Patient was asymptomatic and hemodynamically stable during this. No prior history of cardiac issues including now known arrhythmias. Some hypernatremia, 2.4, GFR 25, other electrolytes stable. Current cardiac medications include: ASA, Lipitor, Amiodarone gtt, and Heparin gtt.     EP Recommendations:  Reviewed telemetry which shows he was in AF with RVR and then converted to a WCT which is most consistent with aberrant AF given same cycle length, lack of symptoms, or hemodynamic changed. He is already on amiodarone which can continue and then plan for short course of amiodarone (can transition to 400 mg BID X 2 weeks, then 200 mg daily thereafter for 6 weeks then stop). Recommend continuation of therapeutic  anticoagulation given CHADSVASC 3, can bridge heparin to therapeutic Warfarin. Chronicity can be determined as an outpatient. If he remains in AF with RVR can consider pursing DCCV after further post operative healing.     The patient states understanding and is agreeable with plan.   Thank you for allowing us to participate in the care of this patient.     The patient was discussed w/ Dr. Franklin.  The above note reflects our joint plan.    LISA Moore CNP  Electrophysiology Consult Service  Pager: [phone]    EP STAFF NOTE  I have seen and examined the patient as part of a shared visit with ANASTASIA Moore NP.  I agree with the note above. I reviewed today's vital signs and medications. I have reviewed and discussed with the advanced practice provider their physical examination, assessment, and plan   Briefly, post op AF  My key history/exam findings are: recovering.   The key management decisions made by me: short term amio, AC.    Deangelo Franklin MD [facility]  Cardiology - Electrophysiology

## 2020-02-21 NOTE — PROGRESS NOTES
CV ICU PROGRESS NOTE  February 21, 2020      CO-MORBIDITIES:   Dissection of aorta, unspecified portion of aorta (H)    ASSESSMENT: Vel Espana is a 74 year old male with PMH of HTN s/p Ascending aortic dissection repair using a 32 mm Dacron Gelweave graft, AVR with 27 mm Patterson bovine pericardial valve, Coronary bypass to right coronary artery, Left greater saphenous vein harvest, Circulatory arrest, Left femoral artery exploration on 2/10, remains intubated for airway protection for slowly resolving metabolic encephalopathy.     TODAY'S PROGRESS:   - EP re-consult for episode of VT today  - bedside trach  - diuresis  - follow up with ID re: plan for antibiotics    PLAN:  Neuro/ pain/ sedation:  #Encephalopathy, improving  - Monitor neurological status. Notify the MD for any acute changes in exam.  - Fentanyl gtt for pain.  - precedex gtt  - Daily sedation holiday  - seroquel 50 mg at bedtime      Pulmonary care:   #MV for airway protection post op  #ventilator associated pneumonia  - PST as able with sedation wean  - MV  - Titrate FiO2 for SpO2 >92%-  - Dr. Mckeon requests PEEP no less than 8  - bronchoscopy 2/15, 2/16  - trach 2/21     Cardiovascular:    - Monitor hemodynamic status.   - MAP >65, SBP <120  - Hold PTA anti-hypertensives    #Non-sustained VT. brief run of nonsustained VT, less than 30 seconds, in the operating room, was hypokalemic at the time, also was being paced, possible R-on-T phenomenon, has not had recurrence since temporary epicardial pacing has been discontinued.  Cardiology EP consult 2/12 recommended discontinuing amiodarone  - noted again 2/21, asymptomatic and hemodynamically stable. Re-bolused with amiodarone and reconsulted EP  #A-fib with RVR: noted overnight 2/14  - amiodarone PO     GI care:   - NPO except ice chips and medications.     Fluids/ Electrolytes/ Nutrition:   - TKO for IV fluid hydration, use D5 0.45 NS given hypernatremia  - NJ placed, TF at goal  - free water  60/hr for ongoing hypernatremia with q6 hr Na checks.     Renal/ Fluid Balance:    #Nonoliguric HANNA  - Urine output is adequate so far.  - Will continue to monitor intake and output, Cr, electrolytes.   - Nephrology consult  - diuresis with bumex 2.5 q 6 x 3 today     Endocrine:    # Stress hyperglycemia  - Insulin gtt     ID/ Antibiotics:  # ventilator associated pneumonia  - cefepime and flagyl (switched from zosyn 2/20 for renal function)  - ID consult  - s/p vanc 2/15 - 2/17, restarted 2/18 for ongoing fevers  - CT CAP 2/19 - nothing to do for pericardial effusion per CVTS     Heme:     - Hgb goal >7  - low int heparin gtt for a-fib - held for procedure     Prophylaxis:    - heparin as above  - Protonix qd     Lines/ tubes/ drains:  - PICC, Arterial line, quintero     Disposition:  - CV ICU.      Patient seen, findings and plan discussed with CVICU attending Dr. Kristin Alfonso MD  [phone]    ====================================    SUBJECTIVE:   Mental status continues to gradually improve. No acute overnight events    OBJECTIVE:   1. VITAL SIGNS:   Temp:  [96.9  F (36.1  C)-98.8  F (37.1  C)] 97.8  F (36.6  C)  Heart Rate:  [] 106  Resp:  [21-36] 31  MAP:  [56 mmHg-79 mmHg] 72 mmHg  Arterial Line BP: ()/(47-65) 97/61  FiO2 (%):  [55 %] 55 %  SpO2:  [95 %-100 %] 98 %  Ventilation Mode: CMV/AC  (Continuous Mandatory Ventilation/ Assist Control)  FiO2 (%): 55 %  Rate Set (breaths/minute): 16 breaths/min  Tidal Volume Set (mL): 450 mL  PEEP (cm H2O): 8 cmH2O  Pressure Support (cm H2O): 8 cmH2O  Oxygen Concentration (%): 55 %  Resp: (!) 31      2. INTAKE/ OUTPUT:   I/O last 3 completed shifts:  In: 6267.2 [I.V.:1677.2; NG/GT:3210]  Out: 4515 [Urine:3715; Emesis/NG output:800]    3. PHYSICAL EXAMINATION:   General: intubated, laying in hospital bed  Neuro: Opens eyes to voice, mouths full sentences, follows commands in all 4 extremities  Resp: mechanical ventilation  CV: a-fib  Abdomen: Soft,  Non-distended, Non-tender  Incisions: c/d/i  Extremities: warm and well perfused with mild edema, dusky appearance to the right second and third toes, stable    4. INVESTIGATIONS:   Arterial Blood Gases   Recent Labs   Lab 02/21/20  0521 02/20/20  1145 02/19/20  1228 02/19/20  0357   PH 7.47* 7.49* 7.49* 7.50*   PCO2 35 28* 30* 33*   PO2 125* 64* 97 67*   HCO3 25 21 22 26     Complete Blood Count   Recent Labs   Lab 02/21/20  0521 02/20/20  0421 02/19/20  0357 02/18/20  0409   WBC 12.6* 10.8 8.1 9.9   HGB 7.9* 8.3* 8.0* 8.7*    307 262 238     Basic Metabolic Panel  Recent Labs   Lab 02/21/20  0852 02/21/20  0521 02/20/20  2136 02/20/20  1557 02/20/20  1018 02/20/20  0421  02/19/20  0357  02/18/20  0409   * 152* 153* 153* 154* 152*   < > 154*   < > 150*   POTASSIUM 3.8 3.6  --  3.8 3.4 3.5   < > 3.7   < > 3.1*   CHLORIDE  --  121*  --   --   --  121*  --  122*  --  119*   CO2  --  25  --   --   --  24  --  25  --  27   BUN  --  99*  --   --   --  99*  --  104*  --  106*   CR  --  2.40*  --   --   --  2.40*  --  2.38*  --  2.36*   GLC  --  198*  --   --   --  161*  --  180*  --  161*    < > = values in this interval not displayed.     Liver Function Tests  Recent Labs   Lab 02/15/20  1213 02/14/20  2030   AST 32 44   ALT 17 17   ALKPHOS 66  --    BILITOTAL 0.5  --    ALBUMIN 2.3*  --      Pancreatic Enzymes  No lab results found in last 7 days.  Coagulation Profile  Recent Labs   Lab 02/17/20  0332 02/16/20  0336   PTT 42* 48*         5. RADIOLOGY:   Recent Results (from the past 24 hour(s))   XR Abdomen Port 1 View    Narrative    Exam: XR ABDOMEN PORT 1 VW, 2/21/2020 4:13 AM    Indication: verify NJ placement    Comparison: CT to 19 2020. Radiograph 2/17/2020.    Findings:   Single supine view of the mid abdomen. Gastric tube tip and sidehole  project over the distal stomach. An enteric tube tip projects left of  midline, likely in the distal duodenum or proximal jejunum. Bowel gas  pattern is  nonobstructive. Scattered stool is noted. Lower lumbar  facet arthropathy.      Impression    Impression:   1. Feeding tube tip projects in the distal duodenum/proximal jejunum.  2. Gastric tube projects in the distal stomach.    I have personally reviewed the examination and initial interpretation  and I agree with the findings.    MAGDA KEENAN MD   XR Chest Port 1 View    Narrative    Exam: XR CHEST PORT 1 VW, 2/21/2020 4:14 AM    Indication: ETT placement    Comparison: Radiograph 2/19/2020    Findings:   Single portable AP view of the chest. Patient is rotated to the left.  ET tube tip in the midthoracic trachea 5.7 cm from the daniel. Enteric  tubes course midline with tips outside the field-of-view. Right PICC  line with tip at the high SVC. Postoperative chest with aortic valve  prosthesis, midline sternotomy wires, and multiple surgical clips over  the mediastinum. Stable enlarged cardiac silhouette. Increased diffuse  interstitial opacities and moderate left layering pleural effusion.  Increased bibasilar opacities. Small right pleural effusion is present  though the right costophrenic angle is collimated outside the  field-of-view. No pneumothorax.      Impression    Impression:   1. Increased bilateral pleural effusions, moderate left and small  right.  2. Increased mixed opacities, which likely represent a combination of  pulmonary edema, atelectasis, and infection/aspiration.  3. Endotracheal tube in the midthoracic trachea. Additional support  devices are stable.    I have personally reviewed the examination and initial interpretation  and I agree with the findings.    MAGDA KEENAN MD       =========================================

## 2020-02-21 NOTE — PROGRESS NOTES
Care Coordinator - Discharge Planning    Admission Date/Time:  2/9/2020  Attending MD:  Nivia Mckeon MD     Data  Chart reviewed, discussed with interdisciplinary team.   Patient was admitted for:   1. Dissection of aorta, unspecified portion of aorta (H)         Assessment   Pt remain in ICU vented and doing PS    Coordination of Care  Pt family had more question about VA coverage.  Family stated they  were informed by VA pt hospitalization will be covered under VA and need to transfer him to VA once he is stable.   RNCC contacted [facility] UR RNSonali to clarify the info family received.  Sonali reviewed pt record and stated VA has approved his hospitalization and has authorization.  RNCC informed Sonali that we don't have that information in our system and will contact our UR RN to contact her.  Sonali agreed.  Sonali stated pt is not required to transfer to [facility] for continuous of care.  If the provider wants to transfer pt to [facility] for continuous of care, we can call the referral line # [phone].    RNCC and SW met with pt spouse, dtr and step dtr to provide update about the above info and address their questions. RNCC shared the above info to the family and encouraged them to call VA to get the detail info about his rehab coverage.  Pt family provided VA insurance number.  Family requested that we call VA before pt transfer out of ICU to check if he needs to transfer to VA.  RNCC agreed to call VA after pt is extubated and stable.  RNCC informed [facility] UR RNEva regarding pt VA approval and provided pt VA insurance number.  Eva agreed to contact our financial counselor department to update them and they will follow up with VA and family if they need more info.    Plan  Anticipated Discharge Date:  TBD.  Anticipated Discharge Plan:   TBD.    RNCC will call VA referral line once pt extubated and stable to check if pt needs to transfer to [facility] for continuous of care.  RNCC will cont to follow plan of  care.        Chris Isaac RN, PHN, BSN  4A and 4E/ ICU  Care Coordinator  Phone: [phone]  Pager: [phone]

## 2020-02-21 NOTE — PLAN OF CARE
Discharge Planner OT   Patient plan for discharge: not discussed   Current status: Pt alert and following ~90% of simple commands. Pt's glasses donned to facilitate alertness and orientation. Pt participated in BUE/LE AAROM exercises with fair tolerance, VSS.   Barriers to return to prior living situation: medical status, post op precautions, weakness, level of A for ADLs and mobility   Recommendations for discharge: TCU when LTACH goals are met   Rationale for recommendations: pt far below baseline in ADLs, pt will require continued OT intervention to maximize independence with ADLs.        Entered by: Vilma Earl 02/21/2020 2:54 PM

## 2020-02-21 NOTE — PLAN OF CARE
Major shift events: During a linen change while lying flat, pt had several strong coughs causing his ETT to dislodge by 5 cm. Pt tachycardic and tachypneic. RT and MD notified. ETT pushed back in at previous marking of 25 cm, cuff inflated, and CXR obtained. Correct placement of ETT verified by RT and bilateral breath sounds heard. Pt back to breathing at his normal rate and HR decreased. K 3.6, replaced. Tube feeds and heparin gtt stopped at 0630 for possible bronch today. Vent settings 50%/16/8/450. Precedex weaned off, pt following commands and responding to yes/no questions appropriately.

## 2020-02-21 NOTE — PROGRESS NOTES
CVTS PROGRESS NOTE  February 21, 2020      CO-MORBIDITIES:   Dissection of aorta, unspecified portion of aorta (H)    ASSESSMENT: Vel Espana is a 74 year old male with PMH of HTN s/p Ascending aortic dissection repair using a 32 mm Dacron Gelweave graft, AVR with 27 mm Patterson bovine pericardial valve, Coronary bypass to right coronary artery, Left greater saphenous vein harvest, Circulatory arrest, Left femoral artery exploration on 2/10, remains intubated for airway protection for slowly resolving metabolic encephalopathy.     TODAY'S PROGRESS:   - EP re-consult for episode of VT today  - bedside trach  - diuresis  - follow up with ID re: plan for antibiotics    PLAN:  Neuro/ pain/ sedation:  #Encephalopathy, improving  - Monitor neurological status. Notify the MD for any acute changes in exam.  - Fentanyl gtt for pain.  - precedex gtt  - Daily sedation holiday  - seroquel 50 mg at bedtime      Pulmonary care:   #MV for airway protection post op  #ventilator associated pneumonia  - PST as able with sedation wean  - MV  - Titrate FiO2 for SpO2 >92%-  - Dr. Mckeon requests PEEP no less than 8  - bronchoscopy 2/15, 2/16  - trach 2/21     Cardiovascular:    - Monitor hemodynamic status.   - MAP >65, SBP <120  - Hold PTA anti-hypertensives    #Non-sustained VT. brief run of nonsustained VT, less than 30 seconds, in the operating room, was hypokalemic at the time, also was being paced, possible R-on-T phenomenon, has not had recurrence since temporary epicardial pacing has been discontinued.  Cardiology EP consult 2/12 recommended discontinuing amiodarone  - noted again 2/21, asymptomatic and hemodynamically stable. Re-bolused with amiodarone and reconsulted EP  #A-fib with RVR: noted overnight 2/14  - amiodarone PO     GI care:   - NPO except ice chips and medications.     Fluids/ Electrolytes/ Nutrition:   - TKO for IV fluid hydration, use D5 0.45 NS given hypernatremia  - NJ placed, TF at goal  - free water  60/hr for ongoing hypernatremia with q6 hr Na checks.     Renal/ Fluid Balance:    #Nonoliguric HANNA  - Urine output is adequate so far.  - Will continue to monitor intake and output, Cr, electrolytes.   - Nephrology consult  - diuresis with bumex 2.5 q 6 x 3 today     Endocrine:    # Stress hyperglycemia  - Insulin gtt     ID/ Antibiotics:  # ventilator associated pneumonia  - cefepime and flagyl (switched from zosyn 2/20 for renal function)  - ID consult  - s/p vanc 2/15 - 2/17, restarted 2/18 for ongoing fevers  - CT CAP 2/19 - nothing to do for pericardial effusion per CVTS     Heme:     - Hgb goal >7  - low int heparin gtt for a-fib - held for procedure     Prophylaxis:    - heparin as above  - Protonix qd     Lines/ tubes/ drains:  - PICC, Arterial line, quintero     Disposition:  - CV ICU.      Patient seen, findings and plan discussed with CVTS fellow     Macario Alfonso MD  [phone]    ====================================    SUBJECTIVE:   Mental status continues to gradually improve. No acute overnight events    OBJECTIVE:   1. VITAL SIGNS:   Temp:  [96.9  F (36.1  C)-98.8  F (37.1  C)] 97.8  F (36.6  C)  Heart Rate:  [] 102  Resp:  [21-36] 31  MAP:  [56 mmHg-79 mmHg] 66 mmHg  Arterial Line BP: ()/(47-65) 88/56  FiO2 (%):  [55 %] 55 %  SpO2:  [95 %-100 %] 99 %  Ventilation Mode: CMV/AC  (Continuous Mandatory Ventilation/ Assist Control)  FiO2 (%): 55 %  Rate Set (breaths/minute): 16 breaths/min  Tidal Volume Set (mL): 450 mL  PEEP (cm H2O): 8 cmH2O  Pressure Support (cm H2O): 8 cmH2O  Oxygen Concentration (%): 55 %  Resp: (!) 31      2. INTAKE/ OUTPUT:   I/O last 3 completed shifts:  In: 6267.2 [I.V.:1677.2; NG/GT:3210]  Out: 4515 [Urine:3715; Emesis/NG output:800]    3. PHYSICAL EXAMINATION:   General: intubated, laying in hospital bed  Neuro: Opens eyes to voice, mouths full sentences, follows commands in all 4 extremities  Resp: mechanical ventilation  CV: a-fib  Abdomen: Soft, Non-distended,  Non-tender  Incisions: c/d/i  Extremities: warm and well perfused with mild edema, dusky appearance to the right second and third toes, stable    4. INVESTIGATIONS:   Arterial Blood Gases   Recent Labs   Lab 02/21/20  0521 02/20/20  1145 02/19/20  1228 02/19/20  0357   PH 7.47* 7.49* 7.49* 7.50*   PCO2 35 28* 30* 33*   PO2 125* 64* 97 67*   HCO3 25 21 22 26     Complete Blood Count   Recent Labs   Lab 02/21/20  0521 02/20/20  0421 02/19/20  0357 02/18/20  0409   WBC 12.6* 10.8 8.1 9.9   HGB 7.9* 8.3* 8.0* 8.7*    307 262 238     Basic Metabolic Panel  Recent Labs   Lab 02/21/20  0852 02/21/20  0521 02/20/20  2136 02/20/20  1557 02/20/20  1018 02/20/20  0421  02/19/20  0357  02/18/20  0409   * 152* 153* 153* 154* 152*   < > 154*   < > 150*   POTASSIUM 3.8 3.6  --  3.8 3.4 3.5   < > 3.7   < > 3.1*   CHLORIDE  --  121*  --   --   --  121*  --  122*  --  119*   CO2  --  25  --   --   --  24  --  25  --  27   BUN  --  99*  --   --   --  99*  --  104*  --  106*   CR  --  2.40*  --   --   --  2.40*  --  2.38*  --  2.36*   GLC  --  198*  --   --   --  161*  --  180*  --  161*    < > = values in this interval not displayed.     Liver Function Tests  Recent Labs   Lab 02/15/20  1213 02/14/20  2030   AST 32 44   ALT 17 17   ALKPHOS 66  --    BILITOTAL 0.5  --    ALBUMIN 2.3*  --      Pancreatic Enzymes  No lab results found in last 7 days.  Coagulation Profile  Recent Labs   Lab 02/17/20  0332 02/16/20  0336   PTT 42* 48*         5. RADIOLOGY:   Recent Results (from the past 24 hour(s))   XR Abdomen Port 1 View    Narrative    Exam: XR ABDOMEN PORT 1 VW, 2/21/2020 4:13 AM    Indication: verify NJ placement    Comparison: CT to 19 2020. Radiograph 2/17/2020.    Findings:   Single supine view of the mid abdomen. Gastric tube tip and sidehole  project over the distal stomach. An enteric tube tip projects left of  midline, likely in the distal duodenum or proximal jejunum. Bowel gas  pattern is nonobstructive.  Scattered stool is noted. Lower lumbar  facet arthropathy.      Impression    Impression:   1. Feeding tube tip projects in the distal duodenum/proximal jejunum.  2. Gastric tube projects in the distal stomach.    I have personally reviewed the examination and initial interpretation  and I agree with the findings.    MAGDA KEENAN MD   XR Chest Port 1 View    Narrative    Exam: XR CHEST PORT 1 VW, 2/21/2020 4:14 AM    Indication: ETT placement    Comparison: Radiograph 2/19/2020    Findings:   Single portable AP view of the chest. Patient is rotated to the left.  ET tube tip in the midthoracic trachea 5.7 cm from the daniel. Enteric  tubes course midline with tips outside the field-of-view. Right PICC  line with tip at the high SVC. Postoperative chest with aortic valve  prosthesis, midline sternotomy wires, and multiple surgical clips over  the mediastinum. Stable enlarged cardiac silhouette. Increased diffuse  interstitial opacities and moderate left layering pleural effusion.  Increased bibasilar opacities. Small right pleural effusion is present  though the right costophrenic angle is collimated outside the  field-of-view. No pneumothorax.      Impression    Impression:   1. Increased bilateral pleural effusions, moderate left and small  right.  2. Increased mixed opacities, which likely represent a combination of  pulmonary edema, atelectasis, and infection/aspiration.  3. Endotracheal tube in the midthoracic trachea. Additional support  devices are stable.    I have personally reviewed the examination and initial interpretation  and I agree with the findings.    MAGDA KEENAN MD       =========================================

## 2020-02-21 NOTE — PROGRESS NOTES
Brief Surgery Note  02/21/2020    SICU had plans to do a tracheostomy this afternoon, however patient is more awake, alert, and tolerated brief pressure support trial at bedside with Dr. Arguello. Was following commands and taking in good tidal volumes when instructed. Will plan to continue pressure support trials and reevaluate on Monday. Discussed with primary team.    Leticia Mireles MD (PGY-2)  General Surgery Resident  [phone]

## 2020-02-22 ENCOUNTER — APPOINTMENT (OUTPATIENT)
Dept: GENERAL RADIOLOGY | Facility: CLINIC | Age: 75
DRG: 219 | End: 2020-02-22
Payer: COMMERCIAL

## 2020-02-22 LAB
ANION GAP SERPL CALCULATED.3IONS-SCNC: 6 MMOL/L (ref 3–14)
BUN SERPL-MCNC: 102 MG/DL (ref 7–30)
CALCIUM SERPL-MCNC: 7.8 MG/DL (ref 8.5–10.1)
CHLORIDE SERPL-SCNC: 122 MMOL/L (ref 94–109)
CO2 SERPL-SCNC: 24 MMOL/L (ref 20–32)
CREAT SERPL-MCNC: 2.38 MG/DL (ref 0.66–1.25)
ERYTHROCYTE [DISTWIDTH] IN BLOOD BY AUTOMATED COUNT: 16 % (ref 10–15)
GFR SERPL CREATININE-BSD FRML MDRD: 25 ML/MIN/{1.73_M2}
GLUCOSE BLDC GLUCOMTR-MCNC: 153 MG/DL (ref 70–99)
GLUCOSE BLDC GLUCOMTR-MCNC: 162 MG/DL (ref 70–99)
GLUCOSE BLDC GLUCOMTR-MCNC: 166 MG/DL (ref 70–99)
GLUCOSE BLDC GLUCOMTR-MCNC: 177 MG/DL (ref 70–99)
GLUCOSE BLDC GLUCOMTR-MCNC: 217 MG/DL (ref 70–99)
GLUCOSE SERPL-MCNC: 189 MG/DL (ref 70–99)
HCT VFR BLD AUTO: 26.3 % (ref 40–53)
HGB BLD-MCNC: 7.8 G/DL (ref 13.3–17.7)
LMWH PPP CHRO-ACNC: 0.24 IU/ML
MAGNESIUM SERPL-MCNC: 2.6 MG/DL (ref 1.6–2.3)
MCH RBC QN AUTO: 30.2 PG (ref 26.5–33)
MCHC RBC AUTO-ENTMCNC: 29.7 G/DL (ref 31.5–36.5)
MCV RBC AUTO: 102 FL (ref 78–100)
PHOSPHATE SERPL-MCNC: 3.7 MG/DL (ref 2.5–4.5)
PLATELET # BLD AUTO: 297 10E9/L (ref 150–450)
POTASSIUM SERPL-SCNC: 3.6 MMOL/L (ref 3.4–5.3)
RBC # BLD AUTO: 2.58 10E12/L (ref 4.4–5.9)
SODIUM SERPL-SCNC: 152 MMOL/L (ref 133–144)
WBC # BLD AUTO: 12.1 10E9/L (ref 4–11)

## 2020-02-22 PROCEDURE — 83735 ASSAY OF MAGNESIUM: CPT | Performed by: STUDENT IN AN ORGANIZED HEALTH CARE EDUCATION/TRAINING PROGRAM

## 2020-02-22 PROCEDURE — 20000004 ZZH R&B ICU UMMC

## 2020-02-22 PROCEDURE — 00000146 ZZHCL STATISTIC GLUCOSE BY METER IP

## 2020-02-22 PROCEDURE — 80048 BASIC METABOLIC PNL TOTAL CA: CPT | Performed by: STUDENT IN AN ORGANIZED HEALTH CARE EDUCATION/TRAINING PROGRAM

## 2020-02-22 PROCEDURE — 94640 AIRWAY INHALATION TREATMENT: CPT

## 2020-02-22 PROCEDURE — 99232 SBSQ HOSP IP/OBS MODERATE 35: CPT | Mod: GC | Performed by: ANESTHESIOLOGY

## 2020-02-22 PROCEDURE — 40000281 ZZH STATISTIC TRANSPORT TIME EA 15 MIN

## 2020-02-22 PROCEDURE — A7035 POS AIRWAY PRESS HEADGEAR: HCPCS

## 2020-02-22 PROCEDURE — 25000125 ZZHC RX 250: Performed by: STUDENT IN AN ORGANIZED HEALTH CARE EDUCATION/TRAINING PROGRAM

## 2020-02-22 PROCEDURE — 25000128 H RX IP 250 OP 636: Performed by: STUDENT IN AN ORGANIZED HEALTH CARE EDUCATION/TRAINING PROGRAM

## 2020-02-22 PROCEDURE — 25000132 ZZH RX MED GY IP 250 OP 250 PS 637: Performed by: STUDENT IN AN ORGANIZED HEALTH CARE EDUCATION/TRAINING PROGRAM

## 2020-02-22 PROCEDURE — 25000125 ZZHC RX 250

## 2020-02-22 PROCEDURE — 85027 COMPLETE CBC AUTOMATED: CPT | Performed by: STUDENT IN AN ORGANIZED HEALTH CARE EDUCATION/TRAINING PROGRAM

## 2020-02-22 PROCEDURE — 25000128 H RX IP 250 OP 636: Performed by: SURGERY

## 2020-02-22 PROCEDURE — 84100 ASSAY OF PHOSPHORUS: CPT | Performed by: STUDENT IN AN ORGANIZED HEALTH CARE EDUCATION/TRAINING PROGRAM

## 2020-02-22 PROCEDURE — 85520 HEPARIN ASSAY: CPT | Performed by: STUDENT IN AN ORGANIZED HEALTH CARE EDUCATION/TRAINING PROGRAM

## 2020-02-22 PROCEDURE — 40000275 ZZH STATISTIC RCP TIME EA 10 MIN

## 2020-02-22 PROCEDURE — 27210429 ZZH NUTRITION PRODUCT INTERMEDIATE LITER

## 2020-02-22 PROCEDURE — 25800030 ZZH RX IP 258 OP 636: Performed by: STUDENT IN AN ORGANIZED HEALTH CARE EDUCATION/TRAINING PROGRAM

## 2020-02-22 PROCEDURE — 25800030 ZZH RX IP 258 OP 636: Performed by: THORACIC SURGERY (CARDIOTHORACIC VASCULAR SURGERY)

## 2020-02-22 PROCEDURE — 71045 X-RAY EXAM CHEST 1 VIEW: CPT

## 2020-02-22 PROCEDURE — 84295 ASSAY OF SERUM SODIUM: CPT | Performed by: STUDENT IN AN ORGANIZED HEALTH CARE EDUCATION/TRAINING PROGRAM

## 2020-02-22 PROCEDURE — 25000132 ZZH RX MED GY IP 250 OP 250 PS 637: Performed by: PHYSICIAN ASSISTANT

## 2020-02-22 PROCEDURE — 94003 VENT MGMT INPAT SUBQ DAY: CPT

## 2020-02-22 PROCEDURE — 94640 AIRWAY INHALATION TREATMENT: CPT | Mod: 76

## 2020-02-22 PROCEDURE — 25000128 H RX IP 250 OP 636: Performed by: THORACIC SURGERY (CARDIOTHORACIC VASCULAR SURGERY)

## 2020-02-22 PROCEDURE — 25000132 ZZH RX MED GY IP 250 OP 250 PS 637: Performed by: ANESTHESIOLOGY

## 2020-02-22 PROCEDURE — 40000014 ZZH STATISTIC ARTERIAL MONITORING DAILY

## 2020-02-22 PROCEDURE — 27210136 ZZH KIT CATH ARTERIAL EXT SUPPLY

## 2020-02-22 RX ORDER — BUMETANIDE 0.25 MG/ML
4 INJECTION INTRAMUSCULAR; INTRAVENOUS EVERY 6 HOURS
Status: COMPLETED | OUTPATIENT
Start: 2020-02-22 | End: 2020-02-23

## 2020-02-22 RX ORDER — DEXTROSE MONOHYDRATE 50 MG/ML
INJECTION, SOLUTION INTRAVENOUS CONTINUOUS
Status: DISCONTINUED | OUTPATIENT
Start: 2020-02-22 | End: 2020-02-23

## 2020-02-22 RX ORDER — METOLAZONE 2.5 MG/1
2.5 TABLET ORAL ONCE
Status: COMPLETED | OUTPATIENT
Start: 2020-02-22 | End: 2020-02-22

## 2020-02-22 RX ORDER — METOLAZONE 2.5 MG/1
2.5 TABLET ORAL EVERY 12 HOURS
Status: DISCONTINUED | OUTPATIENT
Start: 2020-02-23 | End: 2020-02-23

## 2020-02-22 RX ORDER — BUMETANIDE 0.25 MG/ML
4 INJECTION INTRAMUSCULAR; INTRAVENOUS EVERY 12 HOURS
Status: DISCONTINUED | OUTPATIENT
Start: 2020-02-23 | End: 2020-02-23

## 2020-02-22 RX ORDER — BUMETANIDE 0.25 MG/ML
1 INJECTION INTRAMUSCULAR; INTRAVENOUS ONCE
Status: COMPLETED | OUTPATIENT
Start: 2020-02-22 | End: 2020-02-22

## 2020-02-22 RX ORDER — BUMETANIDE 0.25 MG/ML
3 INJECTION INTRAMUSCULAR; INTRAVENOUS EVERY 6 HOURS
Status: DISCONTINUED | OUTPATIENT
Start: 2020-02-22 | End: 2020-02-22

## 2020-02-22 RX ADMIN — ALTEPLASE 2 MG: 2.2 INJECTION, POWDER, LYOPHILIZED, FOR SOLUTION INTRAVENOUS at 01:17

## 2020-02-22 RX ADMIN — DEXMEDETOMIDINE 0.4 MCG/KG/HR: 100 INJECTION, SOLUTION, CONCENTRATE INTRAVENOUS at 08:47

## 2020-02-22 RX ADMIN — BUMETANIDE 4 MG: 0.25 INJECTION INTRAMUSCULAR; INTRAVENOUS at 21:53

## 2020-02-22 RX ADMIN — QUETIAPINE FUMARATE 50 MG: 50 TABLET ORAL at 21:08

## 2020-02-22 RX ADMIN — ACETYLCYSTEINE 2 ML: 200 SOLUTION ORAL; RESPIRATORY (INHALATION) at 07:52

## 2020-02-22 RX ADMIN — METRONIDAZOLE 500 MG: 500 INJECTION, SOLUTION INTRAVENOUS at 02:04

## 2020-02-22 RX ADMIN — ALBUTEROL SULFATE 2.5 MG: 2.5 SOLUTION RESPIRATORY (INHALATION) at 16:25

## 2020-02-22 RX ADMIN — ALBUTEROL SULFATE 2.5 MG: 2.5 SOLUTION RESPIRATORY (INHALATION) at 00:43

## 2020-02-22 RX ADMIN — METRONIDAZOLE 500 MG: 500 INJECTION, SOLUTION INTRAVENOUS at 09:52

## 2020-02-22 RX ADMIN — Medication 40 MG: at 07:43

## 2020-02-22 RX ADMIN — INSULIN ASPART 1 UNITS: 100 INJECTION, SOLUTION INTRAVENOUS; SUBCUTANEOUS at 09:04

## 2020-02-22 RX ADMIN — CEFEPIME HYDROCHLORIDE 2 G: 2 INJECTION, POWDER, FOR SOLUTION INTRAVENOUS at 09:05

## 2020-02-22 RX ADMIN — METOLAZONE 2.5 MG: 2.5 TABLET ORAL at 16:07

## 2020-02-22 RX ADMIN — METRONIDAZOLE 500 MG: 500 INJECTION, SOLUTION INTRAVENOUS at 14:10

## 2020-02-22 RX ADMIN — ACETYLCYSTEINE 2 ML: 200 SOLUTION ORAL; RESPIRATORY (INHALATION) at 00:43

## 2020-02-22 RX ADMIN — BUMETANIDE 3 MG: 0.25 INJECTION INTRAMUSCULAR; INTRAVENOUS at 07:42

## 2020-02-22 RX ADMIN — ALBUTEROL SULFATE 2.5 MG: 2.5 SOLUTION RESPIRATORY (INHALATION) at 11:53

## 2020-02-22 RX ADMIN — METRONIDAZOLE 500 MG: 500 INJECTION, SOLUTION INTRAVENOUS at 21:07

## 2020-02-22 RX ADMIN — INSULIN ASPART 1 UNITS: 100 INJECTION, SOLUTION INTRAVENOUS; SUBCUTANEOUS at 16:24

## 2020-02-22 RX ADMIN — POTASSIUM CHLORIDE 20 MEQ: 1.5 POWDER, FOR SOLUTION ORAL at 06:38

## 2020-02-22 RX ADMIN — ACETYLCYSTEINE 2 ML: 200 SOLUTION ORAL; RESPIRATORY (INHALATION) at 20:21

## 2020-02-22 RX ADMIN — INSULIN ASPART 1 UNITS: 100 INJECTION, SOLUTION INTRAVENOUS; SUBCUTANEOUS at 04:01

## 2020-02-22 RX ADMIN — ASPIRIN 81 MG CHEWABLE TABLET 81 MG: 81 TABLET CHEWABLE at 07:42

## 2020-02-22 RX ADMIN — DEXMEDETOMIDINE 0.5 MCG/KG/HR: 100 INJECTION, SOLUTION, CONCENTRATE INTRAVENOUS at 22:44

## 2020-02-22 RX ADMIN — INSULIN ASPART 2 UNITS: 100 INJECTION, SOLUTION INTRAVENOUS; SUBCUTANEOUS at 21:23

## 2020-02-22 RX ADMIN — BUMETANIDE 1 MG: 0.25 INJECTION INTRAMUSCULAR; INTRAVENOUS at 09:05

## 2020-02-22 RX ADMIN — BUMETANIDE 4 MG: 0.25 INJECTION INTRAMUSCULAR; INTRAVENOUS at 16:07

## 2020-02-22 RX ADMIN — DEXTROSE MONOHYDRATE: 50 INJECTION, SOLUTION INTRAVENOUS at 14:09

## 2020-02-22 RX ADMIN — ACETYLCYSTEINE 2 ML: 200 SOLUTION ORAL; RESPIRATORY (INHALATION) at 11:53

## 2020-02-22 RX ADMIN — DEXMEDETOMIDINE 0.5 MCG/KG/HR: 100 INJECTION, SOLUTION, CONCENTRATE INTRAVENOUS at 16:41

## 2020-02-22 RX ADMIN — ALBUTEROL SULFATE 2.5 MG: 2.5 SOLUTION RESPIRATORY (INHALATION) at 07:52

## 2020-02-22 RX ADMIN — INSULIN ASPART 1 UNITS: 100 INJECTION, SOLUTION INTRAVENOUS; SUBCUTANEOUS at 11:43

## 2020-02-22 RX ADMIN — ATORVASTATIN CALCIUM 40 MG: 40 TABLET, FILM COATED ORAL at 21:08

## 2020-02-22 RX ADMIN — ACETYLCYSTEINE 2 ML: 200 SOLUTION ORAL; RESPIRATORY (INHALATION) at 16:26

## 2020-02-22 RX ADMIN — AMIODARONE HYDROCHLORIDE 0.5 MG/MIN: 50 INJECTION, SOLUTION INTRAVENOUS at 17:17

## 2020-02-22 RX ADMIN — HEPARIN SODIUM 1650 UNITS/HR: 10000 INJECTION, SOLUTION INTRAVENOUS at 09:52

## 2020-02-22 RX ADMIN — CEFEPIME HYDROCHLORIDE 2 G: 2 INJECTION, POWDER, FOR SOLUTION INTRAVENOUS at 21:52

## 2020-02-22 RX ADMIN — VANCOMYCIN HYDROCHLORIDE 2000 MG: 10 INJECTION, POWDER, LYOPHILIZED, FOR SOLUTION INTRAVENOUS at 05:42

## 2020-02-22 RX ADMIN — MULTIVITAMIN 15 ML: LIQUID ORAL at 07:42

## 2020-02-22 RX ADMIN — Medication 40 MG: at 21:09

## 2020-02-22 RX ADMIN — ALBUTEROL SULFATE 2.5 MG: 2.5 SOLUTION RESPIRATORY (INHALATION) at 20:22

## 2020-02-22 ASSESSMENT — ACTIVITIES OF DAILY LIVING (ADL)
ADLS_ACUITY_SCORE: 18
ADLS_ACUITY_SCORE: 20
ADLS_ACUITY_SCORE: 18

## 2020-02-22 ASSESSMENT — MIFFLIN-ST. JEOR: SCORE: 2075.38

## 2020-02-22 NOTE — PROGRESS NOTES
CV ICU PROGRESS NOTE  February 22, 2020      CO-MORBIDITIES:   Dissection of aorta, unspecified portion of aorta (H)    ASSESSMENT: Vel Espana is a 74 year old male with PMH of HTN s/p Ascending aortic dissection repair using a 32 mm Dacron Gelweave graft, AVR with 27 mm Patterson bovine pericardial valve, Coronary bypass to right coronary artery, Left greater saphenous vein harvest, Circulatory arrest, Left femoral artery exploration on 2/10, remains intubated for airway protection for slowly resolving metabolic encephalopathy.     TODAY'S PROGRESS:   - diuresis  - continues to fail PST although mental status is improving, anticipate he will need trach Monday    PLAN:  Neuro/ pain/ sedation:  #Encephalopathy, improving  - Monitor neurological status. Notify the MD for any acute changes in exam.  - precedex gtt  - Daily sedation holiday  - seroquel 50 mg at bedtime      Pulmonary care:   #MV for airway protection post op  #ventilator associated pneumonia  - PST as able with sedation wean  - MV  - Titrate FiO2 for SpO2 >92%-  - Dr. Mckeon requests PEEP no less than 8  - bronchoscopy 2/15, 2/16  - trach cancelled 2/21 (Dr. Oswald felt patient may be able to extubate), will try to schedule for 2/24 if continue to fail PST over the weekend     Cardiovascular:    - Monitor hemodynamic status.   - MAP >65, SBP <120  - Hold PTA anti-hypertensives    #Non-sustained VT. brief run of nonsustained VT, less than 30 seconds, in the operating room, was hypokalemic at the time, also was being paced, possible R-on-T phenomenon, has not had recurrence since temporary epicardial pacing has been discontinued.  Cardiology EP consult 2/12 recommended discontinuing amiodarone  - noted again 2/21, asymptomatic and hemodynamically stable. Re-bolused with amiodarone and reconsulted EP  - amio gtt  #A-fib with RVR: noted overnight 2/14     GI care:   - NPO except ice chips and medications.     Fluids/ Electrolytes/ Nutrition:   -  TKO for IV fluid hydration, use D5 0.45 NS given hypernatremia  - NJ placed, TF decreased 2/22 given high residuals  - free water 60/hr for ongoing hypernatremia with q6 hr Na checks.     Renal/ Fluid Balance:    #Nonoliguric HANNA  - Urine output is adequate so far.  - Will continue to monitor intake and output, Cr, electrolytes.   - Nephrology consult  - diuresis with bumex 4 mg q 6 x 4 2/22 for goal of net negative 1L     Endocrine:    # Stress hyperglycemia  - Insulin gtt     ID/ Antibiotics:  # ventilator associated pneumonia  - cefepime and flagyl (switched from zosyn 2/20 for renal function). End date 2/22  - ID consult  - s/p vanc 2/15 - 2/17, restarted 2/18 for ongoing fevers. End date 2/22  - CT CAP 2/19 - nothing to do for pericardial effusion per CVTS     Heme:     - Hgb goal >7  - low int heparin gtt for a-fib     Prophylaxis:    - heparin as above  - Protonix qd     Lines/ tubes/ drains:  - PICC, Arterial line, quintero     Disposition:  - CV ICU.      Patient seen, findings and plan discussed with CVICU attending Dr. Kristin Alfonso MD  [phone]    ====================================    SUBJECTIVE:   No acute overnight events    OBJECTIVE:   1. VITAL SIGNS:   Temp:  [97.8  F (36.6  C)-98.7  F (37.1  C)] 98.2  F (36.8  C)  Heart Rate:  [] 101  Resp:  [23-33] 28  BP: (88-89)/(58-60) 89/58  MAP:  [61 mmHg-87 mmHg] 87 mmHg  Arterial Line BP: ()/(34-70) 123/70  FiO2 (%):  [40 %-50 %] 40 %  SpO2:  [96 %-100 %] 100 %  Ventilation Mode: (S) CPAP/PS  (Continuous positive airway pressure with Pressure Support)  FiO2 (%): 40 %  Rate Set (breaths/minute): 12 breaths/min  Tidal Volume Set (mL): 450 mL  PEEP (cm H2O): 8 cmH2O  Pressure Support (cm H2O): 8 cmH2O  Oxygen Concentration (%): 50 %  Resp: 28      2. INTAKE/ OUTPUT:   I/O last 3 completed shifts:  In: 4464.61 [I.V.:2099.61; NG/GT:1645]  Out: 4900 [Urine:3450; Emesis/NG output:1450]    3. PHYSICAL EXAMINATION:   General: intubated, laying  in hospital bed  Neuro: Opens eyes to voice, mouths full sentences, follows commands in all 4 extremities  Resp: mechanical ventilation  CV: a-fib  Abdomen: Soft, Non-distended, Non-tender  Incisions: c/d/i  Extremities: warm and well perfused with mild edema, dusky appearance to the right second and third toes, stable    4. INVESTIGATIONS:   Arterial Blood Gases   Recent Labs   Lab 02/21/20  1410 02/21/20  0521 02/20/20  1145 02/19/20  1228   PH 7.51* 7.47* 7.49* 7.49*   PCO2 30* 35 28* 30*   PO2 95 125* 64* 97   HCO3 24 25 21 22     Complete Blood Count   Recent Labs   Lab 02/22/20  0354 02/21/20  0521 02/20/20  0421 02/19/20  0357   WBC 12.1* 12.6* 10.8 8.1   HGB 7.8* 7.9* 8.3* 8.0*    282 307 262     Basic Metabolic Panel  Recent Labs   Lab 02/22/20  0354 02/21/20  2108 02/21/20  1705 02/21/20  0852 02/21/20  0521  02/20/20  0421  02/19/20  0357   * 152* 152* 153* 152*   < > 152*   < > 154*   POTASSIUM 3.6 3.7  --  3.8 3.6   < > 3.5   < > 3.7   CHLORIDE 122*  --   --   --  121*  --  121*  --  122*   CO2 24  --   --   --  25  --  24  --  25   *  --   --   --  99*  --  99*  --  104*   CR 2.38*  --   --   --  2.40*  --  2.40*  --  2.38*   *  --   --   --  198*  --  161*  --  180*    < > = values in this interval not displayed.     Liver Function Tests  Recent Labs   Lab 02/15/20  1213   AST 32   ALT 17   ALKPHOS 66   BILITOTAL 0.5   ALBUMIN 2.3*     Pancreatic Enzymes  No lab results found in last 7 days.  Coagulation Profile  Recent Labs   Lab 02/17/20  0332 02/16/20  0336   PTT 42* 48*         5. RADIOLOGY:   No results found for this or any previous visit (from the past 24 hour(s)).    =========================================

## 2020-02-22 NOTE — PHARMACY-VANCOMYCIN DOSING SERVICE
Pharmacy Vancomycin Initial Note  Date of Service 2020  Patient's  1945  74 year old, male    Indication: Sepsis    Current estimated CrCl = Estimated Creatinine Clearance: 36.1 mL/min (A) (based on SCr of 2.38 mg/dL (H)).    Creatinine for last 3 days  2020:  4:21 AM Creatinine 2.40 mg/dL  2020:  5:21 AM Creatinine 2.40 mg/dL  2020:  3:54 AM Creatinine 2.38 mg/dL    Recent Vancomycin Level(s) for last 3 days  2020:  3:31 PM Vancomycin Level 24.0 mg/L      Vancomycin IV Administrations (past 72 hours)                   vancomycin (VANCOCIN) 2,000 mg in sodium chloride 0.9 % 250 mL intermittent infusion (mg) 2,000 mg New Bag 20 0542    vancomycin (VANCOCIN) 2,000 mg in sodium chloride 0.9 % 250 mL intermittent infusion (mg) 2,000 mg New Bag 20 1546                Nephrotoxins and other renal medications (From now, onward)    Start     Dose/Rate Route Frequency Ordered Stop    20 0600  bumetanide (BUMEX) injection 4 mg      4 mg Intravenous EVERY 12 HOURS 20 1309 20 0559    20 1540  vancomycin place mckeon - receiving intermittent dosing      1 each Does not apply SEE ADMIN INSTRUCTIONS 20 1540      20 1400  bumetanide (BUMEX) injection 4 mg      4 mg Intravenous EVERY 6 HOURS 20 0839 20 0759          Contrast Orders - past 72 hours (72h ago, onward)    Start     Dose/Rate Route Frequency Ordered Stop    20 1715  iopamidol (ISOVUE-370) solution 135 mL      135 mL Intravenous ONCE 20 1707 20 1722                Plan:   1.  Patient received Vancomycin 2000 mg iv X 1 today @ 0542.  Will continue with intermittent dosing  2.  Goal Trough Level: 15-20 mg/L   3.  Pharmacy will check trough levels as appropriate in 1-3 Days.    4. Serum creatinine levels will be ordered daily for the first week of therapy and at least twice weekly for subsequent weeks.    5. [place] method utilized to dose vancomycin  therapy: Method 2    Ariella Zacarias, PharmD

## 2020-02-22 NOTE — PROGRESS NOTES
CVTS PROGRESS NOTE  February 22, 2020      CO-MORBIDITIES:   Dissection of aorta, unspecified portion of aorta (H)    ASSESSMENT: Vel Espana is a 74 year old male with PMH of HTN s/p Ascending aortic dissection repair using a 32 mm Dacron Gelweave graft, AVR with 27 mm Patterson bovine pericardial valve, Coronary bypass to right coronary artery, Left greater saphenous vein harvest, Circulatory arrest, Left femoral artery exploration on 2/10, remains intubated for airway protection for slowly resolving metabolic encephalopathy.     TODAY'S PROGRESS:   - diuresis  - continues to fail PST although mental status is improving, anticipate he will need trach Monday    PLAN:  Neuro/ pain/ sedation:  #Encephalopathy, improving  - Monitor neurological status. Notify the MD for any acute changes in exam.  - precedex gtt  - Daily sedation holiday  - seroquel 50 mg at bedtime      Pulmonary care:   #MV for airway protection post op  #ventilator associated pneumonia  - PST as able with sedation wean  - MV  - Titrate FiO2 for SpO2 >92%-  - Dr. Mckeon requests PEEP no less than 8  - bronchoscopy 2/15, 2/16  - trach cancelled 2/21 (Dr. Oswald felt patient may be able to extubate), will try to schedule for 2/24 if continue to fail PST over the weekend     Cardiovascular:    - Monitor hemodynamic status.   - MAP >65, SBP <120  - Hold PTA anti-hypertensives    #Non-sustained VT. brief run of nonsustained VT, less than 30 seconds, in the operating room, was hypokalemic at the time, also was being paced, possible R-on-T phenomenon, has not had recurrence since temporary epicardial pacing has been discontinued.  Cardiology EP consult 2/12 recommended discontinuing amiodarone  - noted again 2/21, asymptomatic and hemodynamically stable. Re-bolused with amiodarone and reconsulted EP  - amio gtt  #A-fib with RVR: noted overnight 2/14     GI care:   - NPO except ice chips and medications.     Fluids/ Electrolytes/ Nutrition:   - TKO  for IV fluid hydration, use D5 0.45 NS given hypernatremia  - NJ placed, TF decreased 2/22 given high residuals  - free water 60/hr for ongoing hypernatremia with q6 hr Na checks.     Renal/ Fluid Balance:    #Nonoliguric HANNA  - Urine output is adequate so far.  - Will continue to monitor intake and output, Cr, electrolytes.   - Nephrology consult  - diuresis with bumex 4 mg q 6 x 4 2/22 for goal of net negative 1L     Endocrine:    # Stress hyperglycemia  - Insulin gtt     ID/ Antibiotics:  # ventilator associated pneumonia  - cefepime and flagyl (switched from zosyn 2/20 for renal function). End date 2/22  - ID consult  - s/p vanc 2/15 - 2/17, restarted 2/18 for ongoing fevers. End date 2/22  - CT CAP 2/19 - nothing to do for pericardial effusion per CVTS     Heme:     - Hgb goal >7  - low int heparin gtt for a-fib     Prophylaxis:    - heparin as above  - Protonix qd     Lines/ tubes/ drains:  - PICC, Arterial line, quintero     Disposition:  - CV ICU.      Patient seen, findings and plan discussed with CVTS fellow     Macario Alfonso MD  [phone]    ====================================    SUBJECTIVE:   No acute overnight events    OBJECTIVE:   1. VITAL SIGNS:   Temp:  [97.8  F (36.6  C)-98.7  F (37.1  C)] 98.2  F (36.8  C)  Heart Rate:  [] 101  Resp:  [23-33] 28  BP: (88-89)/(58-60) 89/58  MAP:  [61 mmHg-87 mmHg] 87 mmHg  Arterial Line BP: ()/(34-70) 123/70  FiO2 (%):  [40 %-50 %] 40 %  SpO2:  [96 %-100 %] 100 %  Ventilation Mode: (S) CPAP/PS  (Continuous positive airway pressure with Pressure Support)  FiO2 (%): 40 %  Rate Set (breaths/minute): 12 breaths/min  Tidal Volume Set (mL): 450 mL  PEEP (cm H2O): 8 cmH2O  Pressure Support (cm H2O): 8 cmH2O  Oxygen Concentration (%): 50 %  Resp: 28      2. INTAKE/ OUTPUT:   I/O last 3 completed shifts:  In: 4464.61 [I.V.:2099.61; NG/GT:1645]  Out: 4900 [Urine:3450; Emesis/NG output:1450]    3. PHYSICAL EXAMINATION:   General: intubated, laying in hospital  bed  Neuro: Opens eyes to voice, mouths full sentences, follows commands in all 4 extremities  Resp: mechanical ventilation  CV: a-fib  Abdomen: Soft, Non-distended, Non-tender  Incisions: c/d/i  Extremities: warm and well perfused with mild edema, dusky appearance to the right second and third toes, stable    4. INVESTIGATIONS:   Arterial Blood Gases   Recent Labs   Lab 02/21/20  1410 02/21/20  0521 02/20/20  1145 02/19/20  1228   PH 7.51* 7.47* 7.49* 7.49*   PCO2 30* 35 28* 30*   PO2 95 125* 64* 97   HCO3 24 25 21 22     Complete Blood Count   Recent Labs   Lab 02/22/20  0354 02/21/20  0521 02/20/20  0421 02/19/20  0357   WBC 12.1* 12.6* 10.8 8.1   HGB 7.8* 7.9* 8.3* 8.0*    282 307 262     Basic Metabolic Panel  Recent Labs   Lab 02/22/20  0354 02/21/20  2108 02/21/20  1705 02/21/20  0852 02/21/20  0521  02/20/20  0421  02/19/20  0357   * 152* 152* 153* 152*   < > 152*   < > 154*   POTASSIUM 3.6 3.7  --  3.8 3.6   < > 3.5   < > 3.7   CHLORIDE 122*  --   --   --  121*  --  121*  --  122*   CO2 24  --   --   --  25  --  24  --  25   *  --   --   --  99*  --  99*  --  104*   CR 2.38*  --   --   --  2.40*  --  2.40*  --  2.38*   *  --   --   --  198*  --  161*  --  180*    < > = values in this interval not displayed.     Liver Function Tests  Recent Labs   Lab 02/15/20  1213   AST 32   ALT 17   ALKPHOS 66   BILITOTAL 0.5   ALBUMIN 2.3*     Pancreatic Enzymes  No lab results found in last 7 days.  Coagulation Profile  Recent Labs   Lab 02/17/20  0332 02/16/20  0336   PTT 42* 48*         5. RADIOLOGY:   No results found for this or any previous visit (from the past 24 hour(s)).    =========================================

## 2020-02-22 NOTE — PHARMACY-VANCOMYCIN DOSING SERVICE
"Pharmacy Vancomycin Consult Note    Date of Service 2020  Patient's  1945   74 year old, male    Indication: Intra-abdominal infection  Goal Trough Level: 15-20 mg/L  Day of Therapy: 3  Current Vancomycin regimen: 2000 mg IV q24h    Current estimated CrCl = Estimated Creatinine Clearance: 36.2 mL/min (A) (based on SCr of 2.4 mg/dL (H)).    Creatinine for last 3 days  2020:  3:57 AM Creatinine 2.38 mg/dL  2020:  4:21 AM Creatinine 2.40 mg/dL  2020:  5:21 AM Creatinine 2.40 mg/dL    Recent Vancomycin Levels (past 3 days)  2020:  3:31 PM Vancomycin Level 24.0 mg/L    Body mass index is 45.06 kg/m .  Height is 5' 9\".   Wt Readings from Last 2 Encounters:   20 138.4 kg (305 lb 1.9 oz)   19 128.4 kg (283 lb)       Vancomycin IV Administrations (past 72 hours)                   vancomycin (VANCOCIN) 2,000 mg in sodium chloride 0.9 % 250 mL intermittent infusion (mg) 2,000 mg New Bag 20 1546              Nephrotoxins and other renal medications (From now, onward)    Start     Dose/Rate Route Frequency Ordered Stop    20 0400  vancomycin (VANCOCIN) 2,000 mg in sodium chloride 0.9 % 250 mL intermittent infusion      2,000 mg (central catheter)  over 60 Minutes Intravenous EVERY 36 HOURS 20           Contrast Orders - past 72 hours (72h ago, onward)    Start     Dose/Rate Route Frequency Ordered Stop    20 1715  iopamidol (ISOVUE-370) solution 135 mL      135 mL Intravenous ONCE 20 1707 20 1722      Interpretation of levels and current regimen:  Trough level is Supratherapeutic  Has serum creatinine changed > 50% in last 72 hours: No  Urine output: good urine output  Renal Function: Stable    Plan:  1.  Decrease dose to 2000 mg IV every 36 hours. Patient is not at steady state concentrations yet and level is already supratherapeutic. By extending the dosing interval it will all for additional vancomycin clearance.  2.  Pharmacy will " check trough levels as appropriate in 3-5 Days.    3.  Serum creatinine levels will be ordered daily for the first week of therapy and at least twice weekly for subsequent weeks.      eZenat Montoya, PharmD  February 21, 2020              .

## 2020-02-22 NOTE — PROGRESS NOTES
Nephrology Progress Note  02/22/2020         Assessment & Recommendations:   74 yr male with Type A  Aortic dissection, S/p repair , AVR ,CABG, Left greater saphenous vein harvest, Circulatory arrest, Left femoral artery exploration on 2/10. Has NSVT. Nephrology consulted for HANNA.      Non Oliguric HANNA   Unknown baseline  Cr   Cr on admission 1.24  Etiology felt to be hypoperfusion on presentation with IV contrast use resulting in ATN.  --  Overall, renal function remains very stable, eGFr 25-30, and he is non oliguric. Urinating well, though negated by high obligate ins         Volume /BP  BP's remain stable off pressors. Still with volume overload, but vent req stable/coming down (decreasing FiO2, PEEP 8) Target Weight of ~130 kg potentially. Primary team choosing aggressive diuresis of Bumex 4mg IV q6h, but after discussion with surgeon at bedside alternative plan would be Bumex 4mg IV BID with metolazone 2.5mg NG BID as well (may also improve serum sodium).         Acid Base, Electrolytes :   Hypernatremia - stable but will worsening with increased loop diuretics ordered. Continue Free H20 at 120cc/hr via NGT or IV (as D5FW 120ml/hr) and CTM serum sodium closely to guide adjustment - goal 8 mmol/L change in sodium per 24hrs. After discussion with surgeon bedside, could treat hypervolemia and hyperNa with metolazone whilst backing off loop diuretics as well.   No other acute issues.      Fevers  PNA, Resp Failure  Surgical Site Infection  Blood Cx 2/19 negative (and only candida on bronch cx). On Cef/Flagyl, Plan for Trach upcoming. Management per CT Surg/ID.      Recommendations were communicated to primary team via note.    Seen and discussed with Dr. Ruiz.    Vel Longoria MD   [phone]    Interval History :   Nursing and provider notes from last 24 hours reviewed.  In the last 24 hours Vel DAGOBERTO Poolmiya made 4L UOP but had large obligate ins and was net neg ~200ml. Primary team stopping high  "volume NG water due to high residuals. Creat is stable.     Review of Systems:   Intubated     Physical Exam:   I/O last 3 completed shifts:  In: 4464.61 [I.V.:2099.61; NG/GT:1645]  Out: 4900 [Urine:3450; Emesis/NG output:1450]   BP (!) 89/58   Pulse 96   Temp 98.2  F (36.8  C) (Axillary)   Resp 28   Ht 1.753 m (5' 9\")   Wt 135.5 kg (298 lb 11.6 oz)   SpO2 97%   BMI 44.11 kg/m       GENERAL APPEARANCE: intubated but eyes open and tracking   PULM: lungs course bilat   CV: regular rhythm, normal rate, no rub    -edema - 1-2+ LE bilat   GI: soft, ND    Labs:   All labs reviewed by me  Electrolytes/Renal -   Recent Labs   Lab Test 02/22/20  1020 02/22/20  0354 02/21/20 2108 02/21/20 0852 02/21/20 0521 02/20/20  0421   * 152* 152*   < > 153* 152*   < > 152*   POTASSIUM  --  3.6 3.7  --  3.8 3.6   < > 3.5   CHLORIDE  --  122*  --   --   --  121*  --  121*   CO2  --  24  --   --   --  25  --  24   BUN  --  102*  --   --   --  99*  --  99*   CR  --  2.38*  --   --   --  2.40*  --  2.40*   GLC  --  189*  --   --   --  198*  --  161*   ELMER  --  7.8*  --   --   --  7.8*  --  7.6*   MAG  --  2.6* 2.4*  --  2.4* 2.4*   < >  --    PHOS  --  3.7  --   --  3.8 3.6  --   --     < > = values in this interval not displayed.       CBC -   Recent Labs   Lab Test 02/22/20  0354 02/21/20  0521 02/20/20  0421   WBC 12.1* 12.6* 10.8   HGB 7.8* 7.9* 8.3*    282 307       LFTs -   Recent Labs   Lab Test 02/15/20  1213 02/14/20  2030 02/13/20  0353 02/12/20  0343   ALKPHOS 66  --  42 35*   BILITOTAL 0.5  --  0.5 0.6   ALT 17 17 16 20   AST 32 44 58* 91*   PROTTOTAL 5.8*  --  5.4* 5.1*   ALBUMIN 2.3*  --  2.8* 2.7*       Iron Panel - No lab results found.      Imaging:  All imaging studies reviewed by me.     Current Medications:    acetylcysteine  2 mL Nebulization Q4H KAYLA     albuterol  2.5 mg Nebulization Q4H     aspirin  81 mg Oral Daily     atorvastatin  40 mg Oral QPM     bumetanide  4 mg Intravenous Q6H     " [START ON 2/23/2020] bumetanide  4 mg Intravenous Q12H     ceFEPIme (MAXIPIME) IV  2 g Intravenous Q12H     heparin lock flush  5-10 mL Intracatheter Q24H     [START ON 2/23/2020] influenza Vac Split High-Dose  0.5 mL Intramuscular Prior to discharge     insulin aspart  1-6 Units Subcutaneous Q4H     lidocaine  1-3 patch Transdermal Q24H     lidocaine  5 mL Topical Once     lidocaine   Transdermal Q8H     metolazone  2.5 mg Oral Once     [START ON 2/23/2020] metolazone  2.5 mg Oral Q12H     metroNIDAZOLE  500 mg Intravenous Q6H     multivitamins w/minerals  15 mL Per Feeding Tube Daily     pantoprazole  40 mg Oral or Feeding Tube BID     polyethylene glycol  17 g Oral or Feeding Tube Daily     QUEtiapine  50 mg Oral QPM     senna-docusate  1 tablet Oral or Feeding Tube BID    Or     senna-docusate  2 tablet Oral or Feeding Tube BID     sodium chloride (PF)  3 mL Intracatheter Q8H       amiodarone 0.5 mg/min (02/22/20 1200)     dexmedetomidine 0.3 mcg/kg/hr (02/22/20 1400)     dextrose       D5W 125 mL/hr at 02/22/20 1409     fentaNYL Stopped (02/17/20 1545)     HEParin 1,650 Units/hr (02/22/20 1200)     propofol (DIPRIVAN) infusion Stopped (02/17/20 1145)     BETA BLOCKER NOT PRESCRIBED       Vel Longoria MD

## 2020-02-22 NOTE — PROGRESS NOTES
SPIRITUAL HEALTH SERVICES  SPIRITUAL ASSESSMENT Progress Note  [facility] 4E     Visited with pt and family in morning and late afternoon. Pt's spouse Ronda and stepdaughter in room in afternoon. Ronda shared that she and pt are active in [employer] in [place] and pastors are very supportive. Ronda requested prayer and said she is grateful for ongoing  support.     PLAN: continue to follow, visiting as appropriate.    Milton Michelle) Yong Tan M.Div., [facility]  Staff   Pager [phone]

## 2020-02-22 NOTE — PLAN OF CARE
Pt more alert today.  Following commands, moves all extremities, c/o pain in toes, tylenol and oxy given x 1.  Afib/flutter.  At 0845 and 1330, pt had wide complex Afib with RVR in 140-150's, resembling VT, with the afternoon episode showing BP decrease with MAPs 58-65.  MD notified both occurrences and in to assess pt.  Amio bolus given for each occurrence and continuous gtt started this afternoon.  Pt remains intubated, no change in vent settings.  PS 12/8 and 10/8 for about 6 hours today.  Tachypneic in 20-30s.  RR lower with PS vs CMV.  Trach placement postponed until Monday, continuing to PST over the weekend to evaluate readiness for extubation.  TF restarted this afternoon at goal rate.  Continue water flushes at 60cc/h.  Smear BM, passing flatus.  [place] with large UOP, bumex given x 3.  Fissure noted on coccyx.  Cleaned and mepilex placed.  Continuing precedex at 0.4 while intubated to ease some agitation.  Family updated on plan of care.

## 2020-02-23 ENCOUNTER — APPOINTMENT (OUTPATIENT)
Dept: GENERAL RADIOLOGY | Facility: CLINIC | Age: 75
DRG: 219 | End: 2020-02-23
Payer: COMMERCIAL

## 2020-02-23 LAB
ABO + RH BLD: NORMAL
ABO + RH BLD: NORMAL
ANION GAP SERPL CALCULATED.3IONS-SCNC: 6 MMOL/L (ref 3–14)
ANION GAP SERPL CALCULATED.3IONS-SCNC: 6 MMOL/L (ref 3–14)
ANION GAP SERPL CALCULATED.3IONS-SCNC: 7 MMOL/L (ref 3–14)
BASE EXCESS BLDA CALC-SCNC: 0.5 MMOL/L
BLD GP AB SCN SERPL QL: NORMAL
BLD PROD TYP BPU: NORMAL
BLD UNIT ID BPU: 0
BLD UNIT ID BPU: 0
BLOOD BANK CMNT PATIENT-IMP: NORMAL
BLOOD PRODUCT CODE: NORMAL
BLOOD PRODUCT CODE: NORMAL
BPU ID: NORMAL
BPU ID: NORMAL
BUN SERPL-MCNC: 103 MG/DL (ref 7–30)
BUN SERPL-MCNC: 110 MG/DL (ref 7–30)
BUN SERPL-MCNC: 96 MG/DL (ref 7–30)
CALCIUM SERPL-MCNC: 7.4 MG/DL (ref 8.5–10.1)
CALCIUM SERPL-MCNC: 7.8 MG/DL (ref 8.5–10.1)
CALCIUM SERPL-MCNC: 8 MG/DL (ref 8.5–10.1)
CHLORIDE SERPL-SCNC: 111 MMOL/L (ref 94–109)
CHLORIDE SERPL-SCNC: 117 MMOL/L (ref 94–109)
CHLORIDE SERPL-SCNC: 117 MMOL/L (ref 94–109)
CO2 SERPL-SCNC: 25 MMOL/L (ref 20–32)
CO2 SERPL-SCNC: 26 MMOL/L (ref 20–32)
CO2 SERPL-SCNC: 27 MMOL/L (ref 20–32)
CREAT SERPL-MCNC: 2.46 MG/DL (ref 0.66–1.25)
CREAT SERPL-MCNC: 2.57 MG/DL (ref 0.66–1.25)
CREAT SERPL-MCNC: 2.62 MG/DL (ref 0.66–1.25)
ERYTHROCYTE [DISTWIDTH] IN BLOOD BY AUTOMATED COUNT: 15.9 % (ref 10–15)
ERYTHROCYTE [DISTWIDTH] IN BLOOD BY AUTOMATED COUNT: 16.7 % (ref 10–15)
GFR SERPL CREATININE-BSD FRML MDRD: 23 ML/MIN/{1.73_M2}
GFR SERPL CREATININE-BSD FRML MDRD: 23 ML/MIN/{1.73_M2}
GFR SERPL CREATININE-BSD FRML MDRD: 24 ML/MIN/{1.73_M2}
GLUCOSE BLDC GLUCOMTR-MCNC: 146 MG/DL (ref 70–99)
GLUCOSE BLDC GLUCOMTR-MCNC: 152 MG/DL (ref 70–99)
GLUCOSE BLDC GLUCOMTR-MCNC: 155 MG/DL (ref 70–99)
GLUCOSE BLDC GLUCOMTR-MCNC: 155 MG/DL (ref 70–99)
GLUCOSE BLDC GLUCOMTR-MCNC: 158 MG/DL (ref 70–99)
GLUCOSE BLDC GLUCOMTR-MCNC: 161 MG/DL (ref 70–99)
GLUCOSE BLDC GLUCOMTR-MCNC: 166 MG/DL (ref 70–99)
GLUCOSE BLDC GLUCOMTR-MCNC: 197 MG/DL (ref 70–99)
GLUCOSE BLDC GLUCOMTR-MCNC: 200 MG/DL (ref 70–99)
GLUCOSE BLDC GLUCOMTR-MCNC: 213 MG/DL (ref 70–99)
GLUCOSE BLDC GLUCOMTR-MCNC: 218 MG/DL (ref 70–99)
GLUCOSE SERPL-MCNC: 176 MG/DL (ref 70–99)
GLUCOSE SERPL-MCNC: 251 MG/DL (ref 70–99)
GLUCOSE SERPL-MCNC: 408 MG/DL (ref 70–99)
HCO3 BLD-SCNC: 25 MMOL/L (ref 21–28)
HCT VFR BLD AUTO: 24.2 % (ref 40–53)
HCT VFR BLD AUTO: 29.4 % (ref 40–53)
HGB BLD-MCNC: 6.7 G/DL (ref 13.3–17.7)
HGB BLD-MCNC: 7.2 G/DL (ref 13.3–17.7)
HGB BLD-MCNC: 8.9 G/DL (ref 13.3–17.7)
INTERPRETATION ECG - MUSE: NORMAL
LMWH PPP CHRO-ACNC: 0.23 IU/ML
MCH RBC QN AUTO: 30.6 PG (ref 26.5–33)
MCH RBC QN AUTO: 30.6 PG (ref 26.5–33)
MCHC RBC AUTO-ENTMCNC: 29.8 G/DL (ref 31.5–36.5)
MCHC RBC AUTO-ENTMCNC: 30.3 G/DL (ref 31.5–36.5)
MCV RBC AUTO: 101 FL (ref 78–100)
MCV RBC AUTO: 103 FL (ref 78–100)
NUM BPU REQUESTED: 2
O2/TOTAL GAS SETTING VFR VENT: 40 %
PCO2 BLD: 36 MM HG (ref 35–45)
PH BLD: 7.44 PH (ref 7.35–7.45)
PLATELET # BLD AUTO: 283 10E9/L (ref 150–450)
PLATELET # BLD AUTO: 304 10E9/L (ref 150–450)
PO2 BLD: 121 MM HG (ref 80–105)
POTASSIUM SERPL-SCNC: 3.1 MMOL/L (ref 3.4–5.3)
POTASSIUM SERPL-SCNC: 3.3 MMOL/L (ref 3.4–5.3)
POTASSIUM SERPL-SCNC: 3.5 MMOL/L (ref 3.4–5.3)
POTASSIUM SERPL-SCNC: 3.6 MMOL/L (ref 3.4–5.3)
RBC # BLD AUTO: 2.35 10E12/L (ref 4.4–5.9)
RBC # BLD AUTO: 2.91 10E12/L (ref 4.4–5.9)
SODIUM SERPL-SCNC: 143 MMOL/L (ref 133–144)
SODIUM SERPL-SCNC: 148 MMOL/L (ref 133–144)
SODIUM SERPL-SCNC: 150 MMOL/L (ref 133–144)
SODIUM SERPL-SCNC: 150 MMOL/L (ref 133–144)
SPECIMEN EXP DATE BLD: NORMAL
TRANSFUSION STATUS PATIENT QL: NORMAL
WBC # BLD AUTO: 10.1 10E9/L (ref 4–11)
WBC # BLD AUTO: 8.7 10E9/L (ref 4–11)

## 2020-02-23 PROCEDURE — 84132 ASSAY OF SERUM POTASSIUM: CPT | Performed by: STUDENT IN AN ORGANIZED HEALTH CARE EDUCATION/TRAINING PROGRAM

## 2020-02-23 PROCEDURE — 25000132 ZZH RX MED GY IP 250 OP 250 PS 637: Performed by: STUDENT IN AN ORGANIZED HEALTH CARE EDUCATION/TRAINING PROGRAM

## 2020-02-23 PROCEDURE — 00000146 ZZHCL STATISTIC GLUCOSE BY METER IP

## 2020-02-23 PROCEDURE — 25000128 H RX IP 250 OP 636: Performed by: STUDENT IN AN ORGANIZED HEALTH CARE EDUCATION/TRAINING PROGRAM

## 2020-02-23 PROCEDURE — 71045 X-RAY EXAM CHEST 1 VIEW: CPT

## 2020-02-23 PROCEDURE — 25000132 ZZH RX MED GY IP 250 OP 250 PS 637: Performed by: ANESTHESIOLOGY

## 2020-02-23 PROCEDURE — 25000128 H RX IP 250 OP 636: Performed by: THORACIC SURGERY (CARDIOTHORACIC VASCULAR SURGERY)

## 2020-02-23 PROCEDURE — 40000014 ZZH STATISTIC ARTERIAL MONITORING DAILY

## 2020-02-23 PROCEDURE — 20000004 ZZH R&B ICU UMMC

## 2020-02-23 PROCEDURE — 86850 RBC ANTIBODY SCREEN: CPT | Performed by: THORACIC SURGERY (CARDIOTHORACIC VASCULAR SURGERY)

## 2020-02-23 PROCEDURE — 85018 HEMOGLOBIN: CPT | Performed by: STUDENT IN AN ORGANIZED HEALTH CARE EDUCATION/TRAINING PROGRAM

## 2020-02-23 PROCEDURE — 25800030 ZZH RX IP 258 OP 636: Performed by: STUDENT IN AN ORGANIZED HEALTH CARE EDUCATION/TRAINING PROGRAM

## 2020-02-23 PROCEDURE — 25000128 H RX IP 250 OP 636: Performed by: PHYSICIAN ASSISTANT

## 2020-02-23 PROCEDURE — 94640 AIRWAY INHALATION TREATMENT: CPT

## 2020-02-23 PROCEDURE — 40000275 ZZH STATISTIC RCP TIME EA 10 MIN

## 2020-02-23 PROCEDURE — 86901 BLOOD TYPING SEROLOGIC RH(D): CPT | Performed by: THORACIC SURGERY (CARDIOTHORACIC VASCULAR SURGERY)

## 2020-02-23 PROCEDURE — 85027 COMPLETE CBC AUTOMATED: CPT | Performed by: INTERNAL MEDICINE

## 2020-02-23 PROCEDURE — 27210429 ZZH NUTRITION PRODUCT INTERMEDIATE LITER

## 2020-02-23 PROCEDURE — 85027 COMPLETE CBC AUTOMATED: CPT | Performed by: STUDENT IN AN ORGANIZED HEALTH CARE EDUCATION/TRAINING PROGRAM

## 2020-02-23 PROCEDURE — 25000125 ZZHC RX 250: Performed by: STUDENT IN AN ORGANIZED HEALTH CARE EDUCATION/TRAINING PROGRAM

## 2020-02-23 PROCEDURE — 94640 AIRWAY INHALATION TREATMENT: CPT | Mod: 76

## 2020-02-23 PROCEDURE — 25000132 ZZH RX MED GY IP 250 OP 250 PS 637: Performed by: THORACIC SURGERY (CARDIOTHORACIC VASCULAR SURGERY)

## 2020-02-23 PROCEDURE — 25800025 ZZH RX 258: Performed by: STUDENT IN AN ORGANIZED HEALTH CARE EDUCATION/TRAINING PROGRAM

## 2020-02-23 PROCEDURE — 80048 BASIC METABOLIC PNL TOTAL CA: CPT | Performed by: STUDENT IN AN ORGANIZED HEALTH CARE EDUCATION/TRAINING PROGRAM

## 2020-02-23 PROCEDURE — 74018 RADEX ABDOMEN 1 VIEW: CPT

## 2020-02-23 PROCEDURE — 82803 BLOOD GASES ANY COMBINATION: CPT | Performed by: STUDENT IN AN ORGANIZED HEALTH CARE EDUCATION/TRAINING PROGRAM

## 2020-02-23 PROCEDURE — 85520 HEPARIN ASSAY: CPT | Performed by: STUDENT IN AN ORGANIZED HEALTH CARE EDUCATION/TRAINING PROGRAM

## 2020-02-23 PROCEDURE — 25000125 ZZHC RX 250

## 2020-02-23 PROCEDURE — 84295 ASSAY OF SERUM SODIUM: CPT | Performed by: STUDENT IN AN ORGANIZED HEALTH CARE EDUCATION/TRAINING PROGRAM

## 2020-02-23 PROCEDURE — 86923 COMPATIBILITY TEST ELECTRIC: CPT | Performed by: THORACIC SURGERY (CARDIOTHORACIC VASCULAR SURGERY)

## 2020-02-23 PROCEDURE — 86900 BLOOD TYPING SEROLOGIC ABO: CPT | Performed by: THORACIC SURGERY (CARDIOTHORACIC VASCULAR SURGERY)

## 2020-02-23 PROCEDURE — 25000132 ZZH RX MED GY IP 250 OP 250 PS 637: Performed by: PHYSICIAN ASSISTANT

## 2020-02-23 PROCEDURE — 99232 SBSQ HOSP IP/OBS MODERATE 35: CPT | Mod: GC | Performed by: ANESTHESIOLOGY

## 2020-02-23 PROCEDURE — P9016 RBC LEUKOCYTES REDUCED: HCPCS | Performed by: THORACIC SURGERY (CARDIOTHORACIC VASCULAR SURGERY)

## 2020-02-23 PROCEDURE — 94003 VENT MGMT INPAT SUBQ DAY: CPT

## 2020-02-23 RX ORDER — BUMETANIDE 0.25 MG/ML
4 INJECTION INTRAMUSCULAR; INTRAVENOUS ONCE
Status: COMPLETED | OUTPATIENT
Start: 2020-02-23 | End: 2020-02-23

## 2020-02-23 RX ORDER — METOLAZONE 2.5 MG/1
5 TABLET ORAL ONCE
Status: COMPLETED | OUTPATIENT
Start: 2020-02-23 | End: 2020-02-23

## 2020-02-23 RX ORDER — DEXTROSE MONOHYDRATE 25 G/50ML
25-50 INJECTION, SOLUTION INTRAVENOUS
Status: DISCONTINUED | OUTPATIENT
Start: 2020-02-23 | End: 2020-02-23

## 2020-02-23 RX ORDER — BUMETANIDE 0.25 MG/ML
4 INJECTION INTRAMUSCULAR; INTRAVENOUS EVERY 12 HOURS
Status: DISCONTINUED | OUTPATIENT
Start: 2020-02-23 | End: 2020-02-23

## 2020-02-23 RX ORDER — BUMETANIDE 0.25 MG/ML
4 INJECTION INTRAMUSCULAR; INTRAVENOUS EVERY 6 HOURS
Status: DISCONTINUED | OUTPATIENT
Start: 2020-02-23 | End: 2020-02-23

## 2020-02-23 RX ORDER — AMIODARONE HYDROCHLORIDE 200 MG/1
200 TABLET ORAL DAILY
Status: DISCONTINUED | OUTPATIENT
Start: 2020-02-23 | End: 2020-03-05 | Stop reason: HOSPADM

## 2020-02-23 RX ORDER — DEXTROSE MONOHYDRATE 100 MG/ML
INJECTION, SOLUTION INTRAVENOUS CONTINUOUS PRN
Status: DISCONTINUED | OUTPATIENT
Start: 2020-02-23 | End: 2020-02-23

## 2020-02-23 RX ORDER — METOLAZONE 2.5 MG/1
5 TABLET ORAL 2 TIMES DAILY
Status: DISCONTINUED | OUTPATIENT
Start: 2020-02-23 | End: 2020-02-23

## 2020-02-23 RX ORDER — NICOTINE POLACRILEX 4 MG
15-30 LOZENGE BUCCAL
Status: DISCONTINUED | OUTPATIENT
Start: 2020-02-23 | End: 2020-02-23

## 2020-02-23 RX ADMIN — ALBUTEROL SULFATE 2.5 MG: 2.5 SOLUTION RESPIRATORY (INHALATION) at 19:22

## 2020-02-23 RX ADMIN — ACETYLCYSTEINE 2 ML: 200 SOLUTION ORAL; RESPIRATORY (INHALATION) at 16:19

## 2020-02-23 RX ADMIN — MULTIVITAMIN 15 ML: LIQUID ORAL at 07:39

## 2020-02-23 RX ADMIN — AMIODARONE HYDROCHLORIDE 200 MG: 200 TABLET ORAL at 10:27

## 2020-02-23 RX ADMIN — Medication 12.5 MG: at 10:26

## 2020-02-23 RX ADMIN — DEXMEDETOMIDINE 0.3 MCG/KG/HR: 100 INJECTION, SOLUTION, CONCENTRATE INTRAVENOUS at 13:18

## 2020-02-23 RX ADMIN — CEFEPIME HYDROCHLORIDE 2 G: 2 INJECTION, POWDER, FOR SOLUTION INTRAVENOUS at 10:26

## 2020-02-23 RX ADMIN — ALBUTEROL SULFATE 2.5 MG: 2.5 SOLUTION RESPIRATORY (INHALATION) at 11:44

## 2020-02-23 RX ADMIN — POTASSIUM CHLORIDE 40 MEQ: 1.5 POWDER, FOR SOLUTION ORAL at 12:19

## 2020-02-23 RX ADMIN — INSULIN ASPART 2 UNITS: 100 INJECTION, SOLUTION INTRAVENOUS; SUBCUTANEOUS at 07:41

## 2020-02-23 RX ADMIN — ACETYLCYSTEINE 2 ML: 200 SOLUTION ORAL; RESPIRATORY (INHALATION) at 19:21

## 2020-02-23 RX ADMIN — METOLAZONE 5 MG: 2.5 TABLET ORAL at 22:30

## 2020-02-23 RX ADMIN — BUMETANIDE 4 MG: 0.25 INJECTION INTRAMUSCULAR; INTRAVENOUS at 10:26

## 2020-02-23 RX ADMIN — BUMETANIDE 4 MG: 0.25 INJECTION INTRAMUSCULAR; INTRAVENOUS at 03:48

## 2020-02-23 RX ADMIN — POTASSIUM CHLORIDE 20 MEQ: 1.5 POWDER, FOR SOLUTION ORAL at 18:21

## 2020-02-23 RX ADMIN — SENNOSIDES AND DOCUSATE SODIUM 1 TABLET: 8.6; 5 TABLET ORAL at 07:39

## 2020-02-23 RX ADMIN — ACETYLCYSTEINE 2 ML: 200 SOLUTION ORAL; RESPIRATORY (INHALATION) at 11:45

## 2020-02-23 RX ADMIN — DEXTROSE MONOHYDRATE: 50 INJECTION, SOLUTION INTRAVENOUS at 02:17

## 2020-02-23 RX ADMIN — ALBUTEROL SULFATE 2.5 MG: 2.5 SOLUTION RESPIRATORY (INHALATION) at 07:29

## 2020-02-23 RX ADMIN — HEPARIN SODIUM 1650 UNITS/HR: 10000 INJECTION, SOLUTION INTRAVENOUS at 20:50

## 2020-02-23 RX ADMIN — BUMETANIDE 4 MG: 0.25 INJECTION INTRAMUSCULAR; INTRAVENOUS at 22:31

## 2020-02-23 RX ADMIN — ACETYLCYSTEINE 2 ML: 200 SOLUTION ORAL; RESPIRATORY (INHALATION) at 07:29

## 2020-02-23 RX ADMIN — ATORVASTATIN CALCIUM 40 MG: 40 TABLET, FILM COATED ORAL at 19:54

## 2020-02-23 RX ADMIN — INSULIN ASPART 2 UNITS: 100 INJECTION, SOLUTION INTRAVENOUS; SUBCUTANEOUS at 03:58

## 2020-02-23 RX ADMIN — ASPIRIN 81 MG CHEWABLE TABLET 81 MG: 81 TABLET CHEWABLE at 07:39

## 2020-02-23 RX ADMIN — DEXMEDETOMIDINE 0.5 MCG/KG/HR: 100 INJECTION, SOLUTION, CONCENTRATE INTRAVENOUS at 22:00

## 2020-02-23 RX ADMIN — POTASSIUM CHLORIDE 20 MEQ: 29.8 INJECTION, SOLUTION INTRAVENOUS at 05:23

## 2020-02-23 RX ADMIN — ACETYLCYSTEINE 2 ML: 200 SOLUTION ORAL; RESPIRATORY (INHALATION) at 23:07

## 2020-02-23 RX ADMIN — ALBUTEROL SULFATE 2.5 MG: 2.5 SOLUTION RESPIRATORY (INHALATION) at 00:06

## 2020-02-23 RX ADMIN — INSULIN ASPART 2 UNITS: 100 INJECTION, SOLUTION INTRAVENOUS; SUBCUTANEOUS at 00:13

## 2020-02-23 RX ADMIN — Medication 40 MG: at 19:55

## 2020-02-23 RX ADMIN — METOLAZONE 2.5 MG: 2.5 TABLET ORAL at 06:48

## 2020-02-23 RX ADMIN — Medication 40 MG: at 07:39

## 2020-02-23 RX ADMIN — POTASSIUM CHLORIDE 20 MEQ: 29.8 INJECTION, SOLUTION INTRAVENOUS at 06:41

## 2020-02-23 RX ADMIN — ALBUTEROL SULFATE 2.5 MG: 2.5 SOLUTION RESPIRATORY (INHALATION) at 23:07

## 2020-02-23 RX ADMIN — ALBUTEROL SULFATE 2.5 MG: 2.5 SOLUTION RESPIRATORY (INHALATION) at 04:31

## 2020-02-23 RX ADMIN — HEPARIN SODIUM 1650 UNITS/HR: 10000 INJECTION, SOLUTION INTRAVENOUS at 03:46

## 2020-02-23 RX ADMIN — HUMAN INSULIN 2 UNITS/HR: 100 INJECTION, SOLUTION SUBCUTANEOUS at 11:33

## 2020-02-23 RX ADMIN — QUETIAPINE FUMARATE 50 MG: 50 TABLET ORAL at 19:54

## 2020-02-23 RX ADMIN — ALBUTEROL SULFATE 2.5 MG: 2.5 SOLUTION RESPIRATORY (INHALATION) at 16:18

## 2020-02-23 RX ADMIN — DEXTROSE AND SODIUM CHLORIDE: 5; 450 INJECTION, SOLUTION INTRAVENOUS at 18:31

## 2020-02-23 RX ADMIN — ACETYLCYSTEINE 2 ML: 200 SOLUTION ORAL; RESPIRATORY (INHALATION) at 00:07

## 2020-02-23 RX ADMIN — DEXMEDETOMIDINE 0.5 MCG/KG/HR: 100 INJECTION, SOLUTION, CONCENTRATE INTRAVENOUS at 05:28

## 2020-02-23 RX ADMIN — ACETYLCYSTEINE 2 ML: 200 SOLUTION ORAL; RESPIRATORY (INHALATION) at 04:31

## 2020-02-23 RX ADMIN — POTASSIUM CHLORIDE 20 MEQ: 1.5 POWDER, FOR SOLUTION ORAL at 14:51

## 2020-02-23 RX ADMIN — Medication 12.5 MG: at 19:54

## 2020-02-23 ASSESSMENT — ACTIVITIES OF DAILY LIVING (ADL)
ADLS_ACUITY_SCORE: 18

## 2020-02-23 ASSESSMENT — MIFFLIN-ST. JEOR: SCORE: 2051.38

## 2020-02-23 NOTE — PLAN OF CARE
Major Shift Events:  Pt sedated on Precedex.  No complaints of pain.  Pt delayed in ambulating to chair d/t chair in room being too small.  Larger chair ordered and delivered to patient.  Up in chair 2.5hrs, hemodynamically tolerated but pt unable to become comfortable in chair.  SBP goal met without need for PRN's.  At 1800 patient had new onset bloody sputum in ETT suction.  CVTS overnight Resident paged and she advised that RN shut off Heparin until otherwise notified.  PS trial well tolerated for 5hrs on 12/8.  Pt unable to tolerated 10/8.  Tube feeds advancing to goal as OGT output no increasing.  No emesis or BM.  Recent Abd Xray shows no concern for illeus.  Blanchable pink/redness to area around rectum.  Mepilex and barrier cream applied.      Spouse visited pt at bedside today.  Expressed concerns over patient potentially needing to be trached.  CVTS initially unavailable to visit at bedside, SICU attending recruited to speak with patient's wife.  Spouse displeased with conversation with SICU attending and care from SICU RN staff.  Bedside RN sat with patient's spouse for 30 minutes to discuss her concerns.  CVTS resident visited patient and spouse at bedside to discuss concerns and possible need for trach.  Despite extensive efforts from MD and RN staff, spousal concerns were unalleviated.    Plan: Probably trach early next week.    For vital signs and complete assessments, please see documentation flowsheets.

## 2020-02-23 NOTE — PLAN OF CARE
Major Shift Events:  PRBC X 2.  Recheck done, see results review.  Pt tolerated CPAP 8/8 for 10/12 hrs.  OGT clamped, as NJT is now only gastric.  Recheck residual at 2000.  BM X 1.    Plan: Potentially extubate tomorrow to see if patient can tolerate.  If he can't, probable reintubation and trach.    For vital signs and complete assessments, please see documentation flowsheets.

## 2020-02-23 NOTE — PROGRESS NOTES
Nephrology Progress Note  02/23/2020         Assessment & Recommendations:   74 yr male with Type A  Aortic dissection, S/p repair , AVR ,CABG, Left greater saphenous vein harvest, Circulatory arrest, Left femoral artery exploration on 2/10. Has NSVT. Nephrology consulted for HANNA.      Non Oliguric HANNA   Unknown baseline Cr   Cr on admission 1.24  Etiology felt to be hypoperfusion on presentation with IV contrast use resulting in ATN.  --  Overall, renal function remains roughly stable, creatinine 2.3-2.5, and he is non oliguric. Urinating well, though negated by high obligate ins.         Volume /BP  BP's remain stable off pressors. Still with volume overload, but vent req stable/coming down (decreasing FiO2, PEEP 8) Target Weight of ~130 kg potentially. Discussed with surgeon at bedside 2/22 plan of Bumex 4mg IV BID with metolazone 2.5mg NG BID as well (may also improve serum sodium). Surgery team wary of hypoK on 2/23 - whether or not using metolazone would CTM potassium after each diuretic dose and replete each time.         Acid Base, Electrolytes :   Hypernatremia - improving with increased free water, will likely also improve with metolazone use. Continue current D5@125ml/hr and NG water 30ml q1h.   Hypokalemia - replete and recheck after each diuretic dose, due to loop diuretics (and now metolazone may get started)   No other acute issues.      Fevers  PNA, Resp Failure  Surgical Site Infection  Blood Cx 2/19 negative (and only candida on bronch cx). On Cef/Flagyl, Plan for Trach upcoming. Management per CT Surg/ID.      Recommendations were communicated to primary team via note.     Seen and discussed with Dr. Ruiz.    Vel Longoria MD   [phone]    Interval History :   Nursing and provider notes from last 24 hours reviewed.  In the last 24 hours Vel Espana opening eyes, nodding yes that he feels thirsty. Remains intubated. Net positive 268 ml yesterday. UOP 4L.    Review of Systems:  "  Intubated    Physical Exam:   I/O last 3 completed shifts:  In: 5646.75 [I.V.:3776.75; NG/GT:900]  Out: 5340 [Urine:4940; Emesis/NG output:400]   /67   Pulse 68   Temp 98.2  F (36.8  C) (Axillary)   Resp 24   Ht 1.753 m (5' 9\")   Wt 133.1 kg (293 lb 6.9 oz)   SpO2 95%   BMI 43.33 kg/m       GENERAL APPEARANCE: intubated  EYES:  no scleral icterus, pupils equal  PULM: lungs with scattered rales bilat  CV: regular rhythm, normal rate, no rub     -edema - 1+ LE bilat    GI: soft, NTND    Labs:   All labs reviewed by me  Electrolytes/Renal -   Recent Labs   Lab Test 02/23/20 0851 02/23/20 0402 02/22/20 2256 02/22/20 0354 02/21/20 2108 02/21/20 0852 02/21/20  0521    148* 152*   < > 152* 152*   < > 153* 152*   POTASSIUM 3.3* 3.1*  --   --  3.6 3.7  --  3.8 3.6   CHLORIDE 111* 117*  --   --  122*  --   --   --  121*   CO2 26 25  --   --  24  --   --   --  25   * 103*  --   --  102*  --   --   --  99*   CR 2.46* 2.57*  --   --  2.38*  --   --   --  2.40*   * 251*  --   --  189*  --   --   --  198*   ELMER 7.4* 7.8*  --   --  7.8*  --   --   --  7.8*   MAG  --   --   --   --  2.6* 2.4*  --  2.4* 2.4*   PHOS  --   --   --   --  3.7  --   --  3.8 3.6    < > = values in this interval not displayed.       CBC -   Recent Labs   Lab Test 02/23/20 0851 02/23/20 0402 02/22/20 0354 02/21/20  0521   WBC  --  8.7 12.1* 12.6*   HGB 6.7* 7.2* 7.8* 7.9*   PLT  --  283 297 282       LFTs -   Recent Labs   Lab Test 02/15/20  1213 02/14/20  2030 02/13/20  0353 02/12/20  0343   ALKPHOS 66  --  42 35*   BILITOTAL 0.5  --  0.5 0.6   ALT 17 17 16 20   AST 32 44 58* 91*   PROTTOTAL 5.8*  --  5.4* 5.1*   ALBUMIN 2.3*  --  2.8* 2.7*       Iron Panel - No lab results found.      Imaging:  All imaging studies reviewed by me.     Current Medications:    acetylcysteine  2 mL Nebulization Q4H KAYLA     albuterol  2.5 mg Nebulization Q4H     amiodarone  200 mg Oral Daily     aspirin  81 mg Oral Daily     " atorvastatin  40 mg Oral QPM     bumetanide  4 mg Intravenous Once     ceFEPIme (MAXIPIME) IV  2 g Intravenous Q24H     heparin lock flush  5-10 mL Intracatheter Q24H     influenza Vac Split High-Dose  0.5 mL Intramuscular Prior to discharge     insulin aspart  1-6 Units Subcutaneous Q4H     lidocaine  1-3 patch Transdermal Q24H     lidocaine  5 mL Topical Once     lidocaine   Transdermal Q8H     metolazone  5 mg Oral Once     metoprolol tartrate  12.5 mg Oral BID     multivitamins w/minerals  15 mL Per Feeding Tube Daily     pantoprazole  40 mg Oral or Feeding Tube BID     polyethylene glycol  17 g Oral or Feeding Tube Daily     QUEtiapine  50 mg Oral QPM     senna-docusate  1 tablet Oral or Feeding Tube BID    Or     senna-docusate  2 tablet Oral or Feeding Tube BID     sodium chloride (PF)  3 mL Intracatheter Q8H       dexmedetomidine 0.3 mcg/kg/hr (02/23/20 1200)     dextrose       fentaNYL Stopped (02/17/20 1545)     HEParin 1,650 Units/hr (02/23/20 1200)     insulin (regular) 2 Units/hr (02/23/20 1200)     propofol (DIPRIVAN) infusion Stopped (02/17/20 1145)     BETA BLOCKER NOT PRESCRIBED       Vel Longoria MD

## 2020-02-23 NOTE — PROGRESS NOTES
CVTS PROGRESS NOTE        CO-MORBIDITIES:   Dissection of aorta, unspecified portion of aorta (H)    ASSESSMENT: Vel Espana is a 74 year old male with PMH of HTN s/p Ascending aortic dissection repair using a 32 mm Dacron Gelweave graft, AVR with 27 mm Patterson bovine pericardial valve, Coronary bypass to right coronary artery, Left greater saphenous vein harvest, Circulatory arrest, Left femoral artery exploration on 2/10, remains intubated for airway protection for slowly resolving metabolic encephalopathy. Mental status has cleared appropriately and patient is progressing with PSTs.     TODAY'S PROGRESS:   - diuresis with bumex and metolazone per Dr. Mckeon   - started metoprolol  - Discontinued D10 infusion and increased free water to 60 ml Q1H per Dr. Mckeon   - changed amio to PO  - 2u RBCs given for anemia  - completed last dose of vancomycin, continue cefepime and flagyl through extubation  - s/p KUB, plan to reposition NJ tube (needs to be done by nutrition). If no success over the weekend with choose to clamp OG and continue feeds via NG.  - restarted insulin ggt  - plan for trial of extubation 2/24 if passes PST today (8/8 for 12 hours). Otherwise plan for trach placement 2/24. Then PEG sometime after that. (Trach has been brought up with the family but PEG has not been addressed).    PLAN:  Neuro/ pain/ sedation:  #Encephalopathy, improving  - Monitor neurological status. Notify the MD for any acute changes in exam.  - precedex gtt  - Daily sedation holiday  - seroquel 50 mg at bedtime      Pulmonary care:   #MV for airway protection post op  #ventilator associated pneumonia  - PST as able with sedation wean  - MV   - Titrate FiO2 for SpO2 >92%  - Dr. Mckeon requests PEEP no less than 8  - bronchoscopy 2/15, 2/16  - trach cancelled 2/21 (Dr. Oswald felt patient may be able to extubate). Plan for trial of extubation 2/24 if passes PST today (remains 8/8 for 12 hours). Otherwise, plan for trach  placement 2/24.     Cardiovascular:    - Monitor hemodynamic status  - MAP >65, SBP <120  - Start metoprolol 12.5mg BID PRN for MAP <65  - TTE mid-week per Dr. Mckeon  - Will need vascular consult mid week for necrotic toes (amputation?)    #Non-sustained VT. brief run of nonsustained VT, less than 30 seconds, in the operating room, was hypokalemic at the time, also was being paced, possible R-on-T phenomenon, has not had recurrence since temporary epicardial pacing has been discontinued.  Cardiology EP consult 2/12 recommended discontinuing amiodarone   - noted again 2/21, asymptomatic and hemodynamically stable. Re-bolused with amiodarone and reconsulted EP  - Transitioned amiodarone to PO  #A-fib with RVR: noted overnight 2/14     GI care:   - NPO except ice chips and medications.  - s/p KUB to confirm NJ/OG placement, plan to reposition NJ      Fluids/ Electrolytes/ Nutrition:   #Hypernatremia, improving  - TKO for IV fluid hydration,  - Increase free water 60 Q1H for ongoing hypernatremia with q6 hr Na checks.   - Discontinue D10 infusion given hyperglycemia  - NJ placed, TF decreased 2/22 given high residuals    Renal/ Fluid Balance:    #Nonoliguric HANNA  - Urine output is adequate so far.  - Will continue to monitor intake and output, Cr, electrolytes.   - Nephrology following   - diuresis with metolazone 5mg NG BID, with administration of bumex 4 mg IV BID 30 minutes after. Goal of net negative 1L.       Endocrine:    # Stress hyperglycemia  - Restart insulin gtt     ID/ Antibiotics:  # ventilator associated pneumonia  - cefepime and flagyl (switched from zosyn 2/20 for renal function). Continue through extubation.  - s/p vanc 2/15 - 2/17, restarted 2/18-2/22 for ongoing fevers.  - ID consult  - CT CAP 2/19 - nothing to do for pericardial effusion per CVTS     Heme:     - Hgb goal >7, s/p 2u transfusion 2/23  - Repeat Hgb post transfusion  - low int heparin gtt for a-fib     Prophylaxis:    - heparin as  above  - Protonix qd     Lines/ tubes/ drains:  - PICC, Arterial line, quintero     Disposition:  - CV ICU     Patient seen, findings and plan discussed with resident and CVTS fellow    Kaycee Pandey MD on 2/23/2020 at 1:18 PM    ====================================    SUBJECTIVE:   No acute overnight events. Hemoglobin recheck this morning 6.7 which is likely dilutional. Will received 2 units pRBCs. Patient is tolerating PS 8/8 since 6am this morning.     OBJECTIVE:   1. VITAL SIGNS:   Temp:  [96  F (35.6  C)-98.3  F (36.8  C)] 96.8  F (36  C)  Pulse:  [68-73] 68  Heart Rate:  [67-86] 78  Resp:  [24-30] 24  BP: (110-115)/(51-67) 115/67  MAP:  [54 mmHg-85 mmHg] 70 mmHg  Arterial Line BP: ()/(41-75) 111/54  FiO2 (%):  [40 %] 40 %  SpO2:  [90 %-100 %] 100 %  Ventilation Mode: CPAP/PS  (Continuous positive airway pressure with Pressure Support)  FiO2 (%): 40 %  Rate Set (breaths/minute): 12 breaths/min  Tidal Volume Set (mL): 450 mL  PEEP (cm H2O): 8 cmH2O  Pressure Support (cm H2O): 8 cmH2O (found on 8)  Oxygen Concentration (%): 40 %  Resp: 24      2. INTAKE/ OUTPUT:   I/O last 3 completed shifts:  In: 5646.75 [I.V.:3776.75; NG/GT:900]  Out: 5340 [Urine:4940; Emesis/NG output:400]    3. PHYSICAL EXAMINATION:   General: intubated, laying in hospital bed  Neuro: Opens eyes to voice, mouths full sentences, follows commands in all 4 extremities  Resp: mechanical ventilation  CV: RRR  Abdomen: Soft, Non-distended, Non-tender  Incisions: c/d/i  Extremities: warm and well perfused with mild edema, dusky appearance to the right second and third toes, stable.    4. INVESTIGATIONS:   Arterial Blood Gases   Recent Labs   Lab 02/23/20  0539 02/21/20  1410 02/21/20  0521 02/20/20  1145   PH 7.44 7.51* 7.47* 7.49*   PCO2 36 30* 35 28*   PO2 121* 95 125* 64*   HCO3 25 24 25 21     Complete Blood Count   Recent Labs   Lab 02/23/20  0851 02/23/20  0402 02/22/20  0354 02/21/20  0521 02/20/20  0421   WBC  --  8.7 12.1* 12.6*  10.8   HGB 6.7* 7.2* 7.8* 7.9* 8.3*   PLT  --  283 297 282 307     Basic Metabolic Panel  Recent Labs   Lab 02/23/20  0851 02/23/20  0402 02/22/20  2256 02/22/20  1725  02/22/20  0354 02/21/20  2108  02/21/20  0521    148* 152* 152*   < > 152* 152*   < > 152*   POTASSIUM 3.3* 3.1*  --   --   --  3.6 3.7   < > 3.6   CHLORIDE 111* 117*  --   --   --  122*  --   --  121*   CO2 26 25  --   --   --  24  --   --  25   * 103*  --   --   --  102*  --   --  99*   CR 2.46* 2.57*  --   --   --  2.38*  --   --  2.40*   * 251*  --   --   --  189*  --   --  198*    < > = values in this interval not displayed.     Liver Function Tests  No lab results found in last 7 days.  Pancreatic Enzymes  No lab results found in last 7 days.  Coagulation Profile  Recent Labs   Lab 02/17/20  0332   PTT 42*         5. RADIOLOGY:   Recent Results (from the past 24 hour(s))   XR Chest Port 1 View    Narrative    Exam: XR CHEST PORT 1 , 2/23/2020 4:37 AM    Indication: intubated, pneumonia, interval change    Comparison: 2/22/2020.    Findings:   Single portable AP view the chest. ET tube tip at the high thoracic  trachea 6.4 cm from the daniel. Right PICC line with tip at the high  SVC. 2 enteric tubes course midline with tips outside the  field-of-view. Aortic valvular prosthesis, intact midline sternotomy  wires, and multiple surgical clips over the mediastinum. Stable  enlargement of the cardiac silhouette. Stable bilateral layering  pleural effusions, left greater the right. Stable diffuse bilateral  mixed opacities. No pneumothorax.      Impression    Impression:   1. Stable bilateral mixed opacities and bilateral pleural effusions,  left greater than right.  2. Stable support devices.    I have personally reviewed the examination and initial interpretation  and I agree with the findings.    JOEL SANTANA MD   XR Abdomen Port 1 View    Narrative    EXAMINATION: XR ABDOMEN PORT 1 VW, 2/23/2020 11:19 AM    COMPARISON:  2/21/2020    HISTORY: evaluate NJ    FINDINGS: Nasogastric tube in the stomach with sidehole at the level  of the pylorus. Feeding tube in the duodenum near the ligament of  Treitz. No air-filled dilated small bowel loops.      Impression    IMPRESSION: Feeding tube in the duodenum near ligament of Treitz.  NG/OG tube tip the pylorus     JOEL SANTANA MD       =========================================

## 2020-02-23 NOTE — PROGRESS NOTES
CLINICAL NUTRITION SERVICES - BRIEF NOTE  *Refer to RD note on 2/18 for full nutrition assessment details.    RN Consult - specify Reason: Reposition NJ     Spoke with RN - notified consulting RN that RD unable to assist in FT placements/repositions on weekend. RD expressed that if the FT needs to be repositioned today, Radiology will need to be consulted.       Monitoring/Evaluation  Will continue to monitor and evaluate per protocol.      Veronique Patel RD,    Weekend pager [phone]

## 2020-02-23 NOTE — PROGRESS NOTES
CV ICU PROGRESS NOTE        CO-MORBIDITIES:   Dissection of aorta, unspecified portion of aorta (H)    ASSESSMENT: Vel Espana is a 74 year old male with PMH of HTN s/p Ascending aortic dissection repair using a 32 mm Dacron Gelweave graft, AVR with 27 mm Patterson bovine pericardial valve, Coronary bypass to right coronary artery, Left greater saphenous vein harvest, Circulatory arrest, Left femoral artery exploration on 2/10, remains intubated for airway protection for slowly resolving metabolic encephalopathy. Mental status has cleared appropriately and patient is progressing with PSTs.     TODAY'S PROGRESS:   - diuresis with bumex and metolazone per Dr. Mckeon   - started metoprolol  - Discontinued D10 infusion and increased free water to 60 ml Q1H per Dr. Mckeon   - changed amio to PO  - 2u RBCs given for anemia  - completed last dose of vancomycin, continue cefepime and flagyl through extubation  - s/p KUB, plan to reposition NJ tube (needs to be done by nutrition). If no success over the weekend with choose to clamp OG and continue feeds via NG.  - restarted insulin ggt  - plan for trial of extubation 2/24 if passes PST today (8/8 for 12 hours). Otherwise plan for trach placement 2/24. Then PEG sometime after that. (Trach has been brought up with the family but PEG has not been addressed).    PLAN:  Neuro/ pain/ sedation:  #Encephalopathy, improving  - Monitor neurological status. Notify the MD for any acute changes in exam.  - precedex gtt  - Daily sedation holiday  - seroquel 50 mg at bedtime      Pulmonary care:   #MV for airway protection post op  #ventilator associated pneumonia  - PST as able with sedation wean  - MV   - Titrate FiO2 for SpO2 >92%  - Dr. Mckeon requests PEEP no less than 8  - bronchoscopy 2/15, 2/16  - trach cancelled 2/21 (Dr. Oswald felt patient may be able to extubate). Plan for trial of extubation 2/24 if passes PST today (remains 8/8 for 12 hours). Otherwise, plan for trach  placement 2/24.     Cardiovascular:    - Monitor hemodynamic status  - MAP >65, SBP <120  - Start metoprolol 12.5mg BID PRN for MAP <65  - TTE mid-week per Dr. Mckeon  - Will need vascular consult mid week for necrotic toes (amputation?)    #Non-sustained VT. brief run of nonsustained VT, less than 30 seconds, in the operating room, was hypokalemic at the time, also was being paced, possible R-on-T phenomenon, has not had recurrence since temporary epicardial pacing has been discontinued.  Cardiology EP consult 2/12 recommended discontinuing amiodarone   - noted again 2/21, asymptomatic and hemodynamically stable. Re-bolused with amiodarone and reconsulted EP  - Transitioned amiodarone to PO  #A-fib with RVR: noted overnight 2/14     GI care:   - NPO except ice chips and medications.  - s/p KUB to confirm NJ/OG placement, plan to reposition NJ      Fluids/ Electrolytes/ Nutrition:   #Hypernatremia, improving  - TKO for IV fluid hydration,  - Increase free water 60 Q1H for ongoing hypernatremia with q6 hr Na checks.   - Discontinue D10 infusion given hyperglycemia  - NJ placed, TF decreased 2/22 given high residuals    Renal/ Fluid Balance:    #Nonoliguric HANNA  - Urine output is adequate so far.  - Will continue to monitor intake and output, Cr, electrolytes.   - Nephrology following   - diuresis with metolazone 5mg NG BID, with administration of bumex 4 mg IV BID 30 minutes after. Goal of net negative 1L.       Endocrine:    # Stress hyperglycemia  - Restart insulin gtt     ID/ Antibiotics:  # ventilator associated pneumonia  - cefepime and flagyl (switched from zosyn 2/20 for renal function). Continue through extubation.  - s/p vanc 2/15 - 2/17, restarted 2/18-2/22 for ongoing fevers.  - ID consult  - CT CAP 2/19 - nothing to do for pericardial effusion per CVTS     Heme:     - Hgb goal >7, s/p 2u transfusion 2/23  - Repeat Hgb post transfusion  - low int heparin gtt for a-fib     Prophylaxis:    - heparin as  above  - Protonix qd     Lines/ tubes/ drains:  - PICC, Arterial line, quintero     Disposition:  - CV ICU     Patient seen, findings and plan discussed with resident and CV ICU attending, Dr. Kristin Pandey MD on 2/23/2020 at 1:18 PM    ====================================    SUBJECTIVE:   No acute overnight events. Hemoglobin recheck this morning 6.7 which is likely dilutional. Will received 2 units pRBCs. Patient is tolerating PS 8/8 since 6am this morning.     OBJECTIVE:   1. VITAL SIGNS:   Temp:  [96  F (35.6  C)-98.2  F (36.8  C)] 97.2  F (36.2  C)  Heart Rate:  [] 77  Resp:  [26-30] 26  MAP:  [54 mmHg-87 mmHg] 75 mmHg  Arterial Line BP: ()/(42-70) 122/61  FiO2 (%):  [40 %] 40 %  SpO2:  [94 %-100 %] 100 %  Ventilation Mode: CMV/AC  (Continuous Mandatory Ventilation/ Assist Control)  FiO2 (%): 40 %  Rate Set (breaths/minute): 12 breaths/min  Tidal Volume Set (mL): 450 mL  PEEP (cm H2O): 8 cmH2O  Pressure Support (cm H2O): (S) 10 cmH2O (changed by MD)  Oxygen Concentration (%): 40 %  Resp: 26      2. INTAKE/ OUTPUT:   I/O last 3 completed shifts:  In: 5376.75 [I.V.:3776.75; NG/GT:840]  Out: 5340 [Urine:4940; Emesis/NG output:400]    3. PHYSICAL EXAMINATION:   General: intubated, laying in hospital bed  Neuro: Opens eyes to voice, mouths full sentences, follows commands in all 4 extremities  Resp: mechanical ventilation  CV: RRR  Abdomen: Soft, Non-distended, Non-tender  Incisions: c/d/i  Extremities: warm and well perfused with mild edema, dusky appearance to the right second and third toes, stable.    4. INVESTIGATIONS:   Arterial Blood Gases   Recent Labs   Lab 02/23/20  0539 02/21/20  1410 02/21/20  0521 02/20/20  1145   PH 7.44 7.51* 7.47* 7.49*   PCO2 36 30* 35 28*   PO2 121* 95 125* 64*   HCO3 25 24 25 21     Complete Blood Count   Recent Labs   Lab 02/23/20  0402 02/22/20  0354 02/21/20  0521 02/20/20  0421   WBC 8.7 12.1* 12.6* 10.8   HGB 7.2* 7.8* 7.9* 8.3*    297 282  307     Basic Metabolic Panel  Recent Labs   Lab 02/23/20  0402 02/22/20  2256 02/22/20  1725 02/22/20  1020 02/22/20  0354 02/21/20  2108  02/21/20  0852 02/21/20  0521  02/20/20  0421   * 152* 152* 152* 152* 152*   < > 153* 152*   < > 152*   POTASSIUM 3.1*  --   --   --  3.6 3.7  --  3.8 3.6   < > 3.5   CHLORIDE 117*  --   --   --  122*  --   --   --  121*  --  121*   CO2 25  --   --   --  24  --   --   --  25  --  24   *  --   --   --  102*  --   --   --  99*  --  99*   CR 2.57*  --   --   --  2.38*  --   --   --  2.40*  --  2.40*   *  --   --   --  189*  --   --   --  198*  --  161*    < > = values in this interval not displayed.     Liver Function Tests  No lab results found in last 7 days.  Pancreatic Enzymes  No lab results found in last 7 days.  Coagulation Profile  Recent Labs   Lab 02/17/20  0332   PTT 42*         5. RADIOLOGY:   Recent Results (from the past 24 hour(s))   XR Chest Port 1 View    Narrative    Exam: XR CHEST PORT 1 VW, 2/22/2020 8:30 AM    Indication: intubated, pneumonia, interval change    Comparison: Radiograph of the chest 2/21/2020    Findings:   Semiupright AP view of the chest. An enteric tube tip projects over  the upper thoracic trachea. Right arm PICC tip projects over the mid  to lower SVC. Enteric tubes course below the level diaphragm with the  tips outside the inferior field of view. Postoperative changes of the  chest with aortic valve replacement. The cardiomediastinal silhouette  is enlarged. The pulmonary vasculature is indistinct. Diffuse  bilateral mixed airspace opacities with a dense opacity in the left  lower lobe. Moderate left pleural effusion. No pneumothorax. The upper  abdomen is unremarkable.       Impression    Impression:   1.  Support devices are stable.  2.  Unchanged mixed pulmonary opacities, with a dense left lower lobe  opacity and moderate left pleural effusion. Primary considerations are  pulmonary edema, ARDS, and/or  infection.    I have personally reviewed the examination and initial interpretation  and I agree with the findings.    JOEL SANTANA MD       =========================================

## 2020-02-23 NOTE — PLAN OF CARE
D/I: Patient on unit 4A Surgical/Neuro ICU   Neuro- Follows commands. PERRL. Moving all ext purposefully. Generalized weakness. Afebrile  CV- BP soft; labile. Tele: SR with 1st degree AV block with occasional PVCs.   Pulm- Vented CMV settings. 40% FiO2 satting high 90s. LS coarse. Small amount of white secretions per ET suctioning.  GI- NPO. Tube feeds infusing at 60ml/hr with 30ml/hr fwf. BS active. No BM overnight  - [place] in place with adequate output. Urine is yellow and cloudy  Gtts- Precedex at 0.5 mg/kg/hr, amiodarone at 0.5mg/min. Heparin at 1650units/hr (therapeutic)  Skin- L leg dressing CDI, L groin site ecchymotic, Chest incision approximated CDI, Abd dressing changed; CDI. Toes necrotic, dusky. Small reddened area on coccyx; mepilex CDI  Pain- Appears comfortable; denies pain  IV's - R PICC infusing. PIV infusing.  See flow sheets for further interventions and assessments.   A: Stable   P: Continue to monitor pt closely. Notify MD of significant changes

## 2020-02-24 ENCOUNTER — APPOINTMENT (OUTPATIENT)
Dept: OCCUPATIONAL THERAPY | Facility: CLINIC | Age: 75
DRG: 219 | End: 2020-02-24
Payer: COMMERCIAL

## 2020-02-24 ENCOUNTER — APPOINTMENT (OUTPATIENT)
Dept: SPEECH THERAPY | Facility: CLINIC | Age: 75
DRG: 219 | End: 2020-02-24
Payer: COMMERCIAL

## 2020-02-24 ENCOUNTER — APPOINTMENT (OUTPATIENT)
Dept: GENERAL RADIOLOGY | Facility: CLINIC | Age: 75
DRG: 219 | End: 2020-02-24
Payer: COMMERCIAL

## 2020-02-24 LAB
ANION GAP SERPL CALCULATED.3IONS-SCNC: 5 MMOL/L (ref 3–14)
BACTERIA SPEC CULT: NO GROWTH
BACTERIA SPEC CULT: NO GROWTH
BASE EXCESS BLDA CALC-SCNC: 3.9 MMOL/L
BUN SERPL-MCNC: 98 MG/DL (ref 7–30)
CALCIUM SERPL-MCNC: 8 MG/DL (ref 8.5–10.1)
CHLORIDE SERPL-SCNC: 115 MMOL/L (ref 94–109)
CO2 SERPL-SCNC: 28 MMOL/L (ref 20–32)
CREAT SERPL-MCNC: 2.52 MG/DL (ref 0.66–1.25)
ERYTHROCYTE [DISTWIDTH] IN BLOOD BY AUTOMATED COUNT: 16.7 % (ref 10–15)
GFR SERPL CREATININE-BSD FRML MDRD: 24 ML/MIN/{1.73_M2}
GLUCOSE BLDC GLUCOMTR-MCNC: 112 MG/DL (ref 70–99)
GLUCOSE BLDC GLUCOMTR-MCNC: 121 MG/DL (ref 70–99)
GLUCOSE BLDC GLUCOMTR-MCNC: 137 MG/DL (ref 70–99)
GLUCOSE BLDC GLUCOMTR-MCNC: 139 MG/DL (ref 70–99)
GLUCOSE BLDC GLUCOMTR-MCNC: 144 MG/DL (ref 70–99)
GLUCOSE BLDC GLUCOMTR-MCNC: 145 MG/DL (ref 70–99)
GLUCOSE BLDC GLUCOMTR-MCNC: 145 MG/DL (ref 70–99)
GLUCOSE BLDC GLUCOMTR-MCNC: 149 MG/DL (ref 70–99)
GLUCOSE BLDC GLUCOMTR-MCNC: 150 MG/DL (ref 70–99)
GLUCOSE BLDC GLUCOMTR-MCNC: 156 MG/DL (ref 70–99)
GLUCOSE BLDC GLUCOMTR-MCNC: 162 MG/DL (ref 70–99)
GLUCOSE BLDC GLUCOMTR-MCNC: 169 MG/DL (ref 70–99)
GLUCOSE BLDC GLUCOMTR-MCNC: 170 MG/DL (ref 70–99)
GLUCOSE BLDC GLUCOMTR-MCNC: 90 MG/DL (ref 70–99)
GLUCOSE SERPL-MCNC: 181 MG/DL (ref 70–99)
HCO3 BLD-SCNC: 28 MMOL/L (ref 21–28)
HCT VFR BLD AUTO: 27.7 % (ref 40–53)
HGB BLD-MCNC: 8.4 G/DL (ref 13.3–17.7)
INTERPRETATION ECG - MUSE: NORMAL
LMWH PPP CHRO-ACNC: 0.31 IU/ML
Lab: NORMAL
Lab: NORMAL
MAGNESIUM SERPL-MCNC: 2.2 MG/DL (ref 1.6–2.3)
MAGNESIUM SERPL-MCNC: 2.4 MG/DL (ref 1.6–2.3)
MAGNESIUM SERPL-MCNC: 2.4 MG/DL (ref 1.6–2.3)
MCH RBC QN AUTO: 30 PG (ref 26.5–33)
MCHC RBC AUTO-ENTMCNC: 30.3 G/DL (ref 31.5–36.5)
MCV RBC AUTO: 99 FL (ref 78–100)
O2/TOTAL GAS SETTING VFR VENT: 40 %
OXYHGB MFR BLD: 98 % (ref 92–100)
PCO2 BLD: 39 MM HG (ref 35–45)
PH BLD: 7.46 PH (ref 7.35–7.45)
PHOSPHATE SERPL-MCNC: 3.9 MG/DL (ref 2.5–4.5)
PLATELET # BLD AUTO: 284 10E9/L (ref 150–450)
PLATELET # BLD AUTO: 288 10E9/L (ref 150–450)
PO2 BLD: 117 MM HG (ref 80–105)
POTASSIUM BLD-SCNC: 4 MMOL/L (ref 3.4–5.3)
POTASSIUM SERPL-SCNC: 3.4 MMOL/L (ref 3.4–5.3)
POTASSIUM SERPL-SCNC: 3.6 MMOL/L (ref 3.4–5.3)
POTASSIUM SERPL-SCNC: 3.7 MMOL/L (ref 3.4–5.3)
POTASSIUM SERPL-SCNC: 4 MMOL/L (ref 3.4–5.3)
RBC # BLD AUTO: 2.8 10E12/L (ref 4.4–5.9)
SODIUM SERPL-SCNC: 145 MMOL/L (ref 133–144)
SODIUM SERPL-SCNC: 147 MMOL/L (ref 133–144)
SODIUM SERPL-SCNC: 148 MMOL/L (ref 133–144)
SODIUM SERPL-SCNC: 149 MMOL/L (ref 133–144)
SPECIMEN SOURCE: NORMAL
SPECIMEN SOURCE: NORMAL
WBC # BLD AUTO: 7.3 10E9/L (ref 4–11)

## 2020-02-24 PROCEDURE — 97110 THERAPEUTIC EXERCISES: CPT | Mod: GO | Performed by: OCCUPATIONAL THERAPIST

## 2020-02-24 PROCEDURE — 85027 COMPLETE CBC AUTOMATED: CPT | Performed by: INTERNAL MEDICINE

## 2020-02-24 PROCEDURE — 97530 THERAPEUTIC ACTIVITIES: CPT | Mod: GO | Performed by: OCCUPATIONAL THERAPIST

## 2020-02-24 PROCEDURE — 27210429 ZZH NUTRITION PRODUCT INTERMEDIATE LITER

## 2020-02-24 PROCEDURE — 25000128 H RX IP 250 OP 636: Performed by: ANESTHESIOLOGY

## 2020-02-24 PROCEDURE — 25000125 ZZHC RX 250: Performed by: STUDENT IN AN ORGANIZED HEALTH CARE EDUCATION/TRAINING PROGRAM

## 2020-02-24 PROCEDURE — 00000146 ZZHCL STATISTIC GLUCOSE BY METER IP

## 2020-02-24 PROCEDURE — 20000004 ZZH R&B ICU UMMC

## 2020-02-24 PROCEDURE — 84132 ASSAY OF SERUM POTASSIUM: CPT | Performed by: STUDENT IN AN ORGANIZED HEALTH CARE EDUCATION/TRAINING PROGRAM

## 2020-02-24 PROCEDURE — 93005 ELECTROCARDIOGRAM TRACING: CPT

## 2020-02-24 PROCEDURE — 25000132 ZZH RX MED GY IP 250 OP 250 PS 637: Performed by: STUDENT IN AN ORGANIZED HEALTH CARE EDUCATION/TRAINING PROGRAM

## 2020-02-24 PROCEDURE — 25000128 H RX IP 250 OP 636: Performed by: PHYSICIAN ASSISTANT

## 2020-02-24 PROCEDURE — 85520 HEPARIN ASSAY: CPT | Performed by: INTERNAL MEDICINE

## 2020-02-24 PROCEDURE — 82805 BLOOD GASES W/O2 SATURATION: CPT | Performed by: PHYSICIAN ASSISTANT

## 2020-02-24 PROCEDURE — 84295 ASSAY OF SERUM SODIUM: CPT | Performed by: STUDENT IN AN ORGANIZED HEALTH CARE EDUCATION/TRAINING PROGRAM

## 2020-02-24 PROCEDURE — 71045 X-RAY EXAM CHEST 1 VIEW: CPT

## 2020-02-24 PROCEDURE — 85049 AUTOMATED PLATELET COUNT: CPT | Performed by: STUDENT IN AN ORGANIZED HEALTH CARE EDUCATION/TRAINING PROGRAM

## 2020-02-24 PROCEDURE — 25000132 ZZH RX MED GY IP 250 OP 250 PS 637: Performed by: THORACIC SURGERY (CARDIOTHORACIC VASCULAR SURGERY)

## 2020-02-24 PROCEDURE — 25800030 ZZH RX IP 258 OP 636: Performed by: STUDENT IN AN ORGANIZED HEALTH CARE EDUCATION/TRAINING PROGRAM

## 2020-02-24 PROCEDURE — 94640 AIRWAY INHALATION TREATMENT: CPT | Mod: 76

## 2020-02-24 PROCEDURE — 25000132 ZZH RX MED GY IP 250 OP 250 PS 637: Performed by: PHYSICIAN ASSISTANT

## 2020-02-24 PROCEDURE — 40000014 ZZH STATISTIC ARTERIAL MONITORING DAILY

## 2020-02-24 PROCEDURE — 99233 SBSQ HOSP IP/OBS HIGH 50: CPT | Mod: GC | Performed by: INTERNAL MEDICINE

## 2020-02-24 PROCEDURE — 25000128 H RX IP 250 OP 636: Performed by: THORACIC SURGERY (CARDIOTHORACIC VASCULAR SURGERY)

## 2020-02-24 PROCEDURE — 83735 ASSAY OF MAGNESIUM: CPT | Performed by: STUDENT IN AN ORGANIZED HEALTH CARE EDUCATION/TRAINING PROGRAM

## 2020-02-24 PROCEDURE — 94640 AIRWAY INHALATION TREATMENT: CPT

## 2020-02-24 PROCEDURE — 92526 ORAL FUNCTION THERAPY: CPT | Mod: GN

## 2020-02-24 PROCEDURE — 93010 ELECTROCARDIOGRAM REPORT: CPT | Performed by: INTERNAL MEDICINE

## 2020-02-24 PROCEDURE — 94003 VENT MGMT INPAT SUBQ DAY: CPT

## 2020-02-24 PROCEDURE — 40000275 ZZH STATISTIC RCP TIME EA 10 MIN

## 2020-02-24 PROCEDURE — 92610 EVALUATE SWALLOWING FUNCTION: CPT | Mod: GN

## 2020-02-24 PROCEDURE — 25000125 ZZHC RX 250

## 2020-02-24 PROCEDURE — 80048 BASIC METABOLIC PNL TOTAL CA: CPT | Performed by: INTERNAL MEDICINE

## 2020-02-24 PROCEDURE — 84100 ASSAY OF PHOSPHORUS: CPT | Performed by: STUDENT IN AN ORGANIZED HEALTH CARE EDUCATION/TRAINING PROGRAM

## 2020-02-24 PROCEDURE — 25000128 H RX IP 250 OP 636: Performed by: STUDENT IN AN ORGANIZED HEALTH CARE EDUCATION/TRAINING PROGRAM

## 2020-02-24 PROCEDURE — 25000132 ZZH RX MED GY IP 250 OP 250 PS 637: Performed by: ANESTHESIOLOGY

## 2020-02-24 RX ORDER — DEXTROSE MONOHYDRATE 50 MG/ML
INJECTION, SOLUTION INTRAVENOUS CONTINUOUS
Status: DISCONTINUED | OUTPATIENT
Start: 2020-02-24 | End: 2020-02-25

## 2020-02-24 RX ORDER — METOLAZONE 2.5 MG/1
2.5 TABLET ORAL EVERY 24 HOURS
Status: DISCONTINUED | OUTPATIENT
Start: 2020-02-24 | End: 2020-02-25

## 2020-02-24 RX ORDER — DEXTROSE MONOHYDRATE 25 G/50ML
25-50 INJECTION, SOLUTION INTRAVENOUS
Status: DISCONTINUED | OUTPATIENT
Start: 2020-02-24 | End: 2020-02-25

## 2020-02-24 RX ORDER — PANTOPRAZOLE SODIUM 40 MG/1
40 TABLET, DELAYED RELEASE ORAL 2 TIMES DAILY
Status: DISCONTINUED | OUTPATIENT
Start: 2020-02-24 | End: 2020-02-25

## 2020-02-24 RX ORDER — BUMETANIDE 0.25 MG/ML
4 INJECTION INTRAMUSCULAR; INTRAVENOUS
Status: DISCONTINUED | OUTPATIENT
Start: 2020-02-24 | End: 2020-02-26

## 2020-02-24 RX ORDER — NICOTINE POLACRILEX 4 MG
15-30 LOZENGE BUCCAL
Status: DISCONTINUED | OUTPATIENT
Start: 2020-02-24 | End: 2020-02-25

## 2020-02-24 RX ORDER — HEPARIN SODIUM 5000 [USP'U]/.5ML
5000 INJECTION, SOLUTION INTRAVENOUS; SUBCUTANEOUS 2 TIMES DAILY
Status: DISCONTINUED | OUTPATIENT
Start: 2020-02-24 | End: 2020-03-05 | Stop reason: HOSPADM

## 2020-02-24 RX ADMIN — ALBUTEROL SULFATE 2.5 MG: 2.5 SOLUTION RESPIRATORY (INHALATION) at 23:53

## 2020-02-24 RX ADMIN — ACETYLCYSTEINE 2 ML: 200 SOLUTION ORAL; RESPIRATORY (INHALATION) at 17:02

## 2020-02-24 RX ADMIN — ALBUTEROL SULFATE 2.5 MG: 2.5 SOLUTION RESPIRATORY (INHALATION) at 17:02

## 2020-02-24 RX ADMIN — DEXMEDETOMIDINE 0.6 MCG/KG/HR: 100 INJECTION, SOLUTION, CONCENTRATE INTRAVENOUS at 03:43

## 2020-02-24 RX ADMIN — ALBUTEROL SULFATE 2.5 MG: 2.5 SOLUTION RESPIRATORY (INHALATION) at 19:40

## 2020-02-24 RX ADMIN — HUMAN INSULIN 3 UNITS/HR: 100 INJECTION, SOLUTION SUBCUTANEOUS at 05:52

## 2020-02-24 RX ADMIN — METOLAZONE 2.5 MG: 2.5 TABLET ORAL at 15:09

## 2020-02-24 RX ADMIN — BUMETANIDE 4 MG: 0.25 INJECTION INTRAMUSCULAR; INTRAVENOUS at 15:47

## 2020-02-24 RX ADMIN — ACETYLCYSTEINE 2 ML: 200 SOLUTION ORAL; RESPIRATORY (INHALATION) at 19:40

## 2020-02-24 RX ADMIN — ATORVASTATIN CALCIUM 40 MG: 40 TABLET, FILM COATED ORAL at 20:26

## 2020-02-24 RX ADMIN — Medication 12.5 MG: at 08:05

## 2020-02-24 RX ADMIN — OXYCODONE HYDROCHLORIDE 5 MG: 5 TABLET ORAL at 17:53

## 2020-02-24 RX ADMIN — QUETIAPINE FUMARATE 50 MG: 50 TABLET ORAL at 20:26

## 2020-02-24 RX ADMIN — ACETYLCYSTEINE 2 ML: 200 SOLUTION ORAL; RESPIRATORY (INHALATION) at 23:53

## 2020-02-24 RX ADMIN — ACETYLCYSTEINE 2 ML: 200 SOLUTION ORAL; RESPIRATORY (INHALATION) at 12:17

## 2020-02-24 RX ADMIN — ACETYLCYSTEINE 2 ML: 200 SOLUTION ORAL; RESPIRATORY (INHALATION) at 07:48

## 2020-02-24 RX ADMIN — POTASSIUM CHLORIDE 20 MEQ: 1.5 POWDER, FOR SOLUTION ORAL at 00:44

## 2020-02-24 RX ADMIN — ALBUTEROL SULFATE 2.5 MG: 2.5 SOLUTION RESPIRATORY (INHALATION) at 03:41

## 2020-02-24 RX ADMIN — ACETYLCYSTEINE 2 ML: 200 SOLUTION ORAL; RESPIRATORY (INHALATION) at 03:40

## 2020-02-24 RX ADMIN — LIDOCAINE 1 PATCH: 560 PATCH PERCUTANEOUS; TOPICAL; TRANSDERMAL at 16:50

## 2020-02-24 RX ADMIN — ALBUTEROL SULFATE 2.5 MG: 2.5 SOLUTION RESPIRATORY (INHALATION) at 07:47

## 2020-02-24 RX ADMIN — Medication 40 MG: at 08:05

## 2020-02-24 RX ADMIN — CEFEPIME HYDROCHLORIDE 2 G: 2 INJECTION, POWDER, FOR SOLUTION INTRAVENOUS at 09:52

## 2020-02-24 RX ADMIN — Medication 5000 UNITS: at 20:26

## 2020-02-24 RX ADMIN — ASPIRIN 81 MG CHEWABLE TABLET 81 MG: 81 TABLET CHEWABLE at 08:05

## 2020-02-24 RX ADMIN — AMIODARONE HYDROCHLORIDE 200 MG: 200 TABLET ORAL at 08:05

## 2020-02-24 RX ADMIN — POTASSIUM CHLORIDE 20 MEQ: 1.5 POWDER, FOR SOLUTION ORAL at 11:20

## 2020-02-24 RX ADMIN — DEXTROSE MONOHYDRATE: 50 INJECTION, SOLUTION INTRAVENOUS at 23:37

## 2020-02-24 RX ADMIN — Medication 12.5 MG: at 20:26

## 2020-02-24 RX ADMIN — MULTIVITAMIN 15 ML: LIQUID ORAL at 08:04

## 2020-02-24 RX ADMIN — DIPHENHYDRAMINE HYDROCHLORIDE 50 MG: 50 INJECTION, SOLUTION INTRAMUSCULAR; INTRAVENOUS at 17:53

## 2020-02-24 RX ADMIN — HYDROMORPHONE HYDROCHLORIDE 0.3 MG: 1 INJECTION, SOLUTION INTRAMUSCULAR; INTRAVENOUS; SUBCUTANEOUS at 16:49

## 2020-02-24 RX ADMIN — POTASSIUM CHLORIDE 20 MEQ: 1.5 POWDER, FOR SOLUTION ORAL at 05:38

## 2020-02-24 RX ADMIN — BUMETANIDE 4 MG: 0.25 INJECTION INTRAMUSCULAR; INTRAVENOUS at 09:55

## 2020-02-24 RX ADMIN — ALBUTEROL SULFATE 2.5 MG: 2.5 SOLUTION RESPIRATORY (INHALATION) at 12:17

## 2020-02-24 RX ADMIN — PANTOPRAZOLE SODIUM 40 MG: 40 TABLET, DELAYED RELEASE ORAL at 21:06

## 2020-02-24 RX ADMIN — Medication 5000 UNITS: at 11:20

## 2020-02-24 RX ADMIN — POTASSIUM CHLORIDE 20 MEQ: 750 TABLET, EXTENDED RELEASE ORAL at 22:43

## 2020-02-24 RX ADMIN — DEXMEDETOMIDINE 0.5 MCG/KG/HR: 100 INJECTION, SOLUTION, CONCENTRATE INTRAVENOUS at 08:56

## 2020-02-24 ASSESSMENT — ACTIVITIES OF DAILY LIVING (ADL)
ADLS_ACUITY_SCORE: 18

## 2020-02-24 NOTE — PROGRESS NOTES
[facility]  General ([place]) Infectious Disease Progress Note -- Sign Off     Patient:  Vel Espana, Date of birth 1945, Medical record number [id number]  Date of Visit:  02/24/2020         Assessment and Recommendations:   Recommendations:  - Discontinue cefepime.  - No additional antimicrobial therapy is indicated.  - Would not treat the 2/15/20 BAL or 2/19/20 sputum C albicans isolates -- they are non-pathogenic normal jc.    Since he presently lacks evidence of any active infection, General ID will sign off now.  Thanks for allowing us to participate in the care of this gentleman.  Please page if additional ID questions or concerns arise.    Enmanuel Wynn MD  Pager [phone]    Assessment:  A 74 year old previously generally healthy gentleman with a medical history of history of hypertension who was admitted in transfer to the [facility] Cardiothoracic Surgery service on 2/9/20 late evening an acute type A aortic dissection for which he was taken to the OR on 2/10/20 morning for a Dacron Gelweave graft repair of the ascending aorta with a Patterson aortic valve replacement and saphenouys vein CABG x 1 to the right coronary artery, as well as a left femoral artery exploration.  Post-operatively, he has been encephalopathic for uncertain reason so remained persistently intubated until 2/24/20.  He developed a new fever of 102.7 degrees F on 2/14/20 PM and after that continued to have idiopathic, intermittent fever until 2/20/20, but has now been afebrile since then.  His mental status has improved so he was able to be extubated early this afternoon, 2/24/20.  His respiratory status is improving with diuresis.    ID issues:    - Resolved fever 2/14 - 20/20 of unclear source:  He developed a new fever on 2/14/20 PM, seemed to defervesce for 36 hours after Zosyn was initiated on 2/15/20, and then again had fevers 2/17 - 18/20.  Empiric Zosyn was given starting 2/15/20 AM and he  also received empiric IV vancomycin on 2/15/20 and 2/16/20, although has no history of MRSA carriage (with a negative nares MRSA PCR screen on 2/10/20) nor any indication of a current Staph aureus infection.  At the point of ID Consult on 2/18/20, there was no exam evidence of any surgical site infection and the chest x-rays were not suggestive of surgical infection.  Although his 2/19/20 procalcitonin was essentially negative (0.29), the initial 2/15/20 fever seemed most consistent with a left lower ventilator-acquired pneumonia -- Zosyn was apt therapy for that and was given through 2/19/20 when it was switched to cefepime / metronidazole (for concern of an increased creatinine).  Unfortunately, the Zosyn did not result in quick defervescence, as would be expected, but his did eventually defervesce on 2/20/20.  There was little in the way of other potential sites for infectious fever source.  He has bilateral (lfet > right) pleural effusions which are highly likely due to fluid overload and have not yet been diagnostically taped (and do not need to be if he stays afebrile).  He also had a suggestion of right maxillary / paranasal sinusitis on his 2/9/20 head CT scan, but the Zosyn (and cefepime / metronidazole) effectively cover that, as well.  There was little likelihood of a CNS infection and his encephalopathy is now clearly nicely.  The acquisition of a nosocomial respiratory virus infection was possible but a respiratory virus PCR panel was apparently mis-sent and not run.   A Olmstead-induced UTI has not been checked for, but his broad-spectrum antimicrobial coverage makes that unlikely.  Since he has now received a total of ten days of empiric antibiotic therapy (5.5 of Zosyn followed by 4.5 days of cefepime, most of that with metronidazole) from 2/15 - 24/20, he has received an adequate course for all of these possibilities and has no indication for additional antimicrobial therapy beyond 2/24/20.    -  Quite-possible, LLL ventilator-acquired pneumonia:  The 2/15/20 BAL fluid WBC was high with a neutrophil predominance, suggestive for an acute bacterial pneumonia.  Being appropriately treated with a week (2/15 - 22/20) of IV Zosyn (switched to cefepime / metronidazole).  Candida albicans was isolated in the 2/14/20 and 2/19/20 ETT sputum and the 2/15/20 BAL cultures -- that is normal, non-pathogenic jc, so there is no indication for antifungal therapy.     - Now improving, previously persistent post-operative obtundation:  He had been quite unresponsive (so stuck on the ventilator) after his 2/10/20 thoracic surgery until ~ 2/18/20, but his mental status has now improved, enabling successful pressure support trials and extubation on 2/24/20.  There was little likelihood that the etiology of his mental status compromise was infectious.    - QTc interval:  485 msec on 2/17/20 EKG.  - Immunization status: Undocumented.  Should receive an influenza vaccination.  Could receive a Prevnar 13 vaccine, if not previously received elsewhere.  - Isolation status: Routine.        Interval History:   Mr. Espana has been afebrile (T max 99.8 degrees F, usually < 99.0 degrees) since 2/20/20 PM with a normalized peripheral WBC of 7.3 this morning (after a brief rise to 12.6 on 2/21/20).  His now only on his last day of cefepime (since 2/20/20, preceded by Zosyn / vancomycin staring 2/15/20 plus metronidazole from 2/19 - 21/20) this morning.  He is now off all antimicrobial agents as of midday today.  His procalcitonin was negative at 0.29 on 2/19/20, although his serum CRP was high at 130.0 on that date -- neither have been repeated since.  He remains intubated on 4A but has tolerated pressure support trials over the past couple of days and will likely be extubated soon this afternoon.  He is responding to basic questions and is moving to request.  He does not seem to have any pain, nausea, abdominal discomfort, new rash, or  other new complaints today.  He is undergoing diuresis with metolazone and bumetanide because chest x-rays continued to show diffuse mixed bilateral infiltrates of pulmonary edema.  He remains on NJ tube feeds.  His toes remain necrotic.  Addendum (2:21) -- extubated at 12:45 to five liters by nasal cannula.  Blood cultures (x 6) from 2/19/20, 2/18/20, and 2/14/20 are without growth.  Sputum (ETT) culture from 2/19/20 and BAL culture from 2/15/20 grew only C albicans (with a negative sputum Gram stain).        History of Infectious Disease Illness (copied from the 2/18/20 General ID Consult Note):   A 74 year old previously generally healthy gentleman with a medical history of history of hypertension who was admitted in transfer to the [facility] Cardiothoracic Surgery service on 2/9/20 late evening after presenting to his local emergency room with acute dizzinenss / syncope and chest pain due to what was diagnosed as a type A aortic dissection.  Upon admission, he was taken to the OR on 2/10/20 morning for a Dacron Gelweave graft repair of the ascending aorta with a Patterson aortic valve replacement and saphenouys vein CABG x 1 to the right coronary artery, as well as a left femoral artery exploration.  Post-operatively he has had poor neurological function and is now only today starting to improve cognitively (now intermittently moving to request) despite daily sedation holidays since 2/13/20, so he has remained intubated on FiO2 0.4, PEEP 10) due to his encephalopathy.  He had an early post-operative fever on 2/10/20, but otherwise initially did well post-operatively and received no antibiotics after his ander-operative vancomycin / cefuroxime / cefazolin from 2/11/20 midday through 2/15/20 early AM, when he commenced empiric Zosyn and IV vancomycin (for a suspected ventilator-acquired pneumonia) in response to spiking a new fever up to 102.7 degrees F on 2/14/20 PM with a new leukocytosis of 12.7 on 2/14/20 afternoon.   The vancomycin was only given for two days through 2/16/20, but the empiric Zosyn has continued until now.  At that point, on 2/14 - 15/20, the chest x-rays showed an increased left pleural effusion with an associated retrocardiac left basilar infiltrate against a background of moderate pulmonary edema.  In addition to blood cultures from 2/14/20 that are without growth, he underwent an ETT bronchoscopy on 2/15/20 early afternoon with copious secretions in the left lung, a BAL WBC of 25,952 with 90% PMNs, but only C albicans growing on BAL fluid culture (and also 2/15/20 ETT sputum culture).  A PICC ws placed on 2/16/20.  He developed atrial fibrillation on about 2/14/20 PM and is on amiodarone.  He has been off pressors since 2/16/20 overnight.  He has been followed by Nephrology Consult since 2/13/20 for non-oliguric HANNA felt likely due to post-operative hypoperfusion plus IV contrast but has recently had stable renal function and is responsive to diuretics.  Due to his prolonged intubation, he is being considered for possible tracheostomy and PEG placement.  Infectious Disease Consult was requested regarding antibiotic therapy for the [id number] LLL pneumonia and because he has continued to have fevers up to 101.6 degrees F yesterday / overnight (although decreased 99.9 degrees F this afternoon) despite the ongoing Zosyn.  His peripheral WBC remains normal today at 8.8.    Review of Systems:  As per HPI.  Additional ROS is unobtainable due to decreased cognition.    Past Medical History:   Diagnosis Date     Psychosexual dysfunction with inhibited sexual excitement      Unspecified essential hypertension      Unspecified sleep apnea      Past Surgical History:   Procedure Laterality Date     REPAIR ANEURYSM ASCENDING AORTA N/A 2/9/2020    Procedure: Median Sternotomy, repair of ascending aorta using 32mm gelweave graft, deep circulatory arrest,  aortic valve repair using 27mm inspiris valve,  on-pump oxygenator, open saphenous vein harvest, coronary artery bypass x1, transesophageal echocardiogram done by anestheisa;  Surgeon: Nivia Mckeon MD;  Location:  OR     Social History     Tobacco Use     Smoking status: Never Smoker     Smokeless tobacco: Never Used   Substance Use Topics     Alcohol use: No     Drug use: No   His wife is in hospice.  [facility]         Current Medications & Allergies:  Now off all antimicrobials as of this midday.       acetylcysteine  2 mL Nebulization Q4H KAYLA     albuterol  2.5 mg Nebulization Q4H     amiodarone  200 mg Oral Daily     aspirin  81 mg Oral Daily     atorvastatin  40 mg Oral QPM     bumetanide  4 mg Intravenous BID     heparin ANTICOAGULANT  5,000 Units Subcutaneous BID     heparin lock flush  5-10 mL Intracatheter Q24H     influenza Vac Split High-Dose  0.5 mL Intramuscular Prior to discharge     insulin aspart  1-6 Units Subcutaneous Q4H     lidocaine  1-3 patch Transdermal Q24H     lidocaine   Transdermal Q8H     metolazone  2.5 mg Oral or Feeding Tube Q24H     metoprolol tartrate  12.5 mg Oral BID     multivitamins w/minerals  15 mL Per Feeding Tube Daily     pantoprazole  40 mg Oral or Feeding Tube BID     polyethylene glycol  17 g Oral or Feeding Tube Daily     QUEtiapine  50 mg Oral QPM     senna-docusate  1 tablet Oral or Feeding Tube BID    Or     senna-docusate  2 tablet Oral or Feeding Tube BID     sodium chloride (PF)  3 mL Intracatheter Q8H     Infusions/Drips:      dextrose Stopped (02/24/20 1306)     - MEDICATION INSTRUCTIONS -       No Known Allergies         Physical Exam:     Patient Vitals for the past 24 hrs:   BP Temp Temp src Resp SpO2   02/24/20 1340 -- -- -- 20 96 %   02/24/20 1300 -- -- -- 20 94 %   02/24/20 1245 -- -- -- -- 97 %   02/24/20 1200 -- 98.3  F (36.8  C) Axillary 20 97 %   02/24/20 1100 -- -- -- 27 98 %   02/24/20 1000 -- -- -- 26 99 %   02/24/20 0900 -- -- --  26 91 %   02/24/20 0800 110/51 98.1  F (36.7  C) Axillary 26 97 %   02/24/20 0750 -- -- -- -- 100 %   02/24/20 0700 -- -- -- -- 93 %   02/24/20 0500 -- -- -- -- 97 %   02/24/20 0400 -- 98.9  F (37.2  C) Axillary 27 97 %   02/24/20 0300 -- -- -- -- 98 %   02/24/20 0200 -- -- -- -- 97 %   02/24/20 0100 -- -- -- -- 97 %   02/24/20 0000 -- 99.8  F (37.7  C) -- 27 95 %   02/23/20 2300 -- -- -- -- 96 %   02/23/20 2200 -- -- -- -- 98 %   02/23/20 2100 -- -- -- -- 98 %   02/23/20 2000 -- 98.4  F (36.9  C) Axillary 29 90 %   02/23/20 1800 -- -- -- -- 97 %   02/23/20 1700 -- -- -- -- 93 %   02/23/20 1600 -- 97.8  F (36.6  C) Axillary 18 100 %   02/23/20 1500 -- -- -- -- 96 %     Ranges for vital signs over the past 24 hours:   Temp:  [97.8  F (36.6  C)-99.8  F (37.7  C)] 98.3  F (36.8  C)  Heart Rate:  [70-83] 81  Resp:  [18-29] 20  BP: (110)/(51) 110/51  MAP:  [54 mmHg-81 mmHg] 72 mmHg  Arterial Line BP: ()/(35-67) 114/55  FiO2 (%):  [40 %] 40 %  SpO2:  [90 %-100 %] 96 %  Vitals:    02/21/20 0400 02/22/20 0400 02/23/20 0600   Weight: 138.4 kg (305 lb 1.9 oz) 135.5 kg (298 lb 11.6 oz) 133.1 kg (293 lb 6.9 oz)     Intake/Output Summary (Last 24 hours) at 2/24/2020 1421  Last data filed at 2/24/2020 1346  Gross per 24 hour   Intake 4756.49 ml   Output 5790 ml   Net -1033.51 ml     Physical Examination:  GENERAL:  Awake, interactive, 74 year old man in NAD on the ventilator.  HEAD:  NCAT.  EYES:  EOMI, anicteric sclerae.  ENT:  No otorrhea.  Oral ETT.  NJ tube present.  No anterior oral lesion.  NECK:  Supple.  LYMPH:  No lymphadenopathy  LUNGS:  Clear to auscultation bilaterally.  CARDIOVASCULAR:  Irregular rate and rhythm, normal S2, without murmur, gallop, or rub.  Sternotomy incision lacks inflammation.  ABDOMEN:  Normal bowel sounds, obese, soft, nontender.  :  Olmstead catheter with viviane urine.  EXTREMITIES:  Distally warm hands.  Left medial thigh vertical saphenous graft harvest site dressed, nontender, no marginal  inflammation.  Feet with 1+ bipedal edema in boots and pressure wraps, right distal toe phalanges necrotic (especially second and third), darker than the the left toes (reportedly unchanged).  Some dusky spots of the finger tips.  SKIN:  No acute rash or lesion.  Right arm PICC line site lacks inflammation.  NEUROLOGIC:  Alert, responding, moves extremities x 4.         Laboratory Data:     Inflammatory Markers    Recent Labs   Lab Test 02/19/20  0357   .0*       Metabolic Studies       Recent Labs   Lab Test 02/24/20  1010 02/24/20  0337  02/23/20  1648  02/19/20  0357  02/17/20  1350  02/13/20  2233  02/10/20  0905   * 148*   < > 150*   < > 154*   < >  --    < >  --    < >  --    POTASSIUM 3.7 3.6   < > 3.6   < > 3.7   < >  --    < >  --    < >  --    CHLORIDE  --  115*  --  117*   < > 122*   < >  --    < >  --    < >  --    CO2  --  28  --  27   < > 25   < >  --    < >  --    < >  --    ANIONGAP  --  5  --  6   < > 6   < >  --    < >  --    < >  --    BUN  --  98*  --  96*   < > 104*   < >  --    < >  --    < >  --    CR  --  2.52*  --  2.62*   < > 2.38*   < >  --    < >  --    < >  --    GFRESTIMATED  --  24*  --  23*   < > 25*   < >  --    < >  --    < >  --    GLC  --  181*  --  176*   < > 180*   < >  --    < >  --    < >  --    A1C  --   --   --   --   --   --   --   --   --   --   --  6.0*   ELMER  --  8.0*  --  8.0*   < > 7.7*   < >  --    < >  --    < >  --    PHOS 3.9  --   --   --    < > 3.5   < >  --    < >  --    < >  --    MAG 2.4*  --   --   --    < > 2.7*   < >  --    < >  --    < >  --    LACT  --   --   --   --   --   --   --  1.3  --  0.8   < >  --    PCAL  --   --   --   --   --  0.29  --   --   --   --   --   --     < > = values in this interval not displayed.     Hepatic Studies    Recent Labs   Lab Test 02/15/20  1213 02/14/20  2030 02/13/20  0353 02/12/20  0343   BILITOTAL 0.5  --  0.5 0.6   ALKPHOS 66  --  42 35*   PROTTOTAL 5.8*  --  5.4* 5.1*   ALBUMIN 2.3*  --  2.8* 2.7*   AST  32 44 58* 91*   ALT 17 17 16 20     Pancreatitis testing    Recent Labs   Lab Test 02/17/20  0332   TRIG 164*     Hematology Studies      Recent Labs   Lab Test 02/24/20  1010 02/24/20  0337 02/23/20  1648  02/23/20  0402 02/22/20  0354 02/21/20  0521 02/20/20  0421  02/09/20  2203 02/09/20  1852   WBC  --  7.3 10.1  --  8.7 12.1* 12.6* 10.8   < > 9.2 11.3*   ANEU  --   --   --   --   --   --   --   --   --  8.0 7.9   ALYM  --   --   --   --   --   --   --   --   --  0.5* 2.2   BARBIE  --   --   --   --   --   --   --   --   --  0.5 0.8   AEOS  --   --   --   --   --   --   --   --   --  0.0 0.2   HGB  --  8.4* 8.9*   < > 7.2* 7.8* 7.9* 8.3*   < > 12.8* 12.9*   HCT  --  27.7* 29.4*  --  24.2* 26.3* 26.5* 27.3*   < > 39.5* 39.8*    288 304  --  283 297 282 307   < > 161 175    < > = values in this interval not displayed.     Clotting Studies    Recent Labs   Lab Test 02/17/20  0332  02/13/20  0353 02/10/20  2052 02/10/20  1408 02/10/20  1000   INR  --   --  1.29* 1.12 1.05 0.98   PTT 42*   < >  --   --   --  42*    < > = values in this interval not displayed.     Arterial Blood Gas Testing    Recent Labs   Lab Test 02/24/20  1214 02/23/20  0539 02/21/20  1410 02/21/20  0521 02/20/20  1145   PH 7.46* 7.44 7.51* 7.47* 7.49*   PCO2 39 36 30* 35 28*   PO2 117* 121* 95 125* 64*   HCO3 28 25 24 25 21   O2PER 40 40.0 50 55 55.0     Thyroid Studies     Recent Labs   Lab Test 02/09/20  1852   TSH 4.06*   T4 0.74*     Urine Studies     Recent Labs   Lab Test 02/19/20 1859 02/18/20  1817 02/14/20 2031 02/13/20  1447   URINEPH 5.0 5.5 5.0 5.0   NITRITE Negative Negative Negative Negative   LEUKEST Small* Negative Small* Small*   WBCU 9* 1 3 4     Body fluid stats    Recent Labs   Lab Test 02/19/20  1859 02/15/20  1342   FTYP  --  Bronchoalveolar Lavage   FCOL  --  Red   FAPR  --  Opaque   FWBC  --  79837   FNEU  --  90   FLYM  --  2   GS <25 PMNs/low power field  No organisms seen >25 PMNs/low power field   Moderate  Yeast  *  Few  Gram positive rods  *     Microbiology:    Last Culture results with specimen source  Culture Micro   Date Value Ref Range Status   02/19/2020 No growth after 5 days  Preliminary   02/19/2020 No growth after 5 days  Preliminary   02/19/2020 (A)  Final    Light growth  Candida albicans / dubliniensis  Candida albicans and Candida dubliniensis are not routinely speciated  Susceptibility testing not routinely done     02/18/2020 No growth  Final   02/18/2020 No growth  Final   02/15/2020 (A)  Final    Heavy growth  Pavithra albicans / dubliniensis  Candida albicans and Candida dubliniensis are not routinely speciated  Susceptibility testing not routinely done     02/15/2020 Moderate growth  Normal jc    Final   02/15/2020 (A)  Final    Heavy growth  Pavithra albicans / dubliniensis  Candida albicans and Candida dubliniensis are not routinely speciated  Susceptibility testing not routinely done     02/14/2020 No growth  Final   02/14/2020 No growth  Final   02/10/2020 No growth  Final    Specimen Description   Date Value Ref Range Status   02/19/2020 Blood  Final   02/19/2020 Blood  Final   02/19/2020 Sputum Endotracheal  Final   02/19/2020 Sputum Endotracheal  Final   02/18/2020 Blood  Final   02/18/2020 Blood  Final   02/15/2020 Bronchial washing  Final   02/15/2020 Bronchial washing  Final   02/15/2020 Sputum  Final   02/14/2020 Blood Left Arm  Final   02/14/2020 Blood  Final   02/10/2020 Other Transfusion Reaction Donor Unit  Final   02/10/2020 Other Transfusion Reaction Donor Unit  Final   02/10/2020 Nares  Final        Last check of C difficile  No results found for: CDBPCT    Virology:    Hepatitis B Testing   No lab results found.   No results found for: HCVAB, HQTG, HCGENO, HCPCR, HQTRNA, HEPRNA, CRYOG    Imaging:  Recent Results (from the past 48 hour(s))   XR Chest Port 1 View    Narrative    Exam: XR CHEST PORT 1 VW, 2/23/2020 4:37 AM    Indication: intubated, pneumonia, interval  change    Comparison: 2/22/2020.    Findings:   Single portable AP view the chest. ET tube tip at the high thoracic  trachea 6.4 cm from the daniel. Right PICC line with tip at the high  SVC. 2 enteric tubes course midline with tips outside the  field-of-view. Aortic valvular prosthesis, intact midline sternotomy  wires, and multiple surgical clips over the mediastinum. Stable  enlargement of the cardiac silhouette. Stable bilateral layering  pleural effusions, left greater the right. Stable diffuse bilateral  mixed opacities. No pneumothorax.      Impression    Impression:   1. Stable bilateral mixed opacities and bilateral pleural effusions,  left greater than right.  2. Stable support devices.    I have personally reviewed the examination and initial interpretation  and I agree with the findings.    JOEL SANTANA MD   XR Abdomen Port 1 View    Narrative    EXAMINATION: XR ABDOMEN PORT 1 , 2/23/2020 11:19 AM    COMPARISON: 2/21/2020    HISTORY: evaluate NJ    FINDINGS: Nasogastric tube in the stomach with sidehole at the level  of the pylorus. Feeding tube in the duodenum near the ligament of  Treitz. No air-filled dilated small bowel loops.      Impression    IMPRESSION: Feeding tube in the duodenum near ligament of Treitz.  NG/OG tube tip the pylorus     JOEL SANTANA MD   XR Chest Port 1 View    Narrative    EXAMINATION: XR CHEST PORT 1 , 2/24/2020 1:24 AM    INDICATION: intubated, pneumonia, interval change    COMPARISON: Chest x-ray 2/23/2020    FINDINGS: Single portable AP view the chest. ET tube tip projects over  the mid thoracic trachea. Right PICC line with tip at the mid  SVC. 2 enteric tubes course midline with tips outside the  field-of-view. Aortic valvular prosthesis, intact midline sternotomy  wires, and multiple surgical clips over the mediastinum. Stable  enlargement of the cardiac silhouette. Slight increase in the right  small pleural effusion. Stable to decreased left small  pleural  effusion. Stable diffuse bilateral mixed opacities. No pneumothorax.      Impression    Impression:   1. Slight increase in small right pleural effusion and stable to  slightly decreased left pleural effusion.  2. Interval increase in the mixed airspace opacities, pulmonary edema  versus infection.  2. Stable support devices.    I have personally reviewed the examination and initial interpretation  and I agree with the findings.    ROWAN ROSAS MD     2/24 CXR:  Slight increase in small right pleural effusion and stable to slightly decreased left pleural effusion.  Interval increase in the mixed airspace opacities, pulmonary edema versus infection.  Stable support devices.  2/23 ABX:  Nasogastric tube in the stomach.  Feeding tube in the duodenum.  No air-filled dilated small bowel loops.  2/23, 2/22 CXRs:  Stable bilateral mixed opacities and bilateral pleural effusions, left greater than right.  Stable support devices.  Primary considerations are pulmonary edema, ARDS, and/or infection.  2/21 CXR:  Increased bilateral pleural effusions, moderate left and small right. Increased mixed opacities, which likely represent a combination of pulmonary edema, atelectasis, and infection/aspiration.  Endotracheal tube.  Additional support  devices are stable.  2/20 Chest / abdm CT:  1. Persistent soft tissue density pericardial effusion which also surrounding the ascending aortic vascular repair site. Thickening of the pericardium foci of gas. This possibly represents surgical site infection. The aorta itself appears intact.  2. Bibasilar atelectasis, airspace opacities and small pleural effusions consistent with pulmonary infection.  3. Indeterminant renal cysts. At an appropriate clinical time,  recommend nonemergent ultrasound or MRI for further evaluation or comparison with prior studies.  2/19 Left thigh U/S:  Minimal soft tissue edema along the left medial thigh and leg incision with no drainable fluid  collections or other focal sonographic abnormalities.  2/18 CXR:  Stable cardiomegaly, pulmonary vascular congestion, and small left pleural effusion.  Unchanged left lower lobe atelectasis/consolidation.  2/18 CXR:  1. Stable bilateral pleural effusions, left rib the right, with overlying atelectasis or infection, left greater than right.  2. Gastric tube and left IJ sheath been removed. Additional support devices are stable.  2/17 LUE U/S:  1. No evidence of left upper extremity deep venous thrombosis.  2. Superficial venous thrombosis of the left cephalic vein, extending from the proximal forearm to the confluence with the subclavian vein.  2/17 Head CT:  1. No acute intracranial pathology.  2. Mild generalized parenchymal volume loss and moderate chronic small vessel ischemic disease.  3. Chronic sinusitis.  (Moderate paranasal sinus mucosal thickening, almost exclusively right-sided.  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  Mastoid air cells are clear.)  2/17, 2/16 ABXs:  Feeding tube.  Gastric tube.  No abnormal air filled bowel.  At least mild colonic stool burden.  Partially visualized left basilar opacities.  2/17 CXR:  1. Increased diffuse interstitial opacities and bibasilar airspace opacities, likely worsening pulmonary edema with atelectasis/consolidation.  2. Increased small bilateral pleural effusions, left greater than right.  3. New left IJ sheath. Left IJ sheath removed. Additional support devices are stable.  2/16 CXRs:  No pneumothorax post bronchoscopy.  Support devices are stable.  Continued cardiomegaly and mild edema.  Increased left pleural effusion with overlying left lower lobe basilar atelectasis/consolidation.  2/15 CXRs:  [place].  Moderately improved aeration of left lung with persistent left pleural effusion, left retrocardiac opacity, atelectasis versus infection.  Ongoing moderate pulmonary edema.  2/14 CXRs:  Stable left pleural effusion with  overlying basilar retrocardiac atelectasis.  Right pleural effusion has resolved.  Cardiomegaly with moderate pulmonary edema.  2/13 TTE:  Technically difficult limited study.  The Ejection Fraction is estimated at 55-60% (visually estimated). Regional wall motion is probably normal.  Moderate right ventricular dilation is present.  Global right ventricular function is moderately reduced.  No significant valvular abnormalities were noted.  This study was compared with the study from 2/10/2020 .RV function and size probably worsened.  2/13, 2/12, 2/11 CXRs:  Cardiomegaly with moderate pulmonary edema.  Bilateral pleural effusions with overlying basilar atelectasis/consolidation.  2/10 CXR:  Postsurgical changes of aortic dissection repair with multiple support devices.  Endotracheal tube.  Enteric tube tip.  Small layering left pleural effusion.  2/9 Aortic CT:  1. Ruby type A/DeBakey type II ascending thoracic aortic aneurysm (with dissection extending to the mid thoracic aortic arch but not involving the great vessels of the neck). The false lumen does contain some contrast-containing blood in the thoracic arch but does not contain contrast containing blood at the aortic root or in the ascending aorta. The false lumen also causes mass effect and narrowing of the true lumen of the aorta narrowing the cross-sectional dimension to as little as 1.4 cm in the ascending aorta.  2. There are aortic valve annular and possible leaflet calcifications which can be associated with aortic valvular stenosis which can be associated in an increased risk of poststenotic dilatation and dissection of the ascending aorta.  3. Renal scarring and mild renal atrophy.  2/9 Head CT:  No intracranial hemorrhage, mass, or definite CT evidence of recent ischemia.  Changes of acute on chronic sinusitis.  2/9 Pelvic x-rays:  Negative pelvis. No evidence for fracture. Degenerative disc disease lower lumbar spine. Constipation.  2/9 CXR:   Cardiac silhouette appears enlarged which could be due to technique.  No other evidence of acute cardiopulmonary disease is seen.

## 2020-02-24 NOTE — PROGRESS NOTES
02/24/20 1436   General Information   Onset Date 02/10/20   Start of Care Date 02/24/20   Referring Physician Eran Martinez MD   Patient Profile Review/OT: Additional Occupational Profile Info See Profile for full history and prior level of function   Patient/Family Goals Statement Wants a donut    Swallowing Evaluation Bedside swallow evaluation   Behaviorial Observations WFL (within functional limits)   Mode of current nutrition NPO   Respiratory Status O2 Supply   Type of O2 supply Nasal cannula   Comments Vel Espana is a 74 year old male with PMH of HTN s/p Ascending aortic dissection repair using a 32 mm Dacron Gelweave graft, AVR with 27 mm Patterson bovine pericardial valve, Coronary bypass to right coronary artery, Left greater saphenous vein harvest, Circulatory arrest, Left femoral artery exploration on 2/10, remains intubated for airway protection for slowly resolving metabolic encephalopathy. Mental status has cleared appropriately and patient is progressing with PSTs. Pt and his daughter deny a hx of dysphagia. Pt is alert, agreeable and in good spirits.   Clinical Swallow Evaluation   Oral Musculature generally intact   Structural Abnormalities none present   Dentition upper dentures  (and partial lower)   Mucosal Quality adequate;dry;sticky   Oral Labial Strength and Mobility   (general weakness)   Lingual Strength and Mobility impaired coordination   Velar Elevation intact   Laryngeal Function Cough;Voicing initiated;Swallow   Oral Musculature Comments Pt is dysphonic, vocal quality is weak and hoarse   Additional Documentation Yes   Swallow Eval   Feeding Assistance frequent cues/help required   Clinical Swallow Eval: Thin Liquid Texture Trial   Mode of Presentation, Thin Liquids cup;spoon;straw;fed by clinician   Volume of Liquid or Food Presented greater than 4 oz    Oral Phase of Swallow Premature pharyngeal entry   Pharyngeal Phase of Swallow intact   Diagnostic Statement No overt s/sx  of aspiration, no changes in breath sounds or vocal quality. Pt denied any difficulty    Clinical Swallow Eval: Puree Solid Texture Trial   Mode of Presentation, Puree spoon;fed by clinician   Volume of Puree Presented 3 oz    Oral Phase, Puree WFL   Pharyngeal Phase, Puree intact   Diagnostic Statement Adequate oral manipulation and clearance, no overt s/sx of aspiration. pt denies any difficulty   Swallow Compensations   Swallow Compensations Pacing   Esophageal Phase of Swallow   Patient reports or presents with symptoms of esophageal dysphagia No   General Therapy Interventions   Planned Therapy Interventions Dysphagia Treatment   Dysphagia treatment Oropharyngeal exercise training;Modified diet education;Instruction of safe swallow strategies   Swallow Eval: Clinical Impressions   Skilled Criteria for Therapy Intervention Skilled criteria met.  Treatment indicated.   Functional Assessment Scale (FAS) 4   Treatment Diagnosis Mild-moderate oropharyngeal dysphagia    Diet texture recommendations Full liquid;Thin liquids   Recommended Feeding/Eating Techniques alternate between small bites and sips of food/liquid;check mouth frequently for oral residue/pocketing;hard swallow w/ each bite or sip;maintain upright posture during/after eating for 30 mins;small sips/bites   Demonstrates Need for Referral to Another Service dietitian;occupational therapy;physical therapy;social work   Therapy Frequency 5x/week   Predicted Duration of Therapy Intervention (days/wks) 1 week    Anticipated Discharge Disposition extended care facility   Risks and Benefits of Treatment have been explained. Yes   Patient, family and/or staff in agreement with Plan of Care Yes   Clinical Impression Comments Pt seen for clinical swallow evaluation per provider's STAT order. Pt and his daughter deny that he has a hx of dysphagia. His oral mech is notable for general weakness and dysphonia secondary to prolonged intubation. Pt tolerated greater  than 4 oz of thin liquids via cup sip and straw sip without overt s/sx of aspiration, no changes in breath sounds or vocal quality. Pt also tolerated 3 oz of puree with adequate oral manipulation and without overt s/sx of aspiration. Overt, congested cough x 1, did not appear related to PO intake. Pt politely declined any further trials of more advanced solids. Suspect premature spillage from oral cavity and mild delay in swallow initiation. Recommend full liquid diet with close RN supervision for diet tolerance given increased aspiration risk secondary to general weakness and suspected reduced airway protection. Pt must be seated fully upright, take small bites/sips, eat/drink at a slow rate and ensure oral cavity is cleared after PO. SLP to continue to follow for diet tolerance, use of strategies, and advancement as appropriate.    Total Evaluation Time   Total Evaluation Time (Minutes) 10

## 2020-02-24 NOTE — PLAN OF CARE
D/I: Patient on unit 4A Surgical/Neuro ICU   Neuro- Follows commands. PERRL. Moving all ext purposefully. Generalized weakness. T max 99.8  CV- BP soft; labile. Tele: SR with occasional PACs and PVCs   Pulm- Vented CMV settings. 40% FiO2 satting high 90s. LS clear/dim. Small amount of white secretions per ET suctioning.  GI- NPO. Tube feeds infusing at 70ml/hr with 60ml/hr fwf. BS active. Soft BM this am  - Olmstead in place with adequate output. Urine is yellow and cloudy  Gtts- Precedex at 0.6 mg/kg/hr, Heparin at 1650units/hr (therapeutic), insulin per algorithm 2  Skin- L leg dressing CDI, L groin site ecchymotic, Chest incision approximated CDI, Abd dressing changed; CDI. Toes necrotic, dusky. Small reddened area on coccyx; mepilex CDI  Pain- Appears comfortable; denies pain  IV's - R PICC infusing. PIV infusing.  See flow sheets for further interventions and assessments.   A: Stable   P: Continue to monitor pt closely. Notify MD of significant changes

## 2020-02-24 NOTE — PROGRESS NOTES
CVTS PROGRESS NOTE        CO-MORBIDITIES:   Dissection of aorta, unspecified portion of aorta (H)    ASSESSMENT: Vel Espana is a 74 year old male with PMH of HTN s/p Ascending aortic dissection repair using a 32 mm Dacron Gelweave graft, AVR with 27 mm Patterson bovine pericardial valve, Coronary bypass to right coronary artery, Left greater saphenous vein harvest, Circulatory arrest, Left femoral artery exploration on 2/10, remains intubated for airway protection for slowly resolving metabolic encephalopathy. Mental status has cleared appropriately and patient is progressing with PSTs.     TODAY'S PROGRESS:   - stop D5W  - NJ placement   - Olmstead replacement  - Stop insulin largine, due to on drip   - Pressure support then maybe extubate in early afternoon  Reorder diuretics:    2.5 metolazone, and bumex   - stop date for abx at day 14 or extubated   - Stop anticoagulation, order dvt proph   - will continue to monitor toes     PLAN:  Neuro/ pain/ sedation:  #Encephalopathy, improving  - Monitor neurological status. Notify the MD for any acute changes in exam.  - precedex gtt  - seroquel 50 mg at bedtime      Pulmonary care:   #MV for airway protection post op  #ventilator associated pneumonia  - PST as able with possible extubation today  - Titrate FiO2 for SpO2 >92%  - bronchoscopy 2/15, 2/16  - trach cancelled 2/21 (Dr. Oswald felt patient may be able to extubate). Plan for trial of extubation 2/24 if passes PST today (remains 8/8 for 12 hours). Otherwise, plan for trach if requires re-intubation     Cardiovascular:    - Monitor hemodynamic status  - MAP >65, SBP <120  - metoprolol 12.5mg BID scheduled , hold for MAP <65  - TTE mid-week per Dr. Mckeon  - Continue to monitor toes, dry gangrene at this time  - PO Amio    #Non-sustained VT. brief run of nonsustained VT, less than 30 seconds, in the operating room, was hypokalemic at the time, also was being paced, possible R-on-T phenomenon, has not had  recurrence since temporary epicardial pacing has been discontinued.  Cardiology EP consult 2/12 recommended discontinuing amiodarone   - noted again 2/21, asymptomatic and hemodynamically stable. Re-bolused with amiodarone and reconsulted EP. Transitioned amiodarone to PO  #A-fib with RVR: noted overnight 2/14     GI care:   - NPO except ice chips and medications.  - confirm NJ/OG placement, plan to reposition NJ      Fluids/ Electrolytes/ Nutrition:   #Hypernatremia, improving  - Continue free water 60 Q1H for ongoing hypernatremia with q6 hr Na checks.   - Discontinue D10 infusion given hyperglycemia  - NJ placed, TF decreased 2/22 given high residuals    Renal/ Fluid Balance:    #Nonoliguric HANNA  - Urine output is adequate so far.  - Will continue to monitor intake and output, Cr, electrolytes.   - Nephrology following   - diuresis with metolazone 2.5mg daily, with administration of bumex 4 mg IV BID. Goal of net negative 1L.       Endocrine:    # Stress hyperglycemia  - Restart insulin gtt     ID/ Antibiotics:  # ventilator associated pneumonia  - cefepime and flagyl (switched from zosyn 2/20 for renal function). Continue through extubation or 14d course.  - Completed course of vanco  - s/p vanc 2/15 - 2/17, restarted 2/18-2/22 for ongoing fevers.  - CT CAP 2/19 - nothing to do for pericardial effusion per CVTS     Heme:     - Hgb goal >7, 2u transfusion 2/23     Prophylaxis:    - Hep TID  - Protonix qd     Lines/ tubes/ drains:  - PICC, Arterial line, quintero     Disposition:  - CV ICU     Patient seen, findings and plan discussed with staff, Dr. Meyer and CVTS fellow, Dr. Ashwini Martinez MD  PGY-3 Surgery  ====================================    SUBJECTIVE:   No acute overnight events. Feeling ok this AM. Following commands.     OBJECTIVE:   1. VITAL SIGNS:   Temp:  [96.6  F (35.9  C)-99.8  F (37.7  C)] 98.9  F (37.2  C)  Pulse:  [68-73] 68  Heart Rate:  [68-83] 75  Resp:  [18-29] 27  BP:  (110-115)/(51-67) 115/67  MAP:  [54 mmHg-104 mmHg] 54 mmHg  Arterial Line BP: ()/(35-89) 81/43  FiO2 (%):  [40 %] 40 %  SpO2:  [90 %-100 %] 97 %  Ventilation Mode: CMV/AC  (Continuous Mandatory Ventilation/ Assist Control)  FiO2 (%): 40 %  Rate Set (breaths/minute): 12 breaths/min  Tidal Volume Set (mL): 450 mL  PEEP (cm H2O): 8 cmH2O  Pressure Support (cm H2O): 8 cmH2O  Oxygen Concentration (%): 40 %  Resp: 27      2. INTAKE/ OUTPUT:   I/O last 3 completed shifts:  In: 6068.81 [I.V.:1988.81; NG/GT:1960]  Out: 6100 [Urine:5450; Emesis/NG output:650]    3. PHYSICAL EXAMINATION:   General: intubated, laying in hospital bed, interactive  Neuro: Opens eyes to voice, follows commands in all 4 extremities  Resp: mechanical ventilation  CV: RRR currently  Abdomen: Soft, Non-distended, Non-tender  Incisions: c/d/i  Extremities: warm and well perfused with mild edema, dark appearance to the right second and third toes, stable, no purulence or surrounding erythema. L toes with purpura     4. INVESTIGATIONS:   Arterial Blood Gases   Recent Labs   Lab 02/23/20  0539 02/21/20  1410 02/21/20  0521 02/20/20  1145   PH 7.44 7.51* 7.47* 7.49*   PCO2 36 30* 35 28*   PO2 121* 95 125* 64*   HCO3 25 24 25 21     Complete Blood Count   Recent Labs   Lab 02/24/20  0337 02/23/20  1648 02/23/20  0851 02/23/20  0402 02/22/20  0354   WBC 7.3 10.1  --  8.7 12.1*   HGB 8.4* 8.9* 6.7* 7.2* 7.8*    304  --  283 297     Basic Metabolic Panel  Recent Labs   Lab 02/24/20  0337 02/23/20  2245 02/23/20  1648 02/23/20  0851 02/23/20  0402   * 150* 150* 143 148*   POTASSIUM 3.6 3.5 3.6 3.3* 3.1*   CHLORIDE 115*  --  117* 111* 117*   CO2 28  --  27 26 25   BUN 98*  --  96* 110* 103*   CR 2.52*  --  2.62* 2.46* 2.57*   *  --  176* 408* 251*       5. RADIOLOGY:   Recent Results (from the past 24 hour(s))   XR Abdomen Port 1 View    Narrative    EXAMINATION: XR ABDOMEN PORT 1 , 2/23/2020 11:19 AM    COMPARISON:  2/21/2020    HISTORY: evaluate NJ    FINDINGS: Nasogastric tube in the stomach with sidehole at the level  of the pylorus. Feeding tube in the duodenum near the ligament of  Treitz. No air-filled dilated small bowel loops.      Impression    IMPRESSION: Feeding tube in the duodenum near ligament of Treitz.  NG/OG tube tip the pylorus     JOEL SANTANA MD       =========================================

## 2020-02-24 NOTE — PLAN OF CARE
Discharge Planner SLP   Patient plan for discharge: Did not discuss  Current status: Pt seen for clinical swallow evaluation per provider's STAT order. His oral mech is notable for general weakness and dysphonia secondary to prolonged intubation. Pt tolerated greater than 4 oz of thin liquids via cup sip and straw sip without overt s/sx of aspiration, no changes in breath sounds or vocal quality. Pt also tolerated 3 oz of puree with adequate oral manipulation and without overt s/sx of aspiration. Suspect premature spillage from oral cavity and mild delay in swallow initiation.     Recommend full liquid diet with close RN supervision for diet tolerance given increased aspiration risk secondary to general weakness and suspected reduced airway protection. Pt must be seated fully upright, take small bites/sips, eat/drink at a slow rate and ensure oral cavity is cleared after PO. SLP to continue to follow for diet tolerance, use of strategies, and advancement as appropriate.     Barriers to return to prior living situation: Below baseline, medical complexity   Recommendations for discharge: TCU  Rationale for recommendations: SLP at next level of care given swallow function is below baseline requiring ongoing skilled intervention          Entered by: Tena Briceño 02/24/2020 2:47 PM

## 2020-02-24 NOTE — SIGNIFICANT EVENT
Patient extubated at 1245 per MD order. Lungs clear, strong cough post-extubation. On 5L NC. MD placed guide wire in NJT prior to extubation; however, NJT still came out partially. NJT removed by MD; per MD, do not replace at this time.

## 2020-02-24 NOTE — PLAN OF CARE
OT/CR 4A: Discharge Planner OT   Patient plan for discharge: Pt did not state  Current status: pt remains orally intubated, alert and following >75% of commands.  Max A for rolling, Dependent for bedside transfers, generalized weakness present though able to engage in BUE/BLE strengthening exercises while seated up in chair with assist.  VSS on PS at 8/8 with 40% Fio2  Barriers to return to prior living situation: generalized weakness, post surgical precautions, assist required for all mobility and self cares, acute medical needs  Recommendations for discharge: TCU  Rationale for recommendations: Pt is currently below baseline with regards to mobility and independence with self cares and will benefit from continued skilled therapy intervention to address deficits.         Entered by: Mamie Briceño 02/24/2020 10:57 AM

## 2020-02-24 NOTE — PROGRESS NOTES
General acute hospital, [place]  Procedure Note          Extubation:       Vel Espana  MRN# [id number]   February 24, 2020, 12:53 PM         Patient extubated at: February 24, 2020, 12:53 PM   Supplemental Oxygen: Via nasal cannula at 4 liters per minute   Cough: The cough is strong   Secretion Mode: Able to clear   Secretion Amount: Moderate amount, moderately thick and yumiko and tan in color   Respiratory Exam:: Breath sounds: equal and clear and good aeration     Location: all lobes   Skin Exam:: Patient color: natural   Patient Status: Currently appears comfortable   Arterial Blood Gasses: pH Arterial (pH)   Date Value   02/24/2020 7.46 (H)     pO2 Arterial (mm Hg)   Date Value   02/24/2020 117 (H)     pCO2 Arterial (mm Hg)   Date Value   02/24/2020 39     Bicarbonate Arterial (mmol/L)   Date Value   02/24/2020 28            Recorded by Zeenat Dwyer

## 2020-02-24 NOTE — PROGRESS NOTES
Care Coordinator Progress Note    Admission Date/Time:  2/9/2020  Attending MD:  Nivia Mckeon MD    Data  Chart reviewed, discussed with interdisciplinary team.   Patient was admitted for: Dissection of aorta, unspecified portion of aorta (H).     Assessment / Coordination of Care   Per chart review and morning report, pt extubated this morning.    Pt family [facility]ed [facility] once [facility] is [facility] ( pt family stated they were informed that pt need to be transfer to [facility] after he is extubated).  RNCC discussed with CVTS team if pt can be transfer to [facility] if accepted for continuous of care..  The team agreed for transfer to [facility] in the next 2-3 day if accepted.    RNCC contacted [facility] referral line # [phone] and provided update and request if pt can transfer to [facility] for [facility] of care.  [facility] referral specialist took pt info and stated [facility]  will call back to discuss more about the request.    Amber, [facility]  called back and stated they can accept pt but his transport will not be covered.  Amber stated for the transport to be covered pt need to be at list 30% service connected.  Currently pt is only 10% service connected and family need to pay for transport.  Amber stated pt current hospital stay have been approved and pt can stay at [facility] until he is medically stable to be transfer to TCU.  Amber stated if pt family agree to pay for transport she will send form for family to complete.  RNCC agreed to share the above info with pt family and will call back Amber.  Amber's contact # [phone].    Amber stated pt is not qualify for [facility] contracted TCU facilities in the community but if he wants to come to [facility] for [facility] can [facility] to [facility] ([facility] it [facility] a [facility].  That will be covered under his VA coverage.  Amber stated he can go to other TCU facilities under his medicare coverage.     Plan  Anticipated Discharge Date:   TBD.  Anticipated Discharge Plan:  [facility] to [facility] vs [facility].  RNCC will cont to follow plan of care.    Addendum:  RNCC visited pt dtr Rosa and shared the above info.  Rosa agreed to share the above info with pt spouse.      Chris Isaac, RN, PHN, BSN  4A and 4E/ ICU  Care Coordinator  Phone: [phone]  Pager: [phone]

## 2020-02-24 NOTE — PROGRESS NOTES
Social Work Services Progress Note    Hospital Day: 15  Date of Initial Social Work Evaluation:  2/11/2020  Collaborated with:  Margie Stevens (FC) and Jordyn (Step-Daughter) via the telephone    Data:  Vel Espana is a 74 year old male with PMH of HTN s/p Ascending aortic dissection repair using a 32 mm Dacron Gelweave graft, AVR with 27 mm Patterson bovine pericardial valve, Coronary bypass to right coronary artery, Left greater saphenous vein harvest, Circulatory arrest, Left femoral artery exploration on 2/10.     Intervention:  SW spoke with Jordyn (Step-Daughter) who again inquired about VA coverage for this hospitalization.  SW informed her that Margie (FC) will be stopping by this afternoon to meet with family.  SW also encouraged family to reach out to the VA directly to answer questions/ verify coverage.    Ultimately for discharge plans, family's hope is that pt and his spouse can get to the same SNF (pt for TCU and his spouse for hospice) when pt is ready to discharge.  SW preiously gave family a list of TCUs that accept [employer].  Their first choice is [facility] in [place].    Assessment:  Pt remains intubated.    Plan:    Anticipated Disposition:  TCU vs. LTACH    Barriers to d/c plan:  Medical Stability    Follow Up:   will continue to follow to provide support and continue discharge plans as identified.    RUSH Pelayo, [facility]  ICU   [facility]  Phone:  [phone]  Pager:  [phone]         Back strain, initial encounter

## 2020-02-24 NOTE — PLAN OF CARE
Major Shift Events: Neurologically intact. Follows all commands. Tolerated PS in AM. Patient extubated to 5L NC at 1245. Currently on 2L NC. OG and NJT removed. Patient on liquid diet. Olmstead with large UOP.  Precedex dc'd. Dilauded and Oxycodone given x1 for pain in coccyx. Foam cushion obtained. Frequent repositioning.    Plan: Continue to monitor and notify CVTS of any changes.  For vital signs and complete assessments, please see documentation flowsheets.

## 2020-02-25 ENCOUNTER — APPOINTMENT (OUTPATIENT)
Dept: SPEECH THERAPY | Facility: CLINIC | Age: 75
DRG: 219 | End: 2020-02-25
Payer: COMMERCIAL

## 2020-02-25 ENCOUNTER — APPOINTMENT (OUTPATIENT)
Dept: PHYSICAL THERAPY | Facility: CLINIC | Age: 75
DRG: 219 | End: 2020-02-25
Attending: PHYSICIAN ASSISTANT
Payer: COMMERCIAL

## 2020-02-25 ENCOUNTER — APPOINTMENT (OUTPATIENT)
Dept: ULTRASOUND IMAGING | Facility: CLINIC | Age: 75
DRG: 219 | End: 2020-02-25
Payer: COMMERCIAL

## 2020-02-25 ENCOUNTER — APPOINTMENT (OUTPATIENT)
Dept: GENERAL RADIOLOGY | Facility: CLINIC | Age: 75
DRG: 219 | End: 2020-02-25
Attending: PHYSICIAN ASSISTANT
Payer: COMMERCIAL

## 2020-02-25 ENCOUNTER — APPOINTMENT (OUTPATIENT)
Dept: CARDIOLOGY | Facility: CLINIC | Age: 75
DRG: 219 | End: 2020-02-25
Attending: NURSE PRACTITIONER
Payer: COMMERCIAL

## 2020-02-25 ENCOUNTER — APPOINTMENT (OUTPATIENT)
Dept: OCCUPATIONAL THERAPY | Facility: CLINIC | Age: 75
DRG: 219 | End: 2020-02-25
Payer: COMMERCIAL

## 2020-02-25 ENCOUNTER — APPOINTMENT (OUTPATIENT)
Dept: GENERAL RADIOLOGY | Facility: CLINIC | Age: 75
DRG: 219 | End: 2020-02-25
Payer: COMMERCIAL

## 2020-02-25 LAB
BACTERIA SPEC CULT: NO GROWTH
BACTERIA SPEC CULT: NO GROWTH
BASOPHILS # BLD AUTO: 0 10E9/L (ref 0–0.2)
BASOPHILS NFR BLD AUTO: 0.5 %
CREAT SERPL-MCNC: 2.41 MG/DL (ref 0.66–1.25)
DIFFERENTIAL METHOD BLD: ABNORMAL
EOSINOPHIL # BLD AUTO: 0.2 10E9/L (ref 0–0.7)
EOSINOPHIL NFR BLD AUTO: 2.5 %
ERYTHROCYTE [DISTWIDTH] IN BLOOD BY AUTOMATED COUNT: 16.2 % (ref 10–15)
GFR SERPL CREATININE-BSD FRML MDRD: 25 ML/MIN/{1.73_M2}
GLUCOSE BLDC GLUCOMTR-MCNC: 122 MG/DL (ref 70–99)
GLUCOSE BLDC GLUCOMTR-MCNC: 130 MG/DL (ref 70–99)
GLUCOSE BLDC GLUCOMTR-MCNC: 146 MG/DL (ref 70–99)
GLUCOSE BLDC GLUCOMTR-MCNC: 204 MG/DL (ref 70–99)
GLUCOSE BLDC GLUCOMTR-MCNC: 208 MG/DL (ref 70–99)
GLUCOSE BLDC GLUCOMTR-MCNC: 233 MG/DL (ref 70–99)
GLUCOSE BLDC GLUCOMTR-MCNC: 298 MG/DL (ref 70–99)
HCT VFR BLD AUTO: 30.3 % (ref 40–53)
HGB BLD-MCNC: 9.2 G/DL (ref 13.3–17.7)
IMM GRANULOCYTES # BLD: 0.3 10E9/L (ref 0–0.4)
IMM GRANULOCYTES NFR BLD: 3.5 %
INTERPRETATION ECG - MUSE: NORMAL
LYMPHOCYTES # BLD AUTO: 1.1 10E9/L (ref 0.8–5.3)
LYMPHOCYTES NFR BLD AUTO: 13.5 %
Lab: NORMAL
Lab: NORMAL
MAGNESIUM SERPL-MCNC: 2.2 MG/DL (ref 1.6–2.3)
MAGNESIUM SERPL-MCNC: 2.3 MG/DL (ref 1.6–2.3)
MAGNESIUM SERPL-MCNC: 2.4 MG/DL (ref 1.6–2.3)
MCH RBC QN AUTO: 30.2 PG (ref 26.5–33)
MCHC RBC AUTO-ENTMCNC: 30.4 G/DL (ref 31.5–36.5)
MCV RBC AUTO: 99 FL (ref 78–100)
MONOCYTES # BLD AUTO: 0.5 10E9/L (ref 0–1.3)
MONOCYTES NFR BLD AUTO: 6.2 %
NEUTROPHILS # BLD AUTO: 6.1 10E9/L (ref 1.6–8.3)
NEUTROPHILS NFR BLD AUTO: 73.8 %
NRBC # BLD AUTO: 0 10*3/UL
NRBC BLD AUTO-RTO: 0 /100
PHOSPHATE SERPL-MCNC: 3.8 MG/DL (ref 2.5–4.5)
PLATELET # BLD AUTO: 335 10E9/L (ref 150–450)
POTASSIUM SERPL-SCNC: 3.3 MMOL/L (ref 3.4–5.3)
POTASSIUM SERPL-SCNC: 3.3 MMOL/L (ref 3.4–5.3)
POTASSIUM SERPL-SCNC: 3.4 MMOL/L (ref 3.4–5.3)
RBC # BLD AUTO: 3.05 10E12/L (ref 4.4–5.9)
SODIUM SERPL-SCNC: 145 MMOL/L (ref 133–144)
SODIUM SERPL-SCNC: 145 MMOL/L (ref 133–144)
SODIUM SERPL-SCNC: 147 MMOL/L (ref 133–144)
SODIUM SERPL-SCNC: 148 MMOL/L (ref 133–144)
SPECIMEN SOURCE: NORMAL
SPECIMEN SOURCE: NORMAL
WBC # BLD AUTO: 8.2 10E9/L (ref 4–11)

## 2020-02-25 PROCEDURE — 99233 SBSQ HOSP IP/OBS HIGH 50: CPT | Performed by: INTERNAL MEDICINE

## 2020-02-25 PROCEDURE — 00000146 ZZHCL STATISTIC GLUCOSE BY METER IP

## 2020-02-25 PROCEDURE — 73630 X-RAY EXAM OF FOOT: CPT | Mod: 50

## 2020-02-25 PROCEDURE — 20000004 ZZH R&B ICU UMMC

## 2020-02-25 PROCEDURE — 93321 DOPPLER ECHO F-UP/LMTD STD: CPT | Mod: 26 | Performed by: INTERNAL MEDICINE

## 2020-02-25 PROCEDURE — 71045 X-RAY EXAM CHEST 1 VIEW: CPT

## 2020-02-25 PROCEDURE — 25000125 ZZHC RX 250: Performed by: STUDENT IN AN ORGANIZED HEALTH CARE EDUCATION/TRAINING PROGRAM

## 2020-02-25 PROCEDURE — 85025 COMPLETE CBC W/AUTO DIFF WBC: CPT | Performed by: STUDENT IN AN ORGANIZED HEALTH CARE EDUCATION/TRAINING PROGRAM

## 2020-02-25 PROCEDURE — 94640 AIRWAY INHALATION TREATMENT: CPT

## 2020-02-25 PROCEDURE — 25000132 ZZH RX MED GY IP 250 OP 250 PS 637: Performed by: THORACIC SURGERY (CARDIOTHORACIC VASCULAR SURGERY)

## 2020-02-25 PROCEDURE — 25000128 H RX IP 250 OP 636: Performed by: STUDENT IN AN ORGANIZED HEALTH CARE EDUCATION/TRAINING PROGRAM

## 2020-02-25 PROCEDURE — 94640 AIRWAY INHALATION TREATMENT: CPT | Mod: 76

## 2020-02-25 PROCEDURE — 25000132 ZZH RX MED GY IP 250 OP 250 PS 637: Performed by: PHYSICIAN ASSISTANT

## 2020-02-25 PROCEDURE — 97110 THERAPEUTIC EXERCISES: CPT | Mod: GP

## 2020-02-25 PROCEDURE — 93308 TTE F-UP OR LMTD: CPT | Mod: 26 | Performed by: INTERNAL MEDICINE

## 2020-02-25 PROCEDURE — 97530 THERAPEUTIC ACTIVITIES: CPT | Mod: GO | Performed by: OCCUPATIONAL THERAPIST

## 2020-02-25 PROCEDURE — 25000132 ZZH RX MED GY IP 250 OP 250 PS 637: Performed by: ANESTHESIOLOGY

## 2020-02-25 PROCEDURE — 25000125 ZZHC RX 250

## 2020-02-25 PROCEDURE — 25000132 ZZH RX MED GY IP 250 OP 250 PS 637: Performed by: NURSE PRACTITIONER

## 2020-02-25 PROCEDURE — 25000132 ZZH RX MED GY IP 250 OP 250 PS 637: Performed by: STUDENT IN AN ORGANIZED HEALTH CARE EDUCATION/TRAINING PROGRAM

## 2020-02-25 PROCEDURE — 97162 PT EVAL MOD COMPLEX 30 MIN: CPT | Mod: GP

## 2020-02-25 PROCEDURE — 83735 ASSAY OF MAGNESIUM: CPT | Performed by: STUDENT IN AN ORGANIZED HEALTH CARE EDUCATION/TRAINING PROGRAM

## 2020-02-25 PROCEDURE — 84100 ASSAY OF PHOSPHORUS: CPT | Performed by: STUDENT IN AN ORGANIZED HEALTH CARE EDUCATION/TRAINING PROGRAM

## 2020-02-25 PROCEDURE — 92526 ORAL FUNCTION THERAPY: CPT | Mod: GN

## 2020-02-25 PROCEDURE — 82565 ASSAY OF CREATININE: CPT | Performed by: STUDENT IN AN ORGANIZED HEALTH CARE EDUCATION/TRAINING PROGRAM

## 2020-02-25 PROCEDURE — 94660 CPAP INITIATION&MGMT: CPT

## 2020-02-25 PROCEDURE — 97535 SELF CARE MNGMENT TRAINING: CPT | Mod: GO | Performed by: OCCUPATIONAL THERAPIST

## 2020-02-25 PROCEDURE — 25000128 H RX IP 250 OP 636: Performed by: PHYSICIAN ASSISTANT

## 2020-02-25 PROCEDURE — 40000275 ZZH STATISTIC RCP TIME EA 10 MIN

## 2020-02-25 PROCEDURE — 93325 DOPPLER ECHO COLOR FLOW MAPG: CPT | Mod: 26 | Performed by: INTERNAL MEDICINE

## 2020-02-25 PROCEDURE — 76770 US EXAM ABDO BACK WALL COMP: CPT

## 2020-02-25 PROCEDURE — 93308 TTE F-UP OR LMTD: CPT

## 2020-02-25 PROCEDURE — 84295 ASSAY OF SERUM SODIUM: CPT | Performed by: STUDENT IN AN ORGANIZED HEALTH CARE EDUCATION/TRAINING PROGRAM

## 2020-02-25 PROCEDURE — 97530 THERAPEUTIC ACTIVITIES: CPT | Mod: GP

## 2020-02-25 PROCEDURE — 84132 ASSAY OF SERUM POTASSIUM: CPT | Performed by: STUDENT IN AN ORGANIZED HEALTH CARE EDUCATION/TRAINING PROGRAM

## 2020-02-25 PROCEDURE — 84132 ASSAY OF SERUM POTASSIUM: CPT | Performed by: THORACIC SURGERY (CARDIOTHORACIC VASCULAR SURGERY)

## 2020-02-25 RX ORDER — LANOLIN ALCOHOL/MO/W.PET/CERES
3 CREAM (GRAM) TOPICAL
Status: DISCONTINUED | OUTPATIENT
Start: 2020-02-25 | End: 2020-02-27

## 2020-02-25 RX ORDER — DEXTROSE MONOHYDRATE 25 G/50ML
25-50 INJECTION, SOLUTION INTRAVENOUS
Status: DISCONTINUED | OUTPATIENT
Start: 2020-02-25 | End: 2020-03-05 | Stop reason: HOSPADM

## 2020-02-25 RX ORDER — NICOTINE POLACRILEX 4 MG
15-30 LOZENGE BUCCAL
Status: DISCONTINUED | OUTPATIENT
Start: 2020-02-25 | End: 2020-03-05 | Stop reason: HOSPADM

## 2020-02-25 RX ADMIN — ACETYLCYSTEINE 2 ML: 200 SOLUTION ORAL; RESPIRATORY (INHALATION) at 09:10

## 2020-02-25 RX ADMIN — POTASSIUM CHLORIDE 20 MEQ: 29.8 INJECTION, SOLUTION INTRAVENOUS at 22:16

## 2020-02-25 RX ADMIN — MELATONIN TAB 3 MG 3 MG: 3 TAB at 22:37

## 2020-02-25 RX ADMIN — ALBUTEROL SULFATE 2.5 MG: 2.5 SOLUTION RESPIRATORY (INHALATION) at 09:11

## 2020-02-25 RX ADMIN — ATORVASTATIN CALCIUM 40 MG: 40 TABLET, FILM COATED ORAL at 19:17

## 2020-02-25 RX ADMIN — Medication 5000 UNITS: at 19:17

## 2020-02-25 RX ADMIN — PANTOPRAZOLE SODIUM 40 MG: 40 TABLET, DELAYED RELEASE ORAL at 08:22

## 2020-02-25 RX ADMIN — ASPIRIN 81 MG CHEWABLE TABLET 81 MG: 81 TABLET CHEWABLE at 08:22

## 2020-02-25 RX ADMIN — POTASSIUM CHLORIDE 20 MEQ: 29.8 INJECTION, SOLUTION INTRAVENOUS at 23:16

## 2020-02-25 RX ADMIN — AMIODARONE HYDROCHLORIDE 200 MG: 200 TABLET ORAL at 08:22

## 2020-02-25 RX ADMIN — BUMETANIDE 4 MG: 0.25 INJECTION INTRAMUSCULAR; INTRAVENOUS at 08:27

## 2020-02-25 RX ADMIN — POLYETHYLENE GLYCOL 3350 17 G: 17 POWDER, FOR SOLUTION ORAL at 08:22

## 2020-02-25 RX ADMIN — Medication 12.5 MG: at 19:17

## 2020-02-25 RX ADMIN — ACETYLCYSTEINE 2 ML: 200 SOLUTION ORAL; RESPIRATORY (INHALATION) at 03:31

## 2020-02-25 RX ADMIN — POTASSIUM CHLORIDE 20 MEQ: 29.8 INJECTION, SOLUTION INTRAVENOUS at 17:15

## 2020-02-25 RX ADMIN — POTASSIUM CHLORIDE 20 MEQ: 750 TABLET, EXTENDED RELEASE ORAL at 04:02

## 2020-02-25 RX ADMIN — SENNOSIDES AND DOCUSATE SODIUM 2 TABLET: 8.6; 5 TABLET ORAL at 08:22

## 2020-02-25 RX ADMIN — BUMETANIDE 4 MG: 0.25 INJECTION INTRAMUSCULAR; INTRAVENOUS at 16:11

## 2020-02-25 RX ADMIN — ALBUTEROL SULFATE 2.5 MG: 2.5 SOLUTION RESPIRATORY (INHALATION) at 03:31

## 2020-02-25 RX ADMIN — Medication 5000 UNITS: at 08:22

## 2020-02-25 RX ADMIN — INSULIN ASPART 1 UNITS: 100 INJECTION, SOLUTION INTRAVENOUS; SUBCUTANEOUS at 16:06

## 2020-02-25 RX ADMIN — Medication 12.5 MG: at 08:22

## 2020-02-25 RX ADMIN — POTASSIUM CHLORIDE 20 MEQ: 1.5 POWDER, FOR SOLUTION ORAL at 10:46

## 2020-02-25 RX ADMIN — INSULIN ASPART 3 UNITS: 100 INJECTION, SOLUTION INTRAVENOUS; SUBCUTANEOUS at 12:51

## 2020-02-25 ASSESSMENT — ACTIVITIES OF DAILY LIVING (ADL)
ADLS_ACUITY_SCORE: 18

## 2020-02-25 ASSESSMENT — MIFFLIN-ST. JEOR: SCORE: 2025.38

## 2020-02-25 NOTE — PROGRESS NOTES
Quintero removed per MD. Attempted 2 external/condom catheters, unable to stay in place. Per MDs, RNs replaced quintero. 1st quintero balloon did not hold, so quintero had to be removed and replaced again.

## 2020-02-25 NOTE — PROGRESS NOTES
Nephrology Progress Note  02/24/2020         Assessment & Recommendations:   74 yr male with Type A  Aortic dissection, S/p repair , AVR ,CABG, Left greater saphenous vein harvest, Circulatory arrest, Left femoral artery exploration on 2/10. Has NSVT. Nephrology consulted for HANNA.       Recommendations  1. TARGET Sodium 142 in next 24 hrs  2. Continue Free water boluses - uriel water deficit 3.4 Litres   3. Consider stopping Metolazone  4. Renal US - Ordered  5. Please ensure patient has  Follow up NEPHROLOGY CLINIC VISIT either here or at [facility] after discharge   5. No indication for Renal replacement therapy   6. Nephrology will sign off . Please call us if you have any questions               Non Oliguric HANNA   Unknown baseline Cr    Cr on admission 1.24  Etiology felt to be hypoperfusion  2/2 to Aortic dissection on presentation with IV contrast use resulting in ATN.  --  Overall, renal function remains roughly stable, creatinine 2.46 - 2.62  --  Patient still has high obligate intake ~ 6000 ml. However his UOP was 5.3 Litre in the last 24 hrs  And today already 6.2 Litres - likely having post HANNA diuretic phase .   --  No indication for Renal replacement therapy   --  High UOP predisposes him towards hypernatremia      Volume /BP  BP's remain stable off pressors.   Hypervolemic with pedal edema   Weight reducing   131.9 kg on 2/9  138.4 kg on 2/21  133.1 kg on 2/23    Continue Bumex 4 mg BID   He is having excellent diuresis - part of it is likely post HANNA diuresis - we suggest that we hold Metolazone         Acid Base, Electrolytes :   Hypernatremia - continue free water  Infusion . In the last 24 hr , target Sodium level is 142  Currently 148 . Free water deficit : 3.4 litres   - continue Free water boluses     Hypokalemia - replete and recheck after each diuretic dose, due to loop diuretics          Fevers  PNA, Resp Failure  Surgical Site Infection  Blood Cx 2/19 negative (and only  "alma on bronch cx). On Cef/Flagyl, Plan for Trach upcoming. Management per CT Surg/ID.      Recommendations were communicated to primary team via note.     Seen and discussed with Dr. Marc Harry MD   Renal fellow   [phone]    Interval History :   Nursing and provider notes from last 24 hours reviewed.  In the last 24 hours Vel Espana remains intubated , but awake and responding    Review of Systems:   By nodding denies CP/SOB/Abd pain       Physical Exam:   I/O last 3 completed shifts:  In: 4844.22 [I.V.:1139.22; NG/GT:1970]  Out: 6140 [Urine:6140]   /68   Pulse 94   Temp 98.5  F (36.9  C) (Axillary)   Resp 16   Ht 1.753 m (5' 9\")   Wt 133.1 kg (293 lb 6.9 oz)   SpO2 94%   BMI 43.33 kg/m       GENERAL APPEARANCE: intubated  EYES:  no scleral icterus, pupils equal  PULM: lungs with scattered rales bilat  CV: regular rhythm, normal rate, no rub     -edema - 1+ LE bilat    GI: soft, NTND    Labs:   All labs reviewed by me  Electrolytes/Renal -   Recent Labs   Lab Test 02/24/20  1533 02/24/20  1010 02/24/20  0337  02/23/20  1648 02/23/20  0851  02/22/20  0354  02/21/20  0852   * 147* 148*   < > 150* 143   < > 152*   < > 153*   POTASSIUM 4.0  4.0 3.7 3.6   < > 3.6 3.3*   < > 3.6   < > 3.8   CHLORIDE  --   --  115*  --  117* 111*   < > 122*  --   --    CO2  --   --  28  --  27 26   < > 24  --   --    BUN  --   --  98*  --  96* 110*   < > 102*  --   --    CR  --   --  2.52*  --  2.62* 2.46*   < > 2.38*  --   --    GLC  --   --  181*  --  176* 408*   < > 189*  --   --    ELMER  --   --  8.0*  --  8.0* 7.4*   < > 7.8*  --   --    MAG 2.4* 2.4*  --   --   --   --   --  2.6*   < > 2.4*   PHOS  --  3.9  --   --   --   --   --  3.7  --  3.8    < > = values in this interval not displayed.       CBC -   Recent Labs   Lab Test 02/24/20  1010 02/24/20  0337 02/23/20  1648 02/23/20  0851 02/23/20  0402   WBC  --  7.3 10.1  --  8.7   HGB  --  8.4* 8.9* 6.7* 7.2*    288 304  --  283 "       LFTs -   Recent Labs   Lab Test 02/15/20  1213 02/14/20  2030 02/13/20  0353 02/12/20  0343   ALKPHOS 66  --  42 35*   BILITOTAL 0.5  --  0.5 0.6   ALT 17 17 16 20   AST 32 44 58* 91*   PROTTOTAL 5.8*  --  5.4* 5.1*   ALBUMIN 2.3*  --  2.8* 2.7*       Iron Panel - No lab results found.      Imaging:  All imaging studies reviewed by me.     Current Medications:    acetylcysteine  2 mL Nebulization Q4H KAYLA     albuterol  2.5 mg Nebulization Q4H     amiodarone  200 mg Oral Daily     aspirin  81 mg Oral Daily     atorvastatin  40 mg Oral QPM     bumetanide  4 mg Intravenous BID     heparin ANTICOAGULANT  5,000 Units Subcutaneous BID     heparin lock flush  5-10 mL Intracatheter Q24H     influenza Vac Split High-Dose  0.5 mL Intramuscular Prior to discharge     insulin aspart  1-6 Units Subcutaneous Q4H     lidocaine  1-3 patch Transdermal Q24H     lidocaine   Transdermal Q8H     metolazone  2.5 mg Oral or Feeding Tube Q24H     metoprolol tartrate  12.5 mg Oral BID     multivitamins w/minerals  15 mL Per Feeding Tube Daily     pantoprazole  40 mg Oral or Feeding Tube BID     polyethylene glycol  17 g Oral or Feeding Tube Daily     QUEtiapine  50 mg Oral QPM     senna-docusate  1 tablet Oral or Feeding Tube BID    Or     senna-docusate  2 tablet Oral or Feeding Tube BID     sodium chloride (PF)  3 mL Intracatheter Q8H       dextrose Stopped (02/24/20 1306)     - MEDICATION INSTRUCTIONS -       Jama Harry MD

## 2020-02-25 NOTE — PROGRESS NOTES
CLINICAL NUTRITION SERVICES - REASSESSMENT NOTE     Nutrition Prescription    Malnutrition Status:    Does not meet criteria for malnutrition     Interventions by Registered Dietitian (RD):  Ordered oral supplements to promote adequate intake on full liquid diet     Future Recommendations:  RD to schedule additional snacks/supplements and multivitamin/mineral if suspected inadequate intake.        EVALUATION OF THE PROGRESS TOWARD GOALS   Diet: Full Liquid  Intake: Diet advanced just this morning per SLP. So far today, fair intake at breakfast per RN.      NEW FINDINGS   Previously receiving TF via NDT, but FT accidentally pulled out during extubation (2/24).     Renal: Bumex   Weight: Down 3 lbs since admit, but cannot rule out fluctuations in fluid status due to diuresis. Adjusted dosing weight of 88 kg remains appropriate.     MALNUTRITION  % Intake: Decreased intake does not meet criteria  % Weight Loss: Difficult to assess with fluctuation in fluid status  Subcutaneous Fat Loss: None observed  Muscle Loss: None observed  Fluid Accumulation/Edema: Mild  Malnutrition Diagnosis: Patient does not meet two of the established criteria necessary for diagnosing malnutrition    Previous Goals   Total avg nutritional intake to meet a minimum of 20 kcal/kg and 1.2 g PRO/kg daily (per dosing wt 88 kg).  Evaluation: Not met consistently     Previous Nutrition Diagnosis  Predicted inadequate nutrient intake (protein/energy)  Evaluation: No change    CURRENT NUTRITION DIAGNOSIS  Predicted inadequate nutrient intake (protein/energy) related to diet restriction per SLP evaluation (full liquid diet) which may limit ability to take adequate intake.       INTERVENTIONS  Implementation  Collaboration with other providers - Nutrition discussed on rounds   Medical food supplement therapy  Multivitamin/mineral supplement therapy    Goals  1) Diet adv > full liquids within 2-3 days.  2) Patient to consume % of nutritionally  adequate meal trays TID, or the equivalent with supplements/snacks.    Monitoring/Evaluation  Progress toward goals will be monitored and evaluated per protocol.    Zina Austin RD, LD  [id number]  Pgr: [phone]

## 2020-02-25 NOTE — PLAN OF CARE
Discharge Planner SLP   Patient plan for discharge: Did not discuss  Current status: Pt tolerated ice cream, thin liquids and cracker. Overt congested cough x 1 with large, impulsive straw sips of thin, no additional s/sx of aspiration across trials. Prolonged and disorganized oral manipulation and clearance with regular solids - soft solids appropriate with supervision for oral clearance.     Recommend dysphagia diet 2 and thin liquids with 1:1 nursing supervision. Pt must be seated fully upright and alert. Provided small bites/sips, alternate solids and liquids, and eat/drink at a slow rate. Nursing to monitor diet tolerance and respiratory status closely     Barriers to return to prior living situation: Below baseline, dysphagia/aspiration risk  Recommendations for discharge: TCU  Rationale for recommendations: SLP at next level of care for management of dysphagia given swallow function is below baseline         Entered by: Tena Briceño 02/25/2020 1:13 PM

## 2020-02-25 NOTE — PLAN OF CARE
4A:  Discharge Planner PT   Patient plan for discharge: SNF with rehab   Current status: Pt alert and following commands, on 3LPM via NC at rest. Completed STS x 3 from chair with Max A x 2; pt unable to stand longer than 10 seconds or stand fully erect. Complete B LE strengthening exercises in chair. All VSS.  Barriers to return to prior living situation: decreased activity tolerance, medical status, weakness, sternal precautions  Recommendations for discharge: TCU; may be appropriate for ARU pending improved activity tolerance  Rationale for recommendations: Due to patient's current level of function below baseline, pt will benefit from therapy services in order to maximize functional mobility and independence.        Entered by: Estefania Orellana 02/25/2020 4:04 PM

## 2020-02-25 NOTE — PLAN OF CARE
OT 4A:  Discharge Planner OT   Patient plan for discharge: Not discussed  Current status: Pt requires max A for rolling for sling placement. Pt is dependent transfer supine > EOB > bedside chair.  Pt tolerated ~8 min. seated EOB with Mod A and vc to correct posture. Pt required mod A hand-over-hand for seated g/h tasks. Pt presents with memory and attention deficits with limited awareness of impairment. VSS on 2.5L O2 via nc.   Barriers to return to prior living situation: cognitive deficits, level of assist, acute medical status, weakness, post surgical precautions  Recommendations for discharge: TCU  Rationale for recommendations: Pt is currently below baseline with regards to mobility and independence with self cares and will benefit from continued skilled therapy intervention to address deficits.         Entered by: Peggy Graves 02/25/2020 11:04 AM

## 2020-02-25 NOTE — PLAN OF CARE
Summary: Patient is alert and oriented x 4. PERRL, 3 mm, round, brisk. Follows commands. Moves all extremities. Denies pain. NSR w/ PVCs. CVTS resident aware. At beginning of shift, patient went into LBBB. EKG done. VSS. Afebrile. +pulses. Lung sounds diminished. BIPAP overnight. Patient refused to wear mask intermediate through night and was then put on 2 L NC. Na+ 149. CVTS resident notified. D5 gtt started. K+ 3.4. Replaced x 2 overnight. Clear liquid diet. No BM overnight. [place] catheter in place with adequate urine output.     Safety concerns: Call light within reach    Plan of care: Continue to monitor Na Q 6 hrs.     Albaro Vasquez, AYDIN gave verbal report to Suzanna/AYDIN Rivera regarding the care of Vel Espana

## 2020-02-25 NOTE — PROVIDER NOTIFICATION
Notified CVTS that patient just flipped in to bundle branch block. Awaiting MD response.    AYDIN Briseno/AYDIN Rivera

## 2020-02-25 NOTE — PROGRESS NOTES
02/25/20 1500   Quick Adds   Type of Visit Initial PT Evaluation   Living Environment   Lives With other (see comments)  (Pt lives w/ significant other who is currently on hospice)   Living Arrangements mobile home   Home Accessibility no concerns   Transportation Anticipated car, drives self;family or friend will provide   Living Environment Comment Pt lives with significant other who is currently on hospice. Lives in a 1-level home with no stairs.   Self-Care   Usual Activity Tolerance good   Current Activity Tolerance poor   Equipment Currently Used at Home none   Activity/Exercise/Self-Care Comment Pt active at baseline and primary caregiver for significant other on hospice   Functional Level Prior   Ambulation 0-->independent   Transferring 0-->independent   Fall history within last six months no   Prior Functional Level Comment Pt IND with mobility and ADLs at baseline   General Information   Onset of Illness/Injury or Date of Surgery - Date 02/09/20   Patient/Family Goals Statement To get stronger/back to baseline mobility   Pertinent History of Current Problem (include personal factors and/or comorbidities that impact the POC) Vel Espana is a 74 year old male with PMH of HTN s/p Ascending aortic dissection repair using a 32 mm Dacron Gelweave graft, AVR with 27 mm Patterson bovine pericardial valve, Coronary bypass to right coronary artery, Left greater saphenous vein harvest, Circulatory arrest, Left femoral artery exploration on 2/10, remains intubated for airway protection for slowly resolving metabolic encephalopathy. Extubated 2/24   Precautions/Limitations fall precautions;sternal precautions   General Observations Pt in recliner upon arrival   Cognitive Status Examination   Orientation person;time;place;other (see comments)  (Pt oriented but demonstrated mild confusion)   Level of Consciousness alert;lethargic/somnolent   Follows Commands and Answers Questions 100% of the time   Personal Safety  "and Judgment intact   Memory intact   Cognitive Comment Pt alert and oriented but demonstrated intermittent confusion throughout the session   Posture    Posture Forward head position;Protracted shoulders   Range of Motion (ROM)   ROM Comment B UE/LE grossly WFL   Strength   Strength Comments Not formally tested; B UE demonstrated anti-gravity strength as able to lift and hold UEs, B LEs able to perform LAQs but unable to stand   Bed Mobility   Bed Mobility Comments Not assessed as pt remained in recliner throughout session   Transfer Skills   Transfer Comments Pt required Max Ax2 for partial STSx3 from recliner   Gait   Gait Comments Not assessed; pt not ready   Balance   Balance Comments Required Mod A in order to maintain static sitting balance at edge of recliner   Sensory Examination   Sensory Perception Comments Not formally tested; pt has gangrene to multiple digits of B feet    General Therapy Interventions   Planned Therapy Interventions bed mobility training;gait training;orthotic fitting/training;strengthening;stretching;transfer training;home program guidelines;progressive activity/exercise   Clinical Impression   Criteria for Skilled Therapeutic Intervention yes, treatment indicated   PT Diagnosis Impaired functional mobility   Influenced by the following impairments Strength, medical status   Functional limitations due to impairments Bed mobility, transfers, gait, endurance   Clinical Presentation Evolving/Changing   Clinical Presentation Rationale Based on PLOF, pt presentation, and clinical judgement   Clinical Decision Making (Complexity) Moderate complexity   Therapy Frequency 6x/week   Predicted Duration of Therapy Intervention (days/wks) 2 weeks   Anticipated Discharge Disposition Transitional Care Facility   Risk & Benefits of therapy have been explained Yes   Patient, Family & other staff in agreement with plan of care Yes   [facility] AM-PAC  \"6 Clicks\" V.2 Basic Mobility Inpatient " Short Form   1. Turning from your back to your side while in a flat bed without using bedrails? 2 - A Lot   2. Moving from lying on your back to sitting on the side of a flat bed without using bedrails? 2 - A Lot   3. Moving to and from a bed to a chair (including a wheelchair)? 1 - Total   4. Standing up from a chair using your arms (e.g., wheelchair, or bedside chair)? 1 - Total   5. To walk in hospital room? 1 - Total   6. Climbing 3-5 steps with a railing? 1 - Total   Basic Mobility Raw Score (Score out of 24.Lower scores equate to lower levels of function) 8   Total Evaluation Time   Total Evaluation Time (Minutes) 10

## 2020-02-25 NOTE — CONSULTS
[facility] PATIENT CONSULTATION NOTE  Consulted by: Magda GONZALEZ  Reason for consultation: Dry gangrene of toes     HPI:  Vel Espana is a 74 year old year old male who has a PMH significant for HTN, Type A aortic dissection s/p repair with dacron graft, AVR, coronary bypass, left greater saphenous vein harvest, circulatory arrest, and left femoral artery exploration and repair on 2/10.  Vascular surgery was consulted for bilateral toe gangrene.    Patient is extubated with family at bedside and answering questions appropriately. He endorses some pain in bilateral toes, but denies leg pain, cramping, or rest pain.  He has states the sensation in his foot is above the toes is normal.  He has not ambulated much since surgery, but has been up to chair with assist.  His family reports he will likely be transferred out of the unit and they are waiting on a TCU bed.  [facility]  He has never smoked.    Review Of Systems:   A complete 10 point review of systems was completed and otherwise negative unless stated in the HPI.    PAST MEDICAL HISTORY:  Past Medical History:   Diagnosis Date     Psychosexual dysfunction with inhibited sexual excitement      Unspecified essential hypertension      Unspecified sleep apnea        PAST SURGICAL HISTORY:  Past Surgical History:   Procedure Laterality Date     REPAIR ANEURYSM ASCENDING AORTA N/A 2/9/2020    Procedure: Median Sternotomy, repair of ascending aorta using 32mm gelweave graft, deep circulatory arrest,  aortic valve repair using 27mm inspiris valve, on-pump oxygenator, open saphenous vein harvest, coronary artery bypass x1, transesophageal echocardiogram done by anestheisa;  Surgeon: Nivia Mckeon MD;  Location:  OR       FAMILY HISTORY:  History reviewed. No pertinent family history.    SOCIAL HISTORY:   Social History     Tobacco Use     Smoking status: Never Smoker     Smokeless tobacco: Never Used  "  Substance Use Topics     Alcohol use: No           HOME MEDICATIONS:  Prior to Admission medications    Medication Sig Start Date End Date Taking? Authorizing Provider   aspirin 81 MG tablet Take 81 mg by mouth every other day     Reported, Patient   atenolol (TENORMIN) 25 MG tablet Take 25 mg by mouth daily    Reported, Patient   azithromycin (ZITHROMAX) 250 MG tablet Two tablets first day, then one tablet daily for four days. 12/16/19   Michael Medrano MD   benzonatate (TESSALON) 100 MG capsule Take 1 capsule (100 mg) by mouth 3 times daily as needed 12/16/19   Michael Medrano MD   predniSONE (DELTASONE) 20 MG tablet Take 2 tablets (40 mg) by mouth daily for 7 days 12/16/19 12/23/19  Michael Medrano MD       VITAL SIGNS:  /73 (BP Location: Left arm)   Pulse 88   Temp 98.9  F (37.2  C) (Axillary)   Resp 18   Ht 1.753 m (5' 9\")   Wt 130.5 kg (287 lb 11.2 oz)   SpO2 98%   BMI 42.49 kg/m      Intake/Output Summary (Last 24 hours) at 2/25/2020 1234  Last data filed at 2/25/2020 1200  Gross per 24 hour   Intake 856.9 ml   Output 6315 ml   Net -5458.1 ml       Labs:  ROUTINE IP LABS (Last four results)  BMP  Recent Labs   Lab 02/25/20  0901 02/25/20  0310 02/24/20  2112 02/24/20  1533  02/24/20  0337  02/23/20  1648 02/23/20  0851 02/23/20  0402   * 145* 149* 145*   < > 148*   < > 150* 143 148*   POTASSIUM 3.4 3.4 3.4 4.0  4.0   < > 3.6   < > 3.6 3.3* 3.1*   CHLORIDE  --   --   --   --   --  115*  --  117* 111* 117*   ELMER  --   --   --   --   --  8.0*  --  8.0* 7.4* 7.8*   CO2  --   --   --   --   --  28  --  27 26 25   BUN  --   --   --   --   --  98*  --  96* 110* 103*   CR  --  2.41*  --   --   --  2.52*  --  2.62* 2.46* 2.57*   GLC  --   --   --   --   --  181*  --  176* 408* 251*    < > = values in this interval not displayed.     CBC  Recent Labs   Lab 02/25/20  0555 02/24/20  1010 02/24/20  0337 02/23/20  1648 02/23/20  0851 02/23/20  0402   WBC 8.2  --  7.3 10.1  --  8.7   RBC 3.05*  " --  2.80* 2.91*  --  2.35*   HGB 9.2*  --  8.4* 8.9* 6.7* 7.2*   HCT 30.3*  --  27.7* 29.4*  --  24.2*   MCV 99  --  99 101*  --  103*   MCH 30.2  --  30.0 30.6  --  30.6   MCHC 30.4*  --  30.3* 30.3*  --  29.8*   RDW 16.2*  --  16.7* 16.7*  --  15.9*    284 288 304  --  283     INRNo lab results found in last 7 days.    PHYSICAL EXAM:  Constitutional: healthy, alert, no acute distress and cooperative   Cardiovascular: RRR  Respiratory: CTAB anteriorly, breathing unlabored without secondary muscle use  Psychiatric: mentation appears normal and affect normal/bright  Neck: no asymmetry.  MSK: able to move all extremities without weakness or ataxia  Extremities: Bilateral feet warm and well perfused with brisk cap refill.  Palpable bilateral DP/PT pulses.  Dry gangrene of all right toes and dry gangrene of left 1st and second toe.  No drainage or odor present.  See photos below.  Hematology: no bruising on visible skin    Right dorsal and plantar          Left dorsal and plantar.              Patient Active Problem List   Diagnosis     CARDIOVASCULAR SCREENING; LDL GOAL LESS THAN 160     HTN (hypertension)     S/P ascending aortic replacement     Acute kidney failure with tubular necrosis (H)       ASSESSMENT:  Vel Espana is a 74 year old year old male who has a PMH significant for HTN, Type A aortic dissection s/p repair with dacron graft, AVR, coronary bypass, left greater saphenous vein harvest, circulatory arrest, and left femoral artery exploration and repair on 2/10 with dry gangrene of right first through fifth and left first and second toes.  Gangrene likely secondary to pressor use and no signs of arterial occlusive disease.      PLAN:  -No vascular surgery interventions indicated at this time,   -Recommend podiatry consult, patient will likely need partial amputation on right foot.  -Keep feet protected with ambulation and monitor for signs of infection.   -Continue neurovascular  checks.  -Vascular surgery will sign off, please contact with questions or concerns.      Alayna Holder DNP, APRN, AGNP-C  Division of Vascular Surgery   [facility]   Pager: [phone]

## 2020-02-25 NOTE — PROGRESS NOTES
CTS PROGRESS NOTE        CO-MORBIDITIES:   Dissection of aorta, unspecified portion of aorta (H)    ASSESSMENT: Vel Espana is a 74 year old male with PMH of HTN s/p Ascending aortic dissection repair using a 32 mm Dacron Gelweave graft, AVR with 27 mm Patterson bovine pericardial valve, Coronary bypass to right coronary artery, Left greater saphenous vein harvest, Circulatory arrest, Left femoral artery exploration on 2/10, remains intubated for airway protection for slowly resolving metabolic encephalopathy. Extubated 2/24    Updates last 24h:   - Restarted D5W overnight for hypernatremia, which has since improved.   - Passed swallow study with full liquids recommended.  - LBBB briefly overnight (not new)   - Stopped insulin gtt  - Extubated 2/24  - Stopped Cefepime  - Stopped high intensity heparin, started dvt proph Heparin.    Changes today 2/25   - Olmstead replacement today. Trialed removing, but retention required re-insertion.  - Hold metolazone, continue Bumex   - Decrease D5W to 25 ml/hr  - Encourage PO fluid intake to prevent further hypernatremia.  - Repeat echo tomorrow to eval AV/RV.  - Vascular consult for eval of any further intervention needed for dissection/blackened toes.  - DC PPI and Seroquel  - Will consider Increasing sliding scale to high given blood sugar with D5.  - Podiatry consult per vascular  - Orthotic consult for offloading boot.  - Transfer upstairs.    PLAN:  Neuro/ pain/ sedation:  #Encephalopathy, resolved.   - Discontinue seroquel 50 mg at bedtime   - No pain. Has PRN if needed     Pulmonary care:   Extubated 2/24.   Bipap at night, has CPAP PTA for PRUDENCIO  # ventilator associated pneumonia- resolved  # Mod L pl effusion     Cardiovascular:    - Monitor hemodynamic status  - MAP >65, SBP <120  - Metoprolol 12.5mg BID scheduled , hold for MAP <65  - Repeat TTE tomorrow, per Dr. Mckeon  - Continue ASA, statin, BB  - PO Amio    # Non-sustained VT. Noted intra-op and again 2/21,  asymptomatic and hemodynamically stable. Started on re-bolused with amiodarone and reconsulted EP. Transitioned amiodarone to PO  # Known LBBB- seen overnight 2/24 and previous EKG on 2/10.   # Post op A-fib with RV- Possibly paroxysmal, appears on EKG 2/9 - no recurrence since 2/14  - Cardiology EP consult 2/12       GI care:   - Passed swallow study, full liquid diet.   - discontinue PPI     Fluids/ Electrolytes/ Nutrition:   #Hypernatremia, improving  - Resumed D5W, decreasing to 25/hr  - Encourage increased fluid intake PO so we can hopefully discontinue D5    Renal/ Fluid Balance:    #Nonoliguric HANNA- improved. Crt today with slight improvement 2.4 (2.5 previously)  - Urine output 7 L yesterday, net negative 4.3 L.   - Will continue to monitor intake and output, Cr, electrolytes.   - Lytes stable.   - Nephrology following, signed off yesterday.   - Renal ultrasound today per their recommendations. [facility]  - Hold metolazone given robust UOP, continue Bumex 4 mg BID  - Goal of net negative 1L.       Endocrine:    # Stress hyperglycemia  - Consider increase sliding scale insulin to high if D5 continues.  - Currently on SSI     ID/ Antibiotics:  # Ventilator associated pneumonia- completed course.   - ID signed off.   - Stopped Cefepime.   - Completed cefepime and flagyl (switched from zosyn 2/20 for renal function).   - Completed course of vanco  - s/p vanc 2/15 - 2/17, restarted 2/18-2/22 for ongoing fevers.  - CT CAP 2/19 - nothing to do for pericardial effusion per CVTS     Heme:     - Stable.   - Hgb goal >7, 2u transfusion 2/23     Prophylaxis:    - Hep DVT prophy  - Protonix DC     Lines/ tubes/ drains:  - PICC, Olmstead     Disposition:  - CV ICU     Patient seen, findings and plan discussed with staff, Dr. Meyer and CVTS fellow, Dr. Maradiaga      ====================================    SUBJECTIVE:   No acute overnight events. Feeling ok this AM. Following commands. Sitting upright. No other  complaints voiced.     OBJECTIVE:   1. VITAL SIGNS:   Temp:  [97.6  F (36.4  C)-99.2  F (37.3  C)] 99.2  F (37.3  C)  Pulse:  [] 92  Heart Rate:  [] 94  Resp:  [16-28] 18  BP: ()/() 114/70  MAP:  [64 mmHg-87 mmHg] 75 mmHg  Arterial Line BP: ()/(49-67) 135/49  FiO2 (%):  [30 %-40 %] 30 %  SpO2:  [89 %-100 %] 93 %  Ventilation Mode: CPAP/PS  (Continuous positive airway pressure with Pressure Support)  FiO2 (%): 30 %  Rate Set (breaths/minute): 12 breaths/min  Tidal Volume Set (mL): 450 mL  PEEP (cm H2O): 8 cmH2O  Pressure Support (cm H2O): 8 cmH2O  Oxygen Concentration (%): 40 %  Resp: 18      2. INTAKE/ OUTPUT:   I/O last 3 completed shifts:  In: 2962.46 [I.V.:807.46; NG/GT:1225]  Out: 7415 [Urine:7415]    3. PHYSICAL EXAMINATION:   General: pleasant, in NAD, Very interactive  Neuro: Opens eyes to voice, follows commands in all 4 extremities  Resp: Diminished lung sounds bilaterally, good air movement.  CV: RRR currently  Abdomen: Soft, Non-distended, Non-tender  Incisions: c/d/i  Extremities: warm and well perfused with mild edema, dark toes, without extension, stable.      4. INVESTIGATIONS:   Arterial Blood Gases   Recent Labs   Lab 02/24/20  1214 02/23/20  0539 02/21/20  1410 02/21/20  0521   PH 7.46* 7.44 7.51* 7.47*   PCO2 39 36 30* 35   PO2 117* 121* 95 125*   HCO3 28 25 24 25     Complete Blood Count   Recent Labs   Lab 02/24/20  1010 02/24/20  0337 02/23/20  1648 02/23/20  0851 02/23/20  0402 02/22/20  0354   WBC  --  7.3 10.1  --  8.7 12.1*   HGB  --  8.4* 8.9* 6.7* 7.2* 7.8*    288 304  --  283 297     Basic Metabolic Panel  Recent Labs   Lab 02/25/20  0310 02/24/20  2112 02/24/20  1533 02/24/20  1010 02/24/20  0337  02/23/20  1648 02/23/20  0851 02/23/20  0402   * 149* 145* 147* 148*   < > 150* 143 148*   POTASSIUM 3.4 3.4 4.0  4.0 3.7 3.6   < > 3.6 3.3* 3.1*   CHLORIDE  --   --   --   --  115*  --  117* 111* 117*   CO2  --   --   --   --  28  --  27 26 25   BUN   --   --   --   --  98*  --  96* 110* 103*   CR  --   --   --   --  2.52*  --  2.62* 2.46* 2.57*   GLC  --   --   --   --  181*  --  176* 408* 251*    < > = values in this interval not displayed.       5. RADIOLOGY:   No results found for this or any previous visit (from the past 24 hour(s)).    =========================================

## 2020-02-26 ENCOUNTER — APPOINTMENT (OUTPATIENT)
Dept: SPEECH THERAPY | Facility: CLINIC | Age: 75
DRG: 219 | End: 2020-02-26
Payer: COMMERCIAL

## 2020-02-26 ENCOUNTER — APPOINTMENT (OUTPATIENT)
Dept: PHYSICAL THERAPY | Facility: CLINIC | Age: 75
DRG: 219 | End: 2020-02-26
Payer: COMMERCIAL

## 2020-02-26 ENCOUNTER — APPOINTMENT (OUTPATIENT)
Dept: CT IMAGING | Facility: CLINIC | Age: 75
DRG: 219 | End: 2020-02-26
Payer: COMMERCIAL

## 2020-02-26 LAB
ANION GAP SERPL CALCULATED.3IONS-SCNC: 4 MMOL/L (ref 3–14)
BASOPHILS # BLD AUTO: 0 10E9/L (ref 0–0.2)
BASOPHILS NFR BLD AUTO: 0.2 %
BUN SERPL-MCNC: 87 MG/DL (ref 7–30)
CALCIUM SERPL-MCNC: 8.7 MG/DL (ref 8.5–10.1)
CHLORIDE SERPL-SCNC: 110 MMOL/L (ref 94–109)
CO2 SERPL-SCNC: 34 MMOL/L (ref 20–32)
CREAT SERPL-MCNC: 1.98 MG/DL (ref 0.66–1.25)
DIFFERENTIAL METHOD BLD: ABNORMAL
EOSINOPHIL # BLD AUTO: 0.2 10E9/L (ref 0–0.7)
EOSINOPHIL NFR BLD AUTO: 2.4 %
ERYTHROCYTE [DISTWIDTH] IN BLOOD BY AUTOMATED COUNT: 15.5 % (ref 10–15)
GFR SERPL CREATININE-BSD FRML MDRD: 32 ML/MIN/{1.73_M2}
GLUCOSE BLDC GLUCOMTR-MCNC: 125 MG/DL (ref 70–99)
GLUCOSE BLDC GLUCOMTR-MCNC: 126 MG/DL (ref 70–99)
GLUCOSE BLDC GLUCOMTR-MCNC: 130 MG/DL (ref 70–99)
GLUCOSE BLDC GLUCOMTR-MCNC: 160 MG/DL (ref 70–99)
GLUCOSE SERPL-MCNC: 131 MG/DL (ref 70–99)
HCT VFR BLD AUTO: 32.5 % (ref 40–53)
HGB BLD-MCNC: 10 G/DL (ref 13.3–17.7)
IMM GRANULOCYTES # BLD: 0.2 10E9/L (ref 0–0.4)
IMM GRANULOCYTES NFR BLD: 1.8 %
LYMPHOCYTES # BLD AUTO: 1.1 10E9/L (ref 0.8–5.3)
LYMPHOCYTES NFR BLD AUTO: 12.8 %
MAGNESIUM SERPL-MCNC: 2.4 MG/DL (ref 1.6–2.3)
MCH RBC QN AUTO: 30.7 PG (ref 26.5–33)
MCHC RBC AUTO-ENTMCNC: 30.8 G/DL (ref 31.5–36.5)
MCV RBC AUTO: 100 FL (ref 78–100)
MONOCYTES # BLD AUTO: 0.6 10E9/L (ref 0–1.3)
MONOCYTES NFR BLD AUTO: 6.3 %
NEUTROPHILS # BLD AUTO: 6.6 10E9/L (ref 1.6–8.3)
NEUTROPHILS NFR BLD AUTO: 76.5 %
NRBC # BLD AUTO: 0 10*3/UL
NRBC BLD AUTO-RTO: 0 /100
PHOSPHATE SERPL-MCNC: 3.8 MG/DL (ref 2.5–4.5)
PLATELET # BLD AUTO: 355 10E9/L (ref 150–450)
POTASSIUM SERPL-SCNC: 3.3 MMOL/L (ref 3.4–5.3)
POTASSIUM SERPL-SCNC: 3.5 MMOL/L (ref 3.4–5.3)
POTASSIUM SERPL-SCNC: 4.6 MMOL/L (ref 3.4–5.3)
RBC # BLD AUTO: 3.26 10E12/L (ref 4.4–5.9)
SODIUM SERPL-SCNC: 146 MMOL/L (ref 133–144)
SODIUM SERPL-SCNC: 146 MMOL/L (ref 133–144)
SODIUM SERPL-SCNC: 147 MMOL/L (ref 133–144)
SODIUM SERPL-SCNC: 148 MMOL/L (ref 133–144)
WBC # BLD AUTO: 8.7 10E9/L (ref 4–11)

## 2020-02-26 PROCEDURE — 25000132 ZZH RX MED GY IP 250 OP 250 PS 637: Performed by: PHYSICIAN ASSISTANT

## 2020-02-26 PROCEDURE — 70486 CT MAXILLOFACIAL W/O DYE: CPT

## 2020-02-26 PROCEDURE — 40000275 ZZH STATISTIC RCP TIME EA 10 MIN

## 2020-02-26 PROCEDURE — 25000128 H RX IP 250 OP 636: Performed by: STUDENT IN AN ORGANIZED HEALTH CARE EDUCATION/TRAINING PROGRAM

## 2020-02-26 PROCEDURE — 25000132 ZZH RX MED GY IP 250 OP 250 PS 637: Performed by: STUDENT IN AN ORGANIZED HEALTH CARE EDUCATION/TRAINING PROGRAM

## 2020-02-26 PROCEDURE — 20000004 ZZH R&B ICU UMMC

## 2020-02-26 PROCEDURE — 80048 BASIC METABOLIC PNL TOTAL CA: CPT | Performed by: STUDENT IN AN ORGANIZED HEALTH CARE EDUCATION/TRAINING PROGRAM

## 2020-02-26 PROCEDURE — 84100 ASSAY OF PHOSPHORUS: CPT | Performed by: STUDENT IN AN ORGANIZED HEALTH CARE EDUCATION/TRAINING PROGRAM

## 2020-02-26 PROCEDURE — 97530 THERAPEUTIC ACTIVITIES: CPT | Mod: GP

## 2020-02-26 PROCEDURE — 85025 COMPLETE CBC W/AUTO DIFF WBC: CPT | Performed by: STUDENT IN AN ORGANIZED HEALTH CARE EDUCATION/TRAINING PROGRAM

## 2020-02-26 PROCEDURE — 84132 ASSAY OF SERUM POTASSIUM: CPT | Performed by: STUDENT IN AN ORGANIZED HEALTH CARE EDUCATION/TRAINING PROGRAM

## 2020-02-26 PROCEDURE — 25000132 ZZH RX MED GY IP 250 OP 250 PS 637: Performed by: ANESTHESIOLOGY

## 2020-02-26 PROCEDURE — 92526 ORAL FUNCTION THERAPY: CPT | Mod: GN

## 2020-02-26 PROCEDURE — L3260 AMBULATORY SURGICAL BOOT EAC: HCPCS

## 2020-02-26 PROCEDURE — 99233 SBSQ HOSP IP/OBS HIGH 50: CPT | Mod: GC | Performed by: INTERNAL MEDICINE

## 2020-02-26 PROCEDURE — 40000809 ZZH STATISTIC NO DOCUMENTATION TO SUPPORT CHARGE

## 2020-02-26 PROCEDURE — 94660 CPAP INITIATION&MGMT: CPT

## 2020-02-26 PROCEDURE — 97110 THERAPEUTIC EXERCISES: CPT | Mod: GP

## 2020-02-26 PROCEDURE — 83735 ASSAY OF MAGNESIUM: CPT | Performed by: STUDENT IN AN ORGANIZED HEALTH CARE EDUCATION/TRAINING PROGRAM

## 2020-02-26 PROCEDURE — 84295 ASSAY OF SERUM SODIUM: CPT | Performed by: STUDENT IN AN ORGANIZED HEALTH CARE EDUCATION/TRAINING PROGRAM

## 2020-02-26 PROCEDURE — 25000128 H RX IP 250 OP 636: Performed by: PHYSICIAN ASSISTANT

## 2020-02-26 PROCEDURE — 00000146 ZZHCL STATISTIC GLUCOSE BY METER IP

## 2020-02-26 PROCEDURE — 25000132 ZZH RX MED GY IP 250 OP 250 PS 637: Performed by: THORACIC SURGERY (CARDIOTHORACIC VASCULAR SURGERY)

## 2020-02-26 RX ORDER — QUETIAPINE FUMARATE 25 MG/1
25 TABLET, FILM COATED ORAL AT BEDTIME
Status: DISCONTINUED | OUTPATIENT
Start: 2020-02-26 | End: 2020-02-27

## 2020-02-26 RX ORDER — BUMETANIDE 0.25 MG/ML
2 INJECTION INTRAMUSCULAR; INTRAVENOUS
Status: DISCONTINUED | OUTPATIENT
Start: 2020-02-26 | End: 2020-02-28

## 2020-02-26 RX ADMIN — BUMETANIDE 4 MG: 0.25 INJECTION INTRAMUSCULAR; INTRAVENOUS at 08:44

## 2020-02-26 RX ADMIN — POLYETHYLENE GLYCOL 3350 17 G: 17 POWDER, FOR SOLUTION ORAL at 08:44

## 2020-02-26 RX ADMIN — Medication 12.5 MG: at 08:44

## 2020-02-26 RX ADMIN — QUETIAPINE FUMARATE 25 MG: 25 TABLET ORAL at 21:33

## 2020-02-26 RX ADMIN — BUMETANIDE 2 MG: 0.25 INJECTION INTRAMUSCULAR; INTRAVENOUS at 16:12

## 2020-02-26 RX ADMIN — INSULIN ASPART 1 UNITS: 100 INJECTION, SOLUTION INTRAVENOUS; SUBCUTANEOUS at 16:14

## 2020-02-26 RX ADMIN — POTASSIUM CHLORIDE 20 MEQ: 29.8 INJECTION, SOLUTION INTRAVENOUS at 14:00

## 2020-02-26 RX ADMIN — AMIODARONE HYDROCHLORIDE 200 MG: 200 TABLET ORAL at 08:44

## 2020-02-26 RX ADMIN — SENNOSIDES AND DOCUSATE SODIUM 1 TABLET: 8.6; 5 TABLET ORAL at 19:39

## 2020-02-26 RX ADMIN — Medication 5000 UNITS: at 08:44

## 2020-02-26 RX ADMIN — ASPIRIN 81 MG CHEWABLE TABLET 81 MG: 81 TABLET CHEWABLE at 08:44

## 2020-02-26 RX ADMIN — POTASSIUM CHLORIDE 20 MEQ: 750 TABLET, EXTENDED RELEASE ORAL at 04:47

## 2020-02-26 RX ADMIN — SENNOSIDES AND DOCUSATE SODIUM 1 TABLET: 8.6; 5 TABLET ORAL at 08:43

## 2020-02-26 RX ADMIN — ATORVASTATIN CALCIUM 40 MG: 40 TABLET, FILM COATED ORAL at 19:39

## 2020-02-26 RX ADMIN — LIDOCAINE 3 PATCH: 560 PATCH PERCUTANEOUS; TOPICAL; TRANSDERMAL at 08:44

## 2020-02-26 RX ADMIN — Medication 5000 UNITS: at 19:39

## 2020-02-26 RX ADMIN — POTASSIUM CHLORIDE 20 MEQ: 29.8 INJECTION, SOLUTION INTRAVENOUS at 12:37

## 2020-02-26 RX ADMIN — Medication 12.5 MG: at 19:39

## 2020-02-26 ASSESSMENT — ACTIVITIES OF DAILY LIVING (ADL)
ADLS_ACUITY_SCORE: 18
ADLS_ACUITY_SCORE: 19
ADLS_ACUITY_SCORE: 19
ADLS_ACUITY_SCORE: 18
ADLS_ACUITY_SCORE: 19
ADLS_ACUITY_SCORE: 18

## 2020-02-26 ASSESSMENT — MIFFLIN-ST. JEOR: SCORE: 1986.38

## 2020-02-26 NOTE — PROGRESS NOTES
S: order received to see patient at [facility] for an eval for an offloading shoe as ordered.  O/G: offload ulcerated area on patient's Bilateral feet  A: patient seen for an evaluation.  Pegs were not removed under toes as it is difficult to unload the toes in the DH2 shoe. Orthowedge does a good job unloading the toes but very unstable to transfer/ambulate. Since the order Is for bilateral the DH2 shoe is the best option for this patient. The  Patient was fit with a size Large DH2 offloading shoes.  P: patient to contact us if any issues arise.   Jorje ALDANA,LO.

## 2020-02-26 NOTE — PLAN OF CARE
OT 4A. Cancel. Pt OOR upon AM attempt. Will check back in PM as schedule allows/reschedule per POC.

## 2020-02-26 NOTE — PLAN OF CARE
Summary: Patient is alert and oriented x 2. PERRL, 2 mm, round, brisk. Follows commands. Moves all extremities. CAM +. Denies pain. NSR w/ PVCs. VSS. Afebrile. +pulses. Lung sounds diminished. Patient tried BIPAP, but later refused. 2 L NC. K+ replaced x 2. No BM overnight. Olmstead catheter in place with adequate urine output.     Safety concerns: Call light within reach    Plan of care: Continue to monitor Na Q 6 hrs.     Albaro Vasquez RN gave verbal report to AYDIN Mueller regarding the care of Vel STEPHON Espana

## 2020-02-26 NOTE — PLAN OF CARE
Discharge Planner SLP   Patient plan for discharge: unknown  Current status: Recommend continue dysphagia diet level 2 with thin liquids as tolerated when awake/alert and sitting upright. Pt will require 1:1 assist for feeding. ST to continue to follow for PO tolerance and to assist with diet advance as appropriate.  Barriers to return to prior living situation: dysphagia; medical status  Recommendations for discharge: TCU  Rationale for recommendations: Pt is below baseline function. Anticipate ongoing multidisciplinary needs post discharge       Entered by: Sarah Orona 02/26/2020 10:48 AM

## 2020-02-26 NOTE — PROGRESS NOTES
CTS PROGRESS NOTE        CO-MORBIDITIES:   Dissection of aorta, unspecified portion of aorta (H)    ASSESSMENT: Vel Espana is a 74 year old male with PMH of HTN s/p Ascending aortic dissection repair using a 32 mm Dacron Gelweave graft, AVR with 27 mm Patterson bovine pericardial valve, Coronary bypass to right coronary artery, Left greater saphenous vein harvest, Circulatory arrest, Left femoral artery exploration on 2/10, remains intubated for airway protection for slowly resolving metabolic encephalopathy. Extubated 2/24    Changes today 2/26  - dental consult: no not remove teeth without speaking to cvts team  -bumex 2mg bid   -reorder sequel hs   -vascular: no further dissection, no further plans from vascular surgery   -encourage PO  -echo 2/25    PLAN:  Neuro/ pain/ sedation:  #Encephalopathy, resolved.   - restart seroquel 50 mg at bedtime, some delirium today  - No pain. Has PRN if needed     Pulmonary care:   Extubated 2/24.   Bipap at night, has CPAP PTA for PRUDENCIO  # ventilator associated pneumonia- resolved  # Mod L pl effusion     Cardiovascular:    - Monitor hemodynamic status  - MAP >65, SBP <120  - Metoprolol 12.5mg BID scheduled , hold for MAP <65  -echo 2/25  Left ventricular function is normal.The EF is 60-65%.  Moderate right ventricular dilation is present.  Global right ventricular function is moderately reduced.  AVR with 27 mm Patterson bovine pericardial valve. No paraprosthetic  regurgitation. Mean gradient is 6 mmHg.  - Continue ASA, statin, BB  - PO Amio    # Non-sustained VT. Noted intra-op and again 2/21, asymptomatic and hemodynamically stable. Started on re-bolused with amiodarone and reconsulted EP. Transitioned amiodarone to PO  # Known LBBB- seen overnight 2/24 and previous EKG on 2/10.   # Post op A-fib with RV- Possibly paroxysmal, appears on EKG 2/9 - no recurrence since 2/14  - Cardiology EP consult 2/12     GI care:   - Passed swallow study, full liquid diet.   -  discontinue PPI     Fluids/ Electrolytes/ Nutrition:   #Hypernatremia, improving  - Resumed D5W, decreasing to 25/hr  - Encourage increased fluid intake PO so we can hopefully discontinue D5    Renal/ Fluid Balance:    #Nonoliguric HANNA- improved. Crt today continues to improve   - Will continue to monitor intake and output, Cr, electrolytes.  -decrease Bumex to 2mg bid    - Lytes stable.   - Nephrology following, signed off yesterday.   - Renal ultrasound:Left ventricular function is normal.The EF is 60-65%.  Moderate right ventricular dilation is present.  Global right ventricular function is moderately reduced.  AVR with 27 mm Patterson bovine pericardial valve. No paraprosthetic  regurgitation. Mean gradient is 6 mmHg.  . [facility]   - Goal of net negative 1L.       Endocrine:    # Stress hyperglycemia  - Consider increase sliding scale insulin to high if D5 continues.  - Currently on SSI     ID/ Antibiotics:  # Ventilator associated pneumonia- completed course.   - ID signed off.   - Stopped Cefepime.   - Completed cefepime and flagyl (switched from zosyn 2/20 for renal function).   - Completed course of vanco  - s/p vanc 2/15 - 2/17, restarted 2/18-2/22 for ongoing fevers.  - CT CAP 2/19 - nothing to do for pericardial effusion per CVTS     Heme:     - Stable.   - Hgb goal >7, 2u transfusion 2/23     Prophylaxis:    - Hep DVT prophy  - Protonix DC     Lines/ tubes/ drains:  - PICC, Olmstead     Disposition:  - CV ICU     Patient seen, findings and plan discussed with staff, Dr. Meyer and CVTS fellow, Dr. Maradiaga      ====================================    SUBJECTIVE:   Slept poorly, mild delirium      OBJECTIVE:   1. VITAL SIGNS:   Temp:  [97.1  F (36.2  C)-98.5  F (36.9  C)] 97.1  F (36.2  C)  Pulse:  [79-89] 83  Heart Rate:  [80-90] 83  Resp:  [13-18] 13  BP: ()/(58-91) 109/75  FiO2 (%):  [30 %] 30 %  SpO2:  [90 %-99 %] 95 %  FiO2 (%): 30 %  Resp: 13      2. INTAKE/ OUTPUT:   I/O last  3 completed shifts:  In: 191.09 [P.O.:1460; I.V.:457.09]  Out: 5500 [Urine:5500]    3. PHYSICAL EXAMINATION:   General: pleasant, in NAD, delirium   Neuro: Opens eyes to voice, follows commands in all 4 extremities  Resp: Diminished lung sounds bilaterally, good air movement.  CV: RRR currently  Abdomen: Soft, Non-distended, Non-tender  Incisions: c/d/i  Extremities: warm and well perfused with mild edema, dark toes, without extension, stable.      4. INVESTIGATIONS:   Arterial Blood Gases   Recent Labs   Lab 20  1214 20  0539 20  1410 20  0521   PH 7.46* 7.44 7.51* 7.47*   PCO2 39 36 30* 35   PO2 117* 121* 95 125*   HCO3 28      Complete Blood Count   Recent Labs   Lab 20  0859 20  0555 20  1010 20  0337 20  1648   WBC 8.7 8.2  --  7.3 10.1   HGB 10.0* 9.2*  --  8.4* 8.9*    335 284 288 304     Basic Metabolic Panel  Recent Labs   Lab 20  0859 20  0224 20  2129 20  1556  20  0310  20  0337  20  1648 20  0851   * 147* 148* 147*   < > 145*   < > 148*   < > 150* 143   POTASSIUM 3.3* 3.5 3.3* 3.4   < > 3.4   < > 3.6   < > 3.6 3.3*   CHLORIDE 110*  --   --   --   --   --   --  115*  --  117* 111*   CO2 34*  --   --   --   --   --   --  28  --  27 26   BUN 87*  --   --   --   --   --   --  98*  --  96* 110*   CR 1.98*  --   --   --   --  2.41*  --  2.52*  --  2.62* 2.46*   *  --   --   --   --   --   --  181*  --  176* 408*    < > = values in this interval not displayed.       5. RADIOLOGY:   Recent Results (from the past 24 hour(s))   Echocardiogram Limited    Narrative    [id number]  [id number]  [id number]  [id number]^OLEG^GORGE^VIRAJ           [facility],[place]  Echocardiography Laboratory  [address] [place] [id number]     Name: RAJ ARCINIEGA  MRN: [id number]  : 1945  Study Date: 2020 01:31 PM  Age: 74 yrs  Gender: Male  Patient Location:  [id number]  Reason For Study: Aortic Valve Replacement. Assess AV and RV  Ordering Physician: GORGE DEJESUS  Performed By: Carlene Quigley RDCS, RVT     BSA: 2.4 m2  Height: 69 in  Weight: 287 lb  BP: 121/69 mmHg  _____________________________________________________________________________  __        Procedure  Limited Portable Echo Adult.  _____________________________________________________________________________  __        Interpretation Summary  Post ascending aortic dissection repair and aortic valve replacement.     Left ventricular function is normal.The EF is 60-65%.  Moderate right ventricular dilation is present.  Global right ventricular function is moderately reduced.  AVR with 27 mm Patterson bovine pericardial valve. No paraprosthetic  regurgitation. Mean gradient is 6 mmHg.  Dilation of the inferior vena cava is present with normal respiratory  variation in diameter.  No pericardial effusion is present.  _____________________________________________________________________________  __        Left Ventricle  Left ventricular size is normal. Left ventricular function is normal.The EF is  60-65%.     Right Ventricle  Moderate right ventricular dilation is present. Global right ventricular  function is moderately reduced.     Mitral Valve  Mild mitral annular calcification is present.        Aortic Valve  AVR with 27 mm Patterson bovine pericardial valve. No paraprosthetic  regurgitation. Mean gradient is 6 mmHg.     Tricuspid Valve  Trace to mild tricuspid insufficiency is present. Estimated pulmonary artery  systolic pressure is 24 mmHg plus right atrial pressure. Pulmonary artery  systolic pressure is normal.     Vessels  Dilation of the inferior vena cava is present with normal respiratory  variation in diameter. IVC diameter and respiratory changes fall into an  intermediate range suggesting an RA pressure of 8 mmHg.     Pericardium  No pericardial effusion is present. Prominent epicardial fat is noted.      Miscellaneous  A left pleural effusion is present.     Compared to Previous Study  This study was compared with the study from 20 . Overall better  assessment of biventricular function today.     _____________________________________________________________________________  __  MMode/2D Measurements & Calculations  LVOT diam: 2.5 cm  LVOT area: 4.9 cm2           Doppler Measurements & Calculations  Ao V2 max: 170.0 cm/sec  Ao max P.0 mmHg  Ao V2 mean: 113.0 cm/sec  Ao mean P.0 mmHg  Ao V2 VTI: 28.1 cm  JUAN DIEGO(I,D): 3.3 cm2  JUAN DIEGO(V,D): 2.9 cm2  LV V1 max P.2 mmHg  LV V1 max: 102.0 cm/sec  LV V1 VTI: 19.0 cm  SV(LVOT): 93.3 ml  SI(LVOT): 38.7 ml/m2  TR max maurizio: 230.5 cm/sec  TR max P.3 mmHg  AV Maurizio Ratio (DI): 0.60  JUAN DIEGO Index (cm2/m2): 1.4     _____________________________________________________________________________  __           Report approved by: Eliel Yancey 2020 02:20 PM      XR Foot Bilateral G/E 3 Views    Narrative    EXAM: XR FOOT BILATERAL G/E 3 VW  2020 6:10 PM      HISTORY: Dry gangrene    COMPARISON: None    FINDINGS: 3 nonweightbearing views of the feet.    Left foot: Disorganized osseous proliferation about the mid foot  compatible with neuropathic arthropathy. Lateral offset of the base of  the second metatarsal relative to the intermediate cuneiform. Severe  tarsometatarsal joint degenerative change. No definite osteolysis.  Soft tissues unremarkable.    Right foot: No acute osseous abnormality. No definite osteolysis.  Diminutive soft tissues about the second digit distally. Disorganize  osseous proliferation about the midfoot, much less pronounced compared  to the left. No acute fracture. Plantar calcaneal heel spur. Distal  Achilles insertional enthesophyte. Soft tissues unremarkable.       Impression    IMPRESSION:   1. No definite osteolysis to indicate osteomyelitis.    2. Left-sided Charcot arthropathy. Less pronounced midfoot  degenerative change of the  right foot.    3. Lateral offset of the left second metatarsal relative to the  intermediate cuneiform may indicate prior Lisfranc injury.    DARRYL (Eli ELDER MD   CT Dental wo Contrast    Narrative    CT DENTAL WO CONTRAST 2/26/2020 9:50 AM    History:  Imaging required for oral diagnosis.    Comparison:  Head CT 2/17/2020.      TECHNIQUE: 3-D reconstruction by the technologists, with curved  multiplanar reformat of thin section imaging through the mandible and  maxilla obtained without intravenous contrast.    FINDINGS:  Edentulous maxilla. Mandibular dental prosthesis. Minimal periapical  lucency surrounding a residual right mandibular incisor.    No significant soft tissue swelling or mass.    Moderate circumferential right maxillary sinus mucosal thickening,  slightly decreased since 2/17/2020. Mucosal thickening continues to  occlude the right maxillary antrostomy. 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 Also slightly  decreased right sphenoid mucous thickening. Persistent right greater  than left ethmoid mucosal thickening.    Multilevel cervical spondylosis.      Impression    IMPRESSION:  1. Minimal periapical disease of a residual right mandibular incisor.  2. Chronic sinusitis, slightly improved since 2/17/2020.    MARC HERRERA MD       =========================================

## 2020-02-26 NOTE — PLAN OF CARE
4A:  Discharge Planner PT   Patient plan for discharge: Not discussed  Current status: Pt alert and oriented; demonstrated intermittent confusion throughout. In order to facilitate WB activity; performed 3 sets of 10 reps.  of modified squats on the Moveo. OH lifted from Moveo > recliner. Pt lethargic and needed frequent rousing throughout session. Tolerated Moveo well but required frequent encouragement, VC, and tactile cues to maintain participation. VSS on 3L via NC.    Barriers to return to prior living situation: decreased activity tolerance, medical status, weakness, sternal precautions  Recommendations for discharge: TCU; may be appropriate for ARU pending improved activity tolerance  Rationale for recommendations: Due to patient's current level of function below baseline, pt will benefit from therapy services in order to maximize functional mobility and independence.        Entered by: Benjy Cam 02/26/2020 11:44 AM

## 2020-02-26 NOTE — PROGRESS NOTES
Social Work Services Progress Note    Hospital Day: 17  Date of Initial Social Work Evaluation:  2/11/2020 - Please see for details   Collaborated with:  Edwin ([facility], [phone]) and chart review     Data:  Pt is a 74 year old male with PMH of HTN s/p Ascending aortic dissection repair using a 32 mm Dacron Gelweave graft, AVR with 27 mm Patterson bovine pericardial valve, Coronary bypass to right coronary artery, Left greater saphenous vein harvest, Circulatory arrest, Left femoral artery exploration on 2/10.     Intervention:  JOSE ANGEL received a VM from Edwin at [facility] requested a call back to coordinate care for pt and pt's spouse.     JOSE ANGEL called Edwin and left a VM on confidential VM indicating family's first choice is [facility] in [place] and inquired about coordinating for spouse to go there as well. JOSE ANGEL reported that hospital SW will coordinate pt's care and outside referral will need to be completed for pt's spouse on hospice for facility bed as well.     Assessment:  Coordination of care interaction - no contact made for this interaction    Plan:    Anticipated Disposition:  Facility:  TCU: TBD     Barriers to d/c plan:  Medical stability     Follow Up:  JOSE ANGEL to follow as needed    RUSH Watters, [facility]  Acute Care Float   [facility]  Pager: [phone]

## 2020-02-26 NOTE — PLAN OF CARE
Major Shift Events:  Up in chair x2. Worked with PT on standing. Mechanical soft diet in place. Transfer orders in place. Trending Sodiums. CVTS aware.   Plan: Continue to monitor and notify CVTS of any changes.  For vital signs and complete assessments, please see documentation flowsheets.

## 2020-02-26 NOTE — PROGRESS NOTES
Followed peripherally   Renal US ,2/24/20 reviewed : IMPRESSION:  Technically limited evaluation of the kidneys and bladder with nonobstructive bilateral renal calculi and no hydronephrosis.    Sodium 145 - 149 , today  - on D5 water infusion per primary team.   Advise to avoid correcting > 8 meq/24 hrs    Nephrology team will sig off  Please arrange Nephrology clinic appointment in 2-3 weeks following discharge        Piero Yun MD, FACP  Nephrology Fellow   [facility]   Pager [phone]

## 2020-02-27 ENCOUNTER — APPOINTMENT (OUTPATIENT)
Dept: OCCUPATIONAL THERAPY | Facility: CLINIC | Age: 75
DRG: 219 | End: 2020-02-27
Payer: COMMERCIAL

## 2020-02-27 ENCOUNTER — APPOINTMENT (OUTPATIENT)
Dept: PHYSICAL THERAPY | Facility: CLINIC | Age: 75
DRG: 219 | End: 2020-02-27
Payer: COMMERCIAL

## 2020-02-27 ENCOUNTER — APPOINTMENT (OUTPATIENT)
Dept: SPEECH THERAPY | Facility: CLINIC | Age: 75
DRG: 219 | End: 2020-02-27
Payer: COMMERCIAL

## 2020-02-27 LAB
ANION GAP SERPL CALCULATED.3IONS-SCNC: 2 MMOL/L (ref 3–14)
ANION GAP SERPL CALCULATED.3IONS-SCNC: 4 MMOL/L (ref 3–14)
BASOPHILS # BLD AUTO: 0 10E9/L (ref 0–0.2)
BASOPHILS NFR BLD AUTO: 0.3 %
BUN SERPL-MCNC: 88 MG/DL (ref 7–30)
BUN SERPL-MCNC: 91 MG/DL (ref 7–30)
CALCIUM SERPL-MCNC: 8.9 MG/DL (ref 8.5–10.1)
CALCIUM SERPL-MCNC: 9 MG/DL (ref 8.5–10.1)
CHLORIDE SERPL-SCNC: 110 MMOL/L (ref 94–109)
CHLORIDE SERPL-SCNC: 110 MMOL/L (ref 94–109)
CO2 SERPL-SCNC: 34 MMOL/L (ref 20–32)
CO2 SERPL-SCNC: 35 MMOL/L (ref 20–32)
CREAT SERPL-MCNC: 1.93 MG/DL (ref 0.66–1.25)
CREAT SERPL-MCNC: 1.99 MG/DL (ref 0.66–1.25)
DIFFERENTIAL METHOD BLD: ABNORMAL
EOSINOPHIL # BLD AUTO: 0.2 10E9/L (ref 0–0.7)
EOSINOPHIL NFR BLD AUTO: 2.8 %
ERYTHROCYTE [DISTWIDTH] IN BLOOD BY AUTOMATED COUNT: 15.2 % (ref 10–15)
GFR SERPL CREATININE-BSD FRML MDRD: 31 ML/MIN/{1.73_M2}
GFR SERPL CREATININE-BSD FRML MDRD: 33 ML/MIN/{1.73_M2}
GLUCOSE BLDC GLUCOMTR-MCNC: 135 MG/DL (ref 70–99)
GLUCOSE BLDC GLUCOMTR-MCNC: 138 MG/DL (ref 70–99)
GLUCOSE BLDC GLUCOMTR-MCNC: 83 MG/DL (ref 70–99)
GLUCOSE BLDC GLUCOMTR-MCNC: 96 MG/DL (ref 70–99)
GLUCOSE SERPL-MCNC: 132 MG/DL (ref 70–99)
GLUCOSE SERPL-MCNC: 185 MG/DL (ref 70–99)
HCT VFR BLD AUTO: 33.9 % (ref 40–53)
HGB BLD-MCNC: 10.4 G/DL (ref 13.3–17.7)
IMM GRANULOCYTES # BLD: 0.1 10E9/L (ref 0–0.4)
IMM GRANULOCYTES NFR BLD: 1.6 %
LYMPHOCYTES # BLD AUTO: 1.2 10E9/L (ref 0.8–5.3)
LYMPHOCYTES NFR BLD AUTO: 14.6 %
MCH RBC QN AUTO: 30.5 PG (ref 26.5–33)
MCHC RBC AUTO-ENTMCNC: 30.7 G/DL (ref 31.5–36.5)
MCV RBC AUTO: 99 FL (ref 78–100)
MONOCYTES # BLD AUTO: 0.5 10E9/L (ref 0–1.3)
MONOCYTES NFR BLD AUTO: 6.9 %
NEUTROPHILS # BLD AUTO: 5.8 10E9/L (ref 1.6–8.3)
NEUTROPHILS NFR BLD AUTO: 73.8 %
NRBC # BLD AUTO: 0 10*3/UL
NRBC BLD AUTO-RTO: 0 /100
PLATELET # BLD AUTO: 358 10E9/L (ref 150–450)
POTASSIUM SERPL-SCNC: 3.4 MMOL/L (ref 3.4–5.3)
POTASSIUM SERPL-SCNC: 3.4 MMOL/L (ref 3.4–5.3)
RBC # BLD AUTO: 3.41 10E12/L (ref 4.4–5.9)
SODIUM SERPL-SCNC: 147 MMOL/L (ref 133–144)
SODIUM SERPL-SCNC: 148 MMOL/L (ref 133–144)
SODIUM SERPL-SCNC: 148 MMOL/L (ref 133–144)
SODIUM SERPL-SCNC: 149 MMOL/L (ref 133–144)
WBC # BLD AUTO: 7.9 10E9/L (ref 4–11)

## 2020-02-27 PROCEDURE — 80048 BASIC METABOLIC PNL TOTAL CA: CPT | Performed by: PHYSICIAN ASSISTANT

## 2020-02-27 PROCEDURE — 25000132 ZZH RX MED GY IP 250 OP 250 PS 637: Performed by: STUDENT IN AN ORGANIZED HEALTH CARE EDUCATION/TRAINING PROGRAM

## 2020-02-27 PROCEDURE — 99233 SBSQ HOSP IP/OBS HIGH 50: CPT | Performed by: INTERNAL MEDICINE

## 2020-02-27 PROCEDURE — 97530 THERAPEUTIC ACTIVITIES: CPT | Mod: GP

## 2020-02-27 PROCEDURE — 92526 ORAL FUNCTION THERAPY: CPT | Mod: GN

## 2020-02-27 PROCEDURE — 25000132 ZZH RX MED GY IP 250 OP 250 PS 637: Performed by: NURSE PRACTITIONER

## 2020-02-27 PROCEDURE — 25000132 ZZH RX MED GY IP 250 OP 250 PS 637: Performed by: ANESTHESIOLOGY

## 2020-02-27 PROCEDURE — 84295 ASSAY OF SERUM SODIUM: CPT | Performed by: STUDENT IN AN ORGANIZED HEALTH CARE EDUCATION/TRAINING PROGRAM

## 2020-02-27 PROCEDURE — 97110 THERAPEUTIC EXERCISES: CPT | Mod: GP

## 2020-02-27 PROCEDURE — 36592 COLLECT BLOOD FROM PICC: CPT | Performed by: THORACIC SURGERY (CARDIOTHORACIC VASCULAR SURGERY)

## 2020-02-27 PROCEDURE — 25000128 H RX IP 250 OP 636: Performed by: STUDENT IN AN ORGANIZED HEALTH CARE EDUCATION/TRAINING PROGRAM

## 2020-02-27 PROCEDURE — 85025 COMPLETE CBC W/AUTO DIFF WBC: CPT | Performed by: STUDENT IN AN ORGANIZED HEALTH CARE EDUCATION/TRAINING PROGRAM

## 2020-02-27 PROCEDURE — 97530 THERAPEUTIC ACTIVITIES: CPT | Mod: GO

## 2020-02-27 PROCEDURE — 25000132 ZZH RX MED GY IP 250 OP 250 PS 637: Performed by: PHYSICIAN ASSISTANT

## 2020-02-27 PROCEDURE — 25000128 H RX IP 250 OP 636: Performed by: PHYSICIAN ASSISTANT

## 2020-02-27 PROCEDURE — 84295 ASSAY OF SERUM SODIUM: CPT | Performed by: THORACIC SURGERY (CARDIOTHORACIC VASCULAR SURGERY)

## 2020-02-27 PROCEDURE — 80048 BASIC METABOLIC PNL TOTAL CA: CPT | Performed by: STUDENT IN AN ORGANIZED HEALTH CARE EDUCATION/TRAINING PROGRAM

## 2020-02-27 PROCEDURE — 25000128 H RX IP 250 OP 636: Performed by: NURSE PRACTITIONER

## 2020-02-27 PROCEDURE — 00000146 ZZHCL STATISTIC GLUCOSE BY METER IP

## 2020-02-27 PROCEDURE — 21400000 ZZH R&B CCU UMMC

## 2020-02-27 PROCEDURE — 25000132 ZZH RX MED GY IP 250 OP 250 PS 637: Performed by: THORACIC SURGERY (CARDIOTHORACIC VASCULAR SURGERY)

## 2020-02-27 RX ORDER — ACETAZOLAMIDE 500 MG/5ML
250 INJECTION, POWDER, LYOPHILIZED, FOR SOLUTION INTRAVENOUS EVERY 8 HOURS
Status: COMPLETED | OUTPATIENT
Start: 2020-02-27 | End: 2020-02-28

## 2020-02-27 RX ORDER — LANOLIN ALCOHOL/MO/W.PET/CERES
3 CREAM (GRAM) TOPICAL AT BEDTIME
Status: DISCONTINUED | OUTPATIENT
Start: 2020-02-27 | End: 2020-03-04

## 2020-02-27 RX ORDER — AMOXICILLIN 400 MG/5ML
500 POWDER, FOR SUSPENSION ORAL EVERY 8 HOURS
Status: DISCONTINUED | OUTPATIENT
Start: 2020-02-27 | End: 2020-03-05 | Stop reason: HOSPADM

## 2020-02-27 RX ORDER — QUETIAPINE FUMARATE 50 MG/1
50 TABLET, FILM COATED ORAL AT BEDTIME
Status: DISCONTINUED | OUTPATIENT
Start: 2020-02-27 | End: 2020-02-29

## 2020-02-27 RX ADMIN — MELATONIN TAB 3 MG 3 MG: 3 TAB at 21:24

## 2020-02-27 RX ADMIN — Medication 12.5 MG: at 08:27

## 2020-02-27 RX ADMIN — ASPIRIN 81 MG CHEWABLE TABLET 81 MG: 81 TABLET CHEWABLE at 08:27

## 2020-02-27 RX ADMIN — POTASSIUM CHLORIDE 20 MEQ: 29.8 INJECTION, SOLUTION INTRAVENOUS at 04:58

## 2020-02-27 RX ADMIN — Medication 12.5 MG: at 19:25

## 2020-02-27 RX ADMIN — BUMETANIDE 2 MG: 0.25 INJECTION INTRAMUSCULAR; INTRAVENOUS at 16:41

## 2020-02-27 RX ADMIN — ACETAZOLAMIDE SODIUM 250 MG: 500 INJECTION, POWDER, LYOPHILIZED, FOR SOLUTION INTRAVENOUS at 11:49

## 2020-02-27 RX ADMIN — AMIODARONE HYDROCHLORIDE 200 MG: 200 TABLET ORAL at 08:27

## 2020-02-27 RX ADMIN — POLYETHYLENE GLYCOL 3350 17 G: 17 POWDER, FOR SOLUTION ORAL at 08:27

## 2020-02-27 RX ADMIN — BUMETANIDE 2 MG: 0.25 INJECTION INTRAMUSCULAR; INTRAVENOUS at 08:27

## 2020-02-27 RX ADMIN — POTASSIUM CHLORIDE 20 MEQ: 29.8 INJECTION, SOLUTION INTRAVENOUS at 18:15

## 2020-02-27 RX ADMIN — SENNOSIDES AND DOCUSATE SODIUM 2 TABLET: 8.6; 5 TABLET ORAL at 19:25

## 2020-02-27 RX ADMIN — ATORVASTATIN CALCIUM 40 MG: 40 TABLET, FILM COATED ORAL at 19:25

## 2020-02-27 RX ADMIN — Medication 5000 UNITS: at 19:25

## 2020-02-27 RX ADMIN — ACETAZOLAMIDE SODIUM 250 MG: 500 INJECTION, POWDER, LYOPHILIZED, FOR SOLUTION INTRAVENOUS at 17:59

## 2020-02-27 RX ADMIN — SENNOSIDES AND DOCUSATE SODIUM 2 TABLET: 8.6; 5 TABLET ORAL at 08:27

## 2020-02-27 RX ADMIN — Medication 5000 UNITS: at 08:27

## 2020-02-27 RX ADMIN — QUETIAPINE FUMARATE 50 MG: 50 TABLET ORAL at 21:24

## 2020-02-27 RX ADMIN — AMOXICILLIN 500 MG: 400 POWDER, FOR SUSPENSION ORAL at 19:25

## 2020-02-27 ASSESSMENT — ACTIVITIES OF DAILY LIVING (ADL)
ADLS_ACUITY_SCORE: 19
ADLS_ACUITY_SCORE: 18
ADLS_ACUITY_SCORE: 19
ADLS_ACUITY_SCORE: 18
ADLS_ACUITY_SCORE: 18
ADLS_ACUITY_SCORE: 19

## 2020-02-27 ASSESSMENT — MIFFLIN-ST. JEOR: SCORE: 1891.38

## 2020-02-27 NOTE — PLAN OF CARE
Discharge Planner OT   Patient plan for discharge: not discussed  Current status: Pt lethargic and decreased tolerance for therapies. Pt disoriented to time of day and following ~75% of one-step commands. Pt unable to tolerate UE exercises from chair. Pt dependently lifted to EOB however needing max A significant L trunk lean.   Barriers to return to prior living situation: medical status, post op precautions, weakness, level of assist for ADLs and mobility   Recommendations for discharge: TCU   Rationale for recommendations: pt far below baseline in ADLs, pt to benefit from continued therapy services to maximize ADL I. Pt will need to be mod I for ADLs/IADLs as pt is his wife's primary caregiver on hospice.        Entered by: Vilma Earl 02/27/2020 2:38 PM

## 2020-02-27 NOTE — PLAN OF CARE
4A:  Discharge Planner PT   Patient plan for discharge: Not discussed  Current status: Pt alert; demonstrated intermittent confusion throughout. Performed 3 sets of 10 reps. of modified squats on the Moveo. OH lifted from Moveo > recliner. Tolerated Moveo well but required frequent encouragement, VC, and tactile cues to maintain participation. VSS on 3L via NC.    Barriers to return to prior living situation: decreased activity tolerance, medical status, weakness, sternal precautions  Recommendations for discharge: TCU; may be appropriate for ARU pending improved activity tolerance  Rationale for recommendations: Due to patient's current level of function below baseline, pt will benefit from therapy services in order to maximize functional mobility and independence.        Entered by: Estefania Orellana 02/27/2020 1:15 PM

## 2020-02-27 NOTE — PLAN OF CARE
Neuro: Awake today. Purposeful response R side, localized with L side. L much weaker than R. Follows simple commands. Restless and fighting the vent this AM- PRN fentanyl and seroquel given and switched over to pressure support- this seemed to help. Oxycodone given T6cdynp today with good pain control.  Cardio: ST and hypertensive- max dose of ordered labetalol given throughout the day- Emilia NP aware. Afebrile today.  Resp: CPAP 7/5 30%- doing well. LS diminished.  GI: Rectal tube with minimal output - discontinue tomorrow if low output continues. OG to LIS with bilious output. TF via NJ tube at goal.  : Bladder scan and straight cath for 500 this AM. Minimal urine output via condom cath- will bladder scan and straight cath again this evening if needed.  Skin: Tongue sore resolved. No other skin issues.  Plan: OR tomorrow for arterial biopsy. Family declined thoracentesis today- will reconsider if patient spikes another temp. Family meeting Saturday 2/29 at 10AM.

## 2020-02-27 NOTE — PLAN OF CARE
Discharge Planner SLP   Patient plan for discharge: Pt unable to state  Current status: Recommend downgrade to dysphagia diet 1 and nectar-thick liquids, with 1:1 supervision and feeding assist.  Ensure pt is fully alert and upright for all PO, given small bites/sips, alternating bites/sips.  Recommend critical meds crushed in puree.  Overt s/sx of aspiration with thin liquids and advanced texture, mild oral dysphagia, weakness, mental status impacting diet advancement.  Per dental service note from 2/27/20, pt only cleared to advance to [facility] sift/dental soft diet in future per poor baseline dentition.  SLP to follow  Barriers to return to prior living situation: Dysphagia, weakness, medical status, mental status  Recommendations for discharge: TCU  Rationale for recommendations: Below baseline function; pt will benefit from ongoing ST targeting swallowing       Entered by: Estefania Kaur 02/27/2020 2:21 PM

## 2020-02-27 NOTE — PLAN OF CARE
[id number]:  Neuro: Pleasantly confused, disoriented to place and situation. Denies pain. Follows commands.   Card: SR with PVCs. Normotensive. Afebrile. Replacing K this am.   Pulm: 2LPM NC. Lungs clear, dim in bases. Pt coughing indep but unknown sputum amt.   GI: DD2 Thin Liquids. BS+, passing flatus but no BM overnight.   : Ishan for strict I&O. Good UOP.   Endo: AC/HS  Skin: See flowsheets for details, unchanged. Dressings on old chest tube sites changed.     Plan to transfer to  when bed available. Will continue to monitor and notify team with updates.

## 2020-02-27 NOTE — PROGRESS NOTES
CTS PROGRESS NOTE        CO-MORBIDITIES:   Dissection of aorta, unspecified portion of aorta (H)    ASSESSMENT: Vel Espana is a 74 year old male with PMH of HTN s/p Ascending aortic dissection repair using a 32 mm Dacron Gelweave graft, AVR with 27 mm Patterson bovine pericardial valve, Coronary bypass to right coronary artery, Left greater saphenous vein harvest, Circulatory arrest, Left femoral artery exploration on 2/10. Extubated 2/24    Interval changes   - More delerious overnight.   - Bicarb elevated, give 3 doses Diamox with repeat BMP this afternoon.  - Had CT per dental; no new recs yet.  -Bumex was decreased to 2mg bid yesterday   - Increase Sequel hs   - Repeat echo 2/25 with unchanged RV, AV mean gradient of 6mm hg.  - Awaiting transfer to floor    PLAN:  Neuro/ pain/ sedation:  #Encephalopathy, resolved.   - Increase Seroquel to 50 mg at bedtime  - Schedule Melatonin  - No pain. Has PRN if needed     Pulmonary care:   Extubated 2/24. Doing well on 2L NC with excellent o2 sats.  Bipap at night, has CPAP PTA for PRUDENCIO  # ventilator associated pneumonia- resolved  # Mod L pl effusion     Cardiovascular:    - MAP >65, SBP <120  - echo 2/25 with preserved LVEF, unchanged RV, AV MG 6 mm  Hg. No PVL  - Continue ASA, statin, BB, PO Amio    # Non-sustained VT. Noted intra-op and again 2/21, asymptomatic and hemodynamically stable. Started on re-bolused with amiodarone and reconsulted EP. Transitioned amiodarone to PO  # Known LBBB- seen overnight 2/24 and previous EKG on 2/10.   # Post op A-fib with RV No recurrence     GI care:   - Passed swallow study, DD2 diet  - Has bowel regimen. Last BM yesterday     Fluids/ Electrolytes/ Nutrition:   #Hypernatremia, essentially stable.  - Encourage increased fluid intake PO. 1.6 L in yesterday    Renal/ Fluid Balance:    #Nonoliguric HANNA- improved. Crt today stablefrom yesterday 1.9  - Will continue to monitor intake and output, Cr, electrolytes.  - Continue Bumex  at 2mg bid today. If Bicarb worse tomorrow, decrease diuretic regimen.    - Lytes stable.   - Renal ultrasound: non obstructive renal calculi. [facility] up [facility] 2-[facility] weeks       Endocrine:    - BS stable.   - SSI     ID/ Antibiotics: No e/o infection. No longer on abx  # Ventilator associated pneumonia- completed course.   - ID signed off.   - Completed cefepime, flagyl and Vanc  - CT CAP 2/19     Heme:     - Stable.   - Hgb goal >7, 2u transfusion 2/23     Prophylaxis:    - Hep DVT prophy     Lines/ tubes/ drains:  - PICC, [place]     Disposition:  - CV ICU     Patient seen, findings and plan discussed with staff, Dr. Meyer and CVTS fellow, Dr. Maradiaga    ====================================    SUBJECTIVE:   Delirium worse this am. Denies pain. Unable to complete full ROS given delirium. Denies any pain.    OBJECTIVE:   1. VITAL SIGNS:   Temp:  [97.1  F (36.2  C)-98.4  F (36.9  C)] 98.2  F (36.8  C)  Pulse:  [80-92] 85  Heart Rate:  [80-92] 92  Resp:  [11-29] 16  BP: ()/(64-96) 115/83  SpO2:  [86 %-99 %] 93 %  Resp: 16    2. INTAKE/ OUTPUT:   I/O last 3 completed shifts:  In: 1290 [P.O.:1080; I.V.:210]  Out: 4230 [Urine:4230]    3. PHYSICAL EXAMINATION:   General: pleasant, in NAD, delirium   Neuro: Opens eyes to voice, follows commands in all 4 extremities  Resp: Diminished lung sounds bilaterally, good air movement.  CV: RRR currently  Abdomen: Soft, Non-distended, Non-tender, BS present   Incisions: c/d/i without e/o infection  Extremities: warm and well perfused with mild edema, dark toes, without extension    4. INVESTIGATIONS:   Arterial Blood Gases   Recent Labs   Lab 02/24/20  1214 02/23/20  0539 02/21/20  1410 02/21/20  0521   PH 7.46* 7.44 7.51* 7.47*   PCO2 39 36 30* 35   PO2 117* 121* 95 125*   HCO3 28 25 24 25     Complete Blood Count   Recent Labs   Lab 02/27/20  0420 02/26/20  0859 02/25/20  0555 02/24/20  1010 02/24/20  0337   WBC 7.9 8.7 8.2  --  7.3   HGB 10.4* 10.0* 9.2*  --  8.4*   PLT  358 355 335 284 288     Basic Metabolic Panel  Recent Labs   Lab 02/27/20  1015 02/27/20  0420 02/26/20  2127 02/26/20  1608 02/26/20  0859 02/26/20  0224  02/25/20  0310  02/24/20  0337  02/23/20  1648   * 147* 146* 146* 148* 147*   < > 145*   < > 148*   < > 150*   POTASSIUM  --  3.4  --  4.6 3.3* 3.5   < > 3.4   < > 3.6   < > 3.6   CHLORIDE  --  110*  --   --  110*  --   --   --   --  115*  --  117*   CO2  --  35*  --   --  34*  --   --   --   --  28  --  27   BUN  --  88*  --   --  87*  --   --   --   --  98*  --  96*   CR  --  1.93*  --   --  1.98*  --   --  2.41*  --  2.52*  --  2.62*   GLC  --  132*  --   --  131*  --   --   --   --  181*  --  176*    < > = values in this interval not displayed.       5. RADIOLOGY:   No results found for this or any previous visit (from the past 24 hour(s)).    =========================================

## 2020-02-27 NOTE — PROGRESS NOTES
Neuro: Continues to be pleasantly confused. Up to chair x3 hours today with bright lights on throughout the day. More alert when family present.  Cardio: SR, stable BP, afebrile.  Resp: RA-2L NC. LS diminished but clear.  GI: Passing flatus, no BM today. Speech eval today d/t pt having trouble masticating and coughing with thin liquid- diet changed to DD1 with nectar thick.  : Ishan for retention and strict I&O.   Skin: see flowsheets- several skin issues.  Plan: Transfer to  when bed becomes available.

## 2020-02-27 NOTE — PLAN OF CARE
Major Shift Events:  Patient remains confused but pleasant.  Follows commands.  K replaced, recheck 4.6.  Maintaining sats on 2L, family asked to bring patients home cpap if able.  CT this morning.    Plan: Transfer to  when bed available.  For vital signs and complete assessments, please see documentation flowsheets.

## 2020-02-27 NOTE — PROGRESS NOTES
Social Work Services Progress Note    Hospital Day: 18  Date of Initial Social Work Evaluation:  2/11/2020  Collaborated with:  Rosa ([facility] SW), Ronda (Spouse), Jordyn (Step-Daughter) and Chart Review    Data:  Pt is a 74 year old male with PMH of HTN s/p Ascending aortic dissection repair using a 32 mm Dacron Gelweave graft, AVR with 27 mm Patterson bovine pericardial valve, Coronary bypass to right coronary artery, Left greater saphenous vein harvest, Circulatory arrest, Left femoral artery exploration on 2/10.  He was extubated on 2/24, will transfer off of the ICU.    Intervention:  Discharge Planning-  Ultimately for discharge plans, family's hope is that pt and his spouse can get to the same SNF (pt for TCU and his spouse for hospice) when pt is ready to discharge.  JOSE ANGEL previously gave family a list of TCUs that accept [employer].  Their first choice is [facility] in [place].    JOSE ANGEL spoke with Rosa (P:  [phone]) who is Ronda's (pt's spouse) [facility] Social Worker.  She confirmed that she is working on getting Ronda into [facility], hopefully at the same time at pt.  She has already spoken with [facility] and made them aware of the situation, they are waiting for a referral for pt.  JOSE ANGEL notified her that pt is getting closer to discharge, she will make a referral for pt's spouse (for hospice) at [facility] tomorrow.    JOSE ANGEL faxed an E-referral to [facility] (P:  [phone];  F:  [phone]).    Addendum:  SW and RNCC met with Ronda (Spouse) and Jordyn (Step-Daughter) per their request.  They again inquired about VA coverage and SW/ RNCC directed them to call the [facility] for any VA coverage related questions.  Per conversations with the [facility], they report that are covering this hospitalization but pt is does not have enough service connection for any post acute care/ TCU coverage unless he goes to their TCU.  Thus, pt would need to use his [employer] for TCU coverage  if he went anywhere other than to the [facility] T[facility].  Family requested that SW make a referral to the [facility] TCU. SW will make a referral to the [facility] TCU ([phone]) tomorrow/ when time allows.    Assessment:  SW did not meet with pt on this date.    Plan:    Anticipated Disposition:  TCU    Barriers to d/c plan:  Medical Stability     Follow Up:   will continue to follow to provide support and continue discharge plans as identified.    RUSH Pelayo, [facility]  ICU   [facility]  Phone:  [phone]  Pager:  [phone]

## 2020-02-27 NOTE — CONSULTS
"Dental Service Consultation        Vel Espana MRN# [id number]   YOB: 1945 Age: 74 year old   Date of Admission: 2/9/2020     Reason for consult: I was asked by Dr. Chelsie Travis  to evaluate this patient for oral condition prior to cardiovascular surgery.           Assessment and Plan:   Assessment: Patient oral condition is poor/hopeless. Fully edentulous maxillary arch. Mandibular arch has anterior's present that hold a partial denture. All remaining natural teeth have decay, fracture and large amounts of plaque/calculus. Radiographic evidence suggestions PARL/infection as well.     Plan: Recommendation is the removal of the remaining mandibular teeth. All teeth presently pose an infection risk to the patient due to condition. Patient would benefit from full mandibular edentulation to provide best chance for cardiovascular surgery success.           Chief Complaint:   \"I have pain in my mouth. It hurts on the top and the bottom.\"    History is obtained from the patient and the patient's family         History of Present Illness:   This patient is a 74 year old male who presents to [place] 4th floor (U4A) for cardiovascular surgery.              Past Medical History:     Past Medical History:   Diagnosis Date     Psychosexual dysfunction with inhibited sexual excitement      Unspecified essential hypertension      Unspecified sleep apnea              Past Surgical History:     Past Surgical History:   Procedure Laterality Date     REPAIR ANEURYSM ASCENDING AORTA N/A 2/9/2020    Procedure: Median Sternotomy, repair of ascending aorta using 32mm gelweave graft, deep circulatory arrest,  aortic valve repair using 27mm inspiris valve, on-pump oxygenator, open saphenous vein harvest, coronary artery bypass x1, transesophageal echocardiogram done by anestheisa;  Surgeon: Nivia Mckeon MD;  Location:  OR               Social History:     Social History     Tobacco Use     Smoking status: Never " Smoker     Smokeless tobacco: Never Used   Substance Use Topics     Alcohol use: No             Family History:   History reviewed. No pertinent family history.          Immunizations:   There is no immunization history for the selected administration types on file for this patient.          Allergies:   No Known Allergies          Medications:     Current Facility-Administered Medications Ordered in Epic   Medication Dose Route Frequency Last Rate Last Dose     acetaminophen (TYLENOL) tablet 650 mg  650 mg Oral or Feeding Tube Q4H PRN   650 mg at 02/21/20 1633     acetaZOLAMIDE (DIAMOX) injection 250 mg  250 mg Intravenous Q8H   250 mg at 02/27/20 1149     albuterol (PROVENTIL) neb solution 2.5 mg  2.5 mg Nebulization Q2H PRN   2.5 mg at 02/16/20 1613     amiodarone (PACERONE) tablet 200 mg  200 mg Oral Daily   200 mg at 02/27/20 0827     aspirin (ASA) chewable tablet 81 mg  81 mg Oral Daily   81 mg at 02/27/20 0827     atorvastatin (LIPITOR) tablet 40 mg  40 mg Oral QPM   40 mg at 02/26/20 1939     bumetanide (BUMEX) injection 2 mg  2 mg Intravenous BID   2 mg at 02/27/20 0827     carboxymethylcellulose PF (REFRESH PLUS) 0.5 % ophthalmic solution 1 drop  1 drop Both Eyes TID PRN         dextrose 10% infusion   Intravenous Continuous PRN   Stopped at 02/24/20 1306     glucose gel 15-30 g  15-30 g Oral Q15 Min PRN        Or     dextrose 50 % injection 25-50 mL  25-50 mL Intravenous Q15 Min PRN        Or     glucagon injection 1 mg  1 mg Subcutaneous Q15 Min PRN         diphenhydrAMINE (BENADRYL) injection 50 mg  50 mg Intravenous Q6H PRN   50 mg at 02/24/20 1753     heparin ANTICOAGULANT injection 5,000 Units  5,000 Units Subcutaneous BID   5,000 Units at 02/27/20 0827     heparin lock flush 10 UNIT/ML injection 5-10 mL  5-10 mL Intracatheter Q24H   10 mL at 02/16/20 1249     heparin lock flush 10 UNIT/ML injection 5-10 mL  5-10 mL Intracatheter Q1H PRN         HYDROmorphone (PF) (DILAUDID) injection 0.3-0.5 mg   0.3-0.5 mg Intravenous Q2H PRN   0.3 mg at 02/24/20 1649     influenza Vac Split High-Dose (FLUZONE) injection 0.5 mL  0.5 mL Intramuscular Prior to discharge         insulin aspart (NovoLOG) injection (RAPID ACTING)  1-10 Units Subcutaneous TID AC   1 Units at 02/26/20 1614     insulin aspart (NovoLOG) injection (RAPID ACTING)  1-7 Units Subcutaneous At Bedtime         Lidocaine (LIDOCARE) 4 % Patch 1-3 patch  1-3 patch Transdermal Q24H   3 patch at 02/26/20 0844     lidocaine (LMX4) cream   Topical Q1H PRN         lidocaine 1 % 0.1-1 mL  0.1-1 mL Other Q1H PRN   3.5 mL at 02/16/20 1030     lidocaine patch in PLACE   Transdermal Q8H   Stopped at 02/14/20 0930     magnesium sulfate 2 g in water intermittent infusion  2 g Intravenous Daily PRN         magnesium sulfate 4 g in 100 mL sterile water (premade)  4 g Intravenous Q4H PRN   2 g at 02/10/20 1043     melatonin tablet 3 mg  3 mg Oral or Feeding Tube At Bedtime PRN   3 mg at 02/25/20 2237     methocarbamol (ROBAXIN) tablet 500 mg  500 mg Oral or Feeding Tube 4x Daily PRN         metoprolol tartrate (LOPRESSOR) half-tab 12.5 mg  12.5 mg Oral BID   12.5 mg at 02/27/20 0827     naloxone (NARCAN) injection 0.1-0.4 mg  0.1-0.4 mg Intravenous Q2 Min PRN         ondansetron (ZOFRAN-ODT) ODT tab 4 mg  4 mg Oral Q6H PRN        Or     ondansetron (ZOFRAN) injection 4 mg  4 mg Intravenous Q6H PRN         oxyCODONE (ROXICODONE) tablet 5-10 mg  5-10 mg Oral or Feeding Tube Q4H PRN   5 mg at 02/24/20 1753     polyethylene glycol (MIRALAX/GLYCOLAX) Packet 17 g  17 g Oral or Feeding Tube Daily   17 g at 02/27/20 0827     potassium chloride (KLOR-CON) Packet 20-40 mEq  20-40 mEq Oral or Feeding Tube Q2H PRN   20 mEq at 02/25/20 1046     potassium chloride 10 mEq in 100 mL intermittent infusion with 10 mg lidocaine  10 mEq Intravenous Q1H PRN         potassium chloride 10 mEq in 100 mL sterile water intermittent infusion (premix)  10 mEq Intravenous Q1H  mL/hr at 02/10/20  1201 10 mEq at 02/10/20 1201     potassium chloride 20 mEq in 50 mL intermittent infusion  20 mEq Intravenous Q1H PRN 50 mL/hr at 02/25/20 1715 20 mEq at 02/27/20 0458     potassium chloride ER (K-DUR/KLOR-CON M) CR tablet 20-40 mEq  20-40 mEq Oral Q2H PRN   20 mEq at 02/26/20 0447     potassium phosphate 10 mmol in D5W 250 mL intermittent infusion  10 mmol Intravenous Daily PRN         potassium phosphate 15 mmol in D5W 250 mL intermittent infusion  15 mmol Intravenous Daily PRN         potassium phosphate 20 mmol in D5W 250 mL intermittent infusion  20 mmol Intravenous Q6H PRN         potassium phosphate 20 mmol in D5W 500 mL intermittent infusion  20 mmol Intravenous Q6H PRN         potassium phosphate 25 mmol in D5W 500 mL intermittent infusion  25 mmol Intravenous Q8H PRN   25 mmol at 02/10/20 1229     prochlorperazine (COMPAZINE) injection 5 mg  5 mg Intravenous Q6H PRN        Or     prochlorperazine (COMPAZINE) tablet 5 mg  5 mg Oral Q6H PRN         QUEtiapine (SEROquel) tablet 25 mg  25 mg Oral At Bedtime   25 mg at 02/26/20 2133     senna-docusate (SENOKOT-S/PERICOLACE) 8.6-50 MG per tablet 1 tablet  1 tablet Oral or Feeding Tube BID   1 tablet at 02/26/20 1939    Or     senna-docusate (SENOKOT-S/PERICOLACE) 8.6-50 MG per tablet 2 tablet  2 tablet Oral or Feeding Tube BID   2 tablet at 02/27/20 0827     sodium chloride (PF) 0.9% PF flush 10-20 mL  10-20 mL Intracatheter q1 min prn         sodium chloride (PF) 0.9% PF flush 3 mL  3 mL Intracatheter q1 min prn         sodium chloride (PF) 0.9% PF flush 3 mL  3 mL Intracatheter Q8H   3 mL at 02/26/20 2129     No current [facility]-ordered outpatient medications on file.             Review of Systems:   The 10 point Review of Systems is negative other than noted in the HPI            Physical Exam:   Vitals were reviewed  Temp: 97.7  F (36.5  C) Temp src: Axillary BP: 114/82 Pulse: 85 Heart Rate: 92 Resp: 16 SpO2: 93 % O2 Device: Nasal cannula Oxygen Delivery: 2  LPM    Head and neck exam: Many tubes and areas of bruising present. No obvious swelling.    Intraoral exam: Edentulous maxillary arch. Anterior's present on the mandible. Extensive plaque/calculus. All teeth have caries and fracture. All teeth pose infection risk.       Data:   Radiographic interpretation: Orthopantomogram taken on 2/26/2020 and interpreted on 2/26-27/2020  Osseous pathology: None apparent. Radiographic evidence of right maxillary sinus mucosal thickening, but has decreased since previous radiograph.  Pulpal Pathology: Mandibular anterior involvement.  Periodontal Pathology: Poor gingiva condition.  Caries: Active.  Odontogenic pathology: None apparent.    The patient was discussed with: Dr. Tamara Mercer DDS and MD Mliad Agustin DDS PGY1  Pager: [phone]

## 2020-02-28 ENCOUNTER — APPOINTMENT (OUTPATIENT)
Dept: GENERAL RADIOLOGY | Facility: CLINIC | Age: 75
DRG: 219 | End: 2020-02-28
Attending: PHYSICIAN ASSISTANT
Payer: COMMERCIAL

## 2020-02-28 ENCOUNTER — APPOINTMENT (OUTPATIENT)
Dept: PHYSICAL THERAPY | Facility: CLINIC | Age: 75
DRG: 219 | End: 2020-02-28
Payer: COMMERCIAL

## 2020-02-28 LAB
ANION GAP SERPL CALCULATED.3IONS-SCNC: 4 MMOL/L (ref 3–14)
BUN SERPL-MCNC: 100 MG/DL (ref 7–30)
CALCIUM SERPL-MCNC: 9 MG/DL (ref 8.5–10.1)
CHLORIDE SERPL-SCNC: 112 MMOL/L (ref 94–109)
CO2 SERPL-SCNC: 32 MMOL/L (ref 20–32)
CREAT SERPL-MCNC: 2.12 MG/DL (ref 0.66–1.25)
GFR SERPL CREATININE-BSD FRML MDRD: 29 ML/MIN/{1.73_M2}
GLUCOSE BLDC GLUCOMTR-MCNC: 109 MG/DL (ref 70–99)
GLUCOSE BLDC GLUCOMTR-MCNC: 122 MG/DL (ref 70–99)
GLUCOSE BLDC GLUCOMTR-MCNC: 134 MG/DL (ref 70–99)
GLUCOSE BLDC GLUCOMTR-MCNC: 97 MG/DL (ref 70–99)
GLUCOSE SERPL-MCNC: 131 MG/DL (ref 70–99)
POTASSIUM SERPL-SCNC: 3.5 MMOL/L (ref 3.4–5.3)
SODIUM SERPL-SCNC: 148 MMOL/L (ref 133–144)
SODIUM SERPL-SCNC: 150 MMOL/L (ref 133–144)
SPECIMEN SOURCE: ABNORMAL
VIRUS SPEC CULT: ABNORMAL
VIRUS SPEC CULT: ABNORMAL

## 2020-02-28 PROCEDURE — 25000132 ZZH RX MED GY IP 250 OP 250 PS 637: Performed by: STUDENT IN AN ORGANIZED HEALTH CARE EDUCATION/TRAINING PROGRAM

## 2020-02-28 PROCEDURE — 25000128 H RX IP 250 OP 636: Performed by: PHYSICIAN ASSISTANT

## 2020-02-28 PROCEDURE — 25000125 ZZHC RX 250: Performed by: PHYSICIAN ASSISTANT

## 2020-02-28 PROCEDURE — 00000146 ZZHCL STATISTIC GLUCOSE BY METER IP

## 2020-02-28 PROCEDURE — 84295 ASSAY OF SERUM SODIUM: CPT | Performed by: THORACIC SURGERY (CARDIOTHORACIC VASCULAR SURGERY)

## 2020-02-28 PROCEDURE — 84295 ASSAY OF SERUM SODIUM: CPT | Performed by: PHYSICIAN ASSISTANT

## 2020-02-28 PROCEDURE — 97530 THERAPEUTIC ACTIVITIES: CPT | Mod: GP

## 2020-02-28 PROCEDURE — 74340 X-RAY GUIDE FOR GI TUBE: CPT

## 2020-02-28 PROCEDURE — 21400000 ZZH R&B CCU UMMC

## 2020-02-28 PROCEDURE — 40000275 ZZH STATISTIC RCP TIME EA 10 MIN

## 2020-02-28 PROCEDURE — 94640 AIRWAY INHALATION TREATMENT: CPT

## 2020-02-28 PROCEDURE — 40000986 XR CHEST PORT 1 VW

## 2020-02-28 PROCEDURE — 25000128 H RX IP 250 OP 636: Performed by: NURSE PRACTITIONER

## 2020-02-28 PROCEDURE — 25000132 ZZH RX MED GY IP 250 OP 250 PS 637: Performed by: PHYSICIAN ASSISTANT

## 2020-02-28 PROCEDURE — 36592 COLLECT BLOOD FROM PICC: CPT | Performed by: PHYSICIAN ASSISTANT

## 2020-02-28 PROCEDURE — 25000125 ZZHC RX 250: Performed by: THORACIC SURGERY (CARDIOTHORACIC VASCULAR SURGERY)

## 2020-02-28 PROCEDURE — 25000128 H RX IP 250 OP 636: Performed by: STUDENT IN AN ORGANIZED HEALTH CARE EDUCATION/TRAINING PROGRAM

## 2020-02-28 PROCEDURE — 25000132 ZZH RX MED GY IP 250 OP 250 PS 637: Performed by: THORACIC SURGERY (CARDIOTHORACIC VASCULAR SURGERY)

## 2020-02-28 PROCEDURE — 94640 AIRWAY INHALATION TREATMENT: CPT | Mod: 76

## 2020-02-28 PROCEDURE — 25000132 ZZH RX MED GY IP 250 OP 250 PS 637: Performed by: ANESTHESIOLOGY

## 2020-02-28 PROCEDURE — 25000132 ZZH RX MED GY IP 250 OP 250 PS 637: Performed by: NURSE PRACTITIONER

## 2020-02-28 PROCEDURE — 36592 COLLECT BLOOD FROM PICC: CPT | Performed by: THORACIC SURGERY (CARDIOTHORACIC VASCULAR SURGERY)

## 2020-02-28 PROCEDURE — 80048 BASIC METABOLIC PNL TOTAL CA: CPT | Performed by: THORACIC SURGERY (CARDIOTHORACIC VASCULAR SURGERY)

## 2020-02-28 RX ORDER — BISACODYL 10 MG
10 SUPPOSITORY, RECTAL RECTAL ONCE
Status: COMPLETED | OUTPATIENT
Start: 2020-02-28 | End: 2020-02-28

## 2020-02-28 RX ORDER — DEXTROSE MONOHYDRATE 100 MG/ML
INJECTION, SOLUTION INTRAVENOUS CONTINUOUS PRN
Status: DISCONTINUED | OUTPATIENT
Start: 2020-02-28 | End: 2020-03-05 | Stop reason: HOSPADM

## 2020-02-28 RX ORDER — AMINO AC/PROTEIN HYDR/WHEY PRO 10G-100/30
1 LIQUID (ML) ORAL 3 TIMES DAILY
Status: DISCONTINUED | OUTPATIENT
Start: 2020-02-28 | End: 2020-03-05 | Stop reason: HOSPADM

## 2020-02-28 RX ORDER — LIDOCAINE HYDROCHLORIDE 20 MG/ML
JELLY TOPICAL ONCE
Status: COMPLETED | OUTPATIENT
Start: 2020-02-28 | End: 2020-02-28

## 2020-02-28 RX ORDER — BUMETANIDE 0.25 MG/ML
1 INJECTION INTRAMUSCULAR; INTRAVENOUS
Status: DISCONTINUED | OUTPATIENT
Start: 2020-02-28 | End: 2020-02-29

## 2020-02-28 RX ORDER — IPRATROPIUM BROMIDE AND ALBUTEROL SULFATE 2.5; .5 MG/3ML; MG/3ML
3 SOLUTION RESPIRATORY (INHALATION) 2 TIMES DAILY
Status: DISCONTINUED | OUTPATIENT
Start: 2020-02-28 | End: 2020-03-05 | Stop reason: HOSPADM

## 2020-02-28 RX ADMIN — MULTIVITAMIN 15 ML: LIQUID ORAL at 20:05

## 2020-02-28 RX ADMIN — Medication 5000 UNITS: at 09:41

## 2020-02-28 RX ADMIN — LIDOCAINE 1 PATCH: 560 PATCH PERCUTANEOUS; TOPICAL; TRANSDERMAL at 09:51

## 2020-02-28 RX ADMIN — AMIODARONE HYDROCHLORIDE 200 MG: 200 TABLET ORAL at 09:40

## 2020-02-28 RX ADMIN — QUETIAPINE FUMARATE 50 MG: 50 TABLET ORAL at 21:52

## 2020-02-28 RX ADMIN — SENNOSIDES AND DOCUSATE SODIUM 2 TABLET: 8.6; 5 TABLET ORAL at 20:06

## 2020-02-28 RX ADMIN — AMOXICILLIN 500 MG: 400 POWDER, FOR SUSPENSION ORAL at 21:54

## 2020-02-28 RX ADMIN — MELATONIN TAB 3 MG 3 MG: 3 TAB at 21:52

## 2020-02-28 RX ADMIN — Medication 12.5 MG: at 20:05

## 2020-02-28 RX ADMIN — BUMETANIDE 2 MG: 0.25 INJECTION INTRAMUSCULAR; INTRAVENOUS at 09:34

## 2020-02-28 RX ADMIN — LIDOCAINE HYDROCHLORIDE: 20 JELLY TOPICAL at 14:17

## 2020-02-28 RX ADMIN — IPRATROPIUM BROMIDE AND ALBUTEROL SULFATE 3 ML: .5; 3 SOLUTION RESPIRATORY (INHALATION) at 20:20

## 2020-02-28 RX ADMIN — BUMETANIDE 1 MG: 0.25 INJECTION INTRAMUSCULAR; INTRAVENOUS at 15:47

## 2020-02-28 RX ADMIN — Medication 5 ML: at 10:41

## 2020-02-28 RX ADMIN — POLYETHYLENE GLYCOL 3350 17 G: 17 POWDER, FOR SOLUTION ORAL at 09:32

## 2020-02-28 RX ADMIN — Medication 5000 UNITS: at 20:06

## 2020-02-28 RX ADMIN — Medication 12.5 MG: at 09:36

## 2020-02-28 RX ADMIN — BISACODYL 10 MG: 10 SUPPOSITORY RECTAL at 15:43

## 2020-02-28 RX ADMIN — AMOXICILLIN 500 MG: 400 POWDER, FOR SUSPENSION ORAL at 05:52

## 2020-02-28 RX ADMIN — POTASSIUM CHLORIDE 20 MEQ: 750 TABLET, EXTENDED RELEASE ORAL at 09:40

## 2020-02-28 RX ADMIN — AMOXICILLIN 500 MG: 400 POWDER, FOR SUSPENSION ORAL at 15:37

## 2020-02-28 RX ADMIN — ASPIRIN 81 MG CHEWABLE TABLET 81 MG: 81 TABLET CHEWABLE at 09:35

## 2020-02-28 RX ADMIN — SENNOSIDES AND DOCUSATE SODIUM 2 TABLET: 8.6; 5 TABLET ORAL at 09:41

## 2020-02-28 RX ADMIN — Medication 5 ML: at 05:39

## 2020-02-28 RX ADMIN — SODIUM CHLORIDE, PRESERVATIVE FREE 10 ML: 5 INJECTION INTRAVENOUS at 15:48

## 2020-02-28 RX ADMIN — ACETAZOLAMIDE SODIUM 250 MG: 500 INJECTION, POWDER, LYOPHILIZED, FOR SOLUTION INTRAVENOUS at 02:15

## 2020-02-28 RX ADMIN — Medication 1 PACKET: at 20:06

## 2020-02-28 RX ADMIN — ATORVASTATIN CALCIUM 40 MG: 40 TABLET, FILM COATED ORAL at 20:06

## 2020-02-28 ASSESSMENT — ACTIVITIES OF DAILY LIVING (ADL)
ADLS_ACUITY_SCORE: 18

## 2020-02-28 NOTE — PROGRESS NOTES
"CVTS Daily Note  2/28/2020  Attending: Nivia Mckeon MD    S:   No overnight events.  Up from ICU  Pt seen at bedside resting comfortably.    Does complain of weakness, right hand pain, and sacral pain, otherwise no acute complaints.      Denies F/C/Sweats.  No CP, SOB, or calf pain.    Tolerating diet minimally and open to restarting tube feeds.  No BM x 3 days, mild nausea with abd distension.     Requires lift room.    Pain level tolerable. Plan as per Neuro section below.     O:   Vital signs:  Temp: 97.5  F (36.4  C) Temp src: Oral BP: 98/75 Pulse: 83 Heart Rate: 78 Resp: 19 SpO2: 96 % O2 Device: Nasal cannula Oxygen Delivery: 2 LPM Height: 175.3 cm (5' 9\") Weight: 117.1 kg (258 lb 2.5 oz)  Estimated body mass index is 38.12 kg/m  as calculated from the following:    Height as of this encounter: 1.753 m (5' 9\").    Weight as of this encounter: 117.1 kg (258 lb 2.5 oz).    Weight; down since admit and trending down.   24 hr Fluid status; net loss 2.9 L.     MAPs: 79 - 88  Gen: AAO x 3, pleasant, NAD  CV: RRR, S1S2 normal, no murmurs, rubs, or gallops.   Pulm: CTA, no rhonchi but soft bilateral wheezes  Abd: distended but soft, non-tender, no guarding  Ext: trace peripheral edema  Incision: clean, dry, intact, no erythema  Chest Tube sites: dressings clean and dry      Labs:  [facility]  Recent Labs   Lab 02/28/20  0540 02/27/20  2229 02/27/20  1644 02/27/20  1015 02/27/20  0420  02/26/20  1608 02/26/20  0859   *  150* 148* 148* 149* 147*   < > 146* 148*   POTASSIUM 3.5  --  3.4  --  3.4  --  4.6 3.3*   CHLORIDE 112*  --  110*  --  110*  --   --  110*   ELMER 9.0  --  8.9  --  9.0  --   --  8.7   CO2 32  --  34*  --  35*  --   --  34*   *  --  91*  --  88*  --   --  87*   CR 2.12*  --  1.99*  --  1.93*  --   --  1.98*   *  --  185*  --  132*  --   --  131*    < > = values in this interval not displayed.     CBC  Recent Labs   Lab 02/27/20  0420 02/26/20  0859 02/25/20  0555 02/24/20  1010 " 02/24/20  0337   WBC 7.9 8.7 8.2  --  7.3   RBC 3.41* 3.26* 3.05*  --  2.80*   HGB 10.4* 10.0* 9.2*  --  8.4*   HCT 33.9* 32.5* 30.3*  --  27.7*   MCV 99 100 99  --  99   MCH 30.5 30.7 30.2  --  30.0   MCHC 30.7* 30.8* 30.4*  --  30.3*   RDW 15.2* 15.5* 16.2*  --  16.7*    355 335 284 288     INR  No lab results found in last 7 days.   Hepatic Panel   Lab Results   Component Value Date    AST 32 02/15/2020     Lab Results   Component Value Date    ALT 17 02/15/2020     Lab Results   Component Value Date    ALBUMIN 2.3 02/15/2020     GLUCOSE:   Recent Labs   Lab 02/28/20  0540 02/28/20  0416 02/27/20  2121 02/27/20  1646 02/27/20  1644 02/27/20  1154 02/27/20  0837 02/27/20  0420 02/26/20 2129  02/26/20  0859  02/24/20  0337  02/23/20  1648   *  --   --   --  185*  --   --  132*  --   --  131*  --  181*  --  176*   BGM  --  109* 83 135*  --  138* 96  --  130*   < >  --    < >  --    < >  --     < > = values in this interval not displayed.       Imaging:  reviewed recent imaging      A/P:   Vel Espana is a 74 year old male with Hx HTN who presented with syncope and CP. Found to have Type A aortic dissection and now status post ascending aortic and arch dissection repair with tissue AVR and vein bypass to RCA on 2/9/20 with Dr. Monet Mckeon.  [facility]     Neuro:   - Neurology consult 2/17; CT head showed no acute pathology, generalized parenchymal volume loss with chronic moderate small vessel disease. No concerns for stroke or TIA. Signed off.   - Pain; tylenol and oxycodone, robaxin PRN.  - Delirium/Encephalopathy; resulted in extended intubation, seroquel 50 mg q HS. Waxes and wanes per family.   - Insomnia; melatonin q HS, also is sleeping during the day at times.     CV:   - Hx of HTN, LV EF 60-65%  - s/p Tissue AVR, vein graft to RCA, and ascending aorta repair.  - Had 25 seconds nonsustained monomorphic VT (corrected lytes and started Amio). Then had A-fib with RVR 2/14.    - Electrophysiology consulted, currently on Amio 200 mg daily.    - ASA 81 mg, atorvastatin.  Metoprolol 12.5 mg BID.   - Vascular Consult 2/25; recommended Podiatry to follow feet, no acute vascular surgery needs.    - Podiatry consult 2/25; await follow up note. Foot X-ray did not indicate osteomyelitis.     Pulm:   - Arrived from OR on Sofia (weaned POD #1).   - Pulm toilet, IS, activity and deep breathing   - Supplemental O2 PRN to keep sats > 92%. Wean off as tolerated.     ID:   - Febrile 2/14, cultures sent and no growth besides candida in BAL. Treated with zosyn through 2/22.   - WBC WNL, afebrile, no signs or symptoms of infection     GI / FEN:   - Had NJ placed in ICU for nutritional support. Ordered NJ placement 2/28.   - DD1 with nectar thick liquids, bowel regimen. Give suppository 2/28.     Dental:   - Consult 2/27. On Amoxicillin to protect tissue AVR.   - Planning removal of the remaining mandibular teeth for infection risk, TBD    Renal / :   - Post-op non-oliguric HANNA, Nephrology was consulted and has signed off.   - No Hx of renal disease. Creatinine 2.12, adequate UOP.   - Diuresis in the ICU with lasix. Currently Bumex 1 mg IV BID.       Heme / Anticoagulation:   - Hgb stable 10's, Plts WNL    Endo:   - Sliding scale insulin    PPX:   - Gastric ulcer; protonix for 30 days  - DVT; Heparin 5000 U q 12 hrs until more active     Dispo:   - 6C since 2/27   - Anticipate DC to home TCU per PT/OT recs. [facility].  - Nephrology clinic follow up 2-3 weeks after discharge.       Staff surgeons have been informed of changes through both  verbal and written communication.      Den Bentley PA-C  Cardiothoracic Surgery  Pager [phone]    11:19 AM   February 28, 2020

## 2020-02-28 NOTE — PLAN OF CARE
Pt transferred to  with belongings and meds at 2142. Report given to RN and notified family of transfer.

## 2020-02-28 NOTE — PROGRESS NOTES
CLINICAL NUTRITION SERVICES     Nutrition Prescription    RECOMMENDATIONS FOR MDs/PROVIDERS TO ORDER:  1. Adjust free water flushes via feeding tube as needed, pending fluid status. Currently, free water flushes are minimal, or for patency.    2. Once kcal counts reveal that pt is consuming at least 1200 kcals and 70 g protein daily on average, discontinue TFs.     Recommendations already ordered by Registered Dietitian (RD):  TFs, patency free water, and certavite    Future/Additional Recommendations:  For all recs, see prior nutrition notes.      Diet: DD 1 + nectar-thick liquids since 2/27. SLP is following. Boost Plus (thickened) at meals. Team ordered kcal counts 2/29-3/2.   TFs: Discontinued 2/24 as lost feeding tube access. Extubated 2/24.    INTERVENTIONS:  Implementation:  Collaboration with other providers, Enteral Nutrition: Discussed pt with team. Ordered TFs. TFs are to infuse via post-pyloric feeding tube (to be placed in radiology) and advanced to goal rate and ordered Prosource TF modular, 3 pkts daily. Nutren 1.5 at goal 75 mL/hr x 16 hours (18:00-10:00) to provide 1200 mL TF, 1800+ kcals (20+ kcal/kg/day), 82+ g PRO (0.9+ g/kg/day), 912 mL H2O, 211 g CHO and no fiber daily. Each packet of ProSource TF modular provides 40 kcals and 11 g protein.   Feeding tube flush: Patency free water flushes ordered.   Multivitamin/mineral supplement therapy: Ordered certavite to help meet micronutrient needs    Follow up/Monitoring:  Will continue to follow pt.    Audrey Torres, MS, RD, LD, [facility]   6C Pgr: [phone]

## 2020-02-28 NOTE — PLAN OF CARE
D/I/A: Pt here s/p  ascending aortic dissection repair, CABGx1 and AVR. Remains pleasantly confused, aox1-2, able to verbalize needs. Per family less alert than other days. CVTS is pursuing neuro consult. Coccyx red, plan to order DEVANG mattress for patient once in lift room. Sternal incision mostly healed, CT sites scabbed over. Large RLE incision from graft site stapled closed. Toes necrotic, painted w/ betadine per order. . SR, RA, VSS.   P: Pt went for TF placement at 1400. Pt will need suppository once returned per CVTS for abdominal distension and no BM for a couple of days.

## 2020-02-28 NOTE — PLAN OF CARE
D: Aorta dissection repair, CAB x 1, AVR c/b cardiac arrest then L arterial exploration 2/10. Extubated 2/24    I: Monitored vitals and assessed pt status.   Changed: transfer from       A: Disoriented to time, situation and occasionally place. Pleasantly confused. Denies pain. Seems comfortable after repositioning.  VSS. 2L O2 overnight. Patient was on room air prior to arrival. DD1 + nectar thick.  Placed on 2L O2 d/t patient frequently removing home cpap. LIFT DEPENDENT. Turn q 2 hours. [place] cathter patent and draining. No BM overnight but passing gas. Necrotic toes swabbed with iodine and wrapped in kerlix, Left vein graft C/D/I. Doesn't use call light. Can swallow pills with applesauce per ICU RN. Patient moves all extremities. Weak  but equal. Slept through the night.       I/O this shift:  In: 20 [I.V.:20]  Out: 500 [Urine:500]    Temp:  [97.3  F (36.3  C)-98.2  F (36.8  C)] 98.2  F (36.8  C)  Pulse:  [80-88] 80  Heart Rate:  [78-92] 78  Resp:  [12-25] 20  BP: ()/(65-84) 101/65  SpO2:  [88 %-100 %] 94 %      P: Continue to monitor Pt status and report changes to treatment team. CVTS.  Social Work is coordinating rehab and attempting to place patient with spouse who is going to be discharged to hospice.

## 2020-02-28 NOTE — PROGRESS NOTES
Admission  Diagnosis: Ascending aorta dissection repair, AVR, CAB x 1, left femoral artery exploration    Transfer from:   Via: bed  Accompanied: staff  Belongings: with patient  Teaching: call light use, room orientation, activity, call dont fall  Access: PIV, PICC  Telemetry: on     Patient is alert to self, disoriented to time and situation. Moves all extremities. Noticeably weak. Follows commands.

## 2020-02-28 NOTE — PLAN OF CARE
Discharge Planner PT   Patient plan for discharge: TCU, trying to get to same facility as wife  Current status: Pt maxAx2 supine<>sit, sits EOB with totalA for scooting and maxA to maintain neutral sitting posture- pt presents with very strong L aide lean and significant B weakness R>L with poor ability to R weight shift or lean in sitting, significant R UE weakness. Tolerates sitting EOB with postural and R weight shift training ~15 min, denies dizziness throughout. Poor LE movement noted with functional activity. Pt currently in double non-lift room, per RN plan to change rooms. Pt would benefit greatly from recliner to promote OOB activity. Continue Liko Ax2 for mobility.  Barriers to return to prior living situation: medical needs, current functional mobility-lift dependent  Recommendations for discharge: TCU  Rationale for recommendations: Pt currently below baseline level of function and would benefit from ongoing therapy to address the above deficits in order to progress towards PLOF and promote IND mobility. Pt demonstrates needs adequate for ARU intensity. However, would unlikely tolerate 3 hours therapy at this time, will continue to update accordingly.       Entered by: Tina Mejia 02/28/2020 10:11 AM

## 2020-02-28 NOTE — PHARMACY-CONSULT NOTE
Pharmacy Tube Feeding Consult    Medication reviewed for administration by feeding tube and for potential food/drug interactions.    Recommendation: Discussed with primary RN, will leave meds as pills for now. No changes are needed at this time.     Pharmacy will continue to follow as new medications are ordered.

## 2020-02-29 ENCOUNTER — APPOINTMENT (OUTPATIENT)
Dept: SPEECH THERAPY | Facility: CLINIC | Age: 75
DRG: 219 | End: 2020-02-29
Payer: COMMERCIAL

## 2020-02-29 LAB
ANION GAP SERPL CALCULATED.3IONS-SCNC: 7 MMOL/L (ref 3–14)
BUN SERPL-MCNC: 112 MG/DL (ref 7–30)
CALCIUM SERPL-MCNC: 8.8 MG/DL (ref 8.5–10.1)
CHLORIDE SERPL-SCNC: 116 MMOL/L (ref 94–109)
CO2 SERPL-SCNC: 28 MMOL/L (ref 20–32)
CREAT SERPL-MCNC: 2.41 MG/DL (ref 0.66–1.25)
ERYTHROCYTE [DISTWIDTH] IN BLOOD BY AUTOMATED COUNT: 15 % (ref 10–15)
GFR SERPL CREATININE-BSD FRML MDRD: 25 ML/MIN/{1.73_M2}
GLUCOSE BLDC GLUCOMTR-MCNC: 141 MG/DL (ref 70–99)
GLUCOSE BLDC GLUCOMTR-MCNC: 142 MG/DL (ref 70–99)
GLUCOSE BLDC GLUCOMTR-MCNC: 172 MG/DL (ref 70–99)
GLUCOSE BLDC GLUCOMTR-MCNC: 175 MG/DL (ref 70–99)
GLUCOSE BLDC GLUCOMTR-MCNC: 197 MG/DL (ref 70–99)
GLUCOSE BLDC GLUCOMTR-MCNC: 204 MG/DL (ref 70–99)
GLUCOSE SERPL-MCNC: 165 MG/DL (ref 70–99)
HCT VFR BLD AUTO: 38 % (ref 40–53)
HGB BLD-MCNC: 11.1 G/DL (ref 13.3–17.7)
MAGNESIUM SERPL-MCNC: 2.8 MG/DL (ref 1.6–2.3)
MCH RBC QN AUTO: 30.2 PG (ref 26.5–33)
MCHC RBC AUTO-ENTMCNC: 29.2 G/DL (ref 31.5–36.5)
MCV RBC AUTO: 104 FL (ref 78–100)
PLATELET # BLD AUTO: 362 10E9/L (ref 150–450)
POTASSIUM SERPL-SCNC: 3.6 MMOL/L (ref 3.4–5.3)
RBC # BLD AUTO: 3.67 10E12/L (ref 4.4–5.9)
SODIUM SERPL-SCNC: 151 MMOL/L (ref 133–144)
SODIUM SERPL-SCNC: 151 MMOL/L (ref 133–144)
WBC # BLD AUTO: 7.8 10E9/L (ref 4–11)

## 2020-02-29 PROCEDURE — 25000128 H RX IP 250 OP 636: Performed by: PHYSICIAN ASSISTANT

## 2020-02-29 PROCEDURE — 85027 COMPLETE CBC AUTOMATED: CPT | Performed by: PHYSICIAN ASSISTANT

## 2020-02-29 PROCEDURE — 83735 ASSAY OF MAGNESIUM: CPT | Performed by: PHYSICIAN ASSISTANT

## 2020-02-29 PROCEDURE — 25000132 ZZH RX MED GY IP 250 OP 250 PS 637: Performed by: PHYSICIAN ASSISTANT

## 2020-02-29 PROCEDURE — 25000132 ZZH RX MED GY IP 250 OP 250 PS 637: Performed by: ANESTHESIOLOGY

## 2020-02-29 PROCEDURE — 25000128 H RX IP 250 OP 636: Performed by: STUDENT IN AN ORGANIZED HEALTH CARE EDUCATION/TRAINING PROGRAM

## 2020-02-29 PROCEDURE — 25000132 ZZH RX MED GY IP 250 OP 250 PS 637: Performed by: STUDENT IN AN ORGANIZED HEALTH CARE EDUCATION/TRAINING PROGRAM

## 2020-02-29 PROCEDURE — 25000125 ZZHC RX 250: Performed by: PHYSICIAN ASSISTANT

## 2020-02-29 PROCEDURE — 94640 AIRWAY INHALATION TREATMENT: CPT

## 2020-02-29 PROCEDURE — 92526 ORAL FUNCTION THERAPY: CPT | Mod: GN

## 2020-02-29 PROCEDURE — 21400000 ZZH R&B CCU UMMC

## 2020-02-29 PROCEDURE — 40000802 ZZH SITE CHECK

## 2020-02-29 PROCEDURE — 00000146 ZZHCL STATISTIC GLUCOSE BY METER IP

## 2020-02-29 PROCEDURE — 84295 ASSAY OF SERUM SODIUM: CPT | Performed by: PHYSICIAN ASSISTANT

## 2020-02-29 PROCEDURE — 94640 AIRWAY INHALATION TREATMENT: CPT | Mod: 76

## 2020-02-29 PROCEDURE — 25000132 ZZH RX MED GY IP 250 OP 250 PS 637: Performed by: NURSE PRACTITIONER

## 2020-02-29 PROCEDURE — 80048 BASIC METABOLIC PNL TOTAL CA: CPT | Performed by: PHYSICIAN ASSISTANT

## 2020-02-29 PROCEDURE — 40000275 ZZH STATISTIC RCP TIME EA 10 MIN

## 2020-02-29 PROCEDURE — 36415 COLL VENOUS BLD VENIPUNCTURE: CPT | Performed by: PHYSICIAN ASSISTANT

## 2020-02-29 PROCEDURE — 25000132 ZZH RX MED GY IP 250 OP 250 PS 637: Performed by: THORACIC SURGERY (CARDIOTHORACIC VASCULAR SURGERY)

## 2020-02-29 RX ORDER — FUROSEMIDE 40 MG
40 TABLET ORAL DAILY
Status: DISCONTINUED | OUTPATIENT
Start: 2020-03-01 | End: 2020-03-05 | Stop reason: HOSPADM

## 2020-02-29 RX ORDER — QUETIAPINE FUMARATE 25 MG/1
25 TABLET, FILM COATED ORAL AT BEDTIME
Status: DISCONTINUED | OUTPATIENT
Start: 2020-02-29 | End: 2020-03-01

## 2020-02-29 RX ADMIN — INSULIN ASPART 1 UNITS: 100 INJECTION, SOLUTION INTRAVENOUS; SUBCUTANEOUS at 08:22

## 2020-02-29 RX ADMIN — INSULIN ASPART 2 UNITS: 100 INJECTION, SOLUTION INTRAVENOUS; SUBCUTANEOUS at 12:57

## 2020-02-29 RX ADMIN — QUETIAPINE FUMARATE 25 MG: 25 TABLET ORAL at 21:27

## 2020-02-29 RX ADMIN — SODIUM CHLORIDE, PRESERVATIVE FREE 5 ML: 5 INJECTION INTRAVENOUS at 14:27

## 2020-02-29 RX ADMIN — IPRATROPIUM BROMIDE AND ALBUTEROL SULFATE 3 ML: .5; 3 SOLUTION RESPIRATORY (INHALATION) at 20:58

## 2020-02-29 RX ADMIN — ACETAMINOPHEN 650 MG: 325 TABLET, FILM COATED ORAL at 14:32

## 2020-02-29 RX ADMIN — LIDOCAINE 1 PATCH: 560 PATCH PERCUTANEOUS; TOPICAL; TRANSDERMAL at 08:29

## 2020-02-29 RX ADMIN — OXYCODONE HYDROCHLORIDE 5 MG: 5 TABLET ORAL at 23:20

## 2020-02-29 RX ADMIN — Medication 40 MG: at 08:27

## 2020-02-29 RX ADMIN — ASPIRIN 81 MG CHEWABLE TABLET 81 MG: 81 TABLET CHEWABLE at 08:27

## 2020-02-29 RX ADMIN — Medication 5000 UNITS: at 19:39

## 2020-02-29 RX ADMIN — Medication 5000 UNITS: at 08:28

## 2020-02-29 RX ADMIN — Medication 1 PACKET: at 14:28

## 2020-02-29 RX ADMIN — ACETAMINOPHEN 650 MG: 325 TABLET, FILM COATED ORAL at 23:20

## 2020-02-29 RX ADMIN — Medication 12.5 MG: at 08:27

## 2020-02-29 RX ADMIN — AMIODARONE HYDROCHLORIDE 200 MG: 200 TABLET ORAL at 08:28

## 2020-02-29 RX ADMIN — BUMETANIDE 1 MG: 0.25 INJECTION INTRAMUSCULAR; INTRAVENOUS at 16:39

## 2020-02-29 RX ADMIN — BUMETANIDE 1 MG: 0.25 INJECTION INTRAMUSCULAR; INTRAVENOUS at 08:28

## 2020-02-29 RX ADMIN — IPRATROPIUM BROMIDE AND ALBUTEROL SULFATE 3 ML: .5; 3 SOLUTION RESPIRATORY (INHALATION) at 10:00

## 2020-02-29 RX ADMIN — AMOXICILLIN 500 MG: 400 POWDER, FOR SUSPENSION ORAL at 05:10

## 2020-02-29 RX ADMIN — INSULIN ASPART 3 UNITS: 100 INJECTION, SOLUTION INTRAVENOUS; SUBCUTANEOUS at 17:15

## 2020-02-29 RX ADMIN — POTASSIUM CHLORIDE 20 MEQ: 1.5 POWDER, FOR SOLUTION ORAL at 14:27

## 2020-02-29 RX ADMIN — POLYETHYLENE GLYCOL 3350 17 G: 17 POWDER, FOR SOLUTION ORAL at 14:27

## 2020-02-29 RX ADMIN — Medication 12.5 MG: at 19:39

## 2020-02-29 RX ADMIN — ATORVASTATIN CALCIUM 40 MG: 40 TABLET, FILM COATED ORAL at 19:39

## 2020-02-29 RX ADMIN — MELATONIN TAB 3 MG 3 MG: 3 TAB at 21:27

## 2020-02-29 RX ADMIN — Medication 1 PACKET: at 08:29

## 2020-02-29 RX ADMIN — AMOXICILLIN 500 MG: 400 POWDER, FOR SUSPENSION ORAL at 14:27

## 2020-02-29 RX ADMIN — SENNOSIDES AND DOCUSATE SODIUM 2 TABLET: 8.6; 5 TABLET ORAL at 19:39

## 2020-02-29 RX ADMIN — MULTIVITAMIN 15 ML: LIQUID ORAL at 08:27

## 2020-02-29 RX ADMIN — AMOXICILLIN 500 MG: 400 POWDER, FOR SUSPENSION ORAL at 21:32

## 2020-02-29 RX ADMIN — Medication 1 PACKET: at 19:42

## 2020-02-29 ASSESSMENT — ACTIVITIES OF DAILY LIVING (ADL)
ADLS_ACUITY_SCORE: 18

## 2020-02-29 ASSESSMENT — MIFFLIN-ST. JEOR: SCORE: 1890.38

## 2020-02-29 NOTE — PLAN OF CARE
Discharge Planner SLP   Patient plan for discharge: Pt unable to state  Current status: Recommend continuation of dysphagia diet 1 and nectar-thick liquids, no straws, with 1:1 supervision and feeding assist.  Ensure pt is fully alert and upright for all PO, given small bites/sips, alternating bites/sips.  Weakness, lethargy, overt s/sx of aspiration with thin liquids, poor bolus manipulation/transit impacting diet advancement.  SLP to follow.  Barriers to return to prior living situation: Dysphagia, weakness, mental status, TF  Recommendations for discharge: TCU  Rationale for recommendations: Below baseline function; pt will benefit from ongoing ST targeting swallowing       Entered by: Estefania Kaur 02/29/2020 4:08 PM

## 2020-02-29 NOTE — PLAN OF CARE
"/75 (BP Location: Left arm)   Pulse 84   Temp 97.9  F (36.6  C) (Axillary)   Resp 18   Ht 1.753 m (5' 9\")   Wt 117.1 kg (258 lb 2.5 oz)   SpO2 97%   BMI 38.12 kg/m      Time: 7478-5554  Status: s/p aortic dissection repair, valve replacement, and CABG x1  Neuro:  A&3-4, bed alarm on, pulled out his clothing/gown, didn't attempt to get out of bed.   Activity: mechanical lift, side to side repositioned q 2 hours.   Pain: denied   Cardiac: SR with HR 70s-80s.  Respiratory: RA, sating >92% including while asleep. LS clear. Denied SOB. Deep breathing and coughing encouraged.   GI/: incontinent of bowel, bm x 1 and smear after suppository administration.   Diet: NDD1, nectar thick liquid. Needs assistance with feeding. Poor appetite. NJ tube feeding sarted at 1600 at 15 ml/hr, advance q 8 hours, goal rate 75 ml.   Skin: small crack to coccyx/fissure, blanchable redness to buttocks/coccyx area.   LDAs: PICC triple lumen heparin locked.   Labs/Imaging: BS sable no sliding scale correction needed.   New change this shift: changed to private room. Pt slept most of the night.    Plan: advance TF 20 ml q 8 hours with goal 75 ml. Once reached goal and tolerated well, TF will be cycle from 6 pm to 10 am. PT working with him. Next rate change at 0800.  "

## 2020-02-29 NOTE — PLAN OF CARE
"BP 97/64 (BP Location: Right arm)   Pulse 84   Temp 97.8  F (36.6  C) (Oral)   Resp 18   Ht 1.753 m (5' 9\")   Wt 117.1 kg (258 lb 2.5 oz)   SpO2 96%   BMI 38.12 kg/m      Time: 4471-3654  Status: s/p aortic dissection repair, valve replacement, and CABG x1  Neuro:  A&3-4, bed alarm on, pulled out his clothing/gown, didn't attempt to get out of bed.   Activity: mechanical lift, side to side repositioned q 2 hours.   Pain: denied   Cardiac: SR with HR 70s-80s.  Respiratory: RA, sating >92% including while asleep. LS clear. Denied SOB. Deep breathing and coughing encouraged.   GI/: incontinent of bowel, bm x 1 and smear after suppository administration.   Diet: NDD1, nectar thick liquid. Needs assistance with feeding. Poor appetite. NJ tube feeding sarted at 1600 at 15 ml/hr, advance q 8 hours, goal rate 75 ml.   Skin: small crack to coccyx/fissure, blanchable redness to buttocks/coccyx area.   LDAs: PICC triple lumen heparin locked.   Labs/Imaging: BS sable no sliding scale correction needed.   New change this shift: changed to private room. Otherwise, no change.   Plan: advance TF 20 ml q 8 hours with goal 75 ml. Once reached goal and tolerated well, TF will be cycle from 6 pm to 10 am. PT working with him.       "

## 2020-02-29 NOTE — PROGRESS NOTES
D/I- No change in orientation status; remains confused and lethargic. Wound cares completed along with new requested wound orders. Feeds goal met. Potassium replaced per protocol  A- Stable; no acute events  P- Continue to monitor

## 2020-02-29 NOTE — PROGRESS NOTES
"CVTS Daily Note  2/29/2020  Attending: Nivia Mckeon MD    S:   No overnight events.   Pt seen at bedside resting comfortably.    Does complain of R arm weakness and thickened liquids. Also has sore sacrum.     Denies F/C/Sweats.  No CP, SOB, or calf pain.    Tolerating NJ and tube feeds.  + BM.  + Flatus.    Pain level tolerable with meds. Plan as per Neuro section below.     O:   Vital signs:  Temp: 97.9  F (36.6  C) Temp src: Axillary BP: 112/79 Pulse: 87 Heart Rate: 88 Resp: 24 SpO2: 97 % O2 Device: None (Room air) Oxygen Delivery: 1 LPM Height: 175.3 cm (5' 9\") Weight: 117.1 kg (258 lb 2.5 oz)  Estimated body mass index is 38.12 kg/m  as calculated from the following:    Height as of this encounter: 1.753 m (5' 9\").    Weight as of this encounter: 117.1 kg (258 lb 2.5 oz).    MAPs: 78 - 93  Gen: AAO x 3, pleasant, NAD, closes eyes a lot during exam  Mental: answers questions appropriately, moves all extremities  CV: RRR, S1S2 normal, no murmurs, rubs, or gallops.   Pulm: CTA, no rhonchi or wheezes  Abd: soft, non-tender, no guarding  Ext: no peripheral edema  Incision: clean, dry, intact, no erythema  Chest Tube sites: clean and dry  MS: generalized weakness, hand strength symmetrical       Labs:  BMP  Recent Labs   Lab 02/29/20  0539 02/28/20  1657 02/28/20  1045 02/28/20  0540  02/27/20  1644  02/27/20  0420   * 150* 150* 148*  150*   < > 148*   < > 147*   POTASSIUM 3.6  --   --  3.5  --  3.4  --  3.4   CHLORIDE 116*  --   --  112*  --  110*  --  110*   ELMER 8.8  --   --  9.0  --  8.9  --  9.0   CO2 28  --   --  32  --  34*  --  35*   *  --   --  100*  --  91*  --  88*   CR 2.41*  --   --  2.12*  --  1.99*  --  1.93*   *  --   --  131*  --  185*  --  132*    < > = values in this interval not displayed.     CBC  Recent Labs   Lab 02/29/20  0539 02/27/20  0420 02/26/20  0859 02/25/20  0555   WBC 7.8 7.9 8.7 8.2   RBC 3.67* 3.41* 3.26* 3.05*   HGB 11.1* 10.4* 10.0* 9.2*   HCT 38.0* 33.9* " 32.5* 30.3*   * 99 100 99   MCH 30.2 30.5 30.7 30.2   MCHC 29.2* 30.7* 30.8* 30.4*   RDW 15.0 15.2* 15.5* 16.2*    358 355 335     INR  No lab results found in last 7 days.   Hepatic Panel   Lab Results   Component Value Date    AST 32 02/15/2020     Lab Results   Component Value Date    ALT 17 02/15/2020     Lab Results   Component Value Date    ALBUMIN 2.3 02/15/2020     GLUCOSE:   Recent Labs   Lab 02/29/20  1217 02/29/20  0822 02/29/20  0539 02/29/20  0425 02/28/20  2207 02/28/20  1722 02/28/20  1506 02/28/20  0540  02/27/20  1644  02/27/20  0420  02/26/20  0859  02/24/20  0337   GLC  --   --  165*  --   --   --   --  131*  --  185*  --  132*  --  131*  --  181*   * 141*  --  142* 134* 122* 97  --    < >  --    < >  --    < >  --    < >  --     < > = values in this interval not displayed.       Imaging:  reviewed recent imaging    A/P:   Vel Espana is a 74 year old male with Hx HTN who presented with syncope and CP. Found to have Type A aortic dissection and now status post ascending aortic and arch dissection repair with tissue AVR and vein bypass to RCA on 2/9/20 with Dr. Monet Mckeon.  [facility]      Neuro:   - Neurology consult 2/17; CT head showed no acute pathology, generalized parenchymal volume loss with chronic moderate small vessel disease. No concerns for stroke or TIA. Signed off.   - Pain; tylenol and oxycodone, robaxin PRN.  - Delirium/Encephalopathy; resulted in extended intubation, seroquel 50 mg q HS. Waxes and wanes per family.   - Insomnia; melatonin q HS, also is sleeping during the day at times.      CV:   - Hx of HTN, LV EF 60-65%  - s/p Tissue AVR, vein graft to RCA, and ascending aorta repair.  - Had 25 seconds nonsustained monomorphic VT (corrected lytes and started Amio). Then had A-fib with RVR 2/14.   - Electrophysiology consulted, currently on Amio 200 mg daily.    - ASA 81 mg, atorvastatin.  Metoprolol 12.5 mg BID.   - Vascular Consult  2/25; recommended Podiatry to follow feet, no acute vascular surgery needs.    - Ortho consult 2/29; await follow up note. Foot X-ray did not indicate osteomyelitis.      Pulm:   - Arrived from OR on Sofia (weaned POD #1).   - Pulm toilet, IS, activity and deep breathing   - Supplemental O2 PRN to keep sats > 92%. Wean off as tolerated.      ID:   - Febrile 2/14, cultures sent and no growth besides candida in BAL. Treated with zosyn through 2/22.   - WBC WNL, afebrile, no signs or symptoms of infection      GI / FEN:   - Had NJ placed in ICU for nutritional support.   - Ordered NJ placement 2/28. FWF at 100 mL q 4 hrs.   - DD1 with nectar thick liquids, bowel regimen.       Dental:   - Consult 2/27. On Amoxicillin to protect tissue AVR.   - Planning removal of the remaining mandibular teeth for infection risk. Not for 6 weeks from surgery date per Dr Mckeon.      Renal / :   - Post-op non-oliguric HANNA, Nephrology was consulted and has signed off.   - No Hx of renal disease. Creatinine 2.41, adequate UOP.   - Diuresis in the ICU with lasix. Currently Lasix 40 mg PO daily.      Heme / Anticoagulation:   - Hgb stable 10's, Plts WNL     Endo:   - Sliding scale insulin     PPX:   - Gastric ulcer; protonix for 30 days  - DVT; Heparin 5000 U q 12 hrs until more active      Dispo:   - 6C since 2/27   - Anticipate DC to home TCU per PT/OT recs. [facility].  - Nephrology clinic follow up 2-3 weeks after discharge.   - Call Kesha Kang [phone] ([facility]) to ask for help with transfer       Staff surgeons have been informed of changes through both  verbal and written communication.      Dne Bentley PA-C  Cardiothoracic Surgery  Pager [phone]    4:53 PM   February 29, 2020

## 2020-03-01 ENCOUNTER — APPOINTMENT (OUTPATIENT)
Dept: PHYSICAL THERAPY | Facility: CLINIC | Age: 75
DRG: 219 | End: 2020-03-01
Payer: COMMERCIAL

## 2020-03-01 LAB
ANION GAP SERPL CALCULATED.3IONS-SCNC: 5 MMOL/L (ref 3–14)
BUN SERPL-MCNC: 118 MG/DL (ref 7–30)
CALCIUM SERPL-MCNC: 8.8 MG/DL (ref 8.5–10.1)
CHLORIDE SERPL-SCNC: 118 MMOL/L (ref 94–109)
CO2 SERPL-SCNC: 29 MMOL/L (ref 20–32)
CREAT SERPL-MCNC: 2.13 MG/DL (ref 0.66–1.25)
ERYTHROCYTE [DISTWIDTH] IN BLOOD BY AUTOMATED COUNT: 15.3 % (ref 10–15)
GFR SERPL CREATININE-BSD FRML MDRD: 29 ML/MIN/{1.73_M2}
GLUCOSE BLDC GLUCOMTR-MCNC: 189 MG/DL (ref 70–99)
GLUCOSE BLDC GLUCOMTR-MCNC: 194 MG/DL (ref 70–99)
GLUCOSE BLDC GLUCOMTR-MCNC: 235 MG/DL (ref 70–99)
GLUCOSE BLDC GLUCOMTR-MCNC: 249 MG/DL (ref 70–99)
GLUCOSE SERPL-MCNC: 273 MG/DL (ref 70–99)
HCT VFR BLD AUTO: 35.8 % (ref 40–53)
HGB BLD-MCNC: 10.6 G/DL (ref 13.3–17.7)
MAGNESIUM SERPL-MCNC: 2.8 MG/DL (ref 1.6–2.3)
MCH RBC QN AUTO: 30.5 PG (ref 26.5–33)
MCHC RBC AUTO-ENTMCNC: 29.6 G/DL (ref 31.5–36.5)
MCV RBC AUTO: 103 FL (ref 78–100)
PLATELET # BLD AUTO: 352 10E9/L (ref 150–450)
POTASSIUM SERPL-SCNC: 3.4 MMOL/L (ref 3.4–5.3)
RBC # BLD AUTO: 3.47 10E12/L (ref 4.4–5.9)
SODIUM SERPL-SCNC: 152 MMOL/L (ref 133–144)
SODIUM SERPL-SCNC: 156 MMOL/L (ref 133–144)
WBC # BLD AUTO: 8.1 10E9/L (ref 4–11)

## 2020-03-01 PROCEDURE — 25000132 ZZH RX MED GY IP 250 OP 250 PS 637: Performed by: ANESTHESIOLOGY

## 2020-03-01 PROCEDURE — 36592 COLLECT BLOOD FROM PICC: CPT | Performed by: PHYSICIAN ASSISTANT

## 2020-03-01 PROCEDURE — 40000802 ZZH SITE CHECK

## 2020-03-01 PROCEDURE — 80048 BASIC METABOLIC PNL TOTAL CA: CPT | Performed by: PHYSICIAN ASSISTANT

## 2020-03-01 PROCEDURE — 84295 ASSAY OF SERUM SODIUM: CPT | Performed by: PHYSICIAN ASSISTANT

## 2020-03-01 PROCEDURE — 25000132 ZZH RX MED GY IP 250 OP 250 PS 637: Performed by: PHYSICIAN ASSISTANT

## 2020-03-01 PROCEDURE — 85027 COMPLETE CBC AUTOMATED: CPT | Performed by: PHYSICIAN ASSISTANT

## 2020-03-01 PROCEDURE — 21400000 ZZH R&B CCU UMMC

## 2020-03-01 PROCEDURE — 94640 AIRWAY INHALATION TREATMENT: CPT

## 2020-03-01 PROCEDURE — 25000132 ZZH RX MED GY IP 250 OP 250 PS 637: Performed by: THORACIC SURGERY (CARDIOTHORACIC VASCULAR SURGERY)

## 2020-03-01 PROCEDURE — 94640 AIRWAY INHALATION TREATMENT: CPT | Mod: 76

## 2020-03-01 PROCEDURE — 83735 ASSAY OF MAGNESIUM: CPT | Performed by: PHYSICIAN ASSISTANT

## 2020-03-01 PROCEDURE — 00000146 ZZHCL STATISTIC GLUCOSE BY METER IP

## 2020-03-01 PROCEDURE — 36416 COLLJ CAPILLARY BLOOD SPEC: CPT | Performed by: PHYSICIAN ASSISTANT

## 2020-03-01 PROCEDURE — 25000131 ZZH RX MED GY IP 250 OP 636 PS 637: Performed by: STUDENT IN AN ORGANIZED HEALTH CARE EDUCATION/TRAINING PROGRAM

## 2020-03-01 PROCEDURE — 40000275 ZZH STATISTIC RCP TIME EA 10 MIN

## 2020-03-01 PROCEDURE — 25000132 ZZH RX MED GY IP 250 OP 250 PS 637: Performed by: STUDENT IN AN ORGANIZED HEALTH CARE EDUCATION/TRAINING PROGRAM

## 2020-03-01 PROCEDURE — 25000128 H RX IP 250 OP 636: Performed by: STUDENT IN AN ORGANIZED HEALTH CARE EDUCATION/TRAINING PROGRAM

## 2020-03-01 PROCEDURE — 25000132 ZZH RX MED GY IP 250 OP 250 PS 637: Performed by: NURSE PRACTITIONER

## 2020-03-01 PROCEDURE — 40000558 ZZH STATISTIC CVC DRESSING CHANGE

## 2020-03-01 PROCEDURE — 97110 THERAPEUTIC EXERCISES: CPT | Mod: GP

## 2020-03-01 PROCEDURE — 25000125 ZZHC RX 250: Performed by: PHYSICIAN ASSISTANT

## 2020-03-01 PROCEDURE — 97530 THERAPEUTIC ACTIVITIES: CPT | Mod: GP

## 2020-03-01 RX ORDER — FLUORIDE TOOTHPASTE
15 TOOTHPASTE DENTAL 4 TIMES DAILY PRN
Status: DISCONTINUED | OUTPATIENT
Start: 2020-03-01 | End: 2020-03-05 | Stop reason: HOSPADM

## 2020-03-01 RX ORDER — NYSTATIN 100000/ML
500000 SUSPENSION, ORAL (FINAL DOSE FORM) ORAL 4 TIMES DAILY
Status: DISCONTINUED | OUTPATIENT
Start: 2020-03-01 | End: 2020-03-05 | Stop reason: HOSPADM

## 2020-03-01 RX ADMIN — MULTIVITAMIN 15 ML: LIQUID ORAL at 08:14

## 2020-03-01 RX ADMIN — Medication 5000 UNITS: at 08:18

## 2020-03-01 RX ADMIN — Medication 5000 UNITS: at 21:35

## 2020-03-01 RX ADMIN — ASPIRIN 81 MG CHEWABLE TABLET 81 MG: 81 TABLET CHEWABLE at 08:15

## 2020-03-01 RX ADMIN — IPRATROPIUM BROMIDE AND ALBUTEROL SULFATE 3 ML: .5; 3 SOLUTION RESPIRATORY (INHALATION) at 07:33

## 2020-03-01 RX ADMIN — MELATONIN TAB 3 MG 3 MG: 3 TAB at 21:35

## 2020-03-01 RX ADMIN — Medication 5 ML: at 06:13

## 2020-03-01 RX ADMIN — INSULIN ASPART 5 UNITS: 100 INJECTION, SOLUTION INTRAVENOUS; SUBCUTANEOUS at 17:06

## 2020-03-01 RX ADMIN — AMOXICILLIN 500 MG: 400 POWDER, FOR SUSPENSION ORAL at 21:35

## 2020-03-01 RX ADMIN — IPRATROPIUM BROMIDE AND ALBUTEROL SULFATE 3 ML: .5; 3 SOLUTION RESPIRATORY (INHALATION) at 20:40

## 2020-03-01 RX ADMIN — Medication 40 MG: at 08:19

## 2020-03-01 RX ADMIN — Medication 1 PACKET: at 21:35

## 2020-03-01 RX ADMIN — POTASSIUM CHLORIDE 20 MEQ: 1.5 POWDER, FOR SOLUTION ORAL at 11:53

## 2020-03-01 RX ADMIN — Medication 12.5 MG: at 08:16

## 2020-03-01 RX ADMIN — SENNOSIDES AND DOCUSATE SODIUM 1 TABLET: 8.6; 5 TABLET ORAL at 21:35

## 2020-03-01 RX ADMIN — NYSTATIN 500000 UNITS: 100000 SUSPENSION ORAL at 21:34

## 2020-03-01 RX ADMIN — AMOXICILLIN 500 MG: 400 POWDER, FOR SUSPENSION ORAL at 08:14

## 2020-03-01 RX ADMIN — AMOXICILLIN 500 MG: 400 POWDER, FOR SUSPENSION ORAL at 13:41

## 2020-03-01 RX ADMIN — Medication 1 PACKET: at 08:19

## 2020-03-01 RX ADMIN — Medication 1 PACKET: at 13:41

## 2020-03-01 RX ADMIN — Medication 12.5 MG: at 21:35

## 2020-03-01 RX ADMIN — OXYCODONE HYDROCHLORIDE 5 MG: 5 TABLET ORAL at 12:20

## 2020-03-01 RX ADMIN — FUROSEMIDE 40 MG: 40 TABLET ORAL at 08:18

## 2020-03-01 RX ADMIN — INSULIN ASPART 3 UNITS: 100 INJECTION, SOLUTION INTRAVENOUS; SUBCUTANEOUS at 11:54

## 2020-03-01 RX ADMIN — ATORVASTATIN CALCIUM 40 MG: 40 TABLET, FILM COATED ORAL at 21:35

## 2020-03-01 RX ADMIN — AMIODARONE HYDROCHLORIDE 200 MG: 200 TABLET ORAL at 08:15

## 2020-03-01 RX ADMIN — NYSTATIN 500000 UNITS: 100000 SUSPENSION ORAL at 16:39

## 2020-03-01 RX ADMIN — SODIUM CHLORIDE, PRESERVATIVE FREE 10 ML: 5 INJECTION INTRAVENOUS at 11:53

## 2020-03-01 ASSESSMENT — ACTIVITIES OF DAILY LIVING (ADL)
ADLS_ACUITY_SCORE: 19
ADLS_ACUITY_SCORE: 20
ADLS_ACUITY_SCORE: 19

## 2020-03-01 ASSESSMENT — MIFFLIN-ST. JEOR: SCORE: 1880.38

## 2020-03-01 NOTE — PLAN OF CARE
PT - Use of OH lift for transfers    Discharge Planner PT   Patient plan for discharge: not stated  Current status: Pt performs rolling in bed with mod A for placement of sling. Pt dependently transferred between bed<>MOVEO using OH lift and Ax2. Pt participates in LE strengthening on MOVEO. Pt progresses to tolerate up to 40% body weight.  Barriers to return to prior living situation: medical status, level of assist, decreased activity tolerance, weakness  Recommendations for discharge: TCU  Rationale for recommendations: Pt would benefit from continued skilled PT in order to progress strength, activity tolerance and performance of functional mobility. Pt would benefit from intensity of ARU to progress mobility, however, pt likely would not tolerate 3 hours of therapy.  Entered by: Alta Mcdonald 03/01/2020 11:52 AM

## 2020-03-01 NOTE — PROGRESS NOTES
Calorie Count  Intake recorded for: 2/29  Total Kcals: 243 Total Protein: 10g  Kcals from Hospital Food: 243  Protein: 10g  Kcals from Outside Food (average):0 Protein: 0g  # Meals Recorded: 2 meals (First - 50% oatmeal w/ nectar thick milk)      (Second - less than 25% pudding, mashed potatoes w/ gravy, v8 juice)  # Supplements Recorded: less than 25% 1 Boost Plus and Magic Cup

## 2020-03-01 NOTE — PLAN OF CARE
Pt with a hx of HTN who presented with syncope and CP. Found to have Type A aortic dissection and now status post ascending aortic and arch dissection repair with tissue AVR and vein bypass to RCA on 2/9/20 with Dr. Monet Mckeon.  Continues on TF at 75 ml/hr with q 4 hour 100 ml water flushes. Adequate UO through FC. Restless, taking CPAP off at times, put on o2 per NC with RR in the 20s and sats in the 90s. Pulling off tele patches and gown frequently. He has some skin issues with stapled incision on his R leg and some sores with dressings on his feet. SCDs on.

## 2020-03-01 NOTE — PROGRESS NOTES
"CVTS Daily Note  3/1/2020  Attending: Nivia Mckeon MD     S:   No overnight events.   Pt seen at bedside resting comfortably.    Does complain of dry sore tongue and sacral pain.     Requires heavy assist and lifts to chair.     Denies F/C/Sweats.  No CP, SOB, or calf pain.    Tolerating diet minimally and tube feeds.  + BM    Pain level tolerable with meds. Plan as per Neuro section below.     O:   Vital signs:  Temp: 96.7  F (35.9  C) Temp src: Axillary BP: 116/77 Pulse: 87 Heart Rate: 90 Resp: 24 SpO2: 97 % O2 Device: None (Room air) Oxygen Delivery: 1 LPM Height: 175.3 cm (5' 9\") Weight: 116 kg (255 lb 11.7 oz)  Estimated body mass index is 37.77 kg/m  as calculated from the following:    Height as of this encounter: 1.753 m (5' 9\").    Weight as of this encounter: 116 kg (255 lb 11.7 oz).    24 hr Fluid status; net loss 360 mL.     MAPs: 78 - 90  Gen: pleasant, NAD, sleeping on and off during exam   CV: RRR, S1S2 normal, no murmurs, rubs, or gallops.   Pulm: CTA, no rhonchi or wheezes  Abd: obese, soft, non-tender, no guarding  Ext: trace peripheral edema, non-pitting. Staple line intact, minimal erythema around staples  Incision: clean, dry, intact, no erythema  Chest Tube sites: scabs clean and dry      Labs:  [facility]  Recent Labs   Lab 03/01/20  0619 02/29/20  1801 02/29/20  0539 02/28/20  1657  02/28/20  0540  02/27/20  1644   * 151* 151* 150*   < > 148*  150*   < > 148*   POTASSIUM 3.4  --  3.6  --   --  3.5  --  3.4   CHLORIDE 118*  --  116*  --   --  112*  --  110*   ELMER 8.8  --  8.8  --   --  9.0  --  8.9   CO2 29  --  28  --   --  32  --  34*   *  --  112*  --   --  100*  --  91*   CR 2.13*  --  2.41*  --   --  2.12*  --  1.99*   *  --  165*  --   --  131*  --  185*    < > = values in this interval not displayed.     CBC  Recent Labs   Lab 03/01/20  0619 02/29/20  0539 02/27/20  0420 02/26/20  0859   WBC 8.1 7.8 7.9 8.7   RBC 3.47* 3.67* 3.41* 3.26*   HGB 10.6* 11.1* 10.4* 10.0* "   HCT 35.8* 38.0* 33.9* 32.5*   * 104* 99 100   MCH 30.5 30.2 30.5 30.7   MCHC 29.6* 29.2* 30.7* 30.8*   RDW 15.3* 15.0 15.2* 15.5*    362 358 355       Hepatic Panel   Lab Results   Component Value Date    AST 32 02/15/2020     Lab Results   Component Value Date    ALT 17 02/15/2020     Lab Results   Component Value Date    ALBUMIN 2.3 02/15/2020     GLUCOSE:   Recent Labs   Lab 03/01/20  1155 03/01/20  0619 03/01/20  0220 02/29/20  2138 02/29/20  1952 02/29/20  1710 02/29/20  1217  02/29/20  0539  02/28/20  0540  02/27/20  1644  02/27/20  0420  02/26/20  0859   GLC  --  273*  --   --   --   --   --   --  165*  --  131*  --  185*  --  132*  --  131*   *  --  235* 175* 204* 197* 172*   < >  --    < >  --    < >  --    < >  --    < >  --     < > = values in this interval not displayed.       Imaging:  reviewed recent imaging      A/P:   Vel Espana is a 74 year old male with Hx HTN who presented with syncope and CP. Found to have Type A aortic dissection and now status post ascending aortic and arch dissection repair with tissue AVR and vein bypass to RCA on 2/9/20 with Dr. Monet Mckeon.  [facility]      Neuro:   - Neurology consult 2/17; CT head showed no acute pathology, generalized parenchymal volume loss with chronic moderate small vessel disease. No concerns for stroke or TIA. Signed off.   - Pain; tylenol and oxycodone, robaxin PRN.  - Delirium/Encephalopathy; resulted in extended intubation, seroquel tapered to off. Waxes and wanes per family.   - Insomnia; melatonin q HS, also is sleeping during the day at times.      CV:   - Hx of HTN, LV EF 60-65%  - s/p Tissue AVR, vein graft to RCA, and ascending aorta repair.  - Had 25 seconds nonsustained monomorphic VT (corrected lytes and started Amio). Then had A-fib with RVR 2/14.   - Electrophysiology consulted, currently on Amio 200 mg daily.    - ASA 81 mg, atorvastatin.  Metoprolol 12.5 mg BID.   - Vascular Consult 2/25;  recommended Podiatry to follow feet, no acute vascular surgery needs.    - Ortho consult 2/29; see note 3/1. Foot X-ray did not indicate osteomyelitis. Continue to observe over next weeks to months, may need some surgical intervention but nothing urgent. Continue dry dressings.      Pulm:   - Arrived from OR on Sofia (weaned POD #1).   - Pulm toilet, IS, activity and deep breathing   - Supplemental O2 PRN to keep sats > 92%. Wean off as tolerated.      ID:   - Febrile 2/14, cultures sent and no growth besides candida in BAL. Treated with zosyn through 2/22.   - WBC WNL, afebrile, no signs or symptoms of infection   - Will treat for oral candidiasis; nystatin 4 times daily with mouth swab application     GI / FEN:   - Had NJ placed in ICU for nutritional support, it came out with extubation.   - Ordered NJ placement 2/28. FWF at 100 mL q 4 hrs.   - DD1 with nectar thick liquids, bowel regimen.       Dental:   - Consult 2/27. On Amoxicillin to protect tissue AVR.   - Planning removal of the remaining mandibular teeth for infection risk. Not for 6 weeks from surgery date per Dr Mckeon.      Renal / :   - Post-op non-oliguric HANNA, Nephrology was consulted and has signed off.   - No Hx of renal disease. Creatinine 2.13, adequate UOP.   - Diuresis in the ICU with lasix. Currently Lasix 40 mg PO daily.      Heme / Anticoagulation:   - Hgb stable 10's, Plts WNL     Endo:   - Sliding scale insulin     PPX:   - Gastric ulcer; protonix for 30 days  - DVT; Heparin 5000 U q 12 hrs until more active      Dispo:   - 6C since 2/27   - Anticipate DC to home TCU per PT/OT recs. [facility].  - Nephrology clinic follow up 2-3 weeks after discharge.   - Call Kesha Kang [phone] ([facility] on Monday to ask for help with transfer per Dr Mckeon        Staff surgeons have been informed of changes through both  verbal and written communication.      Den Bentley PA-C  Cardiothoracic Surgery  Pager  [phone]    12:34 PM   March 1, 2020

## 2020-03-01 NOTE — CONSULTS
Consult Date:  03/01/2020      CHIEF COMPLAINT:  Dry gangrene.      HISTORY OF PRESENT ILLNESS:  Vel Espana is a pleasant 74-year-old gentleman with past medical history of hypertension, who presented to an outside hospital with chest pain after an episode of lightheadedness and syncope on 02/09/2020.  At that time, he was found to have a type A aortic dissection, was then transferred to [facility] for definitive management.  He underwent open repair of ascending aortic dissection, AVR, CABG to the right coronary artery, left greater saphenous vein harvest, and left femoral artery exploration on 02/10/2020.  His postoperative course was complicated by metabolic encephalopathy that was slow to resolve.  Additionally, he had ventilator-associated pneumonia and had challenges weaning from the vent.  Orthopedic Surgery Service was consulted to evaluate his bilateral lower extremity gangrenous toes, status post pressor use.      Currently, the patient is not having pain in his lower extremities.  He has generalized numbness at the level of the toes as well.  He denies fevers, chills, night sweats.  Denies obvious drainage from the feet.      PAST MEDICAL HISTORY:   1.  Hypertension.   2.  Ascending aortic dissection.      PAST SURGICAL HISTORY:  Repair of ascending aortic aneurysm on 02/09/2020.      ALLERGIES:  NO KNOWN DRUG ALLERGIES.      MEDICATIONS:  Per electronic medical record.      SOCIAL HISTORY:  Not a smoker.      FAMILY HISTORY:  Reviewed and noncontributory.      REVIEW OF SYSTEMS:  A 10-point review of systems was performed today with pertinent positives as noted in the HPI.      PHYSICAL EXAMINATION:  The patient is not in acute distress and has nonlabored breathing on 2 liters nasal cannula supplemental oxygen.  He has a regular heart rate to peripheral pulse.  Focused examination of his bilateral lower extremities reveals dry gangrenous changes of essentially all 5 toes on both feet.  There is no sign of  wet gangrene at this time.  No obvious purulence, drainage, sign of infection.  Dull sensation in the region of gangrene as well.  Limited mobility.      IMAGING:  X-ray of bilateral feet (2020):  Personally reviewed today.  Fails to reveal acute osseous abnormalities or signs concerning for osteomyelitis.      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  At this time, we would recommend ongoing watchful waiting, as the gangrene declares itself.  Over the course of the next weeks to months, as the gangrene declares its definitive level, further discussions maybe had in the clinic setting to discuss the possibility of amputation and at which level  We would recommend scheduling clinic followup with the Podiatry Service in the next 1-2 weeks to begin these discussions.  Otherwise, we would recommend bedside wound cares and wound ostomy nurse evaluation.  It is appropriate for dry dressings to keep the digits protected if needed.        No additional imaging needed at this time.        The patient to be discussed with Orthopedic staff.         MARC MARTINS MD       As dictated by GARIMA RAUSCH MD            D: 2020   T: 2020   MT: BRANDIE      Name:     VEL ESPANA   MRN:      [id number]        Account:       [id number]   :      1945           Consult Date:  2020      Document: [id number]

## 2020-03-01 NOTE — PLAN OF CARE
"/71 (BP Location: Left arm)   Pulse 87   Temp 97.4  F (36.3  C) (Axillary)   Resp 24   Ht 1.753 m (5' 9\")   Wt 116 kg (255 lb 11.7 oz)   SpO2 98%   BMI 37.77 kg/m      Shift: 2032-6929   Reason for Admission: Type A aortic dissection and now status post ascending aortic and arch dissection repair with tissue AVR and vein bypass to RCA on 2/9/20  VS: VSS on RA. SR  Neuros: Disoriented x3, confused. ALONZO full neuros  GI/: 1 BM this shift. Olmstead in place.   Diet: DD1 + nectar thick. BG ACHS. TF @ 75 mL (goal). Poor appetite.   Drains/Lines: R. PICC 3L- hep locked, gray port occluded.   Activity: A2 + lift. Repositioning every 2hr. Worked with PT this shift.  Pain/Nausea: C/o of lower back pain- PRN Oxycodone given. Denies nausea  Respiratory: Shallow breaths, sats >95% on RA. Pt uses CPAP at night.  Skin: L. Leg incision- stapled, TRINI. Mepilex on coccyx- new dressing. Midline incision- TRINI. Bilateral foot/toe wounds- cares done, covered with dry gauze. Bilateral heel wounds- dressing changed.   Labs: K+ replaced this shift, recheck in AM  Social: Family at bedside  New this shift: Changed pt's bed to pulsate mattress.   Plan of care: Will continue to monitor and follow POC.   "

## 2020-03-02 ENCOUNTER — APPOINTMENT (OUTPATIENT)
Dept: SPEECH THERAPY | Facility: CLINIC | Age: 75
DRG: 219 | End: 2020-03-02
Payer: COMMERCIAL

## 2020-03-02 ENCOUNTER — APPOINTMENT (OUTPATIENT)
Dept: GENERAL RADIOLOGY | Facility: CLINIC | Age: 75
DRG: 219 | End: 2020-03-02
Attending: PHYSICIAN ASSISTANT
Payer: COMMERCIAL

## 2020-03-02 ENCOUNTER — APPOINTMENT (OUTPATIENT)
Dept: PHYSICAL THERAPY | Facility: CLINIC | Age: 75
DRG: 219 | End: 2020-03-02
Payer: COMMERCIAL

## 2020-03-02 LAB
ANION GAP SERPL CALCULATED.3IONS-SCNC: 4 MMOL/L (ref 3–14)
BUN SERPL-MCNC: 105 MG/DL (ref 7–30)
CALCIUM SERPL-MCNC: 8.8 MG/DL (ref 8.5–10.1)
CHLORIDE SERPL-SCNC: 124 MMOL/L (ref 94–109)
CO2 SERPL-SCNC: 29 MMOL/L (ref 20–32)
CREAT SERPL-MCNC: 1.72 MG/DL (ref 0.66–1.25)
ERYTHROCYTE [DISTWIDTH] IN BLOOD BY AUTOMATED COUNT: 15.4 % (ref 10–15)
GFR SERPL CREATININE-BSD FRML MDRD: 38 ML/MIN/{1.73_M2}
GLUCOSE BLDC GLUCOMTR-MCNC: 165 MG/DL (ref 70–99)
GLUCOSE BLDC GLUCOMTR-MCNC: 190 MG/DL (ref 70–99)
GLUCOSE BLDC GLUCOMTR-MCNC: 201 MG/DL (ref 70–99)
GLUCOSE BLDC GLUCOMTR-MCNC: 237 MG/DL (ref 70–99)
GLUCOSE BLDC GLUCOMTR-MCNC: 244 MG/DL (ref 70–99)
GLUCOSE BLDC GLUCOMTR-MCNC: 296 MG/DL (ref 70–99)
GLUCOSE SERPL-MCNC: 371 MG/DL (ref 70–99)
HCT VFR BLD AUTO: 37.3 % (ref 40–53)
HGB BLD-MCNC: 10.9 G/DL (ref 13.3–17.7)
MAGNESIUM SERPL-MCNC: 2.8 MG/DL (ref 1.6–2.3)
MCH RBC QN AUTO: 30.5 PG (ref 26.5–33)
MCHC RBC AUTO-ENTMCNC: 29.2 G/DL (ref 31.5–36.5)
MCV RBC AUTO: 105 FL (ref 78–100)
PLATELET # BLD AUTO: 334 10E9/L (ref 150–450)
POTASSIUM SERPL-SCNC: 3.8 MMOL/L (ref 3.4–5.3)
RBC # BLD AUTO: 3.57 10E12/L (ref 4.4–5.9)
SODIUM SERPL-SCNC: 156 MMOL/L (ref 133–144)
SODIUM SERPL-SCNC: 156 MMOL/L (ref 133–144)
SODIUM SERPL-SCNC: 158 MMOL/L (ref 133–144)
WBC # BLD AUTO: 9.4 10E9/L (ref 4–11)

## 2020-03-02 PROCEDURE — 25000128 H RX IP 250 OP 636: Performed by: STUDENT IN AN ORGANIZED HEALTH CARE EDUCATION/TRAINING PROGRAM

## 2020-03-02 PROCEDURE — 71045 X-RAY EXAM CHEST 1 VIEW: CPT

## 2020-03-02 PROCEDURE — 25000132 ZZH RX MED GY IP 250 OP 250 PS 637: Performed by: STUDENT IN AN ORGANIZED HEALTH CARE EDUCATION/TRAINING PROGRAM

## 2020-03-02 PROCEDURE — 94640 AIRWAY INHALATION TREATMENT: CPT

## 2020-03-02 PROCEDURE — 25000132 ZZH RX MED GY IP 250 OP 250 PS 637: Performed by: THORACIC SURGERY (CARDIOTHORACIC VASCULAR SURGERY)

## 2020-03-02 PROCEDURE — 25000132 ZZH RX MED GY IP 250 OP 250 PS 637: Performed by: ANESTHESIOLOGY

## 2020-03-02 PROCEDURE — 36415 COLL VENOUS BLD VENIPUNCTURE: CPT | Performed by: PHYSICIAN ASSISTANT

## 2020-03-02 PROCEDURE — 21400000 ZZH R&B CCU UMMC

## 2020-03-02 PROCEDURE — 40000275 ZZH STATISTIC RCP TIME EA 10 MIN

## 2020-03-02 PROCEDURE — 84295 ASSAY OF SERUM SODIUM: CPT | Performed by: STUDENT IN AN ORGANIZED HEALTH CARE EDUCATION/TRAINING PROGRAM

## 2020-03-02 PROCEDURE — 25000128 H RX IP 250 OP 636

## 2020-03-02 PROCEDURE — 25000125 ZZHC RX 250: Performed by: PHYSICIAN ASSISTANT

## 2020-03-02 PROCEDURE — 25000131 ZZH RX MED GY IP 250 OP 636 PS 637: Performed by: PHYSICIAN ASSISTANT

## 2020-03-02 PROCEDURE — 97530 THERAPEUTIC ACTIVITIES: CPT | Mod: GP

## 2020-03-02 PROCEDURE — 94640 AIRWAY INHALATION TREATMENT: CPT | Mod: 76

## 2020-03-02 PROCEDURE — 36592 COLLECT BLOOD FROM PICC: CPT | Performed by: PHYSICIAN ASSISTANT

## 2020-03-02 PROCEDURE — 80048 BASIC METABOLIC PNL TOTAL CA: CPT | Performed by: PHYSICIAN ASSISTANT

## 2020-03-02 PROCEDURE — 92526 ORAL FUNCTION THERAPY: CPT | Mod: GN | Performed by: SPEECH-LANGUAGE PATHOLOGIST

## 2020-03-02 PROCEDURE — 27210429 ZZH NUTRITION PRODUCT INTERMEDIATE LITER

## 2020-03-02 PROCEDURE — 85027 COMPLETE CBC AUTOMATED: CPT | Performed by: PHYSICIAN ASSISTANT

## 2020-03-02 PROCEDURE — 25000132 ZZH RX MED GY IP 250 OP 250 PS 637: Performed by: NURSE PRACTITIONER

## 2020-03-02 PROCEDURE — 36415 COLL VENOUS BLD VENIPUNCTURE: CPT | Performed by: STUDENT IN AN ORGANIZED HEALTH CARE EDUCATION/TRAINING PROGRAM

## 2020-03-02 PROCEDURE — G0463 HOSPITAL OUTPT CLINIC VISIT: HCPCS

## 2020-03-02 PROCEDURE — 25000132 ZZH RX MED GY IP 250 OP 250 PS 637: Performed by: PHYSICIAN ASSISTANT

## 2020-03-02 PROCEDURE — 84295 ASSAY OF SERUM SODIUM: CPT | Performed by: PHYSICIAN ASSISTANT

## 2020-03-02 PROCEDURE — 83735 ASSAY OF MAGNESIUM: CPT | Performed by: PHYSICIAN ASSISTANT

## 2020-03-02 PROCEDURE — 00000146 ZZHCL STATISTIC GLUCOSE BY METER IP

## 2020-03-02 PROCEDURE — 97110 THERAPEUTIC EXERCISES: CPT | Mod: GP

## 2020-03-02 PROCEDURE — 40000802 ZZH SITE CHECK

## 2020-03-02 RX ORDER — OXYCODONE HCL 5 MG/5 ML
5-10 SOLUTION, ORAL ORAL EVERY 4 HOURS PRN
Status: DISCONTINUED | OUTPATIENT
Start: 2020-03-02 | End: 2020-03-05

## 2020-03-02 RX ORDER — QUETIAPINE FUMARATE 25 MG/1
25 TABLET, FILM COATED ORAL AT BEDTIME
Status: DISCONTINUED | OUTPATIENT
Start: 2020-03-02 | End: 2020-03-05 | Stop reason: HOSPADM

## 2020-03-02 RX ADMIN — Medication 1 PACKET: at 20:30

## 2020-03-02 RX ADMIN — INSULIN HUMAN 25 UNITS: 100 INJECTION, SUSPENSION SUBCUTANEOUS at 23:57

## 2020-03-02 RX ADMIN — SODIUM CHLORIDE, PRESERVATIVE FREE 5 ML: 5 INJECTION INTRAVENOUS at 13:12

## 2020-03-02 RX ADMIN — OXYCODONE HYDROCHLORIDE 5 MG: 5 SOLUTION ORAL at 03:09

## 2020-03-02 RX ADMIN — Medication 5 ML: at 18:24

## 2020-03-02 RX ADMIN — MULTIVITAMIN 15 ML: LIQUID ORAL at 08:00

## 2020-03-02 RX ADMIN — QUETIAPINE FUMARATE 25 MG: 25 TABLET ORAL at 21:37

## 2020-03-02 RX ADMIN — SENNOSIDES AND DOCUSATE SODIUM 1 TABLET: 8.6; 5 TABLET ORAL at 20:29

## 2020-03-02 RX ADMIN — INSULIN HUMAN 25 UNITS: 100 INJECTION, SUSPENSION SUBCUTANEOUS at 13:23

## 2020-03-02 RX ADMIN — Medication 12.5 MG: at 13:18

## 2020-03-02 RX ADMIN — LIDOCAINE 1 PATCH: 560 PATCH PERCUTANEOUS; TOPICAL; TRANSDERMAL at 08:00

## 2020-03-02 RX ADMIN — ATORVASTATIN CALCIUM 40 MG: 40 TABLET, FILM COATED ORAL at 20:29

## 2020-03-02 RX ADMIN — Medication 12.5 MG: at 20:29

## 2020-03-02 RX ADMIN — Medication 12.5 MG: at 16:01

## 2020-03-02 RX ADMIN — AMOXICILLIN 500 MG: 400 POWDER, FOR SUSPENSION ORAL at 13:25

## 2020-03-02 RX ADMIN — IPRATROPIUM BROMIDE AND ALBUTEROL SULFATE 3 ML: .5; 3 SOLUTION RESPIRATORY (INHALATION) at 20:34

## 2020-03-02 RX ADMIN — NYSTATIN 500000 UNITS: 100000 SUSPENSION ORAL at 20:30

## 2020-03-02 RX ADMIN — INSULIN ASPART 7 UNITS: 100 INJECTION, SOLUTION INTRAVENOUS; SUBCUTANEOUS at 08:14

## 2020-03-02 RX ADMIN — AMIODARONE HYDROCHLORIDE 200 MG: 200 TABLET ORAL at 08:00

## 2020-03-02 RX ADMIN — Medication 5000 UNITS: at 08:05

## 2020-03-02 RX ADMIN — NYSTATIN 500000 UNITS: 100000 SUSPENSION ORAL at 16:04

## 2020-03-02 RX ADMIN — Medication 5000 UNITS: at 20:29

## 2020-03-02 RX ADMIN — POLYETHYLENE GLYCOL 3350 17 G: 17 POWDER, FOR SOLUTION ORAL at 08:58

## 2020-03-02 RX ADMIN — Medication 5 ML: at 07:02

## 2020-03-02 RX ADMIN — Medication 1 PACKET: at 08:54

## 2020-03-02 RX ADMIN — FUROSEMIDE 40 MG: 40 TABLET ORAL at 08:00

## 2020-03-02 RX ADMIN — Medication 12.5 MG: at 08:00

## 2020-03-02 RX ADMIN — Medication 40 MG: at 08:00

## 2020-03-02 RX ADMIN — Medication 12.5 MG: at 09:10

## 2020-03-02 RX ADMIN — ALTEPLASE 2 MG: 2.2 INJECTION, POWDER, LYOPHILIZED, FOR SOLUTION INTRAVENOUS at 00:24

## 2020-03-02 RX ADMIN — ASPIRIN 81 MG CHEWABLE TABLET 81 MG: 81 TABLET CHEWABLE at 07:59

## 2020-03-02 RX ADMIN — POTASSIUM CHLORIDE 20 MEQ: 1.5 POWDER, FOR SOLUTION ORAL at 13:35

## 2020-03-02 RX ADMIN — Medication 1 PACKET: at 13:38

## 2020-03-02 RX ADMIN — MELATONIN TAB 3 MG 3 MG: 3 TAB at 21:37

## 2020-03-02 RX ADMIN — AMOXICILLIN 500 MG: 400 POWDER, FOR SUSPENSION ORAL at 21:37

## 2020-03-02 RX ADMIN — NYSTATIN 500000 UNITS: 100000 SUSPENSION ORAL at 08:00

## 2020-03-02 RX ADMIN — SENNOSIDES AND DOCUSATE SODIUM 2 TABLET: 8.6; 5 TABLET ORAL at 08:58

## 2020-03-02 RX ADMIN — AMOXICILLIN 500 MG: 400 POWDER, FOR SUSPENSION ORAL at 06:23

## 2020-03-02 RX ADMIN — Medication 40 MG: at 08:12

## 2020-03-02 ASSESSMENT — ACTIVITIES OF DAILY LIVING (ADL)
ADLS_ACUITY_SCORE: 22
ADLS_ACUITY_SCORE: 19
ADLS_ACUITY_SCORE: 19
ADLS_ACUITY_SCORE: 22

## 2020-03-02 ASSESSMENT — PAIN DESCRIPTION - DESCRIPTORS: DESCRIPTORS: ACHING

## 2020-03-02 NOTE — PROGRESS NOTES
"D:No complaints.   Denies pain when asked.   Continue to work with PT with and OT with leg strengthing on the moveo.  Up in chair after leg strengthing for about 2 hours.   Is currently sleeping.   Sternal incision is healing.  No redness or swelling.  Left leg incision with staples.   Healing.  No redness or swelling.   Olmstead with good urine output.   Tube feeding restarted at 50ml/hr continuous as ordered.  Given 50mg of free water q 1 hour as ordered.      A;Confused and disoriented.  Responses are illogical and not appropriate to subject matter.   Makes comments that are not with it.   Reoriented to place and time.  No physical discomfort.   Does respond \"no\" to pain.    P:Continue to assess status.   Continue  With PT/OT.  OOB as tolerated.  Assess for any potential signs of infection.  Assess incision.  Monitor temp, rhythm and vital signs.   "

## 2020-03-02 NOTE — PROGRESS NOTES
Calorie Count    Intake recorded for: 3/1/20  Total Kcals: 200 Total Protein: 7g    Kcals from Hospital Food: 200   Protein: 7g    Kcals from Outside Food (average):0 Protein: 0g    # Meals Recorded: 2 meals (first - less than 25% apple sauce, tomato soup, mashed potatoes w/ gravy)  (second - 50% pureed squash, 25% pureed pizza)    # Supplements Recorded: Less than 25% 2 nectar thick Boost Plus, 2 Magic Cups

## 2020-03-02 NOTE — PROGRESS NOTES
CLINICAL NUTRITION SERVICES     Request from team to modify TFs to continuous.     Diet: DD 1 + nectar-thick liquids. Ordered to receive Boost Plus (thickened) at lunch and supper.    Kcal counts:   2/29   243 kcals and 10 g protein (Two meal/s and less than 25% 1 Boost Plus and Magic Cup supplement/s recorded)    3/1   200 kcals and 7 g protein (Two meal/s and less than 25% 2 nectar thick Boost Plus, 2 Magic Cups supplement/s recorded)   3/2   Pending   * Pt consumed a two-day average of 222 kcals and 9 g protein daily. Inadequate/minimal oral intake.    TFs (via NDT): Nutren 1.5 at goal 75 mL/hr x 16 hours (18:00-10:00) provides 1200 mL TF, 1800+ kcals (20+ kcal/kg/day), 82+ g PRO (0.9+ g/kg/day), 912 mL H2O, 211 g CHO and no fiber daily. TFs have been infusing at goal.    INTERVENTIONS:  Implementation:  Collaboration with other providers: Discussed pt with team. Modified TFs to continuous. Per team, Endo will now be consulted. Notify Endo with all changes to TFs. Notified Endo of orders to start continuous TFs today (3/2).  Enteral Nutrition: Modified TF to continuous. Nutren 1.5 at 50 mL/hr to provide 1200 mL TF/day, 1800+ kcals (20+ kcal/kg/day), 82+ g PRO (0.9+ g/kg/day), 912 mL H2O, 211 g CHO and no fiber daily. Continue Prosource TF modular, 3 pkts/day. Each packet of ProSource TF modular provides an additional 40 kcals and 11 g protein.     Follow up/Monitoring:  Will continue to follow pt.    Audrey Torres, MS, RD, LD, [facility]   6C Pgr: [phone]

## 2020-03-02 NOTE — PLAN OF CARE
6C PT  Discharge Planner PT   Patient plan for discharge: Not discussed  Current status: Pt lethargic and disoriented, increased alertness with activity. Frequent illogical statements made throughout session. ModAx2 required for supine rolling B. Pt dependently transferred bed>moveo>recliner via OH lift and assist of 2. Body weight assisted squats via moveo performed at 30-42% of body weight to promote LE strength and endurance. VSS on RA.  Barriers to return to prior living situation: Medical status, weakness, decreased activity tolerance, impaired cognition  Recommendations for discharge: TCU  Rationale for recommendations: To progress strength, endurance, balance, and safety and independence with functional mobility.       Entered by: Carrie Fry 03/02/2020 10:44 AM

## 2020-03-02 NOTE — PROGRESS NOTES
"CVTS Daily Note  3/2/2020  Attending: Nivia Mckeon MD     S:   No overnight events.   Pt seen at bedside resting comfortably.    Patient confused and agitated this AM. Nursing notes state that patient over night was trying to get out of bed and disoriented.   Requires heavy assist and lifts to chair.     Denies F/C/Sweats.  No CP, SOB, or calf pain.    Tolerating diet minimally and tube feeds. Hyperglycemic this AM. + BM    Pain level tolerable with meds. Plan as per Neuro section below.     O:   Vital signs:  Temp: 98  F (36.7  C) Temp src: Axillary BP: (!) 127/92 Pulse: 90 Heart Rate: 106 Resp: 20 SpO2: 96 % O2 Device: None (Room air) Oxygen Delivery: 1 LPM Height: 175.3 cm (5' 9\") Weight: 116 kg (255 lb 11.7 oz)  Estimated body mass index is 37.77 kg/m  as calculated from the following:    Height as of this encounter: 1.753 m (5' 9\").    Weight as of this encounter: 116 kg (255 lb 11.7 oz).        MAPs: 90's-100  Gen: pleasant, NAD, sleeping on and off during exam   CV: RRR, S1S2 normal, no murmurs, rubs, or gallops.   Pulm: CTA, no rhonchi or wheezes  Abd: obese, soft, non-tender, no guarding  Ext: trace peripheral edema, non-pitting. Staple line intact, minimal erythema around staples  Incision: clean, dry, intact, no erythema  Chest Tube sites: scabs clean and dry      Labs:  [facility]  Recent Labs   Lab 03/02/20  0707 03/01/20  1807 03/01/20  0619 02/29/20  1801 02/29/20  0539  02/28/20  0540   * 156* 152* 151* 151*   < > 148*  150*   POTASSIUM 3.8  --  3.4  --  3.6  --  3.5   CHLORIDE 124*  --  118*  --  116*  --  112*   ELMER 8.8  --  8.8  --  8.8  --  9.0   CO2 29  --  29  --  28  --  32   *  --  118*  --  112*  --  100*   CR 1.72*  --  2.13*  --  2.41*  --  2.12*   *  --  273*  --  165*  --  131*    < > = values in this interval not displayed.     CBC  Recent Labs   Lab 03/02/20  0707 03/01/20  0619 02/29/20  0539 02/27/20  0420   WBC 9.4 8.1 7.8 7.9   RBC 3.57* 3.47* 3.67* 3.41*   HGB " 10.9* 10.6* 11.1* 10.4*   HCT 37.3* 35.8* 38.0* 33.9*   * 103* 104* 99   MCH 30.5 30.5 30.2 30.5   MCHC 29.2* 29.6* 29.2* 30.7*   RDW 15.4* 15.3* 15.0 15.2*    352 362 358       Hepatic Panel   Lab Results   Component Value Date    AST 32 02/15/2020     Lab Results   Component Value Date    ALT 17 02/15/2020     Lab Results   Component Value Date    ALBUMIN 2.3 02/15/2020     GLUCOSE:   Recent Labs   Lab 03/02/20  0811 03/02/20  0707 03/02/20  0203 03/01/20  2056 03/01/20  1648 03/01/20  1155 03/01/20  0619 03/01/20  0220  02/29/20  0539  02/28/20  0540  02/27/20  1644  02/27/20  0420   GLC  --  371*  --   --   --   --  273*  --   --  165*  --  131*  --  185*  --  132*   *  --  237* 189* 249* 194*  --  235*   < >  --    < >  --    < >  --    < >  --     < > = values in this interval not displayed.       Imaging:  reviewed recent imaging      A/P:   Vel Espana is a 74 year old male with Hx HTN who presented with syncope and CP. Found to have Type A aortic dissection and now status post ascending aortic and arch dissection repair with tissue AVR and vein bypass to RCA on 2/9/20 with Dr. Monet Mckeon.  [facility]      Neuro:   - Neurology consult 2/17; CT head showed no acute pathology, generalized parenchymal volume loss with chronic moderate small vessel disease. No concerns for stroke or TIA. Signed off.   - Pain; tylenol and oxycodone, robaxin PRN.  - Delirium/Encephalopathy; resulted in extended intubation, seroquel tapered to off. Waxes and wanes per family.    - Insomnia; melatonin q HS, also is sleeping during the day at times.   - Will restart Seroquel for delirium and sleep 3/2.      CV:   - Hx of HTN, LV EF 60-65%. Moderate RV dysfunction and dilated IVC on Echo 2/25 with weight of 130 kg, now down to 116 kg.   - s/p Tissue AVR, vein graft to RCA, and ascending aorta repair.  - Had 25 seconds nonsustained monomorphic VT (corrected lytes and started Amio). Then had  A-fib with RVR 2/14.   - Electrophysiology consulted, currently on Amio 200 mg daily.    - ASA 81 mg, atorvastatin.  Metoprolol 12.5 mg BID.  - Add hydralazine 12.5 mg qid started 3/2 for MAPS of .   - Vascular Consult 2/25; recommended Podiatry to follow feet, no acute vascular surgery needs.         Pulm:   - Arrived from OR on Sofia (weaned POD #1).   - Pulm toilet, IS, activity and deep breathing   - Supplemental O2 PRN to keep sats > 92%. Wean off as tolerated. On RA     ID:   - Febrile 2/14, cultures sent and no growth besides candida in BAL. Treated with zosyn through 2/22.   - WBC WNL, afebrile, no signs or symptoms of infection   - Will treat for oral candidiasis; nystatin 4 times daily with mouth swab application  - Dry gangrene: - Ortho consult 2/29; see note 3/1. Foot X-ray did not indicate osteomyelitis. Continue to observe over next weeks to months, may need some surgical intervention but nothing urgent. Continue dry dressings. Will consult wound care nurse to follow.      GI / FEN:   - Had NJ placed in ICU for nutritional support, it came out with extubation.   - Ordered NJ placement 2/28.   - Hypernatremia 156, increase FWF to 50 ml q1h from 100 mL q 4 hrs. Recheck in PM.    - DD1 with nectar thick liquids, bowel regimen.       Dental:   - Consult 2/27. On Amoxicillin to protect tissue AVR.   - Planning removal of the remaining mandibular teeth for infection risk. Not for 6 weeks from surgery date per Dr Mckeon.      Renal / :   - Post-op non-oliguric HANNA, Nephrology was consulted and has signed off.   - No Hx of renal disease. Creatinine 1.72, adequate UOP.   - Currently Lasix 40 mg PO daily. Appears dry to euvolemic. Will try to keep even.      Heme / Anticoagulation:   - Hgb stable 10's, Plts WNL     Endo:   - Sliding scale insulin  - Hyperglycemic with tube feeding and diet. Will consult endo.      PPX:   - Gastric ulcer; protonix for 30 days  - DVT; Heparin 5000 U q 12 hrs until more active       Dispo:   - 6C since 2/27   - Anticipate DC to home TCU per PT/OT recs. [facility].  - Nephrology and podiatry clinic follow up 2-3 weeks after discharge.   - Delirium is barrier to discharge at this time.   - Call Kesha Kang [phone] ([facility]) on Monday to ask for help with transfer per Dr Mckeon        Staff surgeons have been informed of changes through both  verbal and written communication.      Valorie Quezada PA-C  Cardiothoracic Surgery  Pager [phone]  March 2, 2020

## 2020-03-02 NOTE — PROGRESS NOTES
D/I/A: Admit on 2/9/2020 with Type A aortic dissection and now status post ascending aortic and arch dissection repair with tissue AVR and vein bypass to RCA on 2/9/20. VSS on RA. SR. Disoriented x3. Denies pain. Resting comfortably. Last BM today. Olmstead in place; catheter cares completed. Draining good UO. DD1 + nectar thick liquids; calorie counts. Ate approx 10-15% of dinner. Awake for meal. TF running at 75 ml/hr. Order reads TF to run cycled 6pm to 10am, although was running when started shift and continued until 1845. Next shift contacted CVTS, who stated to run feedings continuously tonight, then cycle TF beginning tomorrow (stop at 10am). Reposititioned every 2 hours. Lytes previously replaced.   Family at bedside throughout shift. Assisted with cares.   P: Update CVTS if questions/concerns.     Gisel Alcantar RN  Time of Care [phone]

## 2020-03-02 NOTE — PROGRESS NOTES
Neuro: Alert, opens eyes spontaneously, disoriented to time, situation and place. Pt trying to get out of bed overnight to go home to find his wife   Cardiac: Afebrile, VSS, NSR.   Respiratory: RA, refusing CPAP overnight, kept pulling mask off   GI/: Olmstead intact with adequate output. No BM this shift.   Diet/appetite: Tolerating DD1 nectar thickened liquid diet. Denies nausea. Blood sugars ACHS   Activity: Up with mech lift assist, turns q2h and PRN for comfort   Pain: . C/o back pain, oxy x1 overnight    Skin: No new deficits noted. Staples on LLE intact and TRINI, midline TRINI, mepilex on coccyx, bilateral foot wounds CDI, dressing changes every day   Lines:  PICC TPA to grey line x1, blood return noted   Drains: NJ tube with cycled TF at 75cc/hr from 3844-3363, they were not stopped yesterday during the day, surgery crosscover notified and stated to keep them running until today at 1000.     Pt has been resting comfortably throughout night, will continue to monitor and follow plan of care.

## 2020-03-02 NOTE — PLAN OF CARE
Discharge Planner SLP   Patient plan for discharge: not discussed  Current status: Patient seen for dysphagia treatment while up in bedside chair. Note confusion and frequent attempts to reposition self. Noted one instance of mild throat clear after sip of nectar thick water by cup. No other overt aspiration signs occurred. Note occasional poor oral acceptance from spoon given level of confusion. Minimal oral residue remained after swallows likely due to confusion and reduced oral awareness.     Recommend cautiously continue dysphagia diet level 1 with nectar thick liquids given 1:1 assist and swallow strategies (fully alert and upright position, no straws, small single sips/bites, slow rate, verify swallows). Please crush pills if able and serve with puree consistency. Hold PO if aspiration signs occur or with increased confusion. SLP to follow.  Barriers to return to prior living situation: Dysphagia, weakness, mental status, TF  Recommendations for discharge: TCU  Rationale for recommendations: Below baseline function; pt will benefit from ongoing ST targeting swallowing       Entered by: Regine Niño 03/02/2020 12:23 PM

## 2020-03-02 NOTE — CONSULTS
Diabetes/Hyperglycemia Management Consult    Chief Complaint enteral feeding induced hyperglycemia  Consult requested by: Valorie Wright PA-C  History of Present Illness Mr. Vel Espana is a 73 yo man with a history of hypertension, who presented with syncope and chest pain and was found to have a type a aortic dissection, now status post ascending aortic and arch dissection repair with tissue AVR and vein bypass to RCA on 2/9/2020.    History obtained from patient, chart review, and telephone discussion with his wife, Ronda.  Luis does not have a history of diabetes.  His wife reports that in 2009 he had an elevated lab (presumably a hemoglobin A1c), but since that time they have tracked this [facility] and his numbers have all shown he is not diabetic according to his wife.  His hemoglobin A1c this admission is 6% with hemoglobin down to 8.1 at that time, so likely his A1c is higher than 6%.  This would indicate the patient is likely prediabetic, and possibly has type 2 diabetes.    During this admission Luis has been on and off IV insulin.  When IV insulin is off he has been on correction aspart.  He had a stretch of several days mid February where his glucoses were stable in the low to mid 100s with correction aspart, it is unclear what his nutrition status was at that time.  On 2/24 he was receiving Nepro at a goal rate of 70 mL/hour and he was on IV insulin, requiring around 2.5 units/h with blood sugar in the mid 100s.  He is now getting continuous tube feeds Nutren 1.5, this was running at 75  mL/hour over the weekend, but the rate is now decreasing to 50 mL/hour this morning.  This tube feed amount is about 60 g of carbohydrate less than what he was getting while on Nepro and requiring 2.5 units/h of IV insulin.  Of note, order stated TFs were cycled over the weekend, but flow sheets indicate that it was running continuously.   BG over weekend in the high 100s to 200s, but up to  high 300s last night.  Luis is on a dysphagia diet with boost between meals, but intake has been poor per [facility] team.    Luis told me he felt tired.  RN reported that he has been disoriented.    Recent Labs   Lab 03/02/20  1227 03/02/20  0811 03/02/20  0707 03/02/20  0203 03/01/20  2056 03/01/20  1648 03/01/20  1155 03/01/20  0619  02/29/20  0539  02/28/20  0540  02/27/20  1644  02/27/20  0420   GLC  --   --  371*  --   --   --   --  273*  --  165*  --  131*  --  185*  --  132*   * 296*  --  237* 189* 249* 194*  --    < >  --    < >  --    < >  --    < >  --     < > = values in this interval not displayed.         Diabetes Type: no previous diagnosis, A1c this admission indicates pre-diabetes (or possible DM2 given that pt was anemic when lab was drawn).  Diabetes Control:   Lab Results   Component Value Date    A1C 6.0 02/10/2020       [facility]  Factors Impacting Glucose Control: continuous enteral feeds with rate decreasing today.  Minimal po intake on dysphagia diet.      Review of Systems  10 point ROS completed with pertinent positives and negatives noted in the HPI    Past medical, family and social histories are reviewed and updated.    Past Medical History  Past Medical History:   Diagnosis Date     Psychosexual dysfunction with inhibited sexual excitement      Unspecified essential hypertension      Unspecified sleep apnea        Family History  History reviewed. No pertinent family history.    Social History  Social History     Socioeconomic History     Marital status:      Spouse name: None     Number of children: None     Years of education: None     Highest education level: None   Occupational History     None   Social Needs     Financial resource strain: None     Food insecurity:     Worry: None     Inability: None     Transportation needs:     Medical: None     Non-medical: None   Tobacco Use     Smoking status: Never Smoker     Smokeless tobacco: Never Used  "  Substance and Sexual Activity     Alcohol use: No     Drug use: No     Sexual activity: Yes     Partners: Female   Lifestyle     Physical activity:     Days per week: None     Minutes per session: None     Stress: None   Relationships     Social connections:     Talks on phone: None     Gets together: None     Attends [employer] service: None     Active member of club or organization: None     Attends meetings of clubs or organizations: None     Relationship status: None     Intimate partner violence:     Fear of current or ex partner: None     Emotionally abused: None     Physically abused: None     Forced sexual activity: None   Other Topics Concern     Parent/sibling w/ CABG, MI or angioplasty before 65F 55M? Not Asked   Social History Narrative     None   Luis is  to Ronda.  They have a daughter, Rosa.  They live in [place].      Physical Exam  /81 (BP Location: Left arm)   Pulse 90   Temp 97.4  F (36.3  C) (Oral)   Resp 20   Ht 1.753 m (5' 9\")   Wt 116 kg (255 lb 11.7 oz)   SpO2 95%   BMI 37.77 kg/m      General:  Tired appearing elderly man, resting in chair, in no distress.   HEENT: NC/AT, PER and anicteric, non-injected, oral mucous membranes dry.  NJ in R nares, bridled.   Lungs: unlabored respiration, no cough  Skin: warm and dry, no obvious lesions  MSK:  fluid movement of all extremities  Lymp:  no LE edema   Mental status: Tired, oriented to self but no place or time (told me he was in a hospital in [place]).  Psych:  calm, even mood    Laboratory  Recent Labs   Lab Test 03/02/20  0707 03/01/20  1807 03/01/20  0619   * 156* 152*   POTASSIUM 3.8  --  3.4   CHLORIDE 124*  --  118*   CO2 29  --  29   ANIONGAP 4  --  5   *  --  273*   *  --  118*   CR 1.72*  --  2.13*   ELMER 8.8  --  8.8     CBC RESULTS:   Recent Labs   Lab Test 03/02/20  0707   WBC 9.4   RBC 3.57*   HGB 10.9*   HCT 37.3*   *   MCH 30.5   MCHC 29.2*   RDW 15.4*          Liver Function " Studies -   Recent Labs   Lab Test 02/15/20  1213   PROTTOTAL 5.8*   ALBUMIN 2.3*   BILITOTAL 0.5   ALKPHOS 66   AST 32   ALT 17       Active Medications  Current Facility-Administered Medications   Medication     acetaminophen (TYLENOL) solution 650 mg     albuterol (PROVENTIL) neb solution 2.5 mg     amiodarone (PACERONE) tablet 200 mg     amoxicillin (AMOXIL) suspension 500 mg     artificial saliva (BIOTENE DRY MOUTHWASH) liquid 15 mL     aspirin (ASA) chewable tablet 81 mg     atorvastatin (LIPITOR) tablet 40 mg     carboxymethylcellulose PF (REFRESH PLUS) 0.5 % ophthalmic solution 1 drop     dextrose 10% infusion     glucose gel 15-30 g    Or     dextrose 50 % injection 25-50 mL    Or     glucagon injection 1 mg     diphenhydrAMINE (BENADRYL) injection 50 mg     furosemide (LASIX) tablet 40 mg     heparin ANTICOAGULANT injection 5,000 Units     heparin lock flush 10 UNIT/ML injection 5-10 mL     heparin lock flush 10 UNIT/ML injection 5-10 mL     hydrALAZINE (APRESOLINE) half-tab 12.5 mg     HYDROmorphone (PF) (DILAUDID) injection 0.3-0.5 mg     influenza Vac Split High-Dose (FLUZONE) injection 0.5 mL     insulin aspart (NovoLOG) injection (RAPID ACTING)     insulin aspart (NovoLOG) injection (RAPID ACTING)     insulin aspart (NovoLOG) injection (RAPID ACTING)     insulin isophane human (HumuLIN N PEN) injection 25 Units     insulin isophane human (HumuLIN N PEN) injection 25 Units     ipratropium - albuterol 0.5 mg/2.5 mg/3 mL (DUONEB) neb solution 3 mL     Lidocaine (LIDOCARE) 4 % Patch 1-3 patch     lidocaine (LMX4) cream     lidocaine 1 % 0.1-1 mL     lidocaine patch in PLACE     magnesium sulfate 2 g in water intermittent infusion     magnesium sulfate 4 g in 100 mL sterile water (premade)     melatonin tablet 3 mg     methocarbamol (ROBAXIN) tablet 500 mg     metoprolol tartrate (LOPRESSOR) half-tab 12.5 mg     multivitamins w/minerals (CERTAVITE) liquid 15 mL     naloxone (NARCAN) injection 0.1-0.4 mg      nystatin (MYCOSTATIN) suspension 500,000 Units     ondansetron (ZOFRAN-ODT) ODT tab 4 mg    Or     ondansetron (ZOFRAN) injection 4 mg     oxyCODONE (ROXICODONE) solution 5-10 mg     pantoprazole (PROTONIX) 2 mg/mL suspension 40 mg     polyethylene glycol (MIRALAX/GLYCOLAX) Packet 17 g     potassium chloride (KLOR-CON) Packet 20-40 mEq     potassium chloride 10 mEq in 100 mL intermittent infusion with 10 mg lidocaine     potassium chloride 10 mEq in 100 mL sterile water intermittent infusion (premix)     potassium chloride 20 mEq in 50 mL intermittent infusion     potassium chloride ER (K-DUR/KLOR-CON M) CR tablet 20-40 mEq     potassium phosphate 10 mmol in D5W 250 mL intermittent infusion     potassium phosphate 15 mmol in D5W 250 mL intermittent infusion     potassium phosphate 20 mmol in D5W 250 mL intermittent infusion     potassium phosphate 20 mmol in D5W 500 mL intermittent infusion     potassium phosphate 25 mmol in D5W 500 mL intermittent infusion     prochlorperazine (COMPAZINE) injection 5 mg    Or     prochlorperazine (COMPAZINE) tablet 5 mg     protein modular (PROSOURCE TF) 1 packet     senna-docusate (SENOKOT-S/PERICOLACE) 8.6-50 MG per tablet 1 tablet    Or     senna-docusate (SENOKOT-S/PERICOLACE) 8.6-50 MG per tablet 2 tablet     sodium chloride (PF) 0.9% PF flush 10-20 mL     sodium chloride (PF) 0.9% PF flush 3 mL     sodium chloride (PF) 0.9% PF flush 3 mL     No current outpatient medications on file.       Current Diet  Orders Placed This Encounter      Dysphagia Diet Level 1 Pureed Nectar Thickened Liquids (pre-thickened or use instant food thickener)        Assessment  Mr. Vel Espana is a 73 yo man with a history of hypertension, who presented with syncope and chest pain and was found to have a type a aortic dissection, now status post ascending aortic and arch dissection repair with tissue AVR and vein bypass to RCA on 2/9/2020.    No history of diabetes but A1c in February  indicates that pt has pre-diabetes, or possible DM2 (given that hemoglobin was down to 8.1 when this was checked following surgery).  Now with enteral feeding induced hyperglycemia.    Plan  -NPH 25 units BID started midmorning.   -aspart for meals and snacks: 1unit/8g CHO  -aspart for correction: high intensity q4h  -monitor glucoses q4h  -start prn D10 if TFs are interrupted to prevent hypoglycemia.    WE will continue to follow.    Keiko Sandy PA-C [phone]  Diabetes Management Team Job Code [id number]  I spent a total of 80 minutes bedside and on the inpatient unit managing the glycemic care of Vel Espana. Over 50% of my time on the unit was spent counseling the patient and/or coordinating care regarding glucose management in the context of enteral feeding induced hyperglycemia.  See note for details.

## 2020-03-02 NOTE — PROGRESS NOTES
WOC Nurse Inpatient Wound Assessment   Reason for consultation: BL achilles, feet, penis and  coccyxwound     Assessment  BL achilles and feet wound due to Unknown Etiology- most likely from arterial insufficiency post surgery. Not typical PI- refer to pictures below. Vascular team has seen the pt and recommended to follow in outpt  Intergluteal cleft- Fissure r/t moisture  Penis (foreskin)- MASD  Status: initial assessment and evolving   Blanchable maroon purple tinge skin on BL heels    Treatment Plan  BL achilles and Intergluteal cleft wound: Every other day     Cleanse the area with NS and pat dry.    Apply No sting film barrier to periwound skin.    Cover wound with Mepilex on achilles and Sacral Mepilex (#[id number]) on intergluteal cleft    Turn and reposition Q 2hrs.    Ensure pt has Martin-cushion while sitting up in the chair.    Correct placement of heel lift boot at all times to offload the pressure.    FYI- If pt has constant incontinent loose stools needing dressing changes Q shift please discontinue the Mepilex dressing and apply criticaid barrier paste BID and PRN.    POC for wound located on Penis- Cleanse and apply No sting film barrier daily.  POC for wound located on BL toes (black necrotic toes)- Cleanse and paint with betadine to all black necrotic toes. Cover with dry gauze.     Orders Written  Recommended provider order: None  WOC Nurse follow-up plan:weekly  Nursing to notify the Provider(s) and re-consult the WOC Nurse if wound(s) deteriorates or new skin concern.    Patient History  According to provider note(s):  Vel Espana is a 74 year old male with Hx HTN who presented with syncope and CP. Found to have Type A aortic dissection and now status post ascending aortic and arch dissection repair with tissue AVR and vein bypass to RCA on 2/9/20 with Dr. Monet Mckeon.  [facility]    Objective Data  Containment of urine/stool: Incontinence Protocol, Brief and Indwelling  catheter    Active Diet Order  Orders Placed This Encounter      Dysphagia Diet Level 1 Pureed Nectar Thickened Liquids (pre-thickened or use instant food thickener)      Output:   I/O last 3 completed shifts:  In: 2525 [P.O.:60; NG/GT:740]  Out: 2700 [Urine:2700]    Risk Assessment:   Sensory Perception: 3-->slightly limited  Moisture: 3-->occasionally moist  Activity: 2-->chairfast  Mobility: 2-->very limited  Nutrition: 3-->adequate  Friction and Shear: 2-->potential problem  Samuel Score: 15                          Labs:   Recent Labs   Lab 03/02/20  0707   HGB 10.9*   WBC 9.4       Physical Exam  Skin inspection: focused coccyx, sacrum, BL buttocks, and BLLE, heels    Wound Location:  BL achilles      R) achilles                                                                      L) achilles      Date of last photo 3/2/2020  Wound History: these areas developed recently after his surgery. Vascular and ortho has been consulted. Suspect from blood flow insuffiencey. Not consistent with pressure  Measurements (length x width x depth, in cm) L) achilles- 5  x 1.5  x  0.0 cm, R) achilles- 3 x 1.6 x 0.1cm  Wound Base: 100 % red tissue on the right and purple tissue on left  Tunneling N/A  Undermining N/A  Palpation of the wound bed: normal   Periwound skin: intact and somewhat purple tinge  Periwound Color: purple  Periwound Temperature: normal   Drainage:, scant  Description of drainage: bloody  Odor: none  Pain: tension to hands, feet and body,      Wound Location:  Intergluteal cleft        Date of last photo 3/2  Wound History: Moist envirnoment. Coccyx and sacrum with intact epidermis  Measurements (length x width x depth, in cm) 0.4  x 0.2  x  0.1 cm   Wound Base: 100 % dermis  Tunneling N/A  Undermining N/A  Palpation of the wound bed: normal   Periwound skin: intact  Periwound Color: normal and consistent with surrounding tissue  Periwound Temperature: normal   Drainage:, none  Description of drainage:  none  Odor: none  Pain: denies     Wound Location:  Bl feet    R) feet     L) feet    Date of last photo 3/2/2020  Wound History: these areas developed recently after his surgery. Vascular and ortho has been consulted. Suspect from blood flow insuffiencey. Not consistent with pressure  Measurements (length x width x depth, in cm) All toes on right foot and great toe and 2nd toe on left is affected with dry gangrene.  Wound Base: 100 % dry black necrotic toes  Tunneling N/A  Palpation of the wound bed: normal   Periwound skin: intact  Periwound Color: normal and consistent with surrounding tissue  Periwound Temperature: normal   Drainage:, none  Description of drainage: none  Odor: none  Pain: tension to hands, feet and body,     Penis (foreskin) - small skin erosion on top of the foreskin r/t moisture not consistent with pressure. Meatus mucosa is intact.    Interventions  Current support surface: Standard  Atmos Air mattress  Current off-loading measures: Heel off-loading boot(s), Foam padding and Pillows  Visual inspection and assessment completed   Wound Care: done per plan of care  Supplies: floor stock  Education provided: importance of repositioning, plan of care and wound progress  Discussed plan of care with Patient and Nurse    Pema Veliz RN

## 2020-03-03 ENCOUNTER — APPOINTMENT (OUTPATIENT)
Dept: OCCUPATIONAL THERAPY | Facility: CLINIC | Age: 75
DRG: 219 | End: 2020-03-03
Payer: COMMERCIAL

## 2020-03-03 ENCOUNTER — APPOINTMENT (OUTPATIENT)
Dept: SPEECH THERAPY | Facility: CLINIC | Age: 75
DRG: 219 | End: 2020-03-03
Payer: COMMERCIAL

## 2020-03-03 LAB
ALBUMIN SERPL-MCNC: 2.3 G/DL (ref 3.4–5)
ALP SERPL-CCNC: 120 U/L (ref 40–150)
ALT SERPL W P-5'-P-CCNC: 93 U/L (ref 0–70)
AMMONIA PLAS-SCNC: 31 UMOL/L (ref 10–50)
ANION GAP SERPL CALCULATED.3IONS-SCNC: 3 MMOL/L (ref 3–14)
ANION GAP SERPL CALCULATED.3IONS-SCNC: 6 MMOL/L (ref 3–14)
AST SERPL W P-5'-P-CCNC: 51 U/L (ref 0–45)
BILIRUB DIRECT SERPL-MCNC: 0.2 MG/DL (ref 0–0.2)
BILIRUB SERPL-MCNC: 0.4 MG/DL (ref 0.2–1.3)
BUN SERPL-MCNC: 109 MG/DL (ref 7–30)
BUN SERPL-MCNC: 112 MG/DL (ref 7–30)
CALCIUM SERPL-MCNC: 8.5 MG/DL (ref 8.5–10.1)
CALCIUM SERPL-MCNC: 9 MG/DL (ref 8.5–10.1)
CHLORIDE SERPL-SCNC: 120 MMOL/L (ref 94–109)
CHLORIDE SERPL-SCNC: 124 MMOL/L (ref 94–109)
CO2 SERPL-SCNC: 27 MMOL/L (ref 20–32)
CO2 SERPL-SCNC: 30 MMOL/L (ref 20–32)
CREAT SERPL-MCNC: 1.68 MG/DL (ref 0.66–1.25)
CREAT SERPL-MCNC: 1.75 MG/DL (ref 0.66–1.25)
ERYTHROCYTE [DISTWIDTH] IN BLOOD BY AUTOMATED COUNT: 15.6 % (ref 10–15)
GFR SERPL CREATININE-BSD FRML MDRD: 37 ML/MIN/{1.73_M2}
GFR SERPL CREATININE-BSD FRML MDRD: 39 ML/MIN/{1.73_M2}
GLUCOSE BLDC GLUCOMTR-MCNC: 198 MG/DL (ref 70–99)
GLUCOSE BLDC GLUCOMTR-MCNC: 211 MG/DL (ref 70–99)
GLUCOSE BLDC GLUCOMTR-MCNC: 218 MG/DL (ref 70–99)
GLUCOSE BLDC GLUCOMTR-MCNC: 230 MG/DL (ref 70–99)
GLUCOSE BLDC GLUCOMTR-MCNC: 237 MG/DL (ref 70–99)
GLUCOSE SERPL-MCNC: 252 MG/DL (ref 70–99)
GLUCOSE SERPL-MCNC: 261 MG/DL (ref 70–99)
HCT VFR BLD AUTO: 38.6 % (ref 40–53)
HGB BLD-MCNC: 11.2 G/DL (ref 13.3–17.7)
MAGNESIUM SERPL-MCNC: 2.8 MG/DL (ref 1.6–2.3)
MCH RBC QN AUTO: 30.8 PG (ref 26.5–33)
MCHC RBC AUTO-ENTMCNC: 29 G/DL (ref 31.5–36.5)
MCV RBC AUTO: 106 FL (ref 78–100)
PLATELET # BLD AUTO: 305 10E9/L (ref 150–450)
POTASSIUM SERPL-SCNC: 3.8 MMOL/L (ref 3.4–5.3)
POTASSIUM SERPL-SCNC: 4.1 MMOL/L (ref 3.4–5.3)
PROT SERPL-MCNC: 7.5 G/DL (ref 6.8–8.8)
RBC # BLD AUTO: 3.64 10E12/L (ref 4.4–5.9)
SODIUM SERPL-SCNC: 152 MMOL/L (ref 133–144)
SODIUM SERPL-SCNC: 153 MMOL/L (ref 133–144)
SODIUM SERPL-SCNC: 158 MMOL/L (ref 133–144)
WBC # BLD AUTO: 9.2 10E9/L (ref 4–11)

## 2020-03-03 PROCEDURE — 83735 ASSAY OF MAGNESIUM: CPT | Performed by: PHYSICIAN ASSISTANT

## 2020-03-03 PROCEDURE — G0463 HOSPITAL OUTPT CLINIC VISIT: HCPCS

## 2020-03-03 PROCEDURE — 94640 AIRWAY INHALATION TREATMENT: CPT | Mod: 76

## 2020-03-03 PROCEDURE — 82140 ASSAY OF AMMONIA: CPT | Performed by: PHYSICIAN ASSISTANT

## 2020-03-03 PROCEDURE — 97535 SELF CARE MNGMENT TRAINING: CPT | Mod: GO

## 2020-03-03 PROCEDURE — 25000132 ZZH RX MED GY IP 250 OP 250 PS 637: Performed by: STUDENT IN AN ORGANIZED HEALTH CARE EDUCATION/TRAINING PROGRAM

## 2020-03-03 PROCEDURE — 25000128 H RX IP 250 OP 636: Performed by: STUDENT IN AN ORGANIZED HEALTH CARE EDUCATION/TRAINING PROGRAM

## 2020-03-03 PROCEDURE — 25800030 ZZH RX IP 258 OP 636: Performed by: PHYSICIAN ASSISTANT

## 2020-03-03 PROCEDURE — 36592 COLLECT BLOOD FROM PICC: CPT | Performed by: PHYSICIAN ASSISTANT

## 2020-03-03 PROCEDURE — 40000802 ZZH SITE CHECK

## 2020-03-03 PROCEDURE — 25000132 ZZH RX MED GY IP 250 OP 250 PS 637: Performed by: NURSE PRACTITIONER

## 2020-03-03 PROCEDURE — 85027 COMPLETE CBC AUTOMATED: CPT | Performed by: PHYSICIAN ASSISTANT

## 2020-03-03 PROCEDURE — 36415 COLL VENOUS BLD VENIPUNCTURE: CPT | Performed by: PHYSICIAN ASSISTANT

## 2020-03-03 PROCEDURE — 25000132 ZZH RX MED GY IP 250 OP 250 PS 637: Performed by: PHYSICIAN ASSISTANT

## 2020-03-03 PROCEDURE — 00000146 ZZHCL STATISTIC GLUCOSE BY METER IP

## 2020-03-03 PROCEDURE — 94640 AIRWAY INHALATION TREATMENT: CPT

## 2020-03-03 PROCEDURE — 40000275 ZZH STATISTIC RCP TIME EA 10 MIN

## 2020-03-03 PROCEDURE — 25000125 ZZHC RX 250: Performed by: PHYSICIAN ASSISTANT

## 2020-03-03 PROCEDURE — 92526 ORAL FUNCTION THERAPY: CPT | Mod: GN

## 2020-03-03 PROCEDURE — 80048 BASIC METABOLIC PNL TOTAL CA: CPT | Performed by: PHYSICIAN ASSISTANT

## 2020-03-03 PROCEDURE — 80076 HEPATIC FUNCTION PANEL: CPT | Performed by: PHYSICIAN ASSISTANT

## 2020-03-03 PROCEDURE — 84295 ASSAY OF SERUM SODIUM: CPT | Performed by: PHYSICIAN ASSISTANT

## 2020-03-03 PROCEDURE — 97530 THERAPEUTIC ACTIVITIES: CPT | Mod: GO

## 2020-03-03 PROCEDURE — 25000132 ZZH RX MED GY IP 250 OP 250 PS 637: Performed by: THORACIC SURGERY (CARDIOTHORACIC VASCULAR SURGERY)

## 2020-03-03 PROCEDURE — 21400000 ZZH R&B CCU UMMC

## 2020-03-03 PROCEDURE — 25000132 ZZH RX MED GY IP 250 OP 250 PS 637: Performed by: ANESTHESIOLOGY

## 2020-03-03 RX ORDER — DEXTROSE MONOHYDRATE 50 MG/ML
INJECTION, SOLUTION INTRAVENOUS CONTINUOUS
Status: DISPENSED | OUTPATIENT
Start: 2020-03-03 | End: 2020-03-03

## 2020-03-03 RX ORDER — FUROSEMIDE 40 MG
40 TABLET ORAL ONCE
Status: DISCONTINUED | OUTPATIENT
Start: 2020-03-03 | End: 2020-03-03

## 2020-03-03 RX ADMIN — ASPIRIN 81 MG CHEWABLE TABLET 81 MG: 81 TABLET CHEWABLE at 08:23

## 2020-03-03 RX ADMIN — QUETIAPINE FUMARATE 25 MG: 25 TABLET ORAL at 22:49

## 2020-03-03 RX ADMIN — Medication 1 PACKET: at 08:37

## 2020-03-03 RX ADMIN — NYSTATIN 500000 UNITS: 100000 SUSPENSION ORAL at 16:38

## 2020-03-03 RX ADMIN — Medication 5000 UNITS: at 08:35

## 2020-03-03 RX ADMIN — Medication 5 ML: at 17:21

## 2020-03-03 RX ADMIN — AMOXICILLIN 500 MG: 400 POWDER, FOR SUSPENSION ORAL at 06:12

## 2020-03-03 RX ADMIN — SENNOSIDES AND DOCUSATE SODIUM 1 TABLET: 8.6; 5 TABLET ORAL at 19:24

## 2020-03-03 RX ADMIN — Medication 40 MG: at 08:37

## 2020-03-03 RX ADMIN — Medication 12.5 MG: at 11:55

## 2020-03-03 RX ADMIN — FUROSEMIDE 40 MG: 40 TABLET ORAL at 08:23

## 2020-03-03 RX ADMIN — AMOXICILLIN 500 MG: 400 POWDER, FOR SUSPENSION ORAL at 22:48

## 2020-03-03 RX ADMIN — Medication 1 PACKET: at 15:22

## 2020-03-03 RX ADMIN — Medication 1 PACKET: at 19:24

## 2020-03-03 RX ADMIN — Medication 5000 UNITS: at 19:24

## 2020-03-03 RX ADMIN — POTASSIUM CHLORIDE 20 MEQ: 750 TABLET, EXTENDED RELEASE ORAL at 19:38

## 2020-03-03 RX ADMIN — AMOXICILLIN 500 MG: 400 POWDER, FOR SUSPENSION ORAL at 15:01

## 2020-03-03 RX ADMIN — Medication 12.5 MG: at 08:23

## 2020-03-03 RX ADMIN — Medication 12.5 MG: at 16:34

## 2020-03-03 RX ADMIN — POLYETHYLENE GLYCOL 3350 17 G: 17 POWDER, FOR SOLUTION ORAL at 08:40

## 2020-03-03 RX ADMIN — ACETAMINOPHEN 650 MG: 325 SOLUTION ORAL at 19:22

## 2020-03-03 RX ADMIN — Medication 12.5 MG: at 19:24

## 2020-03-03 RX ADMIN — NYSTATIN 500000 UNITS: 100000 SUSPENSION ORAL at 19:23

## 2020-03-03 RX ADMIN — SODIUM CHLORIDE, PRESERVATIVE FREE 10 ML: 5 INJECTION INTRAVENOUS at 10:43

## 2020-03-03 RX ADMIN — MULTIVITAMIN 15 ML: LIQUID ORAL at 08:37

## 2020-03-03 RX ADMIN — ATORVASTATIN CALCIUM 40 MG: 40 TABLET, FILM COATED ORAL at 19:24

## 2020-03-03 RX ADMIN — MULTIVITAMIN 15 ML: LIQUID ORAL at 08:24

## 2020-03-03 RX ADMIN — Medication 5 ML: at 20:45

## 2020-03-03 RX ADMIN — IPRATROPIUM BROMIDE AND ALBUTEROL SULFATE 3 ML: .5; 3 SOLUTION RESPIRATORY (INHALATION) at 20:16

## 2020-03-03 RX ADMIN — MELATONIN TAB 3 MG 3 MG: 3 TAB at 22:49

## 2020-03-03 RX ADMIN — NYSTATIN 500000 UNITS: 100000 SUSPENSION ORAL at 08:24

## 2020-03-03 RX ADMIN — DEXTROSE MONOHYDRATE: 50 INJECTION, SOLUTION INTRAVENOUS at 11:49

## 2020-03-03 RX ADMIN — AMIODARONE HYDROCHLORIDE 200 MG: 200 TABLET ORAL at 08:23

## 2020-03-03 RX ADMIN — SENNOSIDES AND DOCUSATE SODIUM 2 TABLET: 8.6; 5 TABLET ORAL at 08:40

## 2020-03-03 ASSESSMENT — ACTIVITIES OF DAILY LIVING (ADL)
ADLS_ACUITY_SCORE: 20
ADLS_ACUITY_SCORE: 18
ADLS_ACUITY_SCORE: 20
ADLS_ACUITY_SCORE: 20

## 2020-03-03 NOTE — PLAN OF CARE
History: Admitted with Type A aortic dissection; now s/p ascending aortic and arch dissection repair with tissue AVR and vein bypass to RCA on 2/9. Hx HTN, DM    Neuro: Oriented x0-1, intermittently to self. Confused; illogical, garbled speech. Bedcheck active  Cardiac: Tele in place, SR  Respiratory: VSS on RA, no SOB  GI/: ND tube in place with TF infusing at 50ml/hr, 75ml flushes q1h. Incontinent of stool, no BM overnight. [place] in place, draining clear viviane urine w/ some sediment  Skin: Sacral Mepilex in place, Mepilex to B heels. Gauze dressings to necrotic toes and finger tips. No-sting barrier applied to penis wound on previous shift. Repositioned q2h this shift  Activity: Up with ceiling lift  Pain/other: No c/o pain. BG monitored and 2 and 3u insulin given as ordered  LDAs: TL PICC to R arm, SL    Plan: Continue to monitor and follow POC. Plan to discharge to TCU pending resolution of delirium. Notify CVTS with changes

## 2020-03-03 NOTE — PROGRESS NOTES
IP Diabetes Management  Daily Note           Assessment and Plan:   HPI: Mr. Vel Espana is a 73 yo man with a history of hypertension, who presented with syncope and chest pain and was found to have a type a aortic dissection, now status post ascending aortic and arch dissection repair with tissue AVR and vein bypass to RCA on 2/9/2020.    Assessment:   1) Pre-Diabetes versus Type II Diabetes Mellitus, with enteral feed induced hyperglycemia      Plan:    -increase NPH from 25 to 28 units BID, adjusting timing today   -increase aspart 1:8> 1:6g CHO coverage with meals and snacks/supplements- cover all PO intake   -continue aspart high intensity sliding scale q4h (on continuous TF)   -BG monitoring q4h (on continuous TF)   -PRN D10% order to run at nutritional rate if tube feeds are interrupted.    -hypoglycemia protocol   -carbohydrate documentation by nursing.   -diabetes education prior to discharge if requiring insulin.    [facility]  Plan discussed with patient, bedside RN. .        Interval History and Assessment: interval glucose trend reviewed:   BG variable, 160-260's with new enteral feed rate and NPH dosing.     DD1 diet; tolerated >70 g CHO with breakfast today, got 9 units for carb coverage. He only received carb coverage for breakfast yesterday, but reported to me he has been eating with all meals; not the best historian, but may be having some daytime hyperglycemia relative to missed carb coverage. Dave counts indicate applesauce, fruit ice, OJ, oatmeal and brown sugar, and magic cup intake yesterday.     SLP recommending meds crushed in puree; thus may also be taking in carbohydrates with pudding/applesauce at med administration.    No documentation, nor report from off going overnight nurse, that patient had PO overnight; thus expect some titration of NPH to cover enteral feed hyperglycemia is warranted.     Despite appropriate carb coverage with  breakfast today, BG trend 211>230 pre-lunch. His insulin is dosed for ~1:5 for tube feeds; increasing mealtime aspart this afternoon.     Cr 1.7.1.6.     Current nutritional intake and type: Orders Placed This Encounter      Dysphagia Diet Level 1 Pureed Nectar Thickened Liquids (pre-thickened or use instant food thickener)  Continuous enteral feeds: Nutren 1.5 at 50cc/hour.     PTA Diabetes Regimen: n/a     Discharge Planning: to TCU when medically stable and delirium improving; may go to [facility].           Diabetes History:   Type of Diabetes: pre diabetes versus TII, with enteral feed hyperglycmia  Lab Results   Component Value Date    A1C 6.0 02/10/2020              Review of Systems:   The Review of Systems is negative other than noted in the Interval History.           Medications:     Current Facility-Administered Medications   Medication     acetaminophen (TYLENOL) solution 650 mg     albuterol (PROVENTIL) neb solution 2.5 mg     amiodarone (PACERONE) tablet 200 mg     amoxicillin (AMOXIL) suspension 500 mg     artificial saliva (BIOTENE DRY MOUTHWASH) liquid 15 mL     aspirin (ASA) chewable tablet 81 mg     atorvastatin (LIPITOR) tablet 40 mg     carboxymethylcellulose PF (REFRESH PLUS) 0.5 % ophthalmic solution 1 drop     dextrose 10% infusion     dextrose 5% infusion     glucose gel 15-30 g    Or     dextrose 50 % injection 25-50 mL    Or     glucagon injection 1 mg     diphenhydrAMINE (BENADRYL) injection 50 mg     furosemide (LASIX) tablet 40 mg     heparin ANTICOAGULANT injection 5,000 Units     heparin lock flush 10 UNIT/ML injection 5-10 mL     heparin lock flush 10 UNIT/ML injection 5-10 mL     hydrALAZINE (APRESOLINE) half-tab 12.5 mg     HYDROmorphone (PF) (DILAUDID) injection 0.3-0.5 mg     influenza Vac Split High-Dose (FLUZONE) injection 0.5 mL     insulin aspart (NovoLOG) injection (RAPID ACTING)     insulin aspart (NovoLOG) injection (RAPID ACTING)     insulin aspart (NovoLOG) injection  (RAPID ACTING)     insulin isophane human (HumuLIN N PEN) injection 28 Units     insulin isophane human (HumuLIN N PEN) injection 28 Units     ipratropium - albuterol 0.5 mg/2.5 mg/3 mL (DUONEB) neb solution 3 mL     Lidocaine (LIDOCARE) 4 % Patch 1-3 patch     lidocaine (LMX4) cream     lidocaine 1 % 0.1-1 mL     lidocaine patch in PLACE     magnesium sulfate 2 g in water intermittent infusion     magnesium sulfate 4 g in 100 mL sterile water (premade)     melatonin tablet 3 mg     methocarbamol (ROBAXIN) tablet 500 mg     metoprolol tartrate (LOPRESSOR) half-tab 12.5 mg     multivitamins w/minerals (CERTAVITE) liquid 15 mL     naloxone (NARCAN) injection 0.1-0.4 mg     nystatin (MYCOSTATIN) suspension 500,000 Units     ondansetron (ZOFRAN-ODT) ODT tab 4 mg    Or     ondansetron (ZOFRAN) injection 4 mg     oxyCODONE (ROXICODONE) solution 5-10 mg     pantoprazole (PROTONIX) 2 mg/mL suspension 40 mg     polyethylene glycol (MIRALAX/GLYCOLAX) Packet 17 g     potassium chloride (KLOR-CON) Packet 20-40 mEq     potassium chloride 10 mEq in 100 mL intermittent infusion with 10 mg lidocaine     potassium chloride 10 mEq in 100 mL sterile water intermittent infusion (premix)     potassium chloride 20 mEq in 50 mL intermittent infusion     potassium chloride ER (K-DUR/KLOR-CON M) CR tablet 20-40 mEq     potassium phosphate 10 mmol in D5W 250 mL intermittent infusion     potassium phosphate 15 mmol in D5W 250 mL intermittent infusion     potassium phosphate 20 mmol in D5W 250 mL intermittent infusion     potassium phosphate 20 mmol in D5W 500 mL intermittent infusion     potassium phosphate 25 mmol in D5W 500 mL intermittent infusion     prochlorperazine (COMPAZINE) injection 5 mg    Or     prochlorperazine (COMPAZINE) tablet 5 mg     protein modular (PROSOURCE TF) 1 packet     QUEtiapine (SEROquel) tablet 25 mg     senna-docusate (SENOKOT-S/PERICOLACE) 8.6-50 MG per tablet 1 tablet    Or     senna-docusate  "(SENOKOT-S/PERICOLACE) 8.6-50 MG per tablet 2 tablet     sodium chloride (PF) 0.9% PF flush 10-20 mL     sodium chloride (PF) 0.9% PF flush 3 mL     sodium chloride (PF) 0.9% PF flush 3 mL            Physical Exam:    /85 (BP Location: Left arm)   Pulse 90   Temp 98.1  F (36.7  C) (Axillary)   Resp 20   Ht 1.753 m (5' 9\")   Wt 116 kg (255 lb 11.7 oz)   SpO2 99%   BMI 37.77 kg/m    General: pleasant, in no distress. Sitting in recliner sideways with right leg dangling off.    HEENT: normocephalic, atraumatic. Oral mucous membranes moist.   Lungs: unlabored respiration, no cough  ABD: rounded, soft, no lipodystrophy noted  Skin: warm and dry, no obvious lesions  MSK:  moves all extremities  Lymp:  no LE edema   Mental status:  alert, disoriented to place, time, but answers simple questions  Psych:  affect, calm and appropriate interaction             Data:     Recent Labs   Lab 03/03/20  1151 03/03/20  0827 03/03/20  0540 03/03/20  0428 03/02/20  2349 03/02/20  2023 03/02/20  1608  03/02/20  0707  03/01/20  0619  02/29/20  0539  02/28/20  0540  02/27/20  1644   GLC  --   --  261*  --   --   --   --   --  371*  --  273*  --  165*  --  131*  --  185*   * 211*  --  198* 165* 190* 201*   < >  --    < >  --    < >  --    < >  --    < >  --     < > = values in this interval not displayed.     Lab Results   Component Value Date    WBC 9.2 03/03/2020    WBC 9.4 03/02/2020    WBC 8.1 03/01/2020    HGB 11.2 (L) 03/03/2020    HGB 10.9 (L) 03/02/2020    HGB 10.6 (L) 03/01/2020    HCT 38.6 (L) 03/03/2020    HCT 37.3 (L) 03/02/2020    HCT 35.8 (L) 03/01/2020     (H) 03/03/2020     (H) 03/02/2020     (H) 03/01/2020     03/03/2020     03/02/2020     03/01/2020     Lab Results   Component Value Date     (H) 03/03/2020     (H) 03/02/2020     (H) 03/02/2020    POTASSIUM 4.1 03/03/2020    POTASSIUM 3.8 03/02/2020    POTASSIUM 3.4 03/01/2020    CHLORIDE 124 " (H) 03/03/2020    CHLORIDE 124 (H) 03/02/2020    CHLORIDE 118 (H) 03/01/2020    CO2 27 03/03/2020    CO2 29 03/02/2020    CO2 29 03/01/2020     (H) 03/03/2020     (H) 03/02/2020     (H) 03/01/2020     Lab Results   Component Value Date     (H) 03/03/2020     (H) 03/02/2020     (H) 03/01/2020     Lab Results   Component Value Date    TSH 4.06 (H) 02/09/2020     Lab Results   Component Value Date    AST 32 02/15/2020    AST 44 02/14/2020    AST 58 (H) 02/13/2020    ALT 17 02/15/2020    ALT 17 02/14/2020    ALT 16 02/13/2020    ALKPHOS 66 02/15/2020    ALKPHOS 42 02/13/2020    ALKPHOS 35 (L) 02/12/2020       Diabetes Management Team job code: [id number]  I spent a total of 30 minutes bedside and on the inpatient unit managing glycemic care. Over 50% of my time on the unit was spent counseling the patient and/or coordinating care regarding acute hyperglycemia management.  See note for details.    Mireille Gonsalez PA-C  Inpatient Diabetes Management Service  Pager [phone]

## 2020-03-03 NOTE — PLAN OF CARE
Discharge Planner OT   Patient plan for discharge: Not able to discuss  Current status: Not oriented to place or month, provided with reality orientation throughout session to address confusion. Bed mobility with maxA for rolling side to side and universal sling placement. Pt dependently transferred from supine to EOB via OH lift and assist of 2 for positioning. Pt tolerated ~5 minutes sitting EOB with mod-maxA and VC for maintaining midline. Pt dependently transferred via OH lift to recliner. Pt completed g/h initially with maxA and VC, requiring totalA for follow through of tasks. Declined putting in dentures. Per nursing request, pt reclined in chair. Left with call light within reach. VSS stable throughout.   Barriers to return to prior living situation: Deconditioning, confusion, fatigue, activity tolerance, level of independence   Recommendations for discharge: TCU  Rationale for recommendations: Pt is below baseline for functional mobility and ADL. Pt is not safe to return home at this time and requires continued therapy to address the barriers above.        Entered by: Anton Walker 03/03/2020 10:26 AM

## 2020-03-03 NOTE — PROGRESS NOTES
Social Work Services Progress Note    Hospital Day: 22  Date of Initial Social Work Evaluation:  2/11/2020  Collaborated with:  [facility] admissions, step daughter Jordyn    Data:  Pt is a 74 year old male being followed by SW for discharge planning to TCU.  Currently pt cannot go to [facility] due to his tube feeding needs.  Next referral plan is with [facility]    Intervention:  SW received a call from pt's step daughter  Addendum:  SW and RNCC met with Ronda (Spouse) and Jordyn requesting updates about pt's care and options for discharge.  Jordyn also had questions about assessing pt's stability for discharge which were relayed to the CVTS team for answering.  Referral sent to the [facility] as they anticipate having beds open this week.  Will plan to follow up tomorrow on referral status.  If pt is declined, will [facility].  Jordyn in agreement with this plan.    Referrals in Progress:   [facility] - faxed to admissions for review  PH: [phone]   F: [phone]    [facility] - pended as pt is needing a tube feeding  PH: [phone]  F: [phone]    Assessment:  Did not meet with pt for this encounter note.  Jordyn (step daughter) given updates on POC options.    Plan:    Anticipated Disposition:  TCU    Barriers to d/c plan:  Medical Stability, tube feeding    Follow Up:  SW to follow for discharge planning needs.    MIGUEL ANGEL Saldivar, VANCEW  6C Unit   Phone: [phone]  Pager: [phone]  Unit: [phone]    ___________________________________________________________________________________________________________________________________________________    Referrals Discontinued:  None    Community Case Management/Community Services in place:   Pt is 10% service connected as a  - has no community TCU coverage and has no [facility] transportation coverage.  Can be seen at the [facility] and has access to the [facility].

## 2020-03-03 NOTE — PLAN OF CARE
Discharge Planner SLP   Patient plan for discharge: Pt unable to state  Current status: Recommend continuation of dysphagia diet 1 and nectar-thick liquids, no straws, with 1:1 supervision and feeding assist.  Recommend critical meds crushed in puree or given via FT.  Ensure pt is fully alert and upright for all PO, given small bites/sips, alternating bites/sips.  SLP to follow.  Barriers to return to prior living situation: dysphagia, weakness, mental status, FT  Recommendations for discharge: TCU  Rationale for recommendations: Below baseline function; pt will benefit from ongoing ST targeting swallowing       Entered by: Estefania Kaur 03/03/2020 11:03 AM

## 2020-03-03 NOTE — PROGRESS NOTES
"CVTS Daily Note  3/3/2020  Attending: Nivia Mckeon MD    S:   No overnight events.  Intermittent confusion continues.   Pt seen at bedside resting comfortably.  No acute complaints.    Still sleeping on and off during the day.     Denies F/C/Sweats.  No CP, SOB, or calf pain.    Tolerating diet with assistance.  + BM.  + Flatus.     Pain level tolerable. Plan as per Neuro section below.     O:   Vital signs:  Temp: 97  F (36.1  C) Temp src: Axillary BP: 124/80 Pulse: 90 Heart Rate: 86 Resp: 22 SpO2: 97 % O2 Device: None (Room air) Oxygen Delivery: 1 LPM Height: 175.3 cm (5' 9\") Weight: 116 kg (255 lb 11.7 oz)  Estimated body mass index is 37.77 kg/m  as calculated from the following:    Height as of this encounter: 1.753 m (5' 9\").    Weight as of this encounter: 116 kg (255 lb 11.7 oz).    Weight; - 1 kg over past 4 days.   24 hr Fluid status; net gain 1.1 L.     MAPs: 83 - 96   Gen: Awake for exam, sarcastically pleasant, NAD  CV: RRR, S1S2 normal, no murmurs, rubs, or gallops.   Pulm: CTA, no rhonchi or wheezes  Abd: soft, non-tender, no guarding  Ext: no peripheral edema  Incision: sternum clean, dry, intact, no erythema. Removed every other staple from left leg, still some mild erythema around knee, no induration or drainage.   Chest Tube sites: dressings clean and dry      Labs:  [facility]  Recent Labs   Lab 03/03/20  0540 03/02/20  1828 03/02/20  0707 03/01/20  1807 03/01/20  0619  02/29/20  0539   * 158*  156* 156* 156* 152*   < > 151*   POTASSIUM 4.1  --  3.8  --  3.4  --  3.6   CHLORIDE 124*  --  124*  --  118*  --  116*   ELMER 9.0  --  8.8  --  8.8  --  8.8   CO2 27  --  29  --  29  --  28   *  --  105*  --  118*  --  112*   CR 1.68*  --  1.72*  --  2.13*  --  2.41*   *  --  371*  --  273*  --  165*    < > = values in this interval not displayed.     CBC  Recent Labs   Lab 03/03/20  0540 03/02/20  0707 03/01/20  0619 02/29/20  0539   WBC 9.2 9.4 8.1 7.8   RBC 3.64* 3.57* 3.47* 3.67* "   HGB 11.2* 10.9* 10.6* 11.1*   HCT 38.6* 37.3* 35.8* 38.0*   * 105* 103* 104*   MCH 30.8 30.5 30.5 30.2   MCHC 29.0* 29.2* 29.6* 29.2*   RDW 15.6* 15.4* 15.3* 15.0    334 352 362     INR  No lab results found in last 7 days.   Hepatic Panel   Lab Results   Component Value Date    AST 32 02/15/2020     Lab Results   Component Value Date    ALT 17 02/15/2020     Lab Results   Component Value Date    ALBUMIN 2.3 02/15/2020     GLUCOSE:   Recent Labs   Lab 03/03/20  0540 03/03/20  0428 03/02/20  2349 03/02/20  2023 03/02/20  1608 03/02/20  1227 03/02/20  0811 03/02/20  0707  03/01/20  0619  02/29/20  0539  02/28/20  0540  02/27/20  1644   *  --   --   --   --   --   --  371*  --  273*  --  165*  --  131*  --  185*   BGM  --  198* 165* 190* 201* 244* 296*  --    < >  --    < >  --    < >  --    < >  --     < > = values in this interval not displayed.       Imaging:    CXR 3/2/20-   Portable upright view the chest. Postsurgical changes of aortic valve replacement. Intact median  sternotomy wires. Stable enlarged cardiac silhouette. Right upper extremity PICC line tip projects  over the high SVC. Unchanged interstitial pulmonary opacities. Increased left basilar atelectasis.  Likely small pleural effusion. No pneumothorax.     IMPRESSION:   Stable cardiomegaly with unchanged interstitial opacities likely representing pulmonary edema.      A/P:   Vel Espana is a 74 year old male with Hx HTN who presented with syncope and CP. Found to have Type A aortic dissection and now status post ascending aortic and arch dissection repair with tissue AVR and vein bypass to RCA on 2/9/20 with Dr. Monet Mckeon.  [facility]      Neuro:   - Neurology consult 2/17; CT head showed no acute pathology, generalized parenchymal volume loss with chronic moderate small vessel disease. No concerns for stroke or TIA. Signed off.   - Pain; tylenol and oxycodone, robaxin PRN.  - Delirium/Encephalopathy;  resulted in extended intubation, seroquel tapered to off 3/1. Waxes and wanes per family. Restarted Seroquel for delirium and sleep 3/2.   - Insomnia; melatonin q HS, also is sleeping and dozing throughout the day.      CV:   - Hx of HTN, LV EF 60-65%. Moderate RV dysfunction and dilated IVC on Echo 2/25 with weight of 130 kg, now down to 116 kg.   - s/p Tissue AVR, vein graft to RCA, and ascending aorta repair.  - Had 25 seconds nonsustained monomorphic VT (corrected lytes and started Amio). Then had A-fib with RVR 2/14.   - Electrophysiology consulted, currently on Amio 200 mg daily.    - ASA 81 mg, atorvastatin.  Metoprolol 12.5 mg BID.   - Hydralazine 12.5 mg qid (since 3/2) for elevated MAPS  - Vascular Consult 2/25; recommended Podiatry to follow feet, no acute vascular surgery needs.       Pulm:   - Arrived from OR on Sofia (weaned POD #1).   - Pulm toilet, IS, activity and deep breathing   - Supplemental O2 PRN to keep sats > 92%. Wean off as tolerated. On RA     ID:   - Febrile 2/14, cultures sent and no growth besides candida in BAL. Treated with zosyn through 2/22.   - WBC WNL, afebrile, no signs or symptoms of infection   - Will treat for oral candidiasis; nystatin 4 times daily with mouth swab application  - Dry gangrene; Ortho consult 2/29; see note 3/1. Foot X-ray did not indicate osteomyelitis. Continue to observe over next weeks to months, may need some surgical intervention but nothing urgent. Continue dry dressings. Wound care nurse to follow.      GI / FEN:   - Had NJ placed in ICU for nutritional support, it came out with extubation.   - Ordered NJ placement 2/28.   - Hypernatremia 158, increased FWF to 75 ml q1h on 3/2. Giving 200 mL bolus via NJ tube and D5 IVF at 50 mL/hr on 3/3.   - DD1 with nectar thick liquids, bowel regimen.       Dental:   - Consult 2/27. On Amoxicillin to protect tissue AVR.   - Planning removal of the remaining mandibular teeth for infection risk. Not for 6 weeks from  surgery date per Dr Mckeon.      Renal / :   - Post-op non-oliguric HANNA, Nephrology was consulted and has signed off.   - No Hx of renal disease. Creatinine 1.68, adequate UOP.   - Currently Lasix 40 mg PO daily. Appears dry to euvolemic. Will try to keep even.      Heme / Anticoagulation:   - Hgb stable 10's, Plts WNL     Endo:   - Sliding scale insulin  - Hyperglycemic with starting tube feeding. Endocrine consulted and following.       PPX:   - Gastric ulcer; protonix for 30 days  - DVT; Heparin 5000 U q 12 hrs until more active      Dispo:   - 6C since 2/27   - Anticipate DC to home TCU per PT/OT recs. SW to look into [facility] coverage.  - Nephrology and podiatry clinic follow up 2-3 weeks after discharge.   - Call Kesha Kang [phone] ([facility] at the [facility]) on Monday to ask for help with transfer per Dr Mckeon       Staff surgeons have been informed of changes through both  verbal and written communication.      Den Bentley PA-C  Cardiothoracic Surgery  Pager [phone]    7:43 AM   March 3, 2020

## 2020-03-03 NOTE — PROGRESS NOTES
"SPIRITUAL HEALTH SERVICES  SPIRITUAL ASSESSMENT Progress Note  [facility] 6C     Brief visit with pt - pt recognized me from previous visits, at sound of my voice said \"you're the ...\" Pt spoke loudly, said he was uncomfortable in chair, declined  visit today.    PLAN: will check in on pt again later this week to further assess needs.     Milton Michelle) Yong Tan M.Div., [facility]  Staff   Pager [phone]      "

## 2020-03-03 NOTE — PROGRESS NOTES
"CLINICAL NUTRITION SERVICES - REASSESSMENT NOTE      Nutrition Prescription     Malnutrition Status:    Patient does not meet two of the established criteria necessary for diagnosing malnutrition     Interventions by Registered Dietitian (RD):  -Continue EN regimen as ordered:   Nutren 1.5 at 50 mL/hr + 3 pkts ProSource to provide 1200 mL TF/day, 1920 kcals (23 kcal/kg/day), 115 g PRO (1.4 g/kg/day), 912 mL H2O, 211 g CHO and no fiber daily.     Future Recommendations:  -Monitor PO adequacy and appropriateness for repeat kcal cts. Right now AMS limiting PO.    -Notify Endo if any changes to EN regimen.         EVALUATION OF THE PROGRESS TOWARD GOALS   Diet: DD1 with nectar-thick liquids + Boost Plus BID at lunch and dinner + PRN snack/supplements  (Previously on DD2 with thin liquids 2/25-2/27 & clear/full liquid diet 2/24-2/25)    Intake:  Minimal PO intake, see kcal cts below. Suspect AMS/confusion main barrier to PO at this time. Pt reports his appetite is \"same ol' same ol'\". One bite taken out of chocolate Magic Cup on otherwise untouched tray in room. Pt pleasantly confused.      Kcal cts 2/29-3/2:  2/29   243 kcals and 10 g protein   3/1     200 kcals and 7 g protein   3/2     492 and 9g  *Pt consumed a 3-day average of 312 kcals and 9 g protein daily. Inadequate/minimal oral intake.    Enteral Access: NDT (rads) on 2/28 + bridle    Enteral Nutrition Support:  -2/28-3/2: Nutren 1.5 at goal 75 mL/hr x 16 hours (18:00-10:00) + 3 pkts ProSource daily to provide 1200 mL TF, 1920 kcals (22 kcal/kg/day), 115 g PRO (1.3 g/kg/day), 912 mL H2O, 211 g CHO and no fiber daily    -3/2-Current (switched to continuous per team): Nutren 1.5 at 50 mL/hr + 3 pkts ProSource to provide 1200 mL TF/day, 1920 kcals (22 kcal/kg/day), 115 g PRO (1.3 g/kg/day), 912 mL H2O, 211 g CHO and no fiber daily. Per previous DW of 86 kg    Enteral Intake: 3-day average (appears per I/Os that cycled regimen was actually running continuously): " "1328 mL TF + 3 pkt ProSource --> This provides 2112 kcal (25 kcal/kg) and 123 g protein (1.5 g pro/kg) which meets/exceeds minimum assessed needs.     NEW FINDINGS   Renal: Lasix for HANNA. Adequate UOP per CVTS notes.     Weight: Down 32 lbs over past week. Suspect largely fluid-related given rapid large wt loss. See new dosing wt below.  03/01/20 0327 116 kg (255 lb 11.7 oz) -- lowest wt this admit   02/29/20 1700 117 kg (257 lb 15 oz)   02/27/20 0400 117.1 kg (258 lb 2.5 oz)   02/26/20 0000 126.6 kg (279 lb 1.6 oz)   02/25/20 0000 130.5 kg (287 lb 11.2 oz)   02/23/20 0600 133.1 kg (293 lb 6.9 oz)   02/22/20 0400 135.5 kg (298 lb 11.6 oz)   02/21/20 0400 138.4 kg (305 lb 1.9 oz)   02/20/20 0200 134.7 kg (296 lb 15.4 oz)   02/18/20 2200 134.7 kg (296 lb 15.4 oz)   02/18/20 0400 135.9 kg (299 lb 9.7 oz)   02/17/20 0400 138.9 kg (306 lb 3.5 oz)   02/16/20 0000 138.6 kg (305 lb 8.9 oz)   02/15/20 0400 138.1 kg (304 lb 7.3 oz)   02/14/20 0000 140 kg (308 lb 10.3 oz)   02/13/20 0000 139.5 kg (307 lb 8.7 oz)   02/12/20 0000 140.8 kg (310 lb 6.5 oz)   02/11/20 0000 139.9 kg (308 lb 6.8 oz)   02/09/20 2206 131.9 kg (290 lb 11.2 oz)     Skin:   -Skin beneath nasal bridle was inspected; appears appropriate, with no skin breakdown from strings or tension on the bridle string. Some redness from feeding tube itself - see reference to WOCN note below.     -Per WOCN note today:   \"Pressure Injury: on R nare , hospital acquired ,   This is a Medical Device Related Pressure Injury (MDRPI) due to NG  Pressure Injury is Stage 1   Contributing factor of the pressure injury: pressure and moisture  Status: initial assessment\"    -Per WOCN note yesterday (3/2):  \"BL achilles and feet wound due to Unknown Etiology- most likely from arterial insufficiency post surgery. Not typical PI- refer to pictures below. Vascular team has seen the pt and recommended to follow in outpt  Intergluteal cleft- Fissure r/t moisture  Penis (foreskin)- " "MASD  Status: initial assessment and evolving\"    --> Pt currently on Certavite multivitamin/mineral and receiving adequate protein from nutrition support. No additional nutrition interventions indicated d/t small size of wound.    Labs: Reviewed. Notable for: Na+ 158 (H - on D5W), K+ 4.1 (WNL),  (H), Cr 1.68 (H), Mg++ 2.8 (H); no recent phos    GI:   -Stooling: Last BM on 2/29 (2/25 before that). On bowel regimen (Miralax, senna-docusate). Pt says he goes daily (questionable historian with his AMS)  -Per CVTS note (regarding dental): \"Planning removal of the remaining mandibular teeth for infection risk. Not for 6 weeks from surgery date\"     SLP: Per SLP note today: \"Recommend continuation of dysphagia diet 1 and nectar-thick liquids, no straws, with 1:1 supervision and feeding assist.\"     Supplements: Certavite multivitamin/mineral    New Dosing Weight: 84 kg (adjusted) - based on lowest weight this admit (116 kg on 3/1) and IBW of 73.     Updated ASSESSED NUTRITION NEEDS  Energy needs: 1680 - 2100 kcal/day (20 - 25+ kcal/kg)   Justification: Obese   Protein needs: 101 - 126+ g protein/day (1.2 - 1.5 g/kg)   Justification: Obese, increased needs post-op, pending renal status     MALNUTRITION   % Intake: Decreased intake does not meet criteria - on TF  % Weight Loss: Difficult to assess with fluctuation in fluid status  Subcutaneous Fat Loss: None observed  Muscle Loss: None observed  Fluid Accumulation/Edema: None noted  Malnutrition Diagnosis: Patient does not meet two of the established criteria necessary for diagnosing malnutrition     Previous Goals   1) Diet adv > full liquids within 2-3 days.  Evaluation: Met    2) Patient to consume % of nutritionally adequate meal trays TID, or the equivalent with supplements/snacks.  Evaluation: Not met, but on TF     Previous Nutrition Diagnosis  Predicted inadequate nutrient intake (protein/energy) related to diet restriction per SLP evaluation (full " liquid diet) which may limit ability to take adequate intake.     Evaluation: Declining, see below     CURRENT NUTRITION DIAGNOSIS  Inadequate oral intake related to dysphagia, constipation and AMS as evidenced by SLP recs for DD1 with nectar-thick liquid diet, 3-day kcal ct average of 312 kcals and 9 g protein daily, and continued reliance on enteral nutrition support to meet 100% assessed nutrition needs at this time.        INTERVENTIONS  Implementation  -Medical food supplement therapy: Continue as previously ordered  -Continue EN as ordered      Goals  Total avg nutritional intake to meet a minimum of 20 kcal/kg and 1.2 g PRO/kg daily (per NEW dosing wt 84 kg).     Monitoring/Evaluation  Progress toward goals will be monitored and evaluated per protocol.    Prema Willis RD, LD  Pager: [phone]

## 2020-03-03 NOTE — PROGRESS NOTES
[facility] Nurse Inpatient Pressure Injury Assessment   Reason for consultation: Evaluate and treat R nare wound    ASSESSMENT  Pressure Injury: on R nare , hospital acquired ,   This is a Medical Device Related Pressure Injury (MDRPI) due to NG  Pressure Injury is Stage 1   Contributing factor of the pressure injury: pressure and moisture  Status: initial assessment     TREATMENT PLAN  R nare wound: Every 4 hours cleanse wound with sterile NS and apply vaseline  If taping tubing to cheek, ensure that there is no pressure on the nare  Orders Written  WO Nurse follow-up plan:weekly  Nursing to notify the Provider(s) and re-consult the WO Nurse if wound(s) deteriorates or new skin concern.    Patient History  According to provider note(s): 74 year old male with Hx HTN who presented with syncope and CP. Found to have Type A aortic dissection and now status post ascending aortic and arch dissection repair with tissue AVR and vein bypass to RCA on 2/9/20     Objective Data    Current Diet/ Nutrition:  Orders Placed This Encounter      Dysphagia Diet Level 1 Pureed Nectar Thickened Liquids (pre-thickened or use instant food thickener)      Output:   I/O last 3 completed shifts:  In: 3070 [P.O.:120; I.V.:40; NG/GT:2110]  Out: 1200 [Urine:1200]    Risk Assessment:   Sensory Perception: 3-->slightly limited  Moisture: 4-->rarely moist  Activity: 2-->chairfast  Mobility: 2-->very limited  Nutrition: 3-->adequate  Friction and Shear: 1-->problem  Samuel Score: 15      Labs:   Recent Labs   Lab 03/03/20  0540   HGB 11.2*   WBC 9.2       Physical Exam  Skin inspection: focused nose    Wound Location:  R nare, by columella        Date of last Photo 3/3/20  Wound History: pt with NG tube secured with bridle.  Found taped to left cheek with pressure from tube on the nare  Measurements (length x width x depth, in cm) 0.2 cm x 0.4 cm  x  0 cm   Wound Base:  100 % non-blanchable and erythema  palpation of the wound bed: normal   Periwound  skin: intact  Color: normal and consistent with surrounding tissue  Temperature: normal   Pain: with palpation,     Interventions  Current support surface: Standard  Low air loss mattress  Repositioning aid: Pillows  Visual inspection of wound(s) completed   Wound Care: was done per plan of care.  Supplies: at bedside  Educated provided: importance of repositioning and plan of care  Education provided to: patient   Discussed importance of:off-loading pressure to wound  Discussed plan of care with Nurse

## 2020-03-03 NOTE — PROGRESS NOTES
Calorie Count  Intake recorded for: 3/2  Total Kcals: 492 Total Protein: 9g  Kcals from Hospital Food: 492  Protein: 9g  Kcals from Outside Food (average):0 Protein: 0g  # Meals Recorded: 100% 1.75 applesauce, fruit ice, 50% orange juice, oatmeal w/ brown sugar  # Supplements Recorded: 75% 1 Magic Cup

## 2020-03-03 NOTE — PROGRESS NOTES
Social Work Services Progress Note    Hospital Day: 23  Date of Initial Social Work Evaluation:  2/11/2020  Collaborated with:  [facility] admissions,  [facility]    Data:  Pt is a 74 year old male being followed by JOSE ANGEL for discharge planning to TCU.  Currently pt cannot go to [facility] due to his tube feeding needs.  Next referral plan is with the [facility] or [facility].    Intervention:  SW has refaxed pt's referral x4 and on 5th attempt the fax did successfully transfer to the [facility] admissions team.  JOSE ANGEL called to request that pt be reviewed for both CLC and or transfer to the hospital as he is medically ready to discharge today.  JOSE ANGEL and CVTS PA continue to be waiting on a call from the [facility] regarding level of care transfer.    Referrals in Progress:   [facility] CLC - faxed to admissions for review  PH: [phone]   F: [phone]    [facility] - pended as pt is needing a tube feeding  PH: [phone]  F: [phone]    Assessment:  Did not meet with pt for this encounter note.      Plan:    Anticipated Disposition:  TCU    Barriers to d/c plan: [facility] Bed availability and review    Follow Up:  SW to follow for discharge planning needs.    MIGUEL ANGEL Saldivar, LCSW  6C Unit   Phone: [phone]  Pager: [phone]  Unit: [phone]    ___________________________________________________________________________________________________________________________________________________    Referrals Discontinued:  None    Community Case Management/Community Services in place:   Pt is 10% service connected as a  - has no community TCU coverage and has no [facility] transportation coverage.  Can be seen at the [facility] and has access to the CLC.

## 2020-03-03 NOTE — PROGRESS NOTES
"D:Responses to simple commmands is appropriate.   Some responses are a little off and not consistent to verbal interaction.  Wife and daughter visited and patient carried on a  Normal conversation.   Smiling and very happy to see wife.  Patient even asked about some he remembered was sick.  Wife indicated to patient he had \" peacefully  with family around.   Denies pain.   No shortness of breath.   Toes on left and right foot are blackened and unchanged.   No complaints of pain in feet.   Up on the moveo for standing and leg strengthening per PT and afterwards,  Up in chair a few hours.   Bed bath and oral cares completed.  5% dextrose started at 50ml/hr.  Is receiving free water at 75ml/hr and tube feeding at 50ml/hr.    D:Is doing well.  Ate about 25% of breakfast.   Tolerating therpies and being up in chair.  Rhythm is stable.    AVSS (see doc flow).   Condition is progressing slowly.  Is still experincing confusion.  And is disoriented to place, time and situation.    P:Continue with current plan of care.  "

## 2020-03-04 ENCOUNTER — APPOINTMENT (OUTPATIENT)
Dept: SPEECH THERAPY | Facility: CLINIC | Age: 75
DRG: 219 | End: 2020-03-04
Payer: COMMERCIAL

## 2020-03-04 ENCOUNTER — APPOINTMENT (OUTPATIENT)
Dept: OCCUPATIONAL THERAPY | Facility: CLINIC | Age: 75
DRG: 219 | End: 2020-03-04
Payer: COMMERCIAL

## 2020-03-04 ENCOUNTER — APPOINTMENT (OUTPATIENT)
Dept: PHYSICAL THERAPY | Facility: CLINIC | Age: 75
DRG: 219 | End: 2020-03-04
Payer: COMMERCIAL

## 2020-03-04 LAB
ALBUMIN UR-MCNC: 10 MG/DL
ANION GAP SERPL CALCULATED.3IONS-SCNC: 4 MMOL/L (ref 3–14)
APPEARANCE UR: CLEAR
BILIRUB UR QL STRIP: NEGATIVE
BUN SERPL-MCNC: 100 MG/DL (ref 7–30)
CALCIUM SERPL-MCNC: 8.6 MG/DL (ref 8.5–10.1)
CHLORIDE SERPL-SCNC: 119 MMOL/L (ref 94–109)
CO2 SERPL-SCNC: 28 MMOL/L (ref 20–32)
COLOR UR AUTO: YELLOW
CREAT SERPL-MCNC: 1.63 MG/DL (ref 0.66–1.25)
ERYTHROCYTE [DISTWIDTH] IN BLOOD BY AUTOMATED COUNT: 15.5 % (ref 10–15)
GFR SERPL CREATININE-BSD FRML MDRD: 40 ML/MIN/{1.73_M2}
GLUCOSE BLDC GLUCOMTR-MCNC: 134 MG/DL (ref 70–99)
GLUCOSE BLDC GLUCOMTR-MCNC: 138 MG/DL (ref 70–99)
GLUCOSE BLDC GLUCOMTR-MCNC: 153 MG/DL (ref 70–99)
GLUCOSE BLDC GLUCOMTR-MCNC: 155 MG/DL (ref 70–99)
GLUCOSE BLDC GLUCOMTR-MCNC: 158 MG/DL (ref 70–99)
GLUCOSE BLDC GLUCOMTR-MCNC: 207 MG/DL (ref 70–99)
GLUCOSE BLDC GLUCOMTR-MCNC: 292 MG/DL (ref 70–99)
GLUCOSE SERPL-MCNC: 275 MG/DL (ref 70–99)
GLUCOSE UR STRIP-MCNC: NEGATIVE MG/DL
HCT VFR BLD AUTO: 35.3 % (ref 40–53)
HGB BLD-MCNC: 10.2 G/DL (ref 13.3–17.7)
HGB UR QL STRIP: ABNORMAL
HYALINE CASTS #/AREA URNS LPF: 1 /LPF (ref 0–2)
KETONES UR STRIP-MCNC: NEGATIVE MG/DL
LEUKOCYTE ESTERASE UR QL STRIP: ABNORMAL
MAGNESIUM SERPL-MCNC: 2.5 MG/DL (ref 1.6–2.3)
MCH RBC QN AUTO: 30.3 PG (ref 26.5–33)
MCHC RBC AUTO-ENTMCNC: 28.9 G/DL (ref 31.5–36.5)
MCV RBC AUTO: 105 FL (ref 78–100)
MUCOUS THREADS #/AREA URNS LPF: PRESENT /LPF
NITRATE UR QL: NEGATIVE
PH UR STRIP: 6 PH (ref 5–7)
PLATELET # BLD AUTO: 246 10E9/L (ref 150–450)
POTASSIUM SERPL-SCNC: 4 MMOL/L (ref 3.4–5.3)
RBC # BLD AUTO: 3.37 10E12/L (ref 4.4–5.9)
RBC #/AREA URNS AUTO: 9 /HPF (ref 0–2)
SODIUM SERPL-SCNC: 151 MMOL/L (ref 133–144)
SODIUM SERPL-SCNC: 152 MMOL/L (ref 133–144)
SODIUM SERPL-SCNC: 153 MMOL/L (ref 133–144)
SOURCE: ABNORMAL
SP GR UR STRIP: 1.02 (ref 1–1.03)
UROBILINOGEN UR STRIP-MCNC: 4 MG/DL (ref 0–2)
WBC # BLD AUTO: 7.9 10E9/L (ref 4–11)
WBC #/AREA URNS AUTO: 3 /HPF (ref 0–5)

## 2020-03-04 PROCEDURE — 25000132 ZZH RX MED GY IP 250 OP 250 PS 637: Performed by: STUDENT IN AN ORGANIZED HEALTH CARE EDUCATION/TRAINING PROGRAM

## 2020-03-04 PROCEDURE — 25000132 ZZH RX MED GY IP 250 OP 250 PS 637: Performed by: PHYSICIAN ASSISTANT

## 2020-03-04 PROCEDURE — 94640 AIRWAY INHALATION TREATMENT: CPT | Mod: 76

## 2020-03-04 PROCEDURE — 40000275 ZZH STATISTIC RCP TIME EA 10 MIN

## 2020-03-04 PROCEDURE — 97110 THERAPEUTIC EXERCISES: CPT | Mod: GP

## 2020-03-04 PROCEDURE — 36415 COLL VENOUS BLD VENIPUNCTURE: CPT | Performed by: PHYSICIAN ASSISTANT

## 2020-03-04 PROCEDURE — 36592 COLLECT BLOOD FROM PICC: CPT | Performed by: PHYSICIAN ASSISTANT

## 2020-03-04 PROCEDURE — 94640 AIRWAY INHALATION TREATMENT: CPT

## 2020-03-04 PROCEDURE — 25000128 H RX IP 250 OP 636: Performed by: STUDENT IN AN ORGANIZED HEALTH CARE EDUCATION/TRAINING PROGRAM

## 2020-03-04 PROCEDURE — 92526 ORAL FUNCTION THERAPY: CPT | Mod: GN | Performed by: REHABILITATION PRACTITIONER

## 2020-03-04 PROCEDURE — 97530 THERAPEUTIC ACTIVITIES: CPT | Mod: GP

## 2020-03-04 PROCEDURE — 21400000 ZZH R&B CCU UMMC

## 2020-03-04 PROCEDURE — 25000125 ZZHC RX 250: Performed by: PHYSICIAN ASSISTANT

## 2020-03-04 PROCEDURE — 25000132 ZZH RX MED GY IP 250 OP 250 PS 637: Performed by: ANESTHESIOLOGY

## 2020-03-04 PROCEDURE — 85027 COMPLETE CBC AUTOMATED: CPT | Performed by: PHYSICIAN ASSISTANT

## 2020-03-04 PROCEDURE — 80048 BASIC METABOLIC PNL TOTAL CA: CPT | Performed by: PHYSICIAN ASSISTANT

## 2020-03-04 PROCEDURE — 83735 ASSAY OF MAGNESIUM: CPT | Performed by: PHYSICIAN ASSISTANT

## 2020-03-04 PROCEDURE — 81001 URINALYSIS AUTO W/SCOPE: CPT | Performed by: PHYSICIAN ASSISTANT

## 2020-03-04 PROCEDURE — 84295 ASSAY OF SERUM SODIUM: CPT | Performed by: PHYSICIAN ASSISTANT

## 2020-03-04 PROCEDURE — 25800030 ZZH RX IP 258 OP 636: Performed by: PHYSICIAN ASSISTANT

## 2020-03-04 PROCEDURE — 25000132 ZZH RX MED GY IP 250 OP 250 PS 637: Performed by: THORACIC SURGERY (CARDIOTHORACIC VASCULAR SURGERY)

## 2020-03-04 PROCEDURE — 97530 THERAPEUTIC ACTIVITIES: CPT | Mod: GO | Performed by: OCCUPATIONAL THERAPIST

## 2020-03-04 PROCEDURE — 40000802 ZZH SITE CHECK

## 2020-03-04 PROCEDURE — 00000146 ZZHCL STATISTIC GLUCOSE BY METER IP

## 2020-03-04 PROCEDURE — 25000128 H RX IP 250 OP 636: Performed by: PHYSICIAN ASSISTANT

## 2020-03-04 RX ORDER — DEXTROSE MONOHYDRATE 50 MG/ML
INJECTION, SOLUTION INTRAVENOUS CONTINUOUS
Status: ACTIVE | OUTPATIENT
Start: 2020-03-04 | End: 2020-03-05

## 2020-03-04 RX ORDER — DEXTROSE MONOHYDRATE 50 MG/ML
INJECTION, SOLUTION INTRAVENOUS CONTINUOUS
Status: ACTIVE | OUTPATIENT
Start: 2020-03-04 | End: 2020-03-04

## 2020-03-04 RX ORDER — LANOLIN ALCOHOL/MO/W.PET/CERES
3 CREAM (GRAM) TOPICAL AT BEDTIME
Status: DISCONTINUED | OUTPATIENT
Start: 2020-03-04 | End: 2020-03-05 | Stop reason: HOSPADM

## 2020-03-04 RX ADMIN — AMOXICILLIN 500 MG: 400 POWDER, FOR SUSPENSION ORAL at 22:02

## 2020-03-04 RX ADMIN — ASPIRIN 81 MG CHEWABLE TABLET 81 MG: 81 TABLET CHEWABLE at 08:36

## 2020-03-04 RX ADMIN — HYDROMORPHONE HYDROCHLORIDE 0.5 MG: 1 INJECTION, SOLUTION INTRAMUSCULAR; INTRAVENOUS; SUBCUTANEOUS at 02:42

## 2020-03-04 RX ADMIN — Medication 1 PACKET: at 19:27

## 2020-03-04 RX ADMIN — IPRATROPIUM BROMIDE AND ALBUTEROL SULFATE 3 ML: .5; 3 SOLUTION RESPIRATORY (INHALATION) at 07:55

## 2020-03-04 RX ADMIN — MELATONIN TAB 3 MG 3 MG: 3 TAB at 19:26

## 2020-03-04 RX ADMIN — QUETIAPINE FUMARATE 25 MG: 25 TABLET ORAL at 22:02

## 2020-03-04 RX ADMIN — NYSTATIN 500000 UNITS: 100000 SUSPENSION ORAL at 16:09

## 2020-03-04 RX ADMIN — Medication 5000 UNITS: at 08:40

## 2020-03-04 RX ADMIN — Medication 1 PACKET: at 13:02

## 2020-03-04 RX ADMIN — Medication 12.5 MG: at 19:26

## 2020-03-04 RX ADMIN — AMIODARONE HYDROCHLORIDE 200 MG: 200 TABLET ORAL at 08:36

## 2020-03-04 RX ADMIN — Medication 12.5 MG: at 08:36

## 2020-03-04 RX ADMIN — ACETAMINOPHEN 650 MG: 325 SOLUTION ORAL at 02:39

## 2020-03-04 RX ADMIN — DEXTROSE MONOHYDRATE: 50 INJECTION, SOLUTION INTRAVENOUS at 18:42

## 2020-03-04 RX ADMIN — SODIUM CHLORIDE, PRESERVATIVE FREE 5 ML: 5 INJECTION INTRAVENOUS at 05:51

## 2020-03-04 RX ADMIN — Medication 1 PACKET: at 08:41

## 2020-03-04 RX ADMIN — IPRATROPIUM BROMIDE AND ALBUTEROL SULFATE 3 ML: .5; 3 SOLUTION RESPIRATORY (INHALATION) at 19:51

## 2020-03-04 RX ADMIN — AMOXICILLIN 500 MG: 400 POWDER, FOR SUSPENSION ORAL at 05:12

## 2020-03-04 RX ADMIN — FUROSEMIDE 40 MG: 40 TABLET ORAL at 08:36

## 2020-03-04 RX ADMIN — Medication 5000 UNITS: at 19:26

## 2020-03-04 RX ADMIN — POLYETHYLENE GLYCOL 3350 17 G: 17 POWDER, FOR SOLUTION ORAL at 08:57

## 2020-03-04 RX ADMIN — SODIUM CHLORIDE, PRESERVATIVE FREE 5 ML: 5 INJECTION INTRAVENOUS at 12:55

## 2020-03-04 RX ADMIN — NYSTATIN 500000 UNITS: 100000 SUSPENSION ORAL at 12:54

## 2020-03-04 RX ADMIN — SENNOSIDES AND DOCUSATE SODIUM 2 TABLET: 8.6; 5 TABLET ORAL at 08:43

## 2020-03-04 RX ADMIN — NYSTATIN 500000 UNITS: 100000 SUSPENSION ORAL at 19:26

## 2020-03-04 RX ADMIN — NYSTATIN 500000 UNITS: 100000 SUSPENSION ORAL at 08:40

## 2020-03-04 RX ADMIN — AMOXICILLIN 500 MG: 400 POWDER, FOR SUSPENSION ORAL at 14:53

## 2020-03-04 RX ADMIN — Medication 40 MG: at 08:40

## 2020-03-04 RX ADMIN — MULTIVITAMIN 15 ML: LIQUID ORAL at 08:40

## 2020-03-04 RX ADMIN — Medication 12.5 MG: at 12:54

## 2020-03-04 RX ADMIN — DEXTROSE MONOHYDRATE: 50 INJECTION, SOLUTION INTRAVENOUS at 08:36

## 2020-03-04 RX ADMIN — Medication 12.5 MG: at 16:09

## 2020-03-04 ASSESSMENT — ACTIVITIES OF DAILY LIVING (ADL)
ADLS_ACUITY_SCORE: 18
ADLS_ACUITY_SCORE: 19
ADLS_ACUITY_SCORE: 18
ADLS_ACUITY_SCORE: 18
ADLS_ACUITY_SCORE: 19
ADLS_ACUITY_SCORE: 20

## 2020-03-04 ASSESSMENT — MIFFLIN-ST. JEOR: SCORE: 1885.38

## 2020-03-04 NOTE — PLAN OF CARE
"/87 (BP Location: Right arm)   Pulse 90   Temp 97.9  F (36.6  C) (Axillary)   Resp 18   Ht 1.753 m (5' 9\")   Wt 116.5 kg (256 lb 13.4 oz)   SpO2 94%   BMI 37.93 kg/m      D: Admitted with Type A aortic dissection; now s/p ascending aortic and arch dissection repair with tissue AVR and vein bypass to RCA on 2/9. Hx HTN, DM  I/A: Patient is confused at baseline only alert to self.  On (Nutren 1.5) TF at 50ml and water flushes q1 at 75ml/hr for hypernatremia.  BS checks q4hrs with insuline coverage.  On tele with SR, on Room air tried cpap but did not tolerate well.  Incontinent of stool and ander-care and barrier cream applied.    P: Plan to discharge to TCU pending delirium resolution.      "

## 2020-03-04 NOTE — PLAN OF CARE
PT - 6C  Discharge Planner PT   Patient plan for discharge: Not discussed today  Current status: Pt lethargic and disoriented throughout PT session, but does participate and follow commands. Max A to roll in bed for placement of sling. Dependently transferred from bed<>Moveo using OH lift and Ax2. Pt performed body weight assisted squats via Moveo at 30-47% of body weight. Mod A x2 for repositioning in bed.   Barriers to return to prior living situation: Current medical status, weakness, decreased activity tolerance, deconditioning, impaired cognition  Recommendations for discharge: TCU  Rationale for recommendations: Pt requires continued skilled therapy services to progress strength, endurance, balance, and safety/IND with functional mobility.  Entered by: Trudi Saul 03/04/2020 12:45 PM

## 2020-03-04 NOTE — PROGRESS NOTES
Social Work Services Progress Note    Hospital Day: 24  Date of Initial Social Work Evaluation:  2/11/2020  [facility]:  [facility],  [facility]    Data:  Pt is a 74 year old male being followed by JOSE ANGEL for discharge planning to TCU.  Currently pt cannot go to [facility] due to his tube feeding needs.  Next referral plan is with the [facility] or [facility].    Intervention:  SW called the [facility] admissions to check on the status of pt's referral.  Pt's referral continues to be pending review of his consultation.  SW will be notified once a determination is made.  No other updates from the [facility] at this time.    JOSE ANGEL and CVTS PA met with pt and daughter Rosa - SW discussed that pt's referral to the [facility] continues to be pending.  SW also received FMLA forms for medical team to complete.    Daughter will be calling the [facility] regarding paperwork that is approving medical care at [facility].  SW stated they are not aware of billing practices/agreements with the [facility] and if family has questions about medical coverage they can follow up with the [facility] and or call [place] billing.  Billing contact information given to Rosa as well.    Referrals in Progress:   [facility] CLC - in process per referral center  PH: [phone]   F: [phone]    [facility] - pended as pt is needing a tube feeding  PH: [phone]  F: [phone]    Assessment:  Pt and daughter Rosa in agreement with the plan.    Plan:    Anticipated Disposition:  TCU    Barriers to d/c plan: [facility] Bed availability and review    Follow Up:  SW to follow for discharge planning needs.    MIGUEL ANGEL Saldivar, VANCEW  6C Unit   Phone: [phone]  Pager: [phone]  Unit: [phone]    ___________________________________________________________________________________________________________________________________________________    Referrals Discontinued:  None    Community Case  Management/Community Services in place:   Pt is 10% service connected as a  - has no community TCU coverage and has no VA transportation coverage.  Can be seen at the [facility] and has access to the CLC.

## 2020-03-04 NOTE — PLAN OF CARE
Pt has hx of HTN who presented with syncope and CP. Found to have Type A aortic dissection and now status post ascending aortic and arch dissection repair with tissue AVR and vein bypass to RCA on 2/9/20 with Dr. Monet Mckeon. Pt is restless and somewhat confused. Bilateral mitts applied for pulling on feeding tube and taking off his tele and gown. Bed alarm on. TF at 50 ml/hr with 75 ml/hr FF, D5 at 50 ml/hr. Q 4 hour blood sugar checks, usually in the 200s. Bilateral toes blackened, heels with dressings for ulcers, L leg stapled incision, coccyx mepilex. To get blood sugars from PICC as his fingers are very bruised. Feeder, ate about 1/4 of his supper. Presently has CPAP on though he frequently takes that off too. [place] cath with adequate UO. Tylenol given for pain with ? Results. Family visiting today.

## 2020-03-04 NOTE — PLAN OF CARE
OT/CR 6C: Discharge Planner OT   Patient plan for discharge: Pt unable to state  Current status: Pt oriented to month and year but not place, situation, time of day or date.  Provided reorientation.  Pt with impaired attention, often covering deficits with humor. Mod A x 2 supine <> EOB.  Tolerates sitting EOB x approx 15 minutes with Mod A progressing to close SBA. Fatigues quickly.   Barriers to return to prior living situation: impaired cognition, generalized weakness, assist required for all mobility and self cares, acute medical needs  Recommendations for discharge: TCU  Rationale for recommendations: Pt is currently below baseline with regards to mobility and independence with self cares and will benefit from continued skilled therapy intervention to address deficits.         Entered by: Mamie Briceño 03/04/2020 3:01 PM

## 2020-03-04 NOTE — PROGRESS NOTES
IP Diabetes Management  Daily Note           Assessment and Plan:   HPI: Mr. Vel Espana is a 75 yo man with a history of hypertension, who presented with syncope and chest pain and was found to have a type a aortic dissection, now status post ascending aortic and arch dissection repair with tissue AVR and vein bypass to RCA on 2/9/2020.    Assessment:   1) Pre-Diabetes versus Type II Diabetes Mellitus, with enteral feed induced hyperglycemia      Plan:    -increase NPH from 28 to 34 units this AM, will assess for increase in evening dose later today.    -aspart 1:6g CHO coverage with meals and snacks/supplements   -patient care order: cover all PO intake   -increase aspart to custom 1/20 sliding scale q4h (on continuous TF)   -BG monitoring q4h (on continuous TF)   -PRN D10% order to run at nutritional rate if tube feeds are interrupted.    -hypoglycemia protocol   -carbohydrate documentation by nursing.   -diabetes education prior to discharge if requiring insulin.    [facility]  Plan discussed with patient, daughter at bedside, bedside RN.      Interval History and Assessment: interval glucose trend reviewed:   BG remain out of target.   Received D5% fluids yesterday afternoon (30cc/hour, or 1.5g CHO per hour) for hypernatremia, and will receive again today x8 hours.     Daytime RN confident that he did not miss carb coverage yesterday on her shift for intake; however evening RN documented patient ate 1/4 of dinner. No carb coverage was given, and at 1:6g IC ratio and intake of 70 g with breakfast, he likely did miss 2-3 units for this dinner intake.     BG overnight 155>292>275, which would be unusual level of increase with tube feeds running continuously at same rate (suspect he may have missed coverage for something overnight).    No noted alteration or interruption to tube feeds which would account for variability in BG.    Luis's daughter is present today.  "Discussed utility of covering all PO intake, and she will call for coverage if she tries to get him to eat some pudding later. Luis is currently only interested in getting \"cold water\". He did not eat breakfast. Worked with therapy this morning and is very tired.     Current nutritional intake and type: Orders Placed This Encounter      Dysphagia Diet Level 1 Pureed Nectar Thickened Liquids (pre-thickened or use instant food thickener)  Continuous enteral feeds: Nutren 1.5 at 50cc/hour.     PTA Diabetes Regimen: n/a     Discharge Planning: to TCU when medically stable and delirium improving; may go to [facility].           Diabetes History:   Type of Diabetes: pre diabetes versus TII, with enteral feed hyperglycmia  Lab Results   Component Value Date    A1C 6.0 02/10/2020              Review of Systems:   The Review of Systems is negative other than noted in the Interval History.           Medications:     Current Facility-Administered Medications   Medication     acetaminophen (TYLENOL) solution 650 mg     albuterol (PROVENTIL) neb solution 2.5 mg     amiodarone (PACERONE) tablet 200 mg     amoxicillin (AMOXIL) suspension 500 mg     artificial saliva (BIOTENE DRY MOUTHWASH) liquid 15 mL     aspirin (ASA) chewable tablet 81 mg     carboxymethylcellulose PF (REFRESH PLUS) 0.5 % ophthalmic solution 1 drop     dextrose 10% infusion     dextrose 5% infusion     glucose gel 15-30 g    Or     dextrose 50 % injection 25-50 mL    Or     glucagon injection 1 mg     diphenhydrAMINE (BENADRYL) injection 50 mg     furosemide (LASIX) tablet 40 mg     heparin ANTICOAGULANT injection 5,000 Units     heparin lock flush 10 UNIT/ML injection 5-10 mL     heparin lock flush 10 UNIT/ML injection 5-10 mL     hydrALAZINE (APRESOLINE) half-tab 12.5 mg     HYDROmorphone (PF) (DILAUDID) injection 0.3-0.5 mg     influenza Vac Split High-Dose (FLUZONE) injection 0.5 mL     insulin aspart (NovoLOG) injection (RAPID ACTING)     insulin aspart " (NovoLOG) injection (RAPID ACTING)     insulin aspart (NovoLOG) injection (RAPID ACTING)     insulin isophane human (HumuLIN N PEN) injection 28 Units     insulin isophane human (HumuLIN N PEN) injection 34 Units     ipratropium - albuterol 0.5 mg/2.5 mg/3 mL (DUONEB) neb solution 3 mL     Lidocaine (LIDOCARE) 4 % Patch 1-3 patch     lidocaine (LMX4) cream     lidocaine 1 % 0.1-1 mL     lidocaine patch in PLACE     magnesium sulfate 2 g in water intermittent infusion     magnesium sulfate 4 g in 100 mL sterile water (premade)     melatonin tablet 3 mg     methocarbamol (ROBAXIN) tablet 500 mg     metoprolol tartrate (LOPRESSOR) half-tab 12.5 mg     multivitamins w/minerals (CERTAVITE) liquid 15 mL     naloxone (NARCAN) injection 0.1-0.4 mg     nystatin (MYCOSTATIN) suspension 500,000 Units     ondansetron (ZOFRAN-ODT) ODT tab 4 mg    Or     ondansetron (ZOFRAN) injection 4 mg     oxyCODONE (ROXICODONE) solution 5-10 mg     pantoprazole (PROTONIX) 2 mg/mL suspension 40 mg     polyethylene glycol (MIRALAX/GLYCOLAX) Packet 17 g     potassium chloride (KLOR-CON) Packet 20-40 mEq     potassium chloride 10 mEq in 100 mL intermittent infusion with 10 mg lidocaine     potassium chloride 10 mEq in 100 mL sterile water intermittent infusion (premix)     potassium chloride 20 mEq in 50 mL intermittent infusion     potassium chloride ER (K-DUR/KLOR-CON M) CR tablet 20-40 mEq     potassium phosphate 10 mmol in D5W 250 mL intermittent infusion     potassium phosphate 15 mmol in D5W 250 mL intermittent infusion     potassium phosphate 20 mmol in D5W 250 mL intermittent infusion     potassium phosphate 20 mmol in D5W 500 mL intermittent infusion     potassium phosphate 25 mmol in D5W 500 mL intermittent infusion     prochlorperazine (COMPAZINE) injection 5 mg    Or     prochlorperazine (COMPAZINE) tablet 5 mg     protein modular (PROSOURCE TF) 1 packet     QUEtiapine (SEROquel) tablet 25 mg     senna-docusate (SENOKOT-S/PERICOLACE)  "8.6-50 MG per tablet 1 tablet    Or     senna-docusate (SENOKOT-S/PERICOLACE) 8.6-50 MG per tablet 2 tablet     sodium chloride (PF) 0.9% PF flush 10-20 mL     sodium chloride (PF) 0.9% PF flush 3 mL     sodium chloride (PF) 0.9% PF flush 3 mL            Physical Exam:    /77 (BP Location: Left arm)   Pulse 90   Temp 96.7  F (35.9  C) (Axillary)   Resp 18   Ht 1.753 m (5' 9\")   Wt 116.5 kg (256 lb 13.4 oz)   SpO2 96%   BMI 37.93 kg/m    General: pleasant, somnolent throughout interview.   HEENT: normocephalic, atraumatic. Oral mucous membranes moist.   Lungs: unlabored respiration, no cough  ABD: rounded, soft, no lipodystrophy noted  Skin: warm and dry, no obvious lesions  MSK:  moves all extremities  Lymp:  no LE edema   Mental status:  alert, disoriented to place, time, but answers simple questions. Somnolent.   Psych:  affect, calm and appropriate interaction. somnolent             Data:     Recent Labs   Lab 03/04/20  0552 03/04/20  0522 03/04/20  0123 03/03/20  1946 03/03/20  1725 03/03/20  1628 03/03/20  1151 03/03/20  0827 03/03/20  0540  03/02/20  0707  03/01/20  0619  02/29/20  0539   *  --   --   --  252*  --   --   --  261*  --  371*  --  273*  --  165*   BGM  --  292* 155* 218*  --  237* 230* 211*  --    < >  --    < >  --    < >  --     < > = values in this interval not displayed.     Lab Results   Component Value Date    WBC 7.9 03/04/2020    WBC 9.2 03/03/2020    WBC 9.4 03/02/2020    HGB 10.2 (L) 03/04/2020    HGB 11.2 (L) 03/03/2020    HGB 10.9 (L) 03/02/2020    HCT 35.3 (L) 03/04/2020    HCT 38.6 (L) 03/03/2020    HCT 37.3 (L) 03/02/2020     (H) 03/04/2020     (H) 03/03/2020     (H) 03/02/2020     03/04/2020     03/03/2020     03/02/2020     Lab Results   Component Value Date     (H) 03/04/2020     (H) 03/04/2020     (H) 03/03/2020    POTASSIUM 4.0 03/04/2020    POTASSIUM 3.8 03/03/2020    POTASSIUM 4.1 03/03/2020    " CHLORIDE 119 (H) 03/04/2020    CHLORIDE 120 (H) 03/03/2020    CHLORIDE 124 (H) 03/03/2020    CO2 28 03/04/2020    CO2 30 03/03/2020    CO2 27 03/03/2020     (H) 03/04/2020     (H) 03/03/2020     (H) 03/03/2020     Lab Results   Component Value Date     (H) 03/04/2020     (H) 03/03/2020     (H) 03/03/2020     Lab Results   Component Value Date    TSH 4.06 (H) 02/09/2020     Lab Results   Component Value Date    AST 51 (H) 03/03/2020    AST 32 02/15/2020    AST 44 02/14/2020    ALT 93 (H) 03/03/2020    ALT 17 02/15/2020    ALT 17 02/14/2020    ALKPHOS 120 03/03/2020    ALKPHOS 66 02/15/2020    ALKPHOS 42 02/13/2020     Diabetes Management Team job code: [id number]  I spent a total of 30 minutes bedside and on the inpatient unit managing glycemic care. Over 50% of my time on the unit was spent counseling the patient and/or coordinating care regarding acute hyperglycemia management.  See note for details.    Mireille Gonsalez PA-C  Inpatient Diabetes Management Service  Pager [phone]

## 2020-03-04 NOTE — PROGRESS NOTES
"D:Not eatng much during meals.   Takes a few bites and then refuses.   Incontinent of stool.   Moderate soft, brown.   Actively participates in therapies.   Daughter indicated during lunch he \"held the cup in his left hand\".   Observed shift weight while up in chair for side to side and leaning forward to get comfortable.   Has denied pain.  Bed and chair alarm on as appropriate.   Turned side to side while in bed.   Rhythm is NSR with no ectopy.    A;Is getting stronger.  More awake and alert today.   Rhythm is unchanged.  AVSS. (see doc flow).   Normal O2 sats on room air.  Condition is slowly improving.   Tolerating therapies and turning and being up in chair a few hours.   Condition is stable.    P:Continue to turn while in bed. OOB to chair for meals.   Encourage PT/OT.   Asses skin for breakdown.   Assess level of comfort and respiratory stats.  Monitor temp, O2 sats, rhythm and vital signs.  Notify MD with any acute changes.  "

## 2020-03-04 NOTE — PLAN OF CARE
Discharge Planner SLP   Patient plan for discharge: did not discuss today.  Current status: Patient seen at bedside for dysphagia management. Pt upright in chair. Pt pleasant/cooperative but fatigued. Patient tolerated nectar thick liquids by cup rim and by spoon with no overt s/s aspiration noted. Trials of thin liquids by spoon resulted in delayed cough/throat clearing 1/4 trials. Hyolaryngeal excursion is reduced/delayed. Patient declines purees due to low appetite this session.     Recommend: continue DD1 solids with nectar thick liquids. 1:1 supervision/assist with all meals. No straws.  Barriers to return to prior living situation: dysphagia, mental status changes.  Recommendations for discharge: TCU  Rationale for recommendations: Swallow remains below baseline.       Entered by: Charmaine Gamez 03/04/2020 10:55 AM

## 2020-03-04 NOTE — PROGRESS NOTES
"CVTS Daily Note  3/4/2020  Attending: Nivia Mckeon MD    S:   No overnight events. Continues on tube feeds.   Pt seen at bedside resting comfortably.    Does complain of general weakness and R arm weakness, otherwise no acute complaints.      Denies F/C/Sweats.  No CP, SOB, or calf pain.    Tolerating diet with assistance and tube feeds.  + BM last night per RN.    Requires heavy assistance and lifts.   Pain level tolerable. Plan as per Neuro section below.     O:   Vital signs:  Temp: 96.7  F (35.9  C) Temp src: Axillary BP: 109/77 Pulse: 90 Heart Rate: 78 Resp: 18 SpO2: 96 % O2 Device: None (Room air) Oxygen Delivery: 1 LPM Height: 175.3 cm (5' 9\") Weight: 116.5 kg (256 lb 13.4 oz)  Estimated body mass index is 37.93 kg/m  as calculated from the following:    Height as of this encounter: 1.753 m (5' 9\").    Weight as of this encounter: 116.5 kg (256 lb 13.4 oz).      MAPs: 88 - 114   Gen: AAO x 3, pleasant, NAD  CV: RRR, S1S2 normal, no murmurs, rubs, or gallops.   Pulm: CTA, no rhonchi or wheezes  Abd: soft, non-tender, no guarding  Ext: no peripheral edema  Incision: clean, dry, intact, no erythema  Chest Tube sites: dressings clean and dry       Labs:  BMP  Recent Labs   Lab 03/04/20  0552 03/04/20  0051 03/03/20  2048 03/03/20  1725 03/03/20  0540  03/02/20  0707   * 153* 152* 153* 158*   < > 156*   POTASSIUM 4.0  --   --  3.8 4.1  --  3.8   CHLORIDE 119*  --   --  120* 124*  --  124*   ELMER 8.6  --   --  8.5 9.0  --  8.8   CO2 28  --   --  30 27  --  29   *  --   --  112* 109*  --  105*   CR 1.63*  --   --  1.75* 1.68*  --  1.72*   *  --   --  252* 261*  --  371*    < > = values in this interval not displayed.     CBC  Recent Labs   Lab 03/04/20  0552 03/03/20  0540 03/02/20  0707 03/01/20  0619   WBC 7.9 9.2 9.4 8.1   RBC 3.37* 3.64* 3.57* 3.47*   HGB 10.2* 11.2* 10.9* 10.6*   HCT 35.3* 38.6* 37.3* 35.8*   * 106* 105* 103*   MCH 30.3 30.8 30.5 30.5   MCHC 28.9* 29.0* 29.2* " 29.6*   RDW 15.5* 15.6* 15.4* 15.3*    305 334 352     INR  No lab results found in last 7 days.   Hepatic Panel   Lab Results   Component Value Date    AST 51 03/03/2020     Lab Results   Component Value Date    ALT 93 03/03/2020     Lab Results   Component Value Date    ALBUMIN 2.3 03/03/2020     GLUCOSE:   Recent Labs   Lab 03/04/20  0552 03/04/20  0522 03/04/20  0123 03/03/20  1946 03/03/20  1725 03/03/20  1628 03/03/20  1151 03/03/20  0827 03/03/20  0540  03/02/20  0707  03/01/20  0619  02/29/20  0539   *  --   --   --  252*  --   --   --  261*  --  371*  --  273*  --  165*   BGM  --  292* 155* 218*  --  237* 230* 211*  --    < >  --    < >  --    < >  --     < > = values in this interval not displayed.       Imaging:  reviewed recent imaging       A/P:   Vel Espana is a 74 year old male with Hx HTN who presented with syncope and CP. Found to have Type A aortic dissection and now status post ascending aortic and arch dissection repair with tissue AVR and vein bypass to RCA on 2/9/20 with Dr. Monet Mckeon.  [facility]      Neuro:   - Neurology consult 2/17; CT head showed no acute pathology, generalized parenchymal volume loss with chronic moderate small vessel disease. No concerns for stroke or TIA. Signed off.   - Pain; tylenol and oxycodone, robaxin PRN.  - Delirium/Encephalopathy; resulted in extended intubation, seroquel tapered to off 3/1. Waxes and wanes per family. Restarted Seroquel for delirium and sleep 3/2.   - Insomnia; melatonin q HS, also is sleeping and dozing throughout the day.      CV:   - Hx of HTN, LV EF 60-65%. Moderate RV dysfunction and dilated IVC on Echo 2/25 with weight of 130 kg, now down to 116 kg.   - s/p Tissue AVR, vein graft to RCA, and ascending aorta repair.  - Had 25 seconds nonsustained monomorphic VT (corrected lytes and started Amio). Then had A-fib with RVR 2/14.   - Electrophysiology consulted, currently on Amio 200 mg daily.    - ASA  81 mg, atorvastatin (holding due to LFTs).  Metoprolol 12.5 mg BID.   - Hydralazine 12.5 mg qid (since 3/2) for elevated MAPS  - Vascular Consult 2/25; recommended Podiatry to follow feet, no acute vascular surgery needs.       Pulm:   - Arrived from OR on Sofia (weaned POD #1).   - Pulm toilet, IS, activity and deep breathing   - Supplemental O2 PRN to keep sats > 92%. Wean off as tolerated. On RA     ID:   - Febrile 2/14, cultures sent and no growth besides candida in BAL. Treated with zosyn through 2/22.   - WBC WNL, afebrile, no signs or symptoms of infection   - Will treat for oral candidiasis; nystatin 4 times daily with mouth swab application  - Dry gangrene; Ortho consult 2/29; see note 3/1. Foot X-ray did not indicate osteomyelitis. Continue to observe over next weeks to months, may need some surgical intervention but nothing urgent. Continue dry dressings. Wound care nurse to follow.      GI / FEN:   - Had NJ placed in ICU for nutritional support, it came out with extubation.   - Ordered NJ placement 2/28.   - Hypernatremia 152, increased FWF to 75 ml q1h on 3/2. Gave 200 mL bolus via NJ tube and D5 IVF at 50 mL/hr on 3/3. Will run D5 IVF again 3/4 for 8 hrs.   - DD1 with nectar thick liquids, bowel regimen.       Dental:   - Consult 2/27. On Amoxicillin to protect tissue AVR.   - Planning removal of the remaining mandibular teeth for infection risk. Not for 6 weeks from surgery date per Dr Mckeon.      Renal / :   - Post-op non-oliguric HANNA, Nephrology was consulted and has signed off.   - No Hx of renal disease. Creatinine 1.63, adequate UOP.   - Currently Lasix 40 mg PO daily. Appears dry to euvolemic. Will try to keep even.      Heme / Anticoagulation:   - Hgb stable 10's, Plts WNL     Endo:   - Sliding scale insulin  - Hyperglycemic with starting tube feeding. Endocrine consulted and following.       PPX:   - Gastric ulcer; protonix for 30 days  - DVT; Heparin 5000 U q 12 hrs until more active       Dispo:   - 6C since 2/27   - Anticipate DC to home TCU per PT/OT recs. SW to look into [facility] coverage.  - Nephrology and podiatry clinic follow up 2-3 weeks after discharge.   - Call Kesha Kang [phone] ([facility]) to ask for help with transfer per Dr Mckeon       Staff surgeons have been informed of changes through both  verbal and written communication.      Den Bentley PA-C  Cardiothoracic Surgery  Pager [phone]    8:34 AM   March 4, 2020

## 2020-03-05 ENCOUNTER — APPOINTMENT (OUTPATIENT)
Dept: PHYSICAL THERAPY | Facility: CLINIC | Age: 75
DRG: 219 | End: 2020-03-05
Payer: COMMERCIAL

## 2020-03-05 ENCOUNTER — APPOINTMENT (OUTPATIENT)
Dept: SPEECH THERAPY | Facility: CLINIC | Age: 75
DRG: 219 | End: 2020-03-05
Payer: COMMERCIAL

## 2020-03-05 VITALS
OXYGEN SATURATION: 96 % | TEMPERATURE: 98.3 F | SYSTOLIC BLOOD PRESSURE: 111 MMHG | WEIGHT: 260.14 LBS | HEART RATE: 90 BPM | HEIGHT: 69 IN | RESPIRATION RATE: 18 BRPM | BODY MASS INDEX: 38.53 KG/M2 | DIASTOLIC BLOOD PRESSURE: 71 MMHG

## 2020-03-05 LAB
ANION GAP SERPL CALCULATED.3IONS-SCNC: 4 MMOL/L (ref 3–14)
BACTERIA SPEC CULT: NORMAL
BUN SERPL-MCNC: 80 MG/DL (ref 7–30)
CALCIUM SERPL-MCNC: 8.4 MG/DL (ref 8.5–10.1)
CHLORIDE SERPL-SCNC: 117 MMOL/L (ref 94–109)
CO2 SERPL-SCNC: 28 MMOL/L (ref 20–32)
CREAT SERPL-MCNC: 1.45 MG/DL (ref 0.66–1.25)
ERYTHROCYTE [DISTWIDTH] IN BLOOD BY AUTOMATED COUNT: 15.4 % (ref 10–15)
GFR SERPL CREATININE-BSD FRML MDRD: 46 ML/MIN/{1.73_M2}
GLUCOSE BLDC GLUCOMTR-MCNC: 138 MG/DL (ref 70–99)
GLUCOSE BLDC GLUCOMTR-MCNC: 155 MG/DL (ref 70–99)
GLUCOSE BLDC GLUCOMTR-MCNC: 160 MG/DL (ref 70–99)
GLUCOSE BLDC GLUCOMTR-MCNC: 186 MG/DL (ref 70–99)
GLUCOSE BLDC GLUCOMTR-MCNC: 207 MG/DL (ref 70–99)
GLUCOSE SERPL-MCNC: 193 MG/DL (ref 70–99)
GRAM STN SPEC: NORMAL
GRAM STN SPEC: NORMAL
HCT VFR BLD AUTO: 35.8 % (ref 40–53)
HGB BLD-MCNC: 10.6 G/DL (ref 13.3–17.7)
MCH RBC QN AUTO: 30.4 PG (ref 26.5–33)
MCHC RBC AUTO-ENTMCNC: 29.6 G/DL (ref 31.5–36.5)
MCV RBC AUTO: 103 FL (ref 78–100)
PLATELET # BLD AUTO: 221 10E9/L (ref 150–450)
POTASSIUM SERPL-SCNC: 4 MMOL/L (ref 3.4–5.3)
RBC # BLD AUTO: 3.49 10E12/L (ref 4.4–5.9)
SODIUM SERPL-SCNC: 148 MMOL/L (ref 133–144)
SPECIMEN SOURCE: NORMAL
SPECIMEN SOURCE: NORMAL
WBC # BLD AUTO: 8.6 10E9/L (ref 4–11)

## 2020-03-05 PROCEDURE — 25000132 ZZH RX MED GY IP 250 OP 250 PS 637: Performed by: STUDENT IN AN ORGANIZED HEALTH CARE EDUCATION/TRAINING PROGRAM

## 2020-03-05 PROCEDURE — 87186 SC STD MICRODIL/AGAR DIL: CPT | Performed by: PHYSICIAN ASSISTANT

## 2020-03-05 PROCEDURE — 25000125 ZZHC RX 250: Performed by: PHYSICIAN ASSISTANT

## 2020-03-05 PROCEDURE — 27210429 ZZH NUTRITION PRODUCT INTERMEDIATE LITER

## 2020-03-05 PROCEDURE — 92526 ORAL FUNCTION THERAPY: CPT | Mod: GN

## 2020-03-05 PROCEDURE — 85027 COMPLETE CBC AUTOMATED: CPT | Performed by: PHYSICIAN ASSISTANT

## 2020-03-05 PROCEDURE — 25000132 ZZH RX MED GY IP 250 OP 250 PS 637: Performed by: PHYSICIAN ASSISTANT

## 2020-03-05 PROCEDURE — 40000275 ZZH STATISTIC RCP TIME EA 10 MIN

## 2020-03-05 PROCEDURE — 80048 BASIC METABOLIC PNL TOTAL CA: CPT | Performed by: PHYSICIAN ASSISTANT

## 2020-03-05 PROCEDURE — 25000132 ZZH RX MED GY IP 250 OP 250 PS 637: Performed by: THORACIC SURGERY (CARDIOTHORACIC VASCULAR SURGERY)

## 2020-03-05 PROCEDURE — 94640 AIRWAY INHALATION TREATMENT: CPT

## 2020-03-05 PROCEDURE — 25000128 H RX IP 250 OP 636: Performed by: STUDENT IN AN ORGANIZED HEALTH CARE EDUCATION/TRAINING PROGRAM

## 2020-03-05 PROCEDURE — 87205 SMEAR GRAM STAIN: CPT | Performed by: PHYSICIAN ASSISTANT

## 2020-03-05 PROCEDURE — 00000146 ZZHCL STATISTIC GLUCOSE BY METER IP

## 2020-03-05 PROCEDURE — 97530 THERAPEUTIC ACTIVITIES: CPT | Mod: GP

## 2020-03-05 PROCEDURE — 25000132 ZZH RX MED GY IP 250 OP 250 PS 637: Performed by: ANESTHESIOLOGY

## 2020-03-05 PROCEDURE — 36592 COLLECT BLOOD FROM PICC: CPT | Performed by: PHYSICIAN ASSISTANT

## 2020-03-05 PROCEDURE — 87088 URINE BACTERIA CULTURE: CPT | Performed by: PHYSICIAN ASSISTANT

## 2020-03-05 PROCEDURE — 87086 URINE CULTURE/COLONY COUNT: CPT | Performed by: PHYSICIAN ASSISTANT

## 2020-03-05 PROCEDURE — 40000802 ZZH SITE CHECK

## 2020-03-05 PROCEDURE — 97110 THERAPEUTIC EXERCISES: CPT | Mod: GP

## 2020-03-05 RX ORDER — CARBOXYMETHYLCELLULOSE SODIUM 5 MG/ML
1 SOLUTION/ DROPS OPHTHALMIC 3 TIMES DAILY PRN
DISCHARGE
Start: 2020-03-05

## 2020-03-05 RX ORDER — ALBUTEROL SULFATE 0.83 MG/ML
2.5 SOLUTION RESPIRATORY (INHALATION)
DISCHARGE
Start: 2020-03-05

## 2020-03-05 RX ORDER — NICOTINE POLACRILEX 4 MG
15-30 LOZENGE BUCCAL
DISCHARGE
Start: 2020-03-05

## 2020-03-05 RX ORDER — AMOXICILLIN 400 MG/5ML
500 POWDER, FOR SUSPENSION ORAL EVERY 8 HOURS
DISCHARGE
Start: 2020-03-05

## 2020-03-05 RX ORDER — POTASSIUM CHLORIDE 1.5 G/1.58G
20-40 POWDER, FOR SOLUTION ORAL
DISCHARGE
Start: 2020-03-05

## 2020-03-05 RX ORDER — ASPIRIN 81 MG/1
81 TABLET, CHEWABLE ORAL DAILY
DISCHARGE
Start: 2020-03-06

## 2020-03-05 RX ORDER — METHOCARBAMOL 500 MG/1
500 TABLET, FILM COATED ORAL 4 TIMES DAILY PRN
DISCHARGE
Start: 2020-03-05

## 2020-03-05 RX ORDER — NALOXONE HYDROCHLORIDE 0.4 MG/ML
.1-.4 INJECTION, SOLUTION INTRAMUSCULAR; INTRAVENOUS; SUBCUTANEOUS ONCE
DISCHARGE
Start: 2020-03-05 | End: 2020-03-05

## 2020-03-05 RX ORDER — MAGNESIUM SULFATE HEPTAHYDRATE 40 MG/ML
4 INJECTION, SOLUTION INTRAVENOUS EVERY 4 HOURS PRN
DISCHARGE
Start: 2020-03-05

## 2020-03-05 RX ORDER — DEXTROSE MONOHYDRATE 50 MG/ML
40 INJECTION, SOLUTION INTRAVENOUS CONTINUOUS
DISCHARGE
Start: 2020-03-05

## 2020-03-05 RX ORDER — AMIODARONE HYDROCHLORIDE 200 MG/1
200 TABLET ORAL DAILY
DISCHARGE
Start: 2020-03-06

## 2020-03-05 RX ORDER — PROCHLORPERAZINE MALEATE 5 MG
5 TABLET ORAL EVERY 6 HOURS PRN
DISCHARGE
Start: 2020-03-05

## 2020-03-05 RX ORDER — DEXTROSE MONOHYDRATE 25 G/50ML
25-50 INJECTION, SOLUTION INTRAVENOUS
DISCHARGE
Start: 2020-03-05

## 2020-03-05 RX ORDER — LANOLIN ALCOHOL/MO/W.PET/CERES
3 CREAM (GRAM) TOPICAL AT BEDTIME
DISCHARGE
Start: 2020-03-05

## 2020-03-05 RX ORDER — DIPHENHYDRAMINE HYDROCHLORIDE 50 MG/ML
50 INJECTION INTRAMUSCULAR; INTRAVENOUS EVERY 6 HOURS PRN
DISCHARGE
Start: 2020-03-05

## 2020-03-05 RX ORDER — TRAMADOL HYDROCHLORIDE 50 MG/1
50-100 TABLET ORAL EVERY 6 HOURS PRN
Status: DISCONTINUED | OUTPATIENT
Start: 2020-03-05 | End: 2020-03-05 | Stop reason: HOSPADM

## 2020-03-05 RX ORDER — METOPROLOL TARTRATE 25 MG/1
12.5 TABLET, FILM COATED ORAL 2 TIMES DAILY
DISCHARGE
Start: 2020-03-05

## 2020-03-05 RX ORDER — LIDOCAINE 40 MG/G
CREAM TOPICAL
DISCHARGE
Start: 2020-03-05

## 2020-03-05 RX ORDER — HEPARIN SODIUM,PORCINE 10 UNIT/ML
5-10 VIAL (ML) INTRAVENOUS
DISCHARGE
Start: 2020-03-05

## 2020-03-05 RX ORDER — POTASSIUM CL/LIDO/0.9 % NACL 10MEQ/0.1L
10 INTRAVENOUS SOLUTION, PIGGYBACK (ML) INTRAVENOUS
DISCHARGE
Start: 2020-03-05

## 2020-03-05 RX ORDER — IPRATROPIUM BROMIDE AND ALBUTEROL SULFATE 2.5; .5 MG/3ML; MG/3ML
3 SOLUTION RESPIRATORY (INHALATION) 2 TIMES DAILY
DISCHARGE
Start: 2020-03-05

## 2020-03-05 RX ORDER — TRAMADOL HYDROCHLORIDE 50 MG/1
50-100 TABLET ORAL EVERY 6 HOURS PRN
Status: SHIPPED | DISCHARGE
Start: 2020-03-05

## 2020-03-05 RX ORDER — ONDANSETRON 2 MG/ML
4 INJECTION INTRAMUSCULAR; INTRAVENOUS EVERY 6 HOURS PRN
DISCHARGE
Start: 2020-03-05

## 2020-03-05 RX ORDER — MAGNESIUM SULFATE HEPTAHYDRATE 40 MG/ML
2 INJECTION, SOLUTION INTRAVENOUS DAILY PRN
DISCHARGE
Start: 2020-03-05

## 2020-03-05 RX ORDER — POTASSIUM CHLORIDE 29.8 MG/ML
20 INJECTION INTRAVENOUS
DISCHARGE
Start: 2020-03-05

## 2020-03-05 RX ORDER — NYSTATIN 100000/ML
500000 SUSPENSION, ORAL (FINAL DOSE FORM) ORAL 4 TIMES DAILY
DISCHARGE
Start: 2020-03-05

## 2020-03-05 RX ORDER — HEPARIN SODIUM,PORCINE 10 UNIT/ML
5-10 VIAL (ML) INTRAVENOUS EVERY 24 HOURS
DISCHARGE
Start: 2020-03-05

## 2020-03-05 RX ORDER — LIDOCAINE 4 G/G
1-3 PATCH TOPICAL EVERY 24 HOURS
DISCHARGE
Start: 2020-03-05

## 2020-03-05 RX ORDER — POLYETHYLENE GLYCOL 3350 17 G/17G
17 POWDER, FOR SOLUTION ORAL DAILY
DISCHARGE
Start: 2020-03-06

## 2020-03-05 RX ORDER — OXYCODONE HCL 5 MG/5 ML
5-10 SOLUTION, ORAL ORAL EVERY 4 HOURS PRN
Refills: 0 | Status: SHIPPED | DISCHARGE
Start: 2020-03-05 | End: 2020-03-05

## 2020-03-05 RX ORDER — AMOXICILLIN 250 MG
2 CAPSULE ORAL 2 TIMES DAILY
DISCHARGE
Start: 2020-03-05

## 2020-03-05 RX ORDER — POTASSIUM CHLORIDE 1500 MG/1
20-40 TABLET, EXTENDED RELEASE ORAL
DISCHARGE
Start: 2020-03-05

## 2020-03-05 RX ORDER — FLUORIDE TOOTHPASTE
15 TOOTHPASTE DENTAL 4 TIMES DAILY PRN
DISCHARGE
Start: 2020-03-05

## 2020-03-05 RX ORDER — HYDRALAZINE HYDROCHLORIDE 25 MG/1
12.5 TABLET, FILM COATED ORAL 4 TIMES DAILY
DISCHARGE
Start: 2020-03-05

## 2020-03-05 RX ORDER — QUETIAPINE FUMARATE 25 MG/1
25 TABLET, FILM COATED ORAL AT BEDTIME
DISCHARGE
Start: 2020-03-05

## 2020-03-05 RX ORDER — AMINO AC/PROTEIN HYDR/WHEY PRO 10G-100/30
1 LIQUID (ML) ORAL 3 TIMES DAILY
DISCHARGE
Start: 2020-03-05

## 2020-03-05 RX ORDER — FUROSEMIDE 40 MG
40 TABLET ORAL DAILY
DISCHARGE
Start: 2020-03-06

## 2020-03-05 RX ORDER — AMOXICILLIN 250 MG
1 CAPSULE ORAL 2 TIMES DAILY
DISCHARGE
Start: 2020-03-05

## 2020-03-05 RX ORDER — POTASSIUM CHLORIDE 7.45 MG/ML
10 INJECTION INTRAVENOUS
DISCHARGE
Start: 2020-03-05

## 2020-03-05 RX ORDER — ONDANSETRON 4 MG/1
4 TABLET, ORALLY DISINTEGRATING ORAL EVERY 6 HOURS PRN
DISCHARGE
Start: 2020-03-05

## 2020-03-05 RX ORDER — HEPARIN SODIUM 5000 [USP'U]/.5ML
5000 INJECTION, SOLUTION INTRAVENOUS; SUBCUTANEOUS 2 TIMES DAILY
DISCHARGE
Start: 2020-03-05

## 2020-03-05 RX ADMIN — AMOXICILLIN 500 MG: 400 POWDER, FOR SUSPENSION ORAL at 06:10

## 2020-03-05 RX ADMIN — Medication 40 MG: at 09:02

## 2020-03-05 RX ADMIN — NYSTATIN 500000 UNITS: 100000 SUSPENSION ORAL at 08:37

## 2020-03-05 RX ADMIN — IPRATROPIUM BROMIDE AND ALBUTEROL SULFATE 3 ML: .5; 3 SOLUTION RESPIRATORY (INHALATION) at 08:18

## 2020-03-05 RX ADMIN — AMIODARONE HYDROCHLORIDE 200 MG: 200 TABLET ORAL at 08:37

## 2020-03-05 RX ADMIN — Medication 5000 UNITS: at 08:37

## 2020-03-05 RX ADMIN — SODIUM CHLORIDE, PRESERVATIVE FREE 5 ML: 5 INJECTION INTRAVENOUS at 11:22

## 2020-03-05 RX ADMIN — POLYETHYLENE GLYCOL 3350 17 G: 17 POWDER, FOR SOLUTION ORAL at 08:37

## 2020-03-05 RX ADMIN — Medication 12.5 MG: at 08:37

## 2020-03-05 RX ADMIN — MULTIVITAMIN 15 ML: LIQUID ORAL at 08:37

## 2020-03-05 RX ADMIN — ASPIRIN 81 MG CHEWABLE TABLET 81 MG: 81 TABLET CHEWABLE at 08:37

## 2020-03-05 RX ADMIN — NYSTATIN 500000 UNITS: 100000 SUSPENSION ORAL at 11:22

## 2020-03-05 RX ADMIN — FUROSEMIDE 40 MG: 40 TABLET ORAL at 08:37

## 2020-03-05 RX ADMIN — Medication 12.5 MG: at 11:21

## 2020-03-05 RX ADMIN — Medication 5 ML: at 05:55

## 2020-03-05 RX ADMIN — Medication 10 ML: at 00:21

## 2020-03-05 RX ADMIN — SENNOSIDES AND DOCUSATE SODIUM 1 TABLET: 8.6; 5 TABLET ORAL at 08:37

## 2020-03-05 RX ADMIN — POTASSIUM CHLORIDE 20 MEQ: 1.5 POWDER, FOR SOLUTION ORAL at 11:21

## 2020-03-05 RX ADMIN — Medication 1 PACKET: at 09:04

## 2020-03-05 ASSESSMENT — ACTIVITIES OF DAILY LIVING (ADL)
ADLS_ACUITY_SCORE: 18
ADLS_ACUITY_SCORE: 20
ADLS_ACUITY_SCORE: 18
ADLS_ACUITY_SCORE: 18

## 2020-03-05 ASSESSMENT — MIFFLIN-ST. JEOR: SCORE: 1900.38

## 2020-03-05 NOTE — PLAN OF CARE
Occupational Therapy Discharge Summary    Reason for therapy discharge:    Discharged to [facility]     Progress towards therapy goal(s). See goals on Care Plan in [facility] electronic health record for goal details.  Goals partially met.  Barriers to achieving goals:   discharge from facility.    Therapy recommendation(s):    Continued therapy is recommended.  Rationale/Recommendations:  Recommend discharge to TCU, pt will benefit from ongoing skilled therapy intervention to address cognitive deficits, progress strength and independence with mobility and self cares.

## 2020-03-05 NOTE — PLAN OF CARE
Speech Language Therapy Discharge Summary    Reason for therapy discharge:    Discharged to transitional care facility.    Progress towards therapy goal(s). See goals on Care Plan in [facility] electronic health record for goal details.  Goals partially met.  Barriers to achieving goals:   discharge from facility.    Therapy recommendation(s):    Continued therapy is recommended.  Rationale/Recommendations:  Pt currently on dysphagia diet 1 and nectar-thick liquids, no straws, with 1:1 supervision and feeding assist. SLP to address PO tolerance, diet advancement as appropriate ([facility] soft/dental soft per dental provider restrictions).

## 2020-03-05 NOTE — PLAN OF CARE
PT - 6C  Discharge Planner PT   Patient plan for discharge: Not discussed today  Current status: Pt continues to be disoriented and confused throughout PT session. ModA to roll in bed for placement of sling. Dependently transferred from bed>Moveo>recliner using OH lift and Ax2. Pt performed body weight assisted squats via Moveo at 42-52% of body weight. Rest breaks required between each set. Pt with SOB and HR up to 98 with activity.  Barriers to return to prior living situation: Current medical status, weakness, decreased activity tolerance, deconditioning, impaired cognition  Recommendations for discharge: TCU  Rationale for recommendations: Pt requires continued skilled therapy services to progress strength, endurance, balance, and safety/IND with functional mobility.  Entered by: Trudi Saul 03/05/2020 1:17 PM

## 2020-03-05 NOTE — PLAN OF CARE
Pt s/p AAA repair continues on TF at 50 ml/hr with q 1 hour water flushes. Also on D5 at 40 ml/hr for NA+ of 151. Pt waxes and wanes mentally but responds well to frequent turning and attention and has been calmer today. VSS. Irregular RR, takes CPAP on and off, sats high 90s. Skin issues, bilateral toes blackened, special boots on. Coccyx with small fissure. Midsternal incision healing well, L leg with sutured incision CDI. [place] cath with good UO, incontinent of stool. Ate 25% of his supper, on sliding scale and carb counting insulin.

## 2020-03-05 NOTE — DISCHARGE INSTRUCTIONS
Insulin Recommendation for Discharge:                     -NPH insulin 32 units every morning (0800) and 30 units every evening (2000)--this insulin is primarily covering continuous tube feeds, and needs adjustment with any change in carb content/rate/type of tube feeds.                  -recommend scheduled aspart 2 units with >25% meal intake (or 1 unit per every 6 g carbohydrate, if carb counting can be done at [facility])                 -aspart custom 1/20 sliding scale q4h (on continuous TF): see scale below     BG less than 140, none   to 159 = 1 unit   to 179 = 2    to 199 = 3   to 219 = 4    to 239 = 5    to 259 = 6    to 279 = 7    to 299 = 8    to 319 = 9    to 339 = 10    to 359 = 11    to 379 = 12    to 399 = 13   BG over 400 = 14                        -BG monitoring q4h (on continuous TF)                 -PRN D10% should run at nutritional rate if tube feeds are interrupted; placed order for RN to administer 4ox juice through tube within 1 hour of discharge if TF are to be interrupted for transport, to keep BG stable en route.

## 2020-03-05 NOTE — PROGRESS NOTES
Social Work Services Discharge Note      Patient Name:  Vel Espana     Anticipated Discharge Date: 03/05/20 @ 1400    Discharge Disposition:   Facility: [facility]  [facility] - RN admissions PH: [phone]  F: [phone]  Nurse to Nurse Call: PH: [phone] (2L unit) - needs to be called prior to discharge    Following MD: Accepting MD: Nivia Mckeon MD    PCP: Michael Medrano   [address] [facility] [address]     Pre-Admission Screening (PAS) online form has been completed.  The Level of Care (LOC) is:  Determined  Confirmation Code is:  NA - transfer to hospital  Patient/caregiver informed of referral to [facility] for Pre-Admission Screening for skilled nursing facility (SNF) placement and to expect a phone call post discharge from SNF.     Additional Services/Equipment Arranged:    - Transportation: [employer] (PH: [phone]) stretcher scheduled for 1400.  Indication for stretcher transport is transfer hospital to hospital.  Pt and family are aware and in agreement that insurance may not cover stretcher transport: YES - praneeth Lee.  PCS form completed prior to transport.  - Imaging: SW called Radiology to gather an imaging CD for pt's discharge.  Will need to be picked up by staff prior to pt's departure.  - VA Notice of Non-Coverage Form: Notice of non coverage of ambulance given to spouse to review.  Spouse may need to sign given pt's physical impairment.  Pt and daughter Rosa are agreeable with cost of ride, spouse has concerns about the ride.  Family will be calling [employer] for coverage options/support.     Patient / Family response to discharge plan:  Pt and family (daughter Rosa, spouse Ronda, daughter in law Jordyn) in agreement with the plan.     Persons notified of above discharge plan:  Pt, Family (praneeth Lee), bedside RN, CVTS team, PCP, [facility] hospital admissions - [facility].    Staff Discharge Instructions:  Please fax discharge orders and signed hard  scripts for any controlled substances.  Please print a packet and send with patient.     CTS Handoff completed:  YES    Medicare Notice of Rights provided to the patient/family:  YES - signed by spouse    MIGUEL ANGEL Saldivar, VANCEW  6C Unit   Phone: [phone]  Pager: [phone]  Unit: [phone]

## 2020-03-05 NOTE — DISCHARGE SUMMARY
"[facility] Cardiothoracic Surgery   Discharge Summary        Date of Admission:  2/9/2020  Date of Discharge:  3/5/2020  Attending Physician:  Nivia Mckeon MD  Discharge Physician:  Valorie Wright PA-C (Contact: [phone])  Discharging Service:  Cardiovascular and Thoracic Surgery     Primary Provider: Michael Medrano          Admission Diagnoses:   1.  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   2.  Aortic valve moderate to severe stenosi  3.  Hypertension  4. 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 Severe right ventricular dysfunction          Discharge Diagnosis:   1.  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   2.  Aortic valve moderate to severe stenosis   3.  Hypertension  4. 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 Severe right ventricular dysfunction             [facility]            Condition on Discharge:   Discharge condition: Stable   Discharge vitals: Blood pressure 111/71, pulse 90, temperature 98.3  F (36.8  C), temperature source Oral, resp. rate 18, height 1.753 m (5' 9\"), weight 118 kg (260 lb 2.3 oz), SpO2 96 %.     Code status on discharge: Full Code           Procedures:   PRIMARY PROCEDURES: 2/9/2020 by Dr. Mckeon   1.  Ascending aortic dissection repair using a 32 mm Dacron Gelweave graft.   2. AVR with 27 mm Patterson bovine pericardial valve  3. Coronary bypass to right coronary artery.  4. Left greater saphenous vein harvest.  2. Circulatory arrest.   3. Left femoral artery exploration              Medications Prior to Admission:     Medications Prior to Admission   Medication Sig Dispense Refill Last Dose     azithromycin (ZITHROMAX) 250 MG tablet Two tablets first day, then one tablet daily for four days. 6 tablet 0      [DISCONTINUED] aspirin 81 MG tablet Take 81 mg by mouth every other " day    Taking     [DISCONTINUED] atenolol (TENORMIN) 25 MG tablet Take 25 mg by mouth daily   Taking     [DISCONTINUED] benzonatate (TESSALON) 100 MG capsule Take 1 capsule (100 mg) by mouth 3 times daily as needed 42 capsule 0      [DISCONTINUED] predniSONE (DELTASONE) 20 MG tablet Take 2 tablets (40 mg) by mouth daily for 7 days 14 tablet 0              Discharge Medications:        Review of your medicines      START taking      Dose / Directions   acetaminophen 32 mg/mL liquid  Commonly known as:  TYLENOL      Dose:  650 mg  20.3 mLs (650 mg) by Oral or Feeding Tube route every 4 hours as needed for other (multimodal surgical pain management along with NSAIDS and opioid medication as indicated based on pain control and physical function.)  Refills:  0     albuterol (2.5 MG/3ML) 0.083% neb solution  Commonly known as:  PROVENTIL      Dose:  2.5 mg  Take 1 vial (2.5 mg) by nebulization every 2 hours as needed for shortness of breath / dyspnea  Refills:  0     amiodarone 200 MG tablet  Commonly known as:  PACERONE      Dose:  200 mg  Start taking on:  March 6, 2020  Take 1 tablet (200 mg) by mouth daily  Refills:  0     amoxicillin 400 MG/5ML suspension  Commonly known as:  AMOXIL  Indication:  tooth infection      Dose:  500 mg  Take 6.3 mLs (500 mg) by mouth every 8 hours  Refills:  0     artificial saliva Liqd liquid      Dose:  15 mL  Swish and spit 15 mLs in mouth 4 times daily as needed for dry mouth  Refills:  0     aspirin 81 MG chewable tablet  Commonly known as:  ASA  Replaces:  aspirin 81 MG tablet      Dose:  81 mg  Start taking on:  March 6, 2020  Take 1 tablet (81 mg) by mouth daily  Refills:  0     carboxymethylcellulose PF 0.5 % ophthalmic solution  Commonly known as:  REFRESH PLUS      Dose:  1 drop  Place 1 drop into both eyes 3 times daily as needed for dry eyes  Refills:  0     * dextrose 50 % injection      Dose:  25-50 mL  Inject 25-50 mLs into the vein every 15 minutes as needed for low blood  sugar  Refills:  0     * D5W 5 % infusion      Dose:  40 mL/hr  Inject 40 mL/hr into the vein continuous  Refills:  0     diphenhydrAMINE 50 MG/ML injection  Commonly known as:  BENADRYL      Dose:  50 mg  Inject 1 mL (50 mg) into the vein every 6 hours as needed for itching  Refills:  0     furosemide 40 MG tablet  Commonly known as:  LASIX      Dose:  40 mg  Start taking on:  March 6, 2020  Take 1 tablet (40 mg) by mouth daily  Refills:  0     glucagon 1 MG Solr injection      Dose:  1 mg  Inject 1 mg Subcutaneous every 15 minutes as needed for low blood sugar (May repeat x 1 only)  Refills:  0     glucose 40 % (400 mg/mL) gel      Dose:  15-30 g  Take 15-30 g by mouth every 15 minutes as needed for low blood sugar  Refills:  0     heparin ANTICOAGULANT 5000 UNIT/0.5ML injection      Dose:  5,000 Units  Inject 0.5 mLs (5,000 Units) Subcutaneous 2 times daily  Refills:  0     * heparin lock flush 10 UNIT/ML Soln injection      Dose:  5-10 mL  5-10 mLs by Intracatheter route every 24 hours  Refills:  0     * heparin lock flush 10 UNIT/ML Soln injection      Dose:  5-10 mL  5-10 mLs by Intracatheter route every hour as needed for other (to lock each CVC - Open Ended (Tunneled and Non-Tunneled) dormant lumen. AFTER medication or blood with MAX: 5 mL per lumen.)  Refills:  0     hydrALAZINE 25 MG tablet  Commonly known as:  APRESOLINE      Dose:  12.5 mg  Take 0.5 tablets (12.5 mg) by mouth 4 times daily  Refills:  0     * insulin aspart 100 UNIT/ML pen  Commonly known as:  NovoLOG PEN      Dose:  1-7 Units  Inject 1-7 Units Subcutaneous Take with snacks or supplements for high blood sugar DOSE:  1 units per 6 grams of carbohydrate. Only chart total amount of units given.  Do not give if pre-prandial glucose is less than 60 mg/dL. If given at mealtime, administer within 30 minutes of start of meal  Refills:  0     * insulin aspart 100 UNIT/ML pen  Commonly known as:  NovoLOG PEN      Dose:  1-7 Units  Inject 1-7 Units  Subcutaneous 3 times daily (with meals) DOSE:  1 units per 6 grams of carbohydrate.  Only chart total amount of units given.  Do not give if pre-prandial glucose is less than 60 mg/dL. If given at mealtime, administer within 30 minutes of start of meal  Refills:  0     * insulin aspart 100 UNIT/ML pen  Commonly known as:  NovoLOG PEN      Dose:  1-13 Units  Inject 1-13 Units Subcutaneous every 4 hours  Refills:  0     * insulin isophane human 100 UNIT/ML injection  Commonly known as:  HumuLIN N PEN      Dose:  30 Units  Inject 30 Units Subcutaneous every 24 hours At 8 pm  Refills:  0     * insulin isophane human 100 UNIT/ML injection  Commonly known as:  HumuLIN N PEN      Dose:  32 Units  Inject 32 Units Subcutaneous every morning  Refills:  0     ipratropium - albuterol 0.5 mg/2.5 mg/3 mL 0.5-2.5 (3) MG/3ML neb solution  Commonly known as:  DUONEB      Dose:  3 mL  Take 1 vial (3 mLs) by nebulization 2 times daily  Refills:  0     lidocaine 1 % Soln      Dose:  0.1-1 mL  0.1-1 mLs by Other route every hour as needed (mild pain with VAD insertion.)  Refills:  0     * Lidocaine 4 % Patch  Commonly known as:  LIDOCARE      Dose:  1-3 patch  Place 1-3 patches onto the skin every 24 hours To prevent lidocaine toxicity, patient should be patch free for 12 hrs daily.  Refills:  0     * lidocaine 4 % external cream  Commonly known as:  LMX4      Apply topically every hour as needed for pain (with VAD insertion or accessing implanted port.)  Refills:  0     * magnesium sulfate 2 GM/50ML Soln infusion      Dose:  2 g  Inject 50 mLs (2 g) into the vein daily as needed for magnesium supplementation  Refills:  0     * magnesium sulfate 4 GM/100ML Soln infusion      Dose:  4 g  Inject 100 mLs (4 g) into the vein every 4 hours as needed for magnesium supplementation  Refills:  0     melatonin 3 MG tablet      Dose:  3 mg  1 tablet (3 mg) by Oral or Feeding Tube route At Bedtime  Refills:  0     methocarbamol 500 MG  tablet  Commonly known as:  ROBAXIN      Dose:  500 mg  1 tablet (500 mg) by Oral or Feeding Tube route 4 times daily as needed for muscle spasms  Refills:  0     metoprolol tartrate 25 MG tablet  Commonly known as:  LOPRESSOR      Dose:  12.5 mg  Take 0.5 tablets (12.5 mg) by mouth 2 times daily  Refills:  0     multivitamins w/minerals liquid      Dose:  15 mL  Start taking on:  March 6, 2020  15 mLs by Per Feeding Tube route daily  Refills:  0     naloxone 0.4 MG/ML injection  Commonly known as:  NARCAN      Dose:  0.1-0.4 mg  Inject 0.25-1 mLs (0.1-0.4 mg) into the vein once for 1 dose  Refills:  0     nystatin 354175 UNIT/ML suspension  Commonly known as:  MYCOSTATIN  Indication:  Candidiasis Fungal Infection of the Oropharynx      Dose:  500,000 Units  Take 5 mLs (500,000 Units) by mouth 4 times daily  Refills:  0     ondansetron 2 MG/ML Soln injection  Commonly known as:  ZOFRAN      Dose:  4 mg  Inject 2 mLs (4 mg) into the vein every 6 hours as needed for nausea or vomiting  Refills:  0     ondansetron 4 MG ODT tab  Commonly known as:  ZOFRAN-ODT      Dose:  4 mg  Take 1 tablet (4 mg) by mouth every 6 hours as needed for nausea or vomiting  Refills:  0     pantoprazole 2 mg/mL Susp suspension  Commonly known as:  PROTONIX      Dose:  40 mg  Start taking on:  March 6, 2020  Take 20 mLs (40 mg) by mouth every morning (before breakfast)  Refills:  0     polyethylene glycol packet  Commonly known as:  MIRALAX      Dose:  17 g  Start taking on:  March 6, 2020  17 g by Oral or Feeding Tube route daily  Refills:  0     * potassium chloride 20 MEQ packet  Commonly known as:  KLOR-CON      Dose:  20-40 mEq  20-40 mEq by Oral or Feeding Tube route every 2 hours as needed for potassium supplementation  Refills:  0     * potassium chloride 10 MEQ/100ML infusion      Dose:  10 mEq  Inject 100 mLs (10 mEq) into the vein every hour as needed Infuse via PERIPHERAL LINE or CENTRAL LINE. Use for central line replacement if  patient weight less than 65 kg, if patient is on TPN with high potassium content or if unit does not stock 20 mEq bags.  If Serum K+ 3.4-4.0, dose = 10 mEq/hr x2 doses. Recheck K+ level the next AM.  If Serum K+ 3.0-3.3, dose = 10 mEq/hr x4 doses (40 mEq IV total dose). Recheck K+ level 2 hours after dose and the next AM.  If Serum K+ less than 3.0, dose = 10 mEq/hr x6 doses (60 mEq IV total dose). Recheck K+ level 2 hours after dose and the next AM.  Refills:  0     * potassium chloride 20 MEQ/50ML infusion      Dose:  20 mEq  Inject 50 mLs (20 mEq) into the vein every hour as needed for potassium supplementation Infuse via CENTRAL LINE Only.  May need EKG if less than 65 kg or on TPN - Max rate is 0.3 mEq/kg/hr for patients not on EKG monitoring.    If Serum K+ 3.4-4.0, dose = 20 mEq/hr x1 doses. Recheck K+ level the next AM.  If Serum K+ 3.0-3.3, dose = 20 mEq/hr x2 doses (40 mEq IV total dose).  Recheck K+ level 2 hours after dose and the next AM.  If Serum K+ less than 3.0, dose = 20 mEq/hr x3 doses (60 mEq IV total dose). Recheck K+ level 2 hours after dose and the next AM.  Refills:  0     potassium chloride ER 20 MEQ CR tablet  Commonly known as:  KLOR-CON M      Dose:  20-40 mEq  Take 1-2 tablets (20-40 mEq) by mouth every 2 hours as needed for potassium supplementation Use if able to take PO.   If Serum K+ 3.4-4.0, dose = 20 mEq x1. Recheck K+ level the next AM.  If Serum K+ 3.0-3.3, dose = 60 mEq po total dose (40 mEq x1 followed in 2 hours by 20 mEq x1). Recheck K+ level 4 hours after dose and the next AM.  If Serum K+ 2.5-2.9, dose = 80 mEq po total dose (40 mEq Q2H x2). Recheck K+ level 4 hours after dose and the next AM.  If Serum K+ less than 2.5, See IV order. DO NOT CRUSH.  Refills:  0     potassium chloride with lidocaine Soln infusion      Dose:  10 mEq  Inject 100 mLs (10 mEq) into the vein every hour as needed for potassium supplementation Infuse via PERIPHERAL LINE. Use potassium with  lidocaine for pain with peripheral administration.  If Serum K+ 3.4-4.0, dose = 10 mEq/hr x2 doses. Recheck K+ level the next AM.  If Serum K+ 3.0-3.3, dose = 10 mEq/hr x4 doses (40 mEq IV total dose). Recheck K+ level 2 hours after dose and the next AM.  If Serum K+ less than 3.0, dose = 10 mEq/hr x6 doses (60 mEq IV total dose). Recheck K+ level 2 hours after dose and the next AM.  Refills:  0     * potassium phosphate 10 mmol      Dose:  10 mmol  Inject 10 mmol into the vein daily as needed For serum phosphorus level 2.5-2.7  Do not infuse Phosphorus in the same line as TPN.   Give 10 mmol and recheck phosphorus level the next AM. Each mmol of phosphate provides 1.47 mEq of Potassium. Multiply the patient's phosphate dose by 1.47 to determine the amount of potassium in this dose.  Refills:  0     * potassium phosphate 15 mmol      Dose:  15 mmol  Inject 15 mmol into the vein daily as needed For serum phosphorus level 2.0-2.4  Do not infuse Phosphorus in the same line as TPN.   Give 15 mmol and recheck phosphorus level next AM. Each mmol of phosphate provides 1.47 mEq of Potassium. Multiply the patient's phosphate dose by 1.47 to determine the amount of potassium in this dose.  Refills:  0     * potassium phosphate 20 mmol      Dose:  20 mmol  Inject 20 mmol into the vein every 6 hours as needed For serum phosphorus level 1.1-1.9  For CENTRAL Line ONLY  Do not infuse Phosphorus in the same line as TPN.   Give 20 mmol and recheck phosphorus level 2 hours after dose and next AM. Each mmol of phosphate provides 1.47 mEq of Potassium. Multiply the patient's phosphate dose by 1.47 to determine the amount of potassium in this dose.  Refills:  0     * potassium phosphate 20 mmol      Dose:  20 mmol  Inject 20 mmol into the vein every 6 hours as needed For serum phosphorus level 1.1-1.9  For peripheral line  Do not infuse Phosphorus in the same line as TPN.   Give 20 mmol and recheck phosphorus level 2 hours after dose  and next AM. Repeat if necessary. Each mmol of phosphate provides 1.47 mEq of Potassium. Multiply the patient's phosphate dose by 1.47 to determine the amount of potassium in this dose.  Refills:  0     * potassium phosphate 25 mmol      Dose:  25 mmol  Inject 25 mmol into the vein every 8 hours as needed For serum phosphorus level less than 1.1  Do not infuse Phosphorus in the same line as TPN.   Give 25 mmol and recheck phosphorus level 2 hours after dose and next AM. Each mmol of phosphate provides 1.47 mEq of Potassium. Multiply the patient's phosphate dose by 1.47 to determine the amount of potassium in this dose.  Refills:  0     prochlorperazine 5 MG tablet  Commonly known as:  COMPAZINE      Dose:  5 mg  Take 1 tablet (5 mg) by mouth every 6 hours as needed for nausea or vomiting  Refills:  0     protein modular Liqd      Dose:  1 packet  1 packet by Per Feeding Tube route 3 times daily  Refills:  0     QUEtiapine 25 MG tablet  Commonly known as:  SEROquel      Dose:  25 mg  Take 1 tablet (25 mg) by mouth At Bedtime  Refills:  0     * senna-docusate 8.6-50 MG tablet  Commonly known as:  SENOKOT-S/PERICOLACE      Dose:  1 tablet  1 tablet by Oral or Feeding Tube route 2 times daily  Refills:  0     * senna-docusate 8.6-50 MG tablet  Commonly known as:  SENOKOT-S/PERICOLACE      Dose:  2 tablet  2 tablets by Oral or Feeding Tube route 2 times daily  Refills:  0     * sodium chloride (PF) 0.9% PF flush      Dose:  3 mL  3 mLs by Intracatheter route every hour as needed for line flush (for peripheral IV flush post IV meds)  Refills:  0     * sodium chloride (PF) 0.9% PF flush      Dose:  10-20 mL  10-20 mLs by Intracatheter route every 8 hours to flush CVC - Open Ended (Tunneled and Non-Tunneled).   10 mL post IV meds; 20 mL post blood draw.  Refills:  0     * sodium chloride (PF) 0.9% PF flush      Dose:  3 mL  3 mLs by Intracatheter route every 8 hours  Refills:  0     traMADol 50 MG tablet  Commonly known as:   ULTRAM      Dose:   mg  Take 1-2 tablets ( mg) by mouth every 6 hours as needed for moderate pain  Refills:  0         * This list has 26 medication(s) that are the same as other medications prescribed for you. Read the directions carefully, and ask your doctor or other care provider to review them with you.            CONTINUE these medicines which have NOT CHANGED      Dose / Directions   azithromycin 250 MG tablet  Commonly known as:  ZITHROMAX  Used for:  Acute bronchitis with symptoms > 10 days      Two tablets first day, then one tablet daily for four days.  Quantity:  6 tablet  Refills:  0        STOP taking    aspirin 81 MG tablet  Commonly known as:  ASA  Replaced by:  aspirin 81 MG chewable tablet        atenolol 25 MG tablet  Commonly known as:  TENORMIN        benzonatate 100 MG capsule  Commonly known as:  TESSALON        predniSONE 20 MG tablet  Commonly known as:  DELTASONE              Where to get your medicines      Information about where to get these medications is not yet available    Ask your nurse or doctor about these medications    traMADol 50 MG tablet               Consultations:   Consultation during this admission received from cardiology, endocrinology, infectious disease, neurology, nephrology, orthopedics, dental, vascular surgery and PT/OT/Cardiac rehab              Brief History of Illness:    Vel Espana is a 74 year old male with significant history of hypertension who presents with an aortic dissection. He had an episode of lightheadedness/syncope earlier and associated chest pain. He was evaluated at a local hospital and was found to have a Type A aortic dissection. He was then transferred here for definitive management. He went emergently to surgery.             Hospital Course:    Vel Espana is a 74 year old male with Hx HTN who presented with syncope and CP. Found to have Type A aortic dissection and now status post ascending aortic and arch dissection  repair with tissue AVR and vein bypass to RCA on 2/9/20 with Dr. Monet Mckeon.  [facility]       Neuro:   - Neurology consult 2/17 for delirium; CT head showed no acute pathology, generalized parenchymal volume loss with chronic moderate small vessel disease. No concerns for stroke or TIA. They have now signed off. Patient remains intermittently confused.   - Pain; tylenol and Tramadol PRN pain  - Delirium/Encephalopathy; resulted in extended intubation, seroquel tapered to off 3/1. Waxes and wanes per family. Restarted Seroquel for delirium and sleep 3/2.   - Insomnia; melatonin q HS, also is sleeping and dozing throughout the day.      CV:   - Hx of HTN, LV EF 60-65%. Moderate RV dysfunction and dilated IVC on Echo 2/25 with weight of 130 kg, now down to 118 kg.   - s/p Tissue AVR, vein graft to RCA, and ascending aorta repair.  - Had 25 seconds nonsustained monomorphic VT (corrected lytes and started Amio). Then had A-fib with RVR 2/14.   - Electrophysiology consulted, currently on Amio 200 mg daily.    - ASA 81 mg, atorvastatin (holding due to LFTs), almost normalized.  Metoprolol 12.5 mg BID.   - Hydralazine 12.5 mg qid (since 3/2) for elevated MAPS  - Vascular Consult 2/25; recommended Podiatry to follow feet, no acute vascular surgery needs.       Pulm:   - Arrived from OR on Sofia (weaned POD #1).   - Pulm toilet, IS, activity and deep breathing   - Supplemental O2 PRN to keep sats > 92%. Wean off as tolerated. On RA     ID:   - Febrile 2/14, cultures sent and no growth besides candida in BAL. Treated with zosyn through 2/22.   - WBC WNL, afebrile, no signs or symptoms of infection   - treated for oral candidiasis; nystatin 4 times daily with mouth swab application  - Dry gangrene; Ortho consult 2/29; see note 3/1. Foot X-ray did not indicate osteomyelitis. Continue to observe over next weeks to months, may need some surgical intervention but nothing urgent. Continue dry  dressings. Wound care nurse to follow.      GI / FEN:   - Had NJ placed in ICU for nutritional support, it came out with extubation.   - Ordered NJ placement 2/28.   - Hypernatremia improved to 148, increased FWF to 75 ml q1h on 3/2. Gave 200 mL bolus via NJ tube and D5 IVF at 50 mL/hr on 3/3. Got D5 IVF again 3/4 for 8 hrs. Continue to monitor.   - DD1 with nectar thick liquids, bowel regimen.       Dental:   - Consult 2/27. On Amoxicillin to protect tissue AVR.   - Planning removal of the remaining mandibular teeth for infection risk. Not for 6 weeks from surgery date per Dr Mckeon. Have dental consult and see at the [facility] for removal.      Renal / :   - Post-op non-oliguric HANNA, Nephrology was consulted and has signed off.   - No Hx of renal disease. Creatinine 1.45, adequate UOP.   - Currently Lasix 40 mg PO daily. Appears euvolemic. Will try to keep even.      Heme / Anticoagulation:   - Hgb stable 10's, Plts WNL     Endo:   - Sliding scale insulin  - On Lantus and carb counting.        PPX:   - Gastric ulcer; protonix for 30 days  - DVT; Heparin 5000 U q 12 hrs until more active      Dispo:   - Transferring to [facility] and eventually to their impatient rehab unit. Patient is stable for transfer and will transport via ambulance.   - Nephrology and podiatry clinic follow up 2-3 weeks after discharge.            Exam on day of discharge    Gen: Mildly confused, pleasant, NAD  CV: RRR, S1S2 normal, no murmurs, rubs, or gallops.   Pulm: CTA, no rhonchi or wheezes  Abd: soft, non-tender, no guarding  Ext: no peripheral edema  Incision: clean, dry, intact, no erythema  Chest Tube sites: dressings clean and dry    Labs:  CBC:  Lab Results   Component Value Date    WBC 8.6 03/05/2020     Lab Results   Component Value Date    RBC 3.49 03/05/2020     Lab Results   Component Value Date    HGB 10.6 03/05/2020     Lab Results   Component Value Date    HCT 35.8 03/05/2020     No components found for: MCT  Lab  Results   Component Value Date     03/05/2020     Lab Results   Component Value Date    MCH 30.4 03/05/2020     Lab Results   Component Value Date    MCHC 29.6 03/05/2020     Lab Results   Component Value Date    RDW 15.4 03/05/2020     Lab Results   Component Value Date     03/05/2020     BMP:  Last Comprehensive Metabolic Panel:  Sodium   Date Value Ref Range Status   03/05/2020 148 (H) 133 - 144 mmol/L Final     Potassium   Date Value Ref Range Status   03/05/2020 4.0 3.4 - 5.3 mmol/L Final     Chloride   Date Value Ref Range Status   03/05/2020 117 (H) 94 - 109 mmol/L Final     Carbon Dioxide   Date Value Ref Range Status   03/05/2020 28 20 - 32 mmol/L Final     Anion Gap   Date Value Ref Range Status   03/05/2020 4 3 - 14 mmol/L Final     Glucose   Date Value Ref Range Status   03/05/2020 193 (H) 70 - 99 mg/dL Final     Urea Nitrogen   Date Value Ref Range Status   03/05/2020 80 (H) 7 - 30 mg/dL Final     Creatinine   Date Value Ref Range Status   03/05/2020 1.45 (H) 0.66 - 1.25 mg/dL Final     GFR Estimate   Date Value Ref Range Status   03/05/2020 46 (L) >60 mL/min/[1.73_m2] Final     Comment:     Non  GFR Calc  Starting 12/18/2018, serum creatinine based estimated GFR (eGFR) will be   calculated using the Chronic Kidney Disease Epidemiology Collaboration   (CKD-EPI) equation.       Calcium   Date Value Ref Range Status   03/05/2020 8.4 (L) 8.5 - 10.1 mg/dL Final     INR:  Lab Results   Component Value Date    INR 1.29 02/13/2020    INR 1.12 02/10/2020    INR 1.05 02/10/2020    INR 0.98 02/10/2020    INR 0.95 02/10/2020    INR 0.90 02/10/2020    INR 0.94 02/10/2020    INR 1.49 02/09/2020    INR 1.38 02/09/2020    INR 1.08 05/31/2012       .           Significant Results:   Echo TTE  Interpretation Summary  Post ascending aortic dissection repair and aortic valve replacement.     Left ventricular function is normal.The EF is 60-65%.  Moderate right ventricular dilation is  present.  Global right ventricular function is moderately reduced.  AVR with 27 mm Patterson bovine pericardial valve. No paraprosthetic  regurgitation. Mean gradient is 6 mmHg.  Dilation of the inferior vena cava is present with normal respiratory  variation in diameter.  No pericardial effusion is present.      3/2/20  9:36 AM [id number] [facility], [place],  Radiology    PACS Images      Show images for XR Chest Port 1 View   Study Result     EXAM: XR CHEST PORT 1 VW  3/2/2020 9:36 AM      HISTORY:  s/p aortic dissection repair        COMPARISON:  Chest x-ray 2/20/2020     FINDINGS:   Portable upright view the chest. Postsurgical changes of aortic valve  replacement. Intact median sternotomy wires. Stable enlarged cardiac  silhouette. Right upper extremity PICC line tip projects over the high  SVC. Unchanged interstitial pulmonary opacities. Increased left  basilar atelectasis. Likely small pleural effusion. No pneumothorax.                                                                      IMPRESSION:   Stable cardiomegaly with unchanged interstitial opacities likely  representing pulmonary edema.     I have personally reviewed the examination and initial interpretation  and I agree with the findings.     DERICK BESS MD              Pending Results:   None           Discharge Instructions and Follow-Up:   Discharge diet:  Snacks/Supplements Adult: Boost Plus; With Meals      Room Service      Calorie Counts      Adult Formula Drip Feeding: Continuous Nutren 1.5; Nasoduodenal tube; Goal Rate: 50 mL/hr; mL/hr; Medication - Feeding Tube Flush Frequency: At least 15-30 mL water before and after medication administration and with tube clogging; free water 75 ml q one hour for hypernatremia    Dysphagia Diet Level 1 Pureed Nectar Thickened Liquids (pre-thickened or use instant food thickener)    Discharge activity:    Activity as tolerated with sternal precautions. No lifting more than 10 pounds for first 6 weeks, then  no lifting more than 20 pounds for an additional 6 weeks. Avoid lifting arms above shoulders. After these 12 weeks, activity as tolerated with pain. Avoid strenuous activities such as bowling, vacuuming, raking, shoveling, golf or tennis for 12 weeks after your surgery.    No driving for 4 weeks. If you continue to take narcotics after 4 weeks, no driving until you are off narcotics.    Discharge follow-up: Surgery follow-up to be arranged by the [facility], also needs follow up with Cardiology, nephrology, and dental.                Lines and drains: Right PICC    Wound care:  Wound care (specify)    You have dissolvable sutures under your skin that do not need to be removed.  Pat wound dressing dry after showers.  Skin glue will eventually fall off in 1-2 weeks.     Keep wound clean and dry, showers are okay after discharge, but don't let spray hit directly on incision. No baths or swimming for 1 month.  Clean wounds twice a day for 2 weeks with microklenz spray if available. Cover chest tube sites with gauze until they stop draining, then leave open.                   Valorie Quezada PA-C  [facility]  [phone]

## 2020-03-05 NOTE — PLAN OF CARE
Discharge Planner SLP   Patient plan for discharge: Pt unable to state  Current status: Recommend continuation of dysphagia diet 1 and nectar-thick liquids, no straws, with 1:1 supervision and feeding assist.  Ensure pt is fully alert and upright for all PO, given small bites/sips, alternating bites/sips.  Recommend critical meds crushed in puree or given via FT.  SLP to follow.  Barriers to return to prior living situation: dysphagia, weakness, mental status, FT  Recommendations for discharge: TCU  Rationale for recommendations: Below baseline function; pt will benefit from ongoing ST targeting swallowing       Entered by: Estefania Kaur 03/05/2020 10:32 AM

## 2020-03-05 NOTE — PLAN OF CARE
Physical Therapy Discharge Summary    Reason for therapy discharge:    [facility]     Progress towards therapy goal(s). See goals on Care Plan in [facility] electronic health record for goal details.  Goals not met.  Barriers to achieving goals:   discharge from facility.    Therapy recommendation(s):    Continued therapy is recommended.  Rationale/Recommendations:  to progress strength, balance, activity tolerance, and mobility safety/IND.

## 2020-03-05 NOTE — PROGRESS NOTES
IP Diabetes Management  Daily Note           Assessment and Plan:   HPI: Mr. Vel Espana is a 73 yo man with a history of hypertension, who presented with syncope and chest pain and was found to have a type a aortic dissection, now status post ascending aortic and arch dissection repair with tissue AVR and vein bypass to RCA on 2/9/2020.    Assessment:   1) Pre-Diabetes versus Type II Diabetes Mellitus, with enteral feed induced hyperglycemia      Recommendation for Discharge:      -NPH insulin 32 units every morning (0800) and 30 units every evening (2000)--this insulin is primarily covering continuous tube feeds, and needs adjustment with any change in carb content/rate/type of tube feeds.    -recommend scheduled aspart 2 units with >25% meal intake (or 1 unit per every 6 g carbohydrate, if carb counting can be done at [facility])   -aspart custom 1/20 sliding scale q4h (on continuous TF): see scale below    BG less than 140, none   to 159 = 1 unit   to 179 = 2    to 199 = 3   to 219 = 4    to 239 = 5    to 259 = 6    to 279 = 7    to 299 = 8    to 319 = 9    to 339 = 10    to 359 = 11    to 379 = 12    to 399 = 13   BG over 400 = 14        -BG monitoring q4h (on continuous TF)   -PRN D10% should run at nutritional rate if tube feeds are interrupted; placed order for RN to administer 4ox juice through tube within 1 hour of discharge if TF are to be interrupted for transport, to keep BG stable en route.       [facility]  Plan discussed with patient, bedside RN, and primary team today.     Interval History and Assessment: interval glucose trend reviewed:   BG within target yesterday with increase in NPH to cover dextrose fluids.   Missed carb coverage again last evening for dinner intake, carb amount not documented per order, but per notes, states he ate 25%. BG remained stable initially despite  this missed coverage, but slight trend out of target overnight.   Hypernatremia improved but still not normal; dextrose fluids currently not ordered for today.      Per notes, patient will be discharging today to [facility] for ongoing cares.     RN pushed oral intake this morning; with encouragement, ate 68g CHO and received 8 units of aspart on time for this intake.     Luis remains confused, told [facility].    Current nutritional intake and type: Orders Placed This Encounter      Dysphagia Diet Level 1 Pureed Nectar Thickened Liquids (pre-thickened or use instant food thickener)  Continuous enteral feeds: Nutren 1.5 at 50cc/hour.     PTA Diabetes Regimen: n/a     Discharge Planning: to [facility] today at 1400.           Diabetes History:   Type of Diabetes: pre diabetes versus TII, with enteral feed hyperglycmia  Lab Results   Component Value Date    A1C 6.0 02/10/2020              Review of Systems:   The Review of Systems is negative other than noted in the Interval History.           Medications:     Current Facility-Administered Medications   Medication     acetaminophen (TYLENOL) solution 650 mg     albuterol (PROVENTIL) neb solution 2.5 mg     amiodarone (PACERONE) tablet 200 mg     amoxicillin (AMOXIL) suspension 500 mg     artificial saliva (BIOTENE DRY MOUTHWASH) liquid 15 mL     aspirin (ASA) chewable tablet 81 mg     carboxymethylcellulose PF (REFRESH PLUS) 0.5 % ophthalmic solution 1 drop     dextrose 10% infusion     glucose gel 15-30 g    Or     dextrose 50 % injection 25-50 mL    Or     glucagon injection 1 mg     diphenhydrAMINE (BENADRYL) injection 50 mg     furosemide (LASIX) tablet 40 mg     heparin ANTICOAGULANT injection 5,000 Units     heparin lock flush 10 UNIT/ML injection 5-10 mL     heparin lock flush 10 UNIT/ML injection 5-10 mL     hydrALAZINE (APRESOLINE) half-tab 12.5 mg     HYDROmorphone (PF) (DILAUDID) injection 0.3-0.5 mg     influenza Vac Split High-Dose  (FLUZONE) injection 0.5 mL     insulin aspart (NovoLOG) injection (RAPID ACTING)     insulin aspart (NovoLOG) injection (RAPID ACTING)     insulin aspart (NovoLOG) injection (RAPID ACTING)     insulin isophane human (HumuLIN N PEN) injection 30 Units     [START ON 3/6/2020] insulin isophane human (HumuLIN N PEN) injection 32 Units     ipratropium - albuterol 0.5 mg/2.5 mg/3 mL (DUONEB) neb solution 3 mL     Lidocaine (LIDOCARE) 4 % Patch 1-3 patch     lidocaine (LMX4) cream     lidocaine 1 % 0.1-1 mL     lidocaine patch in PLACE     magnesium sulfate 2 g in water intermittent infusion     magnesium sulfate 4 g in 100 mL sterile water (premade)     melatonin tablet 3 mg     methocarbamol (ROBAXIN) tablet 500 mg     metoprolol tartrate (LOPRESSOR) half-tab 12.5 mg     multivitamins w/minerals (CERTAVITE) liquid 15 mL     naloxone (NARCAN) injection 0.1-0.4 mg     nystatin (MYCOSTATIN) suspension 500,000 Units     ondansetron (ZOFRAN-ODT) ODT tab 4 mg    Or     ondansetron (ZOFRAN) injection 4 mg     oxyCODONE (ROXICODONE) solution 5-10 mg     pantoprazole (PROTONIX) 2 mg/mL suspension 40 mg     polyethylene glycol (MIRALAX/GLYCOLAX) Packet 17 g     potassium chloride (KLOR-CON) Packet 20-40 mEq     potassium chloride 10 mEq in 100 mL intermittent infusion with 10 mg lidocaine     potassium chloride 10 mEq in 100 mL sterile water intermittent infusion (premix)     potassium chloride 20 mEq in 50 mL intermittent infusion     potassium chloride ER (K-DUR/KLOR-CON M) CR tablet 20-40 mEq     potassium phosphate 10 mmol in D5W 250 mL intermittent infusion     potassium phosphate 15 mmol in D5W 250 mL intermittent infusion     potassium phosphate 20 mmol in D5W 250 mL intermittent infusion     potassium phosphate 20 mmol in D5W 500 mL intermittent infusion     potassium phosphate 25 mmol in D5W 500 mL intermittent infusion     prochlorperazine (COMPAZINE) injection 5 mg    Or     prochlorperazine (COMPAZINE) tablet 5 mg      "protein modular (PROSOURCE TF) 1 packet     QUEtiapine (SEROquel) tablet 25 mg     senna-docusate (SENOKOT-S/PERICOLACE) 8.6-50 MG per tablet 1 tablet    Or     senna-docusate (SENOKOT-S/PERICOLACE) 8.6-50 MG per tablet 2 tablet     sodium chloride (PF) 0.9% PF flush 10-20 mL     sodium chloride (PF) 0.9% PF flush 3 mL     sodium chloride (PF) 0.9% PF flush 3 mL            Physical Exam:    BP (!) 131/94 (BP Location: Left arm)   Pulse 90   Temp 98.5  F (36.9  C) (Oral)   Resp 16   Ht 1.753 m (5' 9\")   Wt 118 kg (260 lb 2.3 oz)   SpO2 96%   BMI 38.42 kg/m    General: pleasant, somnolent throughout interview.   HEENT: normocephalic, atraumatic. Oral mucous membranes moist.   Lungs: unlabored respiration, no cough  ABD: rounded, soft, no lipodystrophy noted  Skin: warm and dry, no obvious lesions  MSK:  moves all extremities  Lymp:  no LE edema   Mental status:  alert, disoriented to place, time, but answers simple questions. Somnolent.   Psych:  affect, calm and appropriate interaction. somnolent             Data:     Recent Labs   Lab 03/05/20  0859 03/05/20  0817 03/05/20  0600 03/05/20  0402 03/05/20  0014 03/04/20  1938 03/04/20  1653  03/04/20  0552  03/03/20  1725  03/03/20  0540  03/02/20  0707  03/01/20  0619   GLC  --   --  193*  --   --   --   --   --  275*  --  252*  --  261*  --  371*  --  273*   * 138*  --  207* 155* 158* 138*   < >  --    < >  --    < >  --    < >  --    < >  --     < > = values in this interval not displayed.     Lab Results   Component Value Date    WBC 8.6 03/05/2020    WBC 7.9 03/04/2020    WBC 9.2 03/03/2020    HGB 10.6 (L) 03/05/2020    HGB 10.2 (L) 03/04/2020    HGB 11.2 (L) 03/03/2020    HCT 35.8 (L) 03/05/2020    HCT 35.3 (L) 03/04/2020    HCT 38.6 (L) 03/03/2020     (H) 03/05/2020     (H) 03/04/2020     (H) 03/03/2020     03/05/2020     03/04/2020     03/03/2020     Lab Results   Component Value Date     (H) " 03/05/2020     (H) 03/04/2020     (H) 03/04/2020    POTASSIUM 4.0 03/05/2020    POTASSIUM 4.0 03/04/2020    POTASSIUM 3.8 03/03/2020    CHLORIDE 117 (H) 03/05/2020    CHLORIDE 119 (H) 03/04/2020    CHLORIDE 120 (H) 03/03/2020    CO2 28 03/05/2020    CO2 28 03/04/2020    CO2 30 03/03/2020     (H) 03/05/2020     (H) 03/04/2020     (H) 03/03/2020     Lab Results   Component Value Date    BUN 80 (H) 03/05/2020     (H) 03/04/2020     (H) 03/03/2020     Lab Results   Component Value Date    TSH 4.06 (H) 02/09/2020     Lab Results   Component Value Date    AST 51 (H) 03/03/2020    AST 32 02/15/2020    AST 44 02/14/2020    ALT 93 (H) 03/03/2020    ALT 17 02/15/2020    ALT 17 02/14/2020    ALKPHOS 120 03/03/2020    ALKPHOS 66 02/15/2020    ALKPHOS 42 02/13/2020     Diabetes Management Team job code: [id number]  I spent a total of 25 minutes bedside and on the inpatient unit managing glycemic care. Over 50% of my time on the unit was spent counseling the patient and/or coordinating care regarding acute hyperglycemia management.  See note for details.    Mireille Gonsalez PA-C  Inpatient Diabetes Management Service  Pager [phone]

## 2020-03-05 NOTE — PLAN OF CARE
Pt admitted 2/10 syncopal episode and CP.  S/p AAA and arch dissection with tissue AVR and vein bypass of RCA 2/9/20.  SR, VS'S on RA and denied pain.  D5 running at 40 ml/hr.  Nutren 1.5 running at 50 ml/hr with FW flushes 75 ml/hr r/t high Na.  Glucose checks q4h.  All dressings changed and all wounds cleansed.  Rooke boots on feet and PCD's on b/l feet.  [place] in for wound healing.  Pulsate mattress with q2h turns.  Otherwise, pt slept well and up with lift.  Continue to monitor and with POC.

## 2020-03-06 LAB
BACTERIA SPEC CULT: ABNORMAL
SPECIMEN SOURCE: ABNORMAL

## 2020-04-06 ENCOUNTER — CARE COORDINATION (OUTPATIENT)
Dept: CARDIOLOGY | Facility: CLINIC | Age: 75
End: 2020-04-06

## 2020-04-06 NOTE — PROGRESS NOTES
Called patient and left voicemail message to see how patient is doing since going home.  Waiting return call.

## 2020-04-06 NOTE — PROGRESS NOTES
Patient's spouse called back and patient was recovering at the [facility] and was transferred to the [facility] on 03/17 and is recovering slowly.  Patient is walking with assist and is continuing to have ischemic problems with his feet and toes and may need to have amputation of 3 toes.  Per spouse this is due to a medication administered during surgery.  Gave patient's spouse contact information to call with questions.

## 2020-06-18 LAB — TRANSF REACT PLASRBC-IMP: NORMAL

## 2022-03-22 ENCOUNTER — TRANSCRIBE ORDERS (OUTPATIENT)
Dept: OTHER | Age: 77
End: 2022-03-22
Payer: COMMERCIAL

## 2022-03-22 DIAGNOSIS — L98.9 DISORDER OF THE SKIN AND SUBCUTANEOUS TISSUE, UNSPECIFIED: Primary | ICD-10-CM

## 2022-05-09 NOTE — PLAN OF CARE
D/I: Patient on unit 4A Surgical/Neuro ICU - transferred from  at appx 0250  Neuro- A/Ox2; not situation or time. PERRL. Moving all ext purposefully. Generalized weakness. Afebrile  CV- BP soft; labile. Tele: Afib with occasional PVCs.   Pulm- Vented CMV settings. 50% FiO2 satting high 90s. LS coarse. Small amount of white secretions per ET suctioning.  GI- NPO. Tube feeds infusing at 30ml/hr with 60ml/hr fwf. BS active. BM overnight  - Olmstead in place with adequate output. Urine is yellow and cloudy  Gtts- Precedex at 0.4 mg/kg/hr, amiodarone at 1mg/min. Heparin at 1650units/hr (therapeutic)  Skin- L leg dressing CDI, L groin site ecchymotic, Chest dressing CDI, Toes necrotic, dusky. Small reddened area on coccyx; mepilex CDI  Pain- Appears comfortable; denies pain  IV's - R PICC infusing. PIV infusing.  See flow sheets for further interventions and assessments.   A: Stable   P: Continue to monitor pt closely. Notify MD of significant changes     [facility]  OBIC Note    Date: May 19, 2022  Name: Claudene Fu  MRN: [id number]       : 1956  Diagnosis: Osteoporosis  Treatment: Prolia  Referring Provider: Dr. Cassandra Uriostegui  Supervising Provider: Dr. Cassandra Uriostegui    Patient arrived to [facility] at 0900. Ms. Frances allergies reviewed and she agreed to receiving today's treatment. Pt. denies new complaints today. Pt reports she plans on having dental cleaning and possibly filling repaired. Scheduled to see Dr. Cassandra Uriostegui after injection so encouraged her to speak with him about timeline of when she can complete the filling repair. Visit Vitals  /84 (BP 1 Location: Left arm, BP Patient Position: Sitting)   Pulse 81   Temp 98 °F (36.7 °C)   Resp 16   SpO2 96%        Results for Eva Thao (MRN [id number])   2022 12:28   Sodium 139   Potassium 3.6   Chloride 99   CO2 24   Glucose 124 (H)   BUN 13   Creatinine 0.80   BUN/Creatinine ratio 16   Calcium 11.2 (H)   Phosphorus 3.4   Albumin 4.3   eGFR 81     Medications given per providers orders:  Administrations This Visit     denosumab (PROLIA) injection 60 mg     Admin Date  2022 Action  Given Dose  60 mg Route  SubCUTAneous Administered By  Harvy Aase, RN                Prolia to right upper arm. [address] [id number]  Lot # [id number]  Exp 10/2024    Ms. Frances tolerated the treatment without complaints and no medication reactions were seen while in presence of this RN. Patient provided with AVS, which included future appointments and written educational material regarding Prolia. All of the patients questions were answered and then discharged ambulatory from Rheumatology OBIC in stable condition at 0915. Encounter routed to supervising provider for co-signing.      Future Appointments   Date Time Provider Catherine Reynaga   2022  9:30 AM MD MICHAEL Luna   2022  9:00 AM [facility]ARIO ALBERTO BS AMB     [facility] renal panel lab draw order slip printed and given to patient. Leelee Chairez RN  May 19, 2022  11:28 AM    ---  ATTENDING ATTESTATION    I, Shailesh Victoria, hereby attest that the medical record entry for 5/19/22 accurately reflects signatures/notations that I made in my capacity as treating physician when I treated/diagnosed the above listed patient. I do hereby attest that this information is true, accurate and complete to the best of my knowledge and I understand that any falsification, omission, or concealment of material fact may subject me to administrative, civil, or criminal liability.   Shailesh Victoria MD [facility]  Adult Rheumatology  Signed 05/19/22 3:41 PM

## 2022-06-07 ENCOUNTER — OFFICE VISIT (OUTPATIENT)
Dept: DERMATOLOGY | Facility: CLINIC | Age: 77
End: 2022-06-07
Attending: INTERNAL MEDICINE
Payer: COMMERCIAL

## 2022-06-07 VITALS — DIASTOLIC BLOOD PRESSURE: 84 MMHG | HEART RATE: 71 BPM | OXYGEN SATURATION: 99 % | SYSTOLIC BLOOD PRESSURE: 121 MMHG

## 2022-06-07 DIAGNOSIS — L82.1 SEBORRHEIC KERATOSIS: ICD-10-CM

## 2022-06-07 DIAGNOSIS — D18.01 ANGIOMA OF SKIN: ICD-10-CM

## 2022-06-07 DIAGNOSIS — D23.9 DERMAL NEVUS: ICD-10-CM

## 2022-06-07 DIAGNOSIS — L81.4 LENTIGO: Primary | ICD-10-CM

## 2022-06-07 DIAGNOSIS — D04.5 SQUAMOUS CELL CARCINOMA IN SITU (SCCIS) OF SKIN OF CHEST: ICD-10-CM

## 2022-06-07 DIAGNOSIS — L98.9 DISORDER OF THE SKIN AND SUBCUTANEOUS TISSUE, UNSPECIFIED: ICD-10-CM

## 2022-06-07 PROCEDURE — 11102 TANGNTL BX SKIN SINGLE LES: CPT | Performed by: DERMATOLOGY

## 2022-06-07 PROCEDURE — 88331 PATH CONSLTJ SURG 1 BLK 1SPC: CPT | Performed by: DERMATOLOGY

## 2022-06-07 PROCEDURE — 99203 OFFICE O/P NEW LOW 30 MIN: CPT | Mod: 25 | Performed by: DERMATOLOGY

## 2022-06-07 NOTE — PROGRESS NOTES
Vel Espana , a 77 year old year old male patient, I was asked to see by Dr. Moya for spots on skin.  Patient states this has been present for a while.  Patient reports the following symptoms:  Red on chest.   .  Patient reports the following previous treatments none.  Patient reports the following modifying factors none.  Associated symptoms: none.  Patient has no other skin complaints today.  Remainder of the HPI, Meds, PMH, Allergies, FH, and SH was reviewed in chart.      Past Medical History:   Diagnosis Date     Psychosexual dysfunction with inhibited sexual excitement      Unspecified essential hypertension      Unspecified sleep apnea        Past Surgical History:   Procedure Laterality Date     REPAIR ANEURYSM ASCENDING AORTA N/A 2/9/2020    Procedure: Median Sternotomy, repair of ascending aorta using 32mm gelweave graft, deep circulatory arrest,  aortic valve repair using 27mm inspiris valve, on-pump oxygenator, open saphenous vein harvest, coronary artery bypass x1, transesophageal echocardiogram done by anestheisa;  Surgeon: Nivia Mckeon MD;  Location:  OR        History reviewed. No pertinent family history.    Social History     Socioeconomic History     Marital status:      Spouse name: Not on file     Number of children: Not on file     Years of education: Not on file     Highest education level: Not on file   Occupational History     Not on file   Tobacco Use     Smoking status: Never Smoker     Smokeless tobacco: Never Used   Substance and Sexual Activity     Alcohol use: No     Drug use: No     Sexual activity: Yes     Partners: Female   Other Topics Concern     Parent/sibling w/ CABG, MI or angioplasty before 65F 55M? Not Asked   Social History Narrative     Not on file     Social Determinants of Health     Financial Resource Strain: Not on file   Food Insecurity: Not on file   Transportation Needs: Not on file   Physical Activity: Not on file   Stress: Not on file   Social  Connections: Not on file   Intimate Partner Violence: Not on file   Housing Stability: Not on file       Outpatient Encounter Medications as of 6/7/2022   Medication Sig Dispense Refill     acetaminophen (TYLENOL) 32 mg/mL liquid 20.3 mLs (650 mg) by Oral or Feeding Tube route every 4 hours as needed for other (multimodal surgical pain management along with NSAIDS and opioid medication as indicated based on pain control and physical function.)       albuterol (PROVENTIL) (2.5 MG/3ML) 0.083% neb solution Take 1 vial (2.5 mg) by nebulization every 2 hours as needed for shortness of breath / dyspnea       amiodarone (PACERONE) 200 MG tablet Take 1 tablet (200 mg) by mouth daily       amoxicillin (AMOXIL) 400 MG/5ML suspension Take 6.3 mLs (500 mg) by mouth every 8 hours       artificial saliva (BIOTENE DRY MOUTHWASH) LIQD liquid Swish and spit 15 mLs in mouth 4 times daily as needed for dry mouth       aspirin (ASA) 81 MG chewable tablet Take 1 tablet (81 mg) by mouth daily       azithromycin (ZITHROMAX) 250 MG tablet Two tablets first day, then one tablet daily for four days. 6 tablet 0     carboxymethylcellulose PF (REFRESH PLUS) 0.5 % ophthalmic solution Place 1 drop into both eyes 3 times daily as needed for dry eyes       dextrose 5% infusion Inject 40 mL/hr into the vein continuous       dextrose 50 % injection Inject 25-50 mLs into the vein every 15 minutes as needed for low blood sugar       diphenhydrAMINE (BENADRYL) 50 MG/ML injection Inject 1 mL (50 mg) into the vein every 6 hours as needed for itching       furosemide (LASIX) 40 MG tablet Take 1 tablet (40 mg) by mouth daily       glucagon 1 MG SOLR injection Inject 1 mg Subcutaneous every 15 minutes as needed for low blood sugar (May repeat x 1 only)       glucose 40 % (400 mg/mL) gel Take 15-30 g by mouth every 15 minutes as needed for low blood sugar       heparin lock flush 10 UNIT/ML SOLN injection 5-10 mLs by Intracatheter route every 24 hours        heparin lock flush 10 UNIT/ML SOLN injection 5-10 mLs by Intracatheter route every hour as needed for other (to lock each CVC - Open Ended (Tunneled and Non-Tunneled) dormant lumen. AFTER medication or blood with MAX: 5 mL per lumen.)       Heparin Sodium, Porcine, (HEPARIN ANTICOAGULANT) 5000 UNIT/0.5ML injection Inject 0.5 mLs (5,000 Units) Subcutaneous 2 times daily       hydrALAZINE (APRESOLINE) 25 MG tablet Take 0.5 tablets (12.5 mg) by mouth 4 times daily       insulin aspart (NOVOLOG PEN) 100 UNIT/ML pen Inject 1-7 Units Subcutaneous Take with snacks or supplements for high blood sugar DOSE:  1 units per 6 grams of carbohydrate. Only chart total amount of units given.  Do not give if pre-prandial glucose is less than 60 mg/dL. If given at mealtime, administer within 30 minutes of start of meal       insulin aspart (NOVOLOG PEN) 100 UNIT/ML pen Inject 1-7 Units Subcutaneous 3 times daily (with meals) DOSE:  1 units per 6 grams of carbohydrate.  Only chart total amount of units given.  Do not give if pre-prandial glucose is less than 60 mg/dL. If given at mealtime, administer within 30 minutes of start of meal       insulin aspart (NOVOLOG PEN) 100 UNIT/ML pen Inject 1-13 Units Subcutaneous every 4 hours       insulin isophane human (HUMULIN N PEN) 100 UNIT/ML injection Inject 30 Units Subcutaneous every 24 hours At 8 pm       insulin isophane human (HUMULIN N PEN) 100 UNIT/ML injection Inject 32 Units Subcutaneous every morning       ipratropium - albuterol 0.5 mg/2.5 mg/3 mL (DUONEB) 0.5-2.5 (3) MG/3ML neb solution Take 1 vial (3 mLs) by nebulization 2 times daily       Lidocaine (LIDOCARE) 4 % Patch Place 1-3 patches onto the skin every 24 hours To prevent lidocaine toxicity, patient should be patch free for 12 hrs daily.       lidocaine (LMX4) 4 % external cream Apply topically every hour as needed for pain (with VAD insertion or accessing implanted port.)       lidocaine 1 % SOLN 0.1-1 mLs by Other route  every hour as needed (mild pain with VAD insertion.)       magnesium sulfate 2 GM/50ML SOLN infusion Inject 50 mLs (2 g) into the vein daily as needed for magnesium supplementation       magnesium sulfate 4 GM/100ML SOLN infusion Inject 100 mLs (4 g) into the vein every 4 hours as needed for magnesium supplementation       melatonin 3 MG tablet 1 tablet (3 mg) by Oral or Feeding Tube route At Bedtime       methocarbamol (ROBAXIN) 500 MG tablet 1 tablet (500 mg) by Oral or Feeding Tube route 4 times daily as needed for muscle spasms       metoprolol tartrate (LOPRESSOR) 25 MG tablet Take 0.5 tablets (12.5 mg) by mouth 2 times daily       multivitamins w/minerals (CERTAVITE) liquid 15 mLs by Per Feeding Tube route daily       nystatin (MYCOSTATIN) 194474 UNIT/ML suspension Take 5 mLs (500,000 Units) by mouth 4 times daily       ondansetron (ZOFRAN) 2 MG/ML SOLN injection Inject 2 mLs (4 mg) into the vein every 6 hours as needed for nausea or vomiting       ondansetron (ZOFRAN-ODT) 4 MG ODT tab Take 1 tablet (4 mg) by mouth every 6 hours as needed for nausea or vomiting       pantoprazole (PROTONIX) 2 mg/mL SUSP suspension Take 20 mLs (40 mg) by mouth every morning (before breakfast)       polyethylene glycol (MIRALAX) packet 17 g by Oral or Feeding Tube route daily       potassium chloride (KLOR-CON) 20 MEQ packet 20-40 mEq by Oral or Feeding Tube route every 2 hours as needed for potassium supplementation       potassium chloride 10 mEq in 100 mL intermittent infusion with 10 mg lidocaine Inject 100 mLs (10 mEq) into the vein every hour as needed for potassium supplementation Infuse via PERIPHERAL LINE. Use potassium with lidocaine for pain with peripheral administration.  If Serum K+ 3.4-4.0, dose = 10 mEq/hr x2 doses. Recheck K+ level the next AM.  If Serum K+ 3.0-3.3, dose = 10 mEq/hr x4 doses (40 mEq IV total dose). Recheck K+ level 2 hours after dose and the next AM.  If Serum K+ less than 3.0, dose = 10 mEq/hr  x6 doses (60 mEq IV total dose). Recheck K+ level 2 hours after dose and the next AM.       potassium chloride 10 mEq in 100 mL sterile water intermittent infusion (premix) Inject 100 mLs (10 mEq) into the vein every hour as needed Infuse via PERIPHERAL LINE or CENTRAL LINE. Use for central line replacement if patient weight less than 65 kg, if patient is on TPN with high potassium content or if unit does not stock 20 mEq bags.  If Serum K+ 3.4-4.0, dose = 10 mEq/hr x2 doses. Recheck K+ level the next AM.  If Serum K+ 3.0-3.3, dose = 10 mEq/hr x4 doses (40 mEq IV total dose). Recheck K+ level 2 hours after dose and the next AM.  If Serum K+ less than 3.0, dose = 10 mEq/hr x6 doses (60 mEq IV total dose). Recheck K+ level 2 hours after dose and the next AM.       potassium chloride 20 MEQ/50ML infusion Inject 50 mLs (20 mEq) into the vein every hour as needed for potassium supplementation Infuse via CENTRAL LINE Only.  May need EKG if less than 65 kg or on TPN - Max rate is 0.3 mEq/kg/hr for patients not on EKG monitoring.    If Serum K+ 3.4-4.0, dose = 20 mEq/hr x1 doses. Recheck K+ level the next AM.  If Serum K+ 3.0-3.3, dose = 20 mEq/hr x2 doses (40 mEq IV total dose).  Recheck K+ level 2 hours after dose and the next AM.  If Serum K+ less than 3.0, dose = 20 mEq/hr x3 doses (60 mEq IV total dose). Recheck K+ level 2 hours after dose and the next AM.       potassium chloride ER (KLOR-CON M) 20 MEQ CR tablet Take 1-2 tablets (20-40 mEq) by mouth every 2 hours as needed for potassium supplementation Use if able to take PO.   If Serum K+ 3.4-4.0, dose = 20 mEq x1. Recheck K+ level the next AM.  If Serum K+ 3.0-3.3, dose = 60 mEq po total dose (40 mEq x1 followed in 2 hours by 20 mEq x1). Recheck K+ level 4 hours after dose and the next AM.  If Serum K+ 2.5-2.9, dose = 80 mEq po total dose (40 mEq Q2H x2). Recheck K+ level 4 hours after dose and the next AM.  If Serum K+ less than 2.5, See IV order. DO NOT CRUSH.        potassium phosphate 10 mmol Inject 10 mmol into the vein daily as needed For serum phosphorus level 2.5-2.7  Do not infuse Phosphorus in the same line as TPN.   Give 10 mmol and recheck phosphorus level the next AM. Each mmol of phosphate provides 1.47 mEq of Potassium. Multiply the patient's phosphate dose by 1.47 to determine the amount of potassium in this dose.       potassium phosphate 15 mmol Inject 15 mmol into the vein daily as needed For serum phosphorus level 2.0-2.4  Do not infuse Phosphorus in the same line as TPN.   Give 15 mmol and recheck phosphorus level next AM. Each mmol of phosphate provides 1.47 mEq of Potassium. Multiply the patient's phosphate dose by 1.47 to determine the amount of potassium in this dose.       potassium phosphate 20 mmol Inject 20 mmol into the vein every 6 hours as needed For serum phosphorus level 1.1-1.9  For CENTRAL Line ONLY  Do not infuse Phosphorus in the same line as TPN.   Give 20 mmol and recheck phosphorus level 2 hours after dose and next AM. Each mmol of phosphate provides 1.47 mEq of Potassium. Multiply the patient's phosphate dose by 1.47 to determine the amount of potassium in this dose.       potassium phosphate 20 mmol Inject 20 mmol into the vein every 6 hours as needed For serum phosphorus level 1.1-1.9  For peripheral line  Do not infuse Phosphorus in the same line as TPN.   Give 20 mmol and recheck phosphorus level 2 hours after dose and next AM. Repeat if necessary. Each mmol of phosphate provides 1.47 mEq of Potassium. Multiply the patient's phosphate dose by 1.47 to determine the amount of potassium in this dose.       potassium phosphate 25 mmol Inject 25 mmol into the vein every 8 hours as needed For serum phosphorus level less than 1.1  Do not infuse Phosphorus in the same line as TPN.   Give 25 mmol and recheck phosphorus level 2 hours after dose and next AM. Each mmol of phosphate provides 1.47 mEq of Potassium. Multiply the patient's  phosphate dose by 1.47 to determine the amount of potassium in this dose.       prochlorperazine (COMPAZINE) 5 MG tablet Take 1 tablet (5 mg) by mouth every 6 hours as needed for nausea or vomiting       protein modular (PROSOURCE TF) LIQD 1 packet by Per Feeding Tube route 3 times daily       QUEtiapine (SEROQUEL) 25 MG tablet Take 1 tablet (25 mg) by mouth At Bedtime       senna-docusate (SENOKOT-S/PERICOLACE) 8.6-50 MG tablet 1 tablet by Oral or Feeding Tube route 2 times daily       senna-docusate (SENOKOT-S/PERICOLACE) 8.6-50 MG tablet 2 tablets by Oral or Feeding Tube route 2 times daily       sodium chloride, PF, 0.9% PF flush 3 mLs by Intracatheter route every hour as needed for line flush (for peripheral IV flush post IV meds)       sodium chloride, PF, 0.9% PF flush 10-20 mLs by Intracatheter route every 8 hours to flush CVC - Open Ended (Tunneled and Non-Tunneled).   10 mL post IV meds; 20 mL post blood draw.       sodium chloride, PF, 0.9% PF flush 3 mLs by Intracatheter route every 8 hours       traMADol (ULTRAM) 50 MG tablet Take 1-2 tablets ( mg) by mouth every 6 hours as needed for moderate pain       naloxone (NARCAN) 0.4 MG/ML injection Inject 0.25-1 mLs (0.1-0.4 mg) into the vein once for 1 dose       No facility-administered encounter medications on file as of 6/7/2022.             Review Of Systems  Skin: As above  Eyes: negative  Ears/Nose/Throat: negative  Respiratory: No shortness of breath, dyspnea on exertion, cough, or hemoptysis  Cardiovascular: negative  Gastrointestinal: negative  Genitourinary: negative  Musculoskeletal: negative  Neurologic: negative  Psychiatric: negative  Hematologic/Lymphatic/Immunologic: negative  Endocrine: negative      O:   NAD, WDWN, Alert & Oriented, Mood & Affect wnl, Vitals stable   Here today alone   /84 (BP Location: Right arm)   Pulse 71   SpO2 99%    General appearance parth ii   Vitals stable   Alert, oriented and in no acute  distress      Following lymph nodes palpated: Occipital, Cervical, Supraclavicular no lad  L chest ill-defined 2.4cm red plaque    Stuck on papules and brown macules on trunk and ext    Red papules on trunk   Flesh colored papules on trunk      The remainder of the full exam was normal; the following areas were examined:  conjunctiva/lids, oral mucosa, neck, peripheral vascular system, abdomen, lymph nodes, digits/nails, eccrine and apocrine glands, scalp/hair, face, neck, chest, abdomen, buttocks, back, RUE, LUE, RLE, LLE       Eyes: Conjunctivae/lids:Normal     ENT: Lips, buccal mucosa, tongue: normal    MSK:Normal    Cardiovascular: peripheral edema 1+    Pulm: Breathing Normal    Lymph Nodes: No Head and Neck Lymphadenopathy     Neuro/Psych: Orientation:Normal; Mood/Affect:Normal      MICRO:   L chest:There is hyperkeratosis & parakeratosis of the epidermis, with full thickness epidermal involvement by atypical keratinocytes with rare pale vacuolated cells.  Unremarkable dermis.    A/P:  1. Seborrheic keratosis, lentigo, angioma, dermal nevus  2. L chest r/o squamous cell carcinoma   TANGENTIAL BIOPSY IN HOUSE:  After consent, anesthesia with LEC and prep, tangential excision performed and dx above confirmed with frozen section histology.  No complications and routine wound care.  Patient is not on  anticoagulants and risk of bleeding discussed with patient.       I have personally reviewed all specimens and/or slides and used them with my medical judgement to determine or confirm the final diagnosis.     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      It was a pleasure speaking to Vel Espana today.  Previous clinic  notes and pertinent laboratory tests were reviewed prior to Vel Espana's visit.  Nature and genetics of benign skin lesions dicussed with patient.  Signs and Symptoms of skin cancer discussed with patient.  Patient encouraged to perform monthly skin  exams.  UV precautions reviewed with patient.  Risks of non-melanoma skin cancer discussed with patient   Return to clinic next appt

## 2022-06-07 NOTE — PATIENT INSTRUCTIONS
Wound Care Instructions     FOR SUPERFICIAL WOUNDS     [facility] [phone]    [facility] [phone]                       AFTER 24 HOURS YOU SHOULD REMOVE THE BANDAGE AND BEGIN DAILY DRESSING CHANGES AS FOLLOWS:     1) Remove Dressing.     2) Clean and dry the area with tap water using a Q-tip or sterile gauze pad.     3) Apply Vaseline, Aquaphor, Polysporin ointment or Bacitracin ointment over entire wound.  Do NOT use Neosporin ointment.     4) Cover the wound with a band-aid, or a sterile non-stick gauze pad and micropore paper tape      REPEAT THESE INSTRUCTIONS AT LEAST ONCE A DAY UNTIL THE WOUND HAS COMPLETELY HEALED.    It is an old wives tale that a wound heals better when it is exposed to air and allowed to dry out. The wound will heal faster with a better cosmetic result if it is kept moist with ointment and covered with a bandage.    **Do not let the wound dry out.**      Supplies Needed:      *Cotton tipped applicators (Q-tips)    *Polysporin Ointment or Bacitracin Ointment (NOT NEOSPORIN)    *Band-aids or non-stick gauze pads and micropore paper tape.      PATIENT INFORMATION:    During the healing process you will notice a number of changes. All wounds develop a small halo of redness surrounding the wound.  This means healing is occurring. Severe itching with extensive redness usually indicates sensitivity to the ointment or bandage tape used to dress the wound.  You should call our office if this develops.      Swelling  and/or discoloration around your surgical site is common, particularly when performed around the eye.    All wounds normally drain.  The larger the wound the more drainage there will be.  After 7-10 days, you will notice the wound beginning to shrink and new skin will begin to grow.  The wound is healed when you can see skin has formed over the entire area.  A healed wound has a healthy, shiny look to the surface and is red to dark pink in color  to normalize.  Wounds may take approximately 4-6 weeks to heal.  Larger wounds may take 6-8 weeks.  After the wound is healed you may discontinue dressing changes.    You may experience a sensation of tightness as your wound heals. This is normal and will gradually subside.    Your healed wound may be sensitive to temperature changes. This sensitivity improves with time, but if you re having a lot of discomfort, try to avoid temperature extremes.    Patients frequently experience itching after their wound appears to have healed because of the continue healing under the skin.  Plain Vaseline will help relieve the itching.        POSSIBLE COMPLICATIONS    BLEEDING:    Leave the bandage in place.  Use tightly rolled up gauze or a cloth to apply direct pressure over the bandage for 30  minutes.  Reapply pressure for an additional 30 minutes if necessary  Use additional gauze and tape to maintain pressure once the bleeding has stopped.

## 2022-06-07 NOTE — NURSING NOTE
"Initial /84 (BP Location: Right arm)   Pulse 71   SpO2 99%  Estimated body mass index is 38.42 kg/m  as calculated from the following:    Height as of 2/9/20: 1.753 m (5' 9\").    Weight as of 3/5/20: 118 kg (260 lb 2.3 oz). .      "

## 2022-06-16 ENCOUNTER — MEDICAL CORRESPONDENCE (OUTPATIENT)
Dept: DERMATOLOGY | Facility: CLINIC | Age: 77
End: 2022-06-16

## 2022-07-19 ENCOUNTER — OFFICE VISIT (OUTPATIENT)
Dept: DERMATOLOGY | Facility: CLINIC | Age: 77
End: 2022-07-19
Payer: COMMERCIAL

## 2022-07-19 VITALS — SYSTOLIC BLOOD PRESSURE: 148 MMHG | HEART RATE: 78 BPM | OXYGEN SATURATION: 99 % | DIASTOLIC BLOOD PRESSURE: 101 MMHG

## 2022-07-19 DIAGNOSIS — D04.5 SQUAMOUS CELL CARCINOMA IN SITU (SCCIS) OF SKIN OF CHEST: Primary | ICD-10-CM

## 2022-07-19 PROCEDURE — 99207 PR NO CHARGE LOS: CPT | Performed by: DERMATOLOGY

## 2022-07-19 PROCEDURE — 17313 MOHS 1 STAGE T/A/L: CPT | Performed by: DERMATOLOGY

## 2022-07-19 NOTE — LETTER
7/19/2022         RE: Vel Espana  [address] [facility] [id number]        Dear Colleague,    Thank you for referring your patient, Vel Espana, to the [facility]. Please see a copy of my visit note below.    Surgical Office Location :   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  Patient has no other skin complaints today.  Remainder of the HPI, Meds, PMH, Allergies, FH, and SH was reviewed in chart.      Past Medical History:   Diagnosis Date     Psychosexual dysfunction with inhibited sexual excitement      Unspecified essential hypertension      Unspecified sleep apnea        Past Surgical History:   Procedure Laterality Date     REPAIR ANEURYSM ASCENDING AORTA N/A 2/9/2020    Procedure: Median Sternotomy, repair of ascending aorta using 32mm gelweave graft, deep circulatory arrest,  aortic valve repair using 27mm inspiris valve, on-pump oxygenator, open saphenous vein harvest, coronary artery bypass x1, transesophageal echocardiogram done by anestheisa;  Surgeon: Nivia Mckeon MD;  Location:  OR        History reviewed. No pertinent family history.    Social History     Socioeconomic History     Marital status:      Spouse name: Not on file     Number of children: Not on file     Years of education: Not on file     Highest education level: Not on file   Occupational History     Not on file   Tobacco Use     Smoking status: Never Smoker     Smokeless tobacco: Never Used   Substance and Sexual Activity     Alcohol use: No     Drug use: No     Sexual activity: Yes     Partners: Female   Other Topics Concern     Parent/sibling w/ CABG, MI or angioplasty before 65F 55M? Not Asked   Social History Narrative     Not on file     Social Determinants of Health      Financial Resource Strain: Not on file   Food Insecurity: Not on file   Transportation Needs: Not on file   Physical Activity: Not on file   Stress: Not on file   Social Connections: Not on file   Intimate Partner Violence: Not on file   Housing Stability: Not on file       Outpatient Encounter Medications as of 7/19/2022   Medication Sig Dispense Refill     acetaminophen (TYLENOL) 32 mg/mL liquid 20.3 mLs (650 mg) by Oral or Feeding Tube route every 4 hours as needed for other (multimodal surgical pain management along with NSAIDS and opioid medication as indicated based on pain control and physical function.)       albuterol (PROVENTIL) (2.5 MG/3ML) 0.083% neb solution Take 1 vial (2.5 mg) by nebulization every 2 hours as needed for shortness of breath / dyspnea       amiodarone (PACERONE) 200 MG tablet Take 1 tablet (200 mg) by mouth daily       amoxicillin (AMOXIL) 400 MG/5ML suspension Take 6.3 mLs (500 mg) by mouth every 8 hours       artificial saliva (BIOTENE DRY MOUTHWASH) LIQD liquid Swish and spit 15 mLs in mouth 4 times daily as needed for dry mouth       aspirin (ASA) 81 MG chewable tablet Take 1 tablet (81 mg) by mouth daily       azithromycin (ZITHROMAX) 250 MG tablet Two tablets first day, then one tablet daily for four days. 6 tablet 0     carboxymethylcellulose PF (REFRESH PLUS) 0.5 % ophthalmic solution Place 1 drop into both eyes 3 times daily as needed for dry eyes       dextrose 5% infusion Inject 40 mL/hr into the vein continuous       dextrose 50 % injection Inject 25-50 mLs into the vein every 15 minutes as needed for low blood sugar       diphenhydrAMINE (BENADRYL) 50 MG/ML injection Inject 1 mL (50 mg) into the vein every 6 hours as needed for itching       furosemide (LASIX) 40 MG tablet Take 1 tablet (40 mg) by mouth daily       glucagon 1 MG SOLR injection Inject 1 mg Subcutaneous every 15 minutes as needed for low blood sugar (May repeat x 1 only)       glucose 40 % (400 mg/mL) gel  Take 15-30 g by mouth every 15 minutes as needed for low blood sugar       heparin lock flush 10 UNIT/ML SOLN injection 5-10 mLs by Intracatheter route every 24 hours       heparin lock flush 10 UNIT/ML SOLN injection 5-10 mLs by Intracatheter route every hour as needed for other (to lock each CVC - Open Ended (Tunneled and Non-Tunneled) dormant lumen. AFTER medication or blood with MAX: 5 mL per lumen.)       Heparin Sodium, Porcine, (HEPARIN ANTICOAGULANT) 5000 UNIT/0.5ML injection Inject 0.5 mLs (5,000 Units) Subcutaneous 2 times daily       hydrALAZINE (APRESOLINE) 25 MG tablet Take 0.5 tablets (12.5 mg) by mouth 4 times daily       insulin aspart (NOVOLOG PEN) 100 UNIT/ML pen Inject 1-7 Units Subcutaneous Take with snacks or supplements for high blood sugar DOSE:  1 units per 6 grams of carbohydrate. Only chart total amount of units given.  Do not give if pre-prandial glucose is less than 60 mg/dL. If given at mealtime, administer within 30 minutes of start of meal       insulin aspart (NOVOLOG PEN) 100 UNIT/ML pen Inject 1-7 Units Subcutaneous 3 times daily (with meals) DOSE:  1 units per 6 grams of carbohydrate.  Only chart total amount of units given.  Do not give if pre-prandial glucose is less than 60 mg/dL. If given at mealtime, administer within 30 minutes of start of meal       insulin aspart (NOVOLOG PEN) 100 UNIT/ML pen Inject 1-13 Units Subcutaneous every 4 hours       insulin isophane human (HUMULIN N PEN) 100 UNIT/ML injection Inject 30 Units Subcutaneous every 24 hours At 8 pm       insulin isophane human (HUMULIN N PEN) 100 UNIT/ML injection Inject 32 Units Subcutaneous every morning       ipratropium - albuterol 0.5 mg/2.5 mg/3 mL (DUONEB) 0.5-2.5 (3) MG/3ML neb solution Take 1 vial (3 mLs) by nebulization 2 times daily       Lidocaine (LIDOCARE) 4 % Patch Place 1-3 patches onto the skin every 24 hours To prevent lidocaine toxicity, patient should be patch free for 12 hrs daily.       lidocaine  (LMX4) 4 % external cream Apply topically every hour as needed for pain (with VAD insertion or accessing implanted port.)       lidocaine 1 % SOLN 0.1-1 mLs by Other route every hour as needed (mild pain with VAD insertion.)       magnesium sulfate 2 GM/50ML SOLN infusion Inject 50 mLs (2 g) into the vein daily as needed for magnesium supplementation       magnesium sulfate 4 GM/100ML SOLN infusion Inject 100 mLs (4 g) into the vein every 4 hours as needed for magnesium supplementation       melatonin 3 MG tablet 1 tablet (3 mg) by Oral or Feeding Tube route At Bedtime       methocarbamol (ROBAXIN) 500 MG tablet 1 tablet (500 mg) by Oral or Feeding Tube route 4 times daily as needed for muscle spasms       metoprolol tartrate (LOPRESSOR) 25 MG tablet Take 0.5 tablets (12.5 mg) by mouth 2 times daily       multivitamins w/minerals (CERTAVITE) liquid 15 mLs by Per Feeding Tube route daily       nystatin (MYCOSTATIN) 472698 UNIT/ML suspension Take 5 mLs (500,000 Units) by mouth 4 times daily       ondansetron (ZOFRAN) 2 MG/ML SOLN injection Inject 2 mLs (4 mg) into the vein every 6 hours as needed for nausea or vomiting       ondansetron (ZOFRAN-ODT) 4 MG ODT tab Take 1 tablet (4 mg) by mouth every 6 hours as needed for nausea or vomiting       pantoprazole (PROTONIX) 2 mg/mL SUSP suspension Take 20 mLs (40 mg) by mouth every morning (before breakfast)       polyethylene glycol (MIRALAX) packet 17 g by Oral or Feeding Tube route daily       potassium chloride (KLOR-CON) 20 MEQ packet 20-40 mEq by Oral or Feeding Tube route every 2 hours as needed for potassium supplementation       potassium chloride 10 mEq in 100 mL intermittent infusion with 10 mg lidocaine Inject 100 mLs (10 mEq) into the vein every hour as needed for potassium supplementation Infuse via PERIPHERAL LINE. Use potassium with lidocaine for pain with peripheral administration.  If Serum K+ 3.4-4.0, dose = 10 mEq/hr x2 doses. Recheck K+ level the next  AM.  If Serum K+ 3.0-3.3, dose = 10 mEq/hr x4 doses (40 mEq IV total dose). Recheck K+ level 2 hours after dose and the next AM.  If Serum K+ less than 3.0, dose = 10 mEq/hr x6 doses (60 mEq IV total dose). Recheck K+ level 2 hours after dose and the next AM.       potassium chloride 10 mEq in 100 mL sterile water intermittent infusion (premix) Inject 100 mLs (10 mEq) into the vein every hour as needed Infuse via PERIPHERAL LINE or CENTRAL LINE. Use for central line replacement if patient weight less than 65 kg, if patient is on TPN with high potassium content or if unit does not stock 20 mEq bags.  If Serum K+ 3.4-4.0, dose = 10 mEq/hr x2 doses. Recheck K+ level the next AM.  If Serum K+ 3.0-3.3, dose = 10 mEq/hr x4 doses (40 mEq IV total dose). Recheck K+ level 2 hours after dose and the next AM.  If Serum K+ less than 3.0, dose = 10 mEq/hr x6 doses (60 mEq IV total dose). Recheck K+ level 2 hours after dose and the next AM.       potassium chloride 20 MEQ/50ML infusion Inject 50 mLs (20 mEq) into the vein every hour as needed for potassium supplementation Infuse via CENTRAL LINE Only.  May need EKG if less than 65 kg or on TPN - Max rate is 0.3 mEq/kg/hr for patients not on EKG monitoring.    If Serum K+ 3.4-4.0, dose = 20 mEq/hr x1 doses. Recheck K+ level the next AM.  If Serum K+ 3.0-3.3, dose = 20 mEq/hr x2 doses (40 mEq IV total dose).  Recheck K+ level 2 hours after dose and the next AM.  If Serum K+ less than 3.0, dose = 20 mEq/hr x3 doses (60 mEq IV total dose). Recheck K+ level 2 hours after dose and the next AM.       potassium chloride ER (KLOR-CON M) 20 MEQ CR tablet Take 1-2 tablets (20-40 mEq) by mouth every 2 hours as needed for potassium supplementation Use if able to take PO.   If Serum K+ 3.4-4.0, dose = 20 mEq x1. Recheck K+ level the next AM.  If Serum K+ 3.0-3.3, dose = 60 mEq po total dose (40 mEq x1 followed in 2 hours by 20 mEq x1). Recheck K+ level 4 hours after dose and the next AM.  If  Serum K+ 2.5-2.9, dose = 80 mEq po total dose (40 mEq Q2H x2). Recheck K+ level 4 hours after dose and the next AM.  If Serum K+ less than 2.5, See IV order. DO NOT CRUSH.       potassium phosphate 10 mmol Inject 10 mmol into the vein daily as needed For serum phosphorus level 2.5-2.7  Do not infuse Phosphorus in the same line as TPN.   Give 10 mmol and recheck phosphorus level the next AM. Each mmol of phosphate provides 1.47 mEq of Potassium. Multiply the patient's phosphate dose by 1.47 to determine the amount of potassium in this dose.       potassium phosphate 15 mmol Inject 15 mmol into the vein daily as needed For serum phosphorus level 2.0-2.4  Do not infuse Phosphorus in the same line as TPN.   Give 15 mmol and recheck phosphorus level next AM. Each mmol of phosphate provides 1.47 mEq of Potassium. Multiply the patient's phosphate dose by 1.47 to determine the amount of potassium in this dose.       potassium phosphate 20 mmol Inject 20 mmol into the vein every 6 hours as needed For serum phosphorus level 1.1-1.9  For CENTRAL Line ONLY  Do not infuse Phosphorus in the same line as TPN.   Give 20 mmol and recheck phosphorus level 2 hours after dose and next AM. Each mmol of phosphate provides 1.47 mEq of Potassium. Multiply the patient's phosphate dose by 1.47 to determine the amount of potassium in this dose.       potassium phosphate 20 mmol Inject 20 mmol into the vein every 6 hours as needed For serum phosphorus level 1.1-1.9  For peripheral line  Do not infuse Phosphorus in the same line as TPN.   Give 20 mmol and recheck phosphorus level 2 hours after dose and next AM. Repeat if necessary. Each mmol of phosphate provides 1.47 mEq of Potassium. Multiply the patient's phosphate dose by 1.47 to determine the amount of potassium in this dose.       potassium phosphate 25 mmol Inject 25 mmol into the vein every 8 hours as needed For serum phosphorus level less than 1.1  Do not infuse Phosphorus in the same  line as TPN.   Give 25 mmol and recheck phosphorus level 2 hours after dose and next AM. Each mmol of phosphate provides 1.47 mEq of Potassium. Multiply the patient's phosphate dose by 1.47 to determine the amount of potassium in this dose.       prochlorperazine (COMPAZINE) 5 MG tablet Take 1 tablet (5 mg) by mouth every 6 hours as needed for nausea or vomiting       protein modular (PROSOURCE TF) LIQD 1 packet by Per Feeding Tube route 3 times daily       QUEtiapine (SEROQUEL) 25 MG tablet Take 1 tablet (25 mg) by mouth At Bedtime       senna-docusate (SENOKOT-S/PERICOLACE) 8.6-50 MG tablet 1 tablet by Oral or Feeding Tube route 2 times daily       senna-docusate (SENOKOT-S/PERICOLACE) 8.6-50 MG tablet 2 tablets by Oral or Feeding Tube route 2 times daily       sodium chloride, PF, 0.9% PF flush 3 mLs by Intracatheter route every hour as needed for line flush (for peripheral IV flush post IV meds)       sodium chloride, PF, 0.9% PF flush 10-20 mLs by Intracatheter route every 8 hours to flush CVC - Open Ended (Tunneled and Non-Tunneled).   10 mL post IV meds; 20 mL post blood draw.       sodium chloride, PF, 0.9% PF flush 3 mLs by Intracatheter route every 8 hours       traMADol (ULTRAM) 50 MG tablet Take 1-2 tablets ( mg) by mouth every 6 hours as needed for moderate pain       naloxone (NARCAN) 0.4 MG/ML injection Inject 0.25-1 mLs (0.1-0.4 mg) into the vein once for 1 dose       No facility-administered encounter medications on file as of 7/19/2022.             O:   NAD, WDWN, Alert & Oriented, Mood & Affect wnl, Vitals stable   Here today alone   BP (!) 148/101   Pulse 78   SpO2 99%    General appearance normal   Vitals stable   Alert, oriented and in no acute distress     L chest 2.4cm red plaque      Eyes: Conjunctivae/lids:Normal     ENT: Lips, buccal mucosa, tongue: normal    MSK:Normal    Cardiovascular: peripheral edema none    Pulm: Breathing Normal    Neuro/Psych: Orientation:Alert and  Orientedx3 ; Mood/Affect:normal       A/P:  1. L chest squamous cell carcinoma in situ   MOHS:   Size    The rationale for Mohs surgery was discussed with the patient and consent was obtained.  The risks and benefits as well as alternatives to therapy were discussed, in detail.  Specifically, the risks of infection, scarring, bleeding, prolonged wound healing, incomplete removal, allergy to anesthesia, nerve injury and recurrence were addressed.  Indication for Mohs was Size. Prior to the procedure, the treatment site was clearly identified and, if available, confirmed with previous photos and confirmed by the patient   All components of the Universal Protocol/PAUSE rule were completed.  The Mohs surgeon operated in two distinct and integrated capacities as the surgeon and pathologist.      The area was prepped with Betasept.  A rim of normal appearing skin was marked circumferentially around the lesion.  The area was infiltrated with local anesthesia.  The tumor was first debulked to remove all clinically apparent tumor.  An incision following the standard Mohs approach was done and the specimen was oriented,mapped and placed in 2 block(s).  Each specimen was then chromacoded and processed in the Mohs laboratory using standard Mohs technique and submitted for frozen section histology.  Frozen section analysis showed no residual tumor but CLEAR MARGINS.      The tumor was excised using standard Mohs technique in 1 stages(s).  CLEAR MARGINS OBTAINED and Final defect size was 3.3 x 3 cm.     We discussed the options for wound management in full with the patient including risks/benefits/ possible outcomes.        REPAIR SECOND INTENT: We discussed the options for wound management in full with the patient including risks/benefits/possible outcomes. Decision made to allow the wound to heal by second intention. Cautery was used for for hemostasis. EBL minimal; complications none; wound care routine.  The patient was  discharged in good condition and will return in one month or prn for wound evaluation.  It was a pleasure speaking to Vel Espana today.  Previous clinic notes and pertinent laboratory tests were reviewed prior to Vel Espana's visit.  Patient encouraged to perform monthly skin exams.  UV precautions reviewed with patient.  Risks of non-melanoma skin cancer discussed with patient   Return to clinic 6 months        Again, thank you for allowing me to participate in the care of your patient.        Sincerely,        Piyush Palomino MD

## 2022-07-19 NOTE — PATIENT INSTRUCTIONS
Sutured Wound Care     [facility]: [phone]    [facility]: [phone]          No strenuous activity for 48 hours. Resume moderate activity in 48 hours. No heavy exercising until you are seen for follow up in one week.     Take Tylenol as needed for discomfort.                         Do not drink alcoholic beverages for 48 hours.     Keep the pressure bandage in place for 24 hours. If the bandage becomes blood tinged or loose, reinforce it with gauze and tape.        (Refer to the reverse side of this page for management of bleeding).    Remove pressure bandage in 24 hours     Leave the flat bandage in place until your follow up appointment.    Keep the bandage dry. Wash around it carefully.    If the tape becomes soiled or starts to come off, reinforce it with additional paper tape.    Do not smoke for 3 weeks; smoking is detrimental to wound healing.    It is normal to have swelling and bruising around the surgical site. The bruising will fade in approximately 10-14 days. Elevate the area to reduce swelling.    Numbness, itchiness and sensitivity to temperature changes can occur after surgery and may take up to 18 months to normalize.      POSSIBLE COMPLICATIONS    BLEEDING:    Leave the bandage in place.  Use tightly rolled up gauze or a cloth to apply direct pressure over the bandage for 20   minutes.  Reapply pressure for an additional 20 minutes if necessary  Call the office or go to the nearest emergency room if pressure fails to stop the bleeding.  Use additional gauze and tape to maintain pressure once the bleeding has stopped.        PAIN:    Post operative pain should slowly get better, never worse.  A severe increase in pain may indicate a problem. Call the office if this occurs.    In case of emergency phone:Dr Palomino [phone]

## 2022-07-19 NOTE — PROGRESS NOTES
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  Patient has no other skin complaints today.  Remainder of the HPI, Meds, PMH, Allergies, FH, and SH was reviewed in chart.      Past Medical History:   Diagnosis Date     Psychosexual dysfunction with inhibited sexual excitement      Unspecified essential hypertension      Unspecified sleep apnea        Past Surgical History:   Procedure Laterality Date     REPAIR ANEURYSM ASCENDING AORTA N/A 2/9/2020    Procedure: Median Sternotomy, repair of ascending aorta using 32mm gelweave graft, deep circulatory arrest,  aortic valve repair using 27mm inspiris valve, on-pump oxygenator, open saphenous vein harvest, coronary artery bypass x1, transesophageal echocardiogram done by anestheisa;  Surgeon: Nivia Mckeon MD;  Location:  OR        History reviewed. No pertinent family history.    Social History     Socioeconomic History     Marital status:      Spouse name: Not on file     Number of children: Not on file     Years of education: Not on file     Highest education level: Not on file   Occupational History     Not on file   Tobacco Use     Smoking status: Never Smoker     Smokeless tobacco: Never Used   Substance and Sexual Activity     Alcohol use: No     Drug use: No     Sexual activity: Yes     Partners: Female   Other Topics Concern     Parent/sibling w/ CABG, MI or angioplasty before 65F 55M? Not Asked   Social History Narrative     Not on file     Social Determinants of Health     Financial Resource Strain: Not on file   Food Insecurity: Not on file   Transportation Needs: Not on file   Physical Activity: Not on file   Stress: Not on file   Social Connections: Not on file   Intimate Partner Violence: Not on file   Housing Stability: Not on file       Outpatient Encounter Medications as of 7/19/2022   Medication Sig Dispense Refill      acetaminophen (TYLENOL) 32 mg/mL liquid 20.3 mLs (650 mg) by Oral or Feeding Tube route every 4 hours as needed for other (multimodal surgical pain management along with NSAIDS and opioid medication as indicated based on pain control and physical function.)       albuterol (PROVENTIL) (2.5 MG/3ML) 0.083% neb solution Take 1 vial (2.5 mg) by nebulization every 2 hours as needed for shortness of breath / dyspnea       amiodarone (PACERONE) 200 MG tablet Take 1 tablet (200 mg) by mouth daily       amoxicillin (AMOXIL) 400 MG/5ML suspension Take 6.3 mLs (500 mg) by mouth every 8 hours       artificial saliva (BIOTENE DRY MOUTHWASH) LIQD liquid Swish and spit 15 mLs in mouth 4 times daily as needed for dry mouth       aspirin (ASA) 81 MG chewable tablet Take 1 tablet (81 mg) by mouth daily       azithromycin (ZITHROMAX) 250 MG tablet Two tablets first day, then one tablet daily for four days. 6 tablet 0     carboxymethylcellulose PF (REFRESH PLUS) 0.5 % ophthalmic solution Place 1 drop into both eyes 3 times daily as needed for dry eyes       dextrose 5% infusion Inject 40 mL/hr into the vein continuous       dextrose 50 % injection Inject 25-50 mLs into the vein every 15 minutes as needed for low blood sugar       diphenhydrAMINE (BENADRYL) 50 MG/ML injection Inject 1 mL (50 mg) into the vein every 6 hours as needed for itching       furosemide (LASIX) 40 MG tablet Take 1 tablet (40 mg) by mouth daily       glucagon 1 MG SOLR injection Inject 1 mg Subcutaneous every 15 minutes as needed for low blood sugar (May repeat x 1 only)       glucose 40 % (400 mg/mL) gel Take 15-30 g by mouth every 15 minutes as needed for low blood sugar       heparin lock flush 10 UNIT/ML SOLN injection 5-10 mLs by Intracatheter route every 24 hours       heparin lock flush 10 UNIT/ML SOLN injection 5-10 mLs by Intracatheter route every hour as needed for other (to lock each CVC - Open Ended (Tunneled and Non-Tunneled) dormant lumen. AFTER  medication or blood with MAX: 5 mL per lumen.)       Heparin Sodium, Porcine, (HEPARIN ANTICOAGULANT) 5000 UNIT/0.5ML injection Inject 0.5 mLs (5,000 Units) Subcutaneous 2 times daily       hydrALAZINE (APRESOLINE) 25 MG tablet Take 0.5 tablets (12.5 mg) by mouth 4 times daily       insulin aspart (NOVOLOG PEN) 100 UNIT/ML pen Inject 1-7 Units Subcutaneous Take with snacks or supplements for high blood sugar DOSE:  1 units per 6 grams of carbohydrate. Only chart total amount of units given.  Do not give if pre-prandial glucose is less than 60 mg/dL. If given at mealtime, administer within 30 minutes of start of meal       insulin aspart (NOVOLOG PEN) 100 UNIT/ML pen Inject 1-7 Units Subcutaneous 3 times daily (with meals) DOSE:  1 units per 6 grams of carbohydrate.  Only chart total amount of units given.  Do not give if pre-prandial glucose is less than 60 mg/dL. If given at mealtime, administer within 30 minutes of start of meal       insulin aspart (NOVOLOG PEN) 100 UNIT/ML pen Inject 1-13 Units Subcutaneous every 4 hours       insulin isophane human (HUMULIN N PEN) 100 UNIT/ML injection Inject 30 Units Subcutaneous every 24 hours At 8 pm       insulin isophane human (HUMULIN N PEN) 100 UNIT/ML injection Inject 32 Units Subcutaneous every morning       ipratropium - albuterol 0.5 mg/2.5 mg/3 mL (DUONEB) 0.5-2.5 (3) MG/3ML neb solution Take 1 vial (3 mLs) by nebulization 2 times daily       Lidocaine (LIDOCARE) 4 % Patch Place 1-3 patches onto the skin every 24 hours To prevent lidocaine toxicity, patient should be patch free for 12 hrs daily.       lidocaine (LMX4) 4 % external cream Apply topically every hour as needed for pain (with VAD insertion or accessing implanted port.)       lidocaine 1 % SOLN 0.1-1 mLs by Other route every hour as needed (mild pain with VAD insertion.)       magnesium sulfate 2 GM/50ML SOLN infusion Inject 50 mLs (2 g) into the vein daily as needed for magnesium supplementation        magnesium sulfate 4 GM/100ML SOLN infusion Inject 100 mLs (4 g) into the vein every 4 hours as needed for magnesium supplementation       melatonin 3 MG tablet 1 tablet (3 mg) by Oral or Feeding Tube route At Bedtime       methocarbamol (ROBAXIN) 500 MG tablet 1 tablet (500 mg) by Oral or Feeding Tube route 4 times daily as needed for muscle spasms       metoprolol tartrate (LOPRESSOR) 25 MG tablet Take 0.5 tablets (12.5 mg) by mouth 2 times daily       multivitamins w/minerals (CERTAVITE) liquid 15 mLs by Per Feeding Tube route daily       nystatin (MYCOSTATIN) 337490 UNIT/ML suspension Take 5 mLs (500,000 Units) by mouth 4 times daily       ondansetron (ZOFRAN) 2 MG/ML SOLN injection Inject 2 mLs (4 mg) into the vein every 6 hours as needed for nausea or vomiting       ondansetron (ZOFRAN-ODT) 4 MG ODT tab Take 1 tablet (4 mg) by mouth every 6 hours as needed for nausea or vomiting       pantoprazole (PROTONIX) 2 mg/mL SUSP suspension Take 20 mLs (40 mg) by mouth every morning (before breakfast)       polyethylene glycol (MIRALAX) packet 17 g by Oral or Feeding Tube route daily       potassium chloride (KLOR-CON) 20 MEQ packet 20-40 mEq by Oral or Feeding Tube route every 2 hours as needed for potassium supplementation       potassium chloride 10 mEq in 100 mL intermittent infusion with 10 mg lidocaine Inject 100 mLs (10 mEq) into the vein every hour as needed for potassium supplementation Infuse via PERIPHERAL LINE. Use potassium with lidocaine for pain with peripheral administration.  If Serum K+ 3.4-4.0, dose = 10 mEq/hr x2 doses. Recheck K+ level the next AM.  If Serum K+ 3.0-3.3, dose = 10 mEq/hr x4 doses (40 mEq IV total dose). Recheck K+ level 2 hours after dose and the next AM.  If Serum K+ less than 3.0, dose = 10 mEq/hr x6 doses (60 mEq IV total dose). Recheck K+ level 2 hours after dose and the next AM.       potassium chloride 10 mEq in 100 mL sterile water intermittent infusion (premix) Inject 100 mLs  (10 mEq) into the vein every hour as needed Infuse via PERIPHERAL LINE or CENTRAL LINE. Use for central line replacement if patient weight less than 65 kg, if patient is on TPN with high potassium content or if unit does not stock 20 mEq bags.  If Serum K+ 3.4-4.0, dose = 10 mEq/hr x2 doses. Recheck K+ level the next AM.  If Serum K+ 3.0-3.3, dose = 10 mEq/hr x4 doses (40 mEq IV total dose). Recheck K+ level 2 hours after dose and the next AM.  If Serum K+ less than 3.0, dose = 10 mEq/hr x6 doses (60 mEq IV total dose). Recheck K+ level 2 hours after dose and the next AM.       potassium chloride 20 MEQ/50ML infusion Inject 50 mLs (20 mEq) into the vein every hour as needed for potassium supplementation Infuse via CENTRAL LINE Only.  May need EKG if less than 65 kg or on TPN - Max rate is 0.3 mEq/kg/hr for patients not on EKG monitoring.    If Serum K+ 3.4-4.0, dose = 20 mEq/hr x1 doses. Recheck K+ level the next AM.  If Serum K+ 3.0-3.3, dose = 20 mEq/hr x2 doses (40 mEq IV total dose).  Recheck K+ level 2 hours after dose and the next AM.  If Serum K+ less than 3.0, dose = 20 mEq/hr x3 doses (60 mEq IV total dose). Recheck K+ level 2 hours after dose and the next AM.       potassium chloride ER (KLOR-CON M) 20 MEQ CR tablet Take 1-2 tablets (20-40 mEq) by mouth every 2 hours as needed for potassium supplementation Use if able to take PO.   If Serum K+ 3.4-4.0, dose = 20 mEq x1. Recheck K+ level the next AM.  If Serum K+ 3.0-3.3, dose = 60 mEq po total dose (40 mEq x1 followed in 2 hours by 20 mEq x1). Recheck K+ level 4 hours after dose and the next AM.  If Serum K+ 2.5-2.9, dose = 80 mEq po total dose (40 mEq Q2H x2). Recheck K+ level 4 hours after dose and the next AM.  If Serum K+ less than 2.5, See IV order. DO NOT CRUSH.       potassium phosphate 10 mmol Inject 10 mmol into the vein daily as needed For serum phosphorus level 2.5-2.7  Do not infuse Phosphorus in the same line as TPN.   Give 10 mmol and recheck  phosphorus level the next AM. Each mmol of phosphate provides 1.47 mEq of Potassium. Multiply the patient's phosphate dose by 1.47 to determine the amount of potassium in this dose.       potassium phosphate 15 mmol Inject 15 mmol into the vein daily as needed For serum phosphorus level 2.0-2.4  Do not infuse Phosphorus in the same line as TPN.   Give 15 mmol and recheck phosphorus level next AM. Each mmol of phosphate provides 1.47 mEq of Potassium. Multiply the patient's phosphate dose by 1.47 to determine the amount of potassium in this dose.       potassium phosphate 20 mmol Inject 20 mmol into the vein every 6 hours as needed For serum phosphorus level 1.1-1.9  For CENTRAL Line ONLY  Do not infuse Phosphorus in the same line as TPN.   Give 20 mmol and recheck phosphorus level 2 hours after dose and next AM. Each mmol of phosphate provides 1.47 mEq of Potassium. Multiply the patient's phosphate dose by 1.47 to determine the amount of potassium in this dose.       potassium phosphate 20 mmol Inject 20 mmol into the vein every 6 hours as needed For serum phosphorus level 1.1-1.9  For peripheral line  Do not infuse Phosphorus in the same line as TPN.   Give 20 mmol and recheck phosphorus level 2 hours after dose and next AM. Repeat if necessary. Each mmol of phosphate provides 1.47 mEq of Potassium. Multiply the patient's phosphate dose by 1.47 to determine the amount of potassium in this dose.       potassium phosphate 25 mmol Inject 25 mmol into the vein every 8 hours as needed For serum phosphorus level less than 1.1  Do not infuse Phosphorus in the same line as TPN.   Give 25 mmol and recheck phosphorus level 2 hours after dose and next AM. Each mmol of phosphate provides 1.47 mEq of Potassium. Multiply the patient's phosphate dose by 1.47 to determine the amount of potassium in this dose.       prochlorperazine (COMPAZINE) 5 MG tablet Take 1 tablet (5 mg) by mouth every 6 hours as needed for nausea or vomiting        protein modular (PROSOURCE TF) LIQD 1 packet by Per Feeding Tube route 3 times daily       QUEtiapine (SEROQUEL) 25 MG tablet Take 1 tablet (25 mg) by mouth At Bedtime       senna-docusate (SENOKOT-S/PERICOLACE) 8.6-50 MG tablet 1 tablet by Oral or Feeding Tube route 2 times daily       senna-docusate (SENOKOT-S/PERICOLACE) 8.6-50 MG tablet 2 tablets by Oral or Feeding Tube route 2 times daily       sodium chloride, PF, 0.9% PF flush 3 mLs by Intracatheter route every hour as needed for line flush (for peripheral IV flush post IV meds)       sodium chloride, PF, 0.9% PF flush 10-20 mLs by Intracatheter route every 8 hours to flush CVC - Open Ended (Tunneled and Non-Tunneled).   10 mL post IV meds; 20 mL post blood draw.       sodium chloride, PF, 0.9% PF flush 3 mLs by Intracatheter route every 8 hours       traMADol (ULTRAM) 50 MG tablet Take 1-2 tablets ( mg) by mouth every 6 hours as needed for moderate pain       naloxone (NARCAN) 0.4 MG/ML injection Inject 0.25-1 mLs (0.1-0.4 mg) into the vein once for 1 dose       No facility-administered encounter medications on file as of 7/19/2022.             O:   NAD, WDWN, Alert & Oriented, Mood & Affect wnl, Vitals stable   Here today alone   BP (!) 148/101   Pulse 78   SpO2 99%    General appearance normal   Vitals stable   Alert, oriented and in no acute distress     L chest 2.4cm red plaque      Eyes: Conjunctivae/lids:Normal     ENT: Lips, buccal mucosa, tongue: normal    MSK:Normal    Cardiovascular: peripheral edema none    Pulm: Breathing Normal    Neuro/Psych: Orientation:Alert and Orientedx3 ; Mood/Affect:normal       A/P:  1. L chest squamous cell carcinoma in situ   MOHS:   Size    The rationale for Mohs surgery was discussed with the patient and consent was obtained.  The risks and benefits as well as alternatives to therapy were discussed, in detail.  Specifically, the risks of infection, scarring, bleeding, prolonged wound healing, incomplete  removal, allergy to anesthesia, nerve injury and recurrence were addressed.  Indication for Mohs was Size. Prior to the procedure, the treatment site was clearly identified and, if available, confirmed with previous photos and confirmed by the patient   All components of the Universal Protocol/PAUSE rule were completed.  The Mohs surgeon operated in two distinct and integrated capacities as the surgeon and pathologist.      The area was prepped with Betasept.  A rim of normal appearing skin was marked circumferentially around the lesion.  The area was infiltrated with local anesthesia.  The tumor was first debulked to remove all clinically apparent tumor.  An incision following the standard Mohs approach was done and the specimen was oriented,mapped and placed in 2 block(s).  Each specimen was then chromacoded and processed in the Mohs laboratory using standard Mohs technique and submitted for frozen section histology.  Frozen section analysis showed no residual tumor but CLEAR MARGINS.      The tumor was excised using standard Mohs technique in 1 stages(s).  CLEAR MARGINS OBTAINED and Final defect size was 3.3 x 3 cm.     We discussed the options for wound management in full with the patient including risks/benefits/ possible outcomes.        REPAIR SECOND INTENT: We discussed the options for wound management in full with the patient including risks/benefits/possible outcomes. Decision made to allow the wound to heal by second intention. Cautery was used for for hemostasis. EBL minimal; complications none; wound care routine.  The patient was discharged in good condition and will return in one month or prn for wound evaluation.  It was a pleasure speaking to Vle Espana today.  Previous clinic notes and pertinent laboratory tests were reviewed prior to Vel Espana's visit.  Patient encouraged to perform monthly skin exams.  UV precautions reviewed with patient.  Risks of non-melanoma skin cancer discussed  with patient   Return to clinic 6 months

## 2022-07-28 ENCOUNTER — OFFICE VISIT (OUTPATIENT)
Dept: DERMATOLOGY | Facility: CLINIC | Age: 77
End: 2022-07-28
Payer: COMMERCIAL

## 2022-07-28 ENCOUNTER — NURSE TRIAGE (OUTPATIENT)
Dept: NURSING | Facility: CLINIC | Age: 77
End: 2022-07-28

## 2022-07-28 DIAGNOSIS — Z48.01 ENCOUNTER FOR CHANGE OR REMOVAL OF SURGICAL WOUND DRESSING: Primary | ICD-10-CM

## 2022-07-28 PROCEDURE — 99207 PR NO CHARGE NURSE ONLY: CPT

## 2022-07-28 NOTE — PROGRESS NOTES
Pt returned to clinic for wound check on his left chest. Patient was concerned about infection. No bandage was over wound. Slight pink color noted around wound. Dry scabs noted around wound. Pt has no complaints, denies pain. Area cleansed with normal saline. Site is healing and wound edges approximating well. Writer informed patient that the wound needs to be washed once a day.  Ointment and bandage until it is healed up. Patient was advised to rotate the bandages to help prevent skin problems.     Advised to watch for signs/sx of infection; spreading redness, drainage, odor, fever. Call or report promptly to clinic. Pt given written instructions and informed to rtc as needed. Patient verbalized understanding.     Katheryn Charles LPN   7/28/2022

## 2022-07-28 NOTE — PATIENT INSTRUCTIONS
Open Wound Care     for ____ Chest __________    No strenuous activity for 48 hours    Take Tylenol as needed for discomfort.                                                .         Do not drink alcoholic beverages for 48 hours.    Keep the pressure bandage in place for 24 hours. If the bandage becomes blood tinged or loose, reinforce it with gauze and tape.        (Refer to the reverse side of this page for management of bleeding).    Remove bandage in 24 hours and begin wound care as follows:     Clean area with tap water using a Q tip or gauze pad, (shower / bathe normally)  Dry wound with Q tip or gauze pad  Apply Aquaphor, Vaseline, Polysporin or Bacitracin Ointment with a Q tip  Do NOT use Neosporin Ointment *  Cover the wound with a band-aid or nonstick gauze pad and paper tape.  Repeat wound care once a day until wound is completely healed.    It is an old wives tale that a wound heals better when it is exposed to air and allowed to dry out. The wound will heal faster with a better cosmetic result if it is kept moist with ointment and covered with a bandage.  Do not let the wound dry out.      Supplies Needed:                Qtips or gauze pads                Polysporin or Bacitracin Ointment                Bandaids or nonstick gauze pads and paper tape    Wound care kits and brown paper tape are available for purchase at   the pharmacy.    BLEEDING:    Use tightly rolled up gauze or cloth to apply direct pressure over the bandage for 20   minutes.  Reapply pressure for an additional 20 minutes if necessary  Call the office or go to the nearest emergency room if pressure fails to stop the bleeding.  Use additional gauze and tape to maintain pressure once the bleeding has stopped.  Begin wound care 24 hours after surgery as directed.                WOUND HEALING    One week after surgery a pink / red halo will form around the outside of the wound.   This is new skin.  The center of the wound will appear  yellowish white and produce some drainage.  The pink halo will slowly migrate in toward the center of the wound until the wound is covered with new shiny pink skin.  There will be no more drainage when the wound is completely healed.  It will take six months to one year for the redness to fade.  The scar may be itchy, tight and sensitive to extreme temperatures for a year after the surgery.  Massaging the area several times a day for several minutes after the wound is completely healed will help the scar soften and normalize faster. Begin massage only after healing is complete.    In case of emergency call: Dr Palomino: [phone]  [facility]: [phone]  [facility]:[phone]

## 2022-07-28 NOTE — TELEPHONE ENCOUNTER
Nurse Triage SBAR    Is this a 2nd Level Triage?  Yes     Situation:   Moh's procedure on the upper left side of the chest    Background/Assessment:     On 7/19/2022 Pt had Moh's procedure on the upper left side of chest     Pt reporting the area is about the size of quarter.   Very itchy, red in the middle and crusty brown color on the outside of the scab.     A little tender to the touch.   Some warmth to it.  No fever or drainage noted.        Pt requesting to be seen in clinic to rule out infection.   Pt lives alone.  So would like somebody at the clinic to look at it.     Please call the Pt back to schedule a visit for today @ [phone] for further assistance.    Valorie Magana RN  Central Triage Red Flags/Med Refills    Protocol Recommended Disposition:   See Today In Office      Reason for Disposition    Patient wants to be seen    Additional Information    Negative: Stitches and not infected    Negative: Surgical wound infection suspected (post-op)    Negative: Bright red, wide-spread, sunburn-like rash    Negative: Black (necrotic) or blisters develop in wound    Negative: Looks infected (spreading redness, red streak, pus) and fever    Negative: Patient sounds very sick or weak to the triager    Negative: Severe pain in the wound    Negative: Red streak runs from the wound    Negative: Facial wound looks infected (spreading redness)    Negative: Finger wound and entire finger swollen    Negative: Skin redness around the wound larger than 2 inches (5 cm)    Negative: Pus or cloudy fluid draining from wound    Negative: Taking antibiotic > 48 hours and fever persists    Negative: Taking antibiotic > 72 hours (3 days) and infected wound not improved (pain, pus, redness)    Negative: No prior tetanus shots (or is not fully vaccinated) and any wound (e.g., cut or scrape)    Negative: HIV positive or severe immunodeficiency (severely weak immune system) and DIRTY cut or scrape    Protocols used: WOUND  INFECTION-A-OH

## 2022-10-26 ENCOUNTER — TRANSCRIBE ORDERS (OUTPATIENT)
Dept: OTHER | Age: 77
End: 2022-10-26

## 2022-10-26 DIAGNOSIS — I63.9 CEREBRAL INFARCTION, UNSPECIFIED (H): Primary | ICD-10-CM

## 2023-08-09 NOTE — PROGRESS NOTES
"SPIRITUAL HEALTH SERVICES  SPIRITUAL ASSESSMENT Progress Note  [facility] ([place]) 4E     REFERRAL SOURCE: self-initiated - pt had not been screened for Spiritual Health Services.    I visited with pt's daughter Rosa at bedside - she updated me regarding pt's condition and shared information about his spiritual support and other details. Per daughter:     patient had a very complicated surgery here on  Sunday and \"on  Monday they were telling all of us family to be here, that things were very serious and critical\"     patient \"is a very social person, he really enjoys visiting with people\"     patient has had a number of vocations and life experiences: \"he graduated with a music degree from [employer] and was a  for a number of years\"; spent some time in  [employer] during [place] [employer] years; ; bus and ; on [place].     Scientology josue is important to pt (\"his  has already come to visit him here\")     pt's spouse \"is enrolled in hospice and my dad is her main caregiver. So she might need to go into a facility at least for a little while.\"     pt \"loves big band music and rock from when he was young, like [employer] Pre[employer], but he likes all kinds of music.\"   Prayer was welcomed by daughter. Pt/family have good spiritual support from pt's [employer], but appears that  support also appreciated.  PLAN: will check in on pt/family again early next week to further assess needs    Milton Tan M.Div. (Bill), [facility]  Staff   Pager [phone]      " Simple / Intermediate / Complex Repair - Final Wound Length In Cm: 0

## 2024-09-01 ENCOUNTER — LAB REQUISITION (OUTPATIENT)
Dept: LAB | Facility: CLINIC | Age: 79
End: 2024-09-01
Payer: COMMERCIAL

## 2024-09-01 DIAGNOSIS — D68.61 ANTIPHOSPHOLIPID SYNDROME (H): ICD-10-CM

## 2024-09-03 LAB — INR PPP: 2.4 (ref 0.85–1.15)

## 2024-09-03 PROCEDURE — P9603 ONE-WAY ALLOW PRORATED MILES: HCPCS | Mod: ORL | Performed by: FAMILY MEDICINE

## 2024-09-03 PROCEDURE — 85610 PROTHROMBIN TIME: CPT | Mod: ORL

## 2024-09-03 PROCEDURE — P9603 ONE-WAY ALLOW PRORATED MILES: HCPCS | Mod: ORL

## 2024-09-03 PROCEDURE — 36415 COLL VENOUS BLD VENIPUNCTURE: CPT | Mod: ORL | Performed by: FAMILY MEDICINE

## 2024-09-03 PROCEDURE — 36415 COLL VENOUS BLD VENIPUNCTURE: CPT | Mod: ORL

## 2024-09-03 PROCEDURE — 85610 PROTHROMBIN TIME: CPT | Mod: ORL | Performed by: FAMILY MEDICINE

## 2024-09-06 ENCOUNTER — LAB REQUISITION (OUTPATIENT)
Dept: LAB | Facility: CLINIC | Age: 79
End: 2024-09-06
Payer: COMMERCIAL

## 2024-09-06 DIAGNOSIS — D68.61 ANTIPHOSPHOLIPID SYNDROME (H): ICD-10-CM

## 2024-09-10 LAB — INR PPP: 3.73 (ref 0.85–1.15)

## 2024-09-10 PROCEDURE — P9604 ONE-WAY ALLOW PRORATED TRIP: HCPCS | Mod: ORL

## 2024-09-10 PROCEDURE — 36415 COLL VENOUS BLD VENIPUNCTURE: CPT | Mod: ORL | Performed by: FAMILY MEDICINE

## 2024-09-10 PROCEDURE — 85610 PROTHROMBIN TIME: CPT | Mod: ORL

## 2024-09-10 PROCEDURE — 85610 PROTHROMBIN TIME: CPT | Mod: ORL | Performed by: FAMILY MEDICINE

## 2024-09-10 PROCEDURE — P9604 ONE-WAY ALLOW PRORATED TRIP: HCPCS | Mod: ORL | Performed by: FAMILY MEDICINE

## 2024-09-10 PROCEDURE — 36415 COLL VENOUS BLD VENIPUNCTURE: CPT | Mod: ORL

## 2024-09-12 ENCOUNTER — LAB REQUISITION (OUTPATIENT)
Dept: LAB | Facility: CLINIC | Age: 79
End: 2024-09-12
Payer: COMMERCIAL

## 2024-09-12 DIAGNOSIS — D68.61 ANTIPHOSPHOLIPID SYNDROME (H): ICD-10-CM

## 2024-09-17 LAB — INR PPP: 3.84 (ref 0.85–1.15)

## 2024-09-17 PROCEDURE — P9604 ONE-WAY ALLOW PRORATED TRIP: HCPCS | Mod: ORL

## 2024-09-17 PROCEDURE — 36415 COLL VENOUS BLD VENIPUNCTURE: CPT | Mod: ORL | Performed by: FAMILY MEDICINE

## 2024-09-17 PROCEDURE — 85610 PROTHROMBIN TIME: CPT | Mod: ORL | Performed by: FAMILY MEDICINE

## 2024-09-17 PROCEDURE — P9604 ONE-WAY ALLOW PRORATED TRIP: HCPCS | Mod: ORL | Performed by: FAMILY MEDICINE

## 2024-09-17 PROCEDURE — 85610 PROTHROMBIN TIME: CPT | Mod: ORL

## 2024-09-17 PROCEDURE — 36415 COLL VENOUS BLD VENIPUNCTURE: CPT | Mod: ORL

## 2024-09-21 ENCOUNTER — LAB REQUISITION (OUTPATIENT)
Dept: LAB | Facility: CLINIC | Age: 79
End: 2024-09-21
Payer: COMMERCIAL

## 2024-09-21 DIAGNOSIS — D68.61 ANTIPHOSPHOLIPID SYNDROME (H): ICD-10-CM

## 2024-09-24 LAB — INR PPP: 2.14 (ref 0.85–1.15)

## 2024-09-24 PROCEDURE — 85610 PROTHROMBIN TIME: CPT | Mod: ORL

## 2024-09-24 PROCEDURE — P9604 ONE-WAY ALLOW PRORATED TRIP: HCPCS | Mod: ORL

## 2024-09-24 PROCEDURE — 36415 COLL VENOUS BLD VENIPUNCTURE: CPT | Mod: ORL

## 2024-09-27 ENCOUNTER — LAB REQUISITION (OUTPATIENT)
Dept: LAB | Facility: CLINIC | Age: 79
End: 2024-09-27
Payer: COMMERCIAL

## 2024-09-27 DIAGNOSIS — D68.61 ANTIPHOSPHOLIPID SYNDROME (H): ICD-10-CM

## 2024-10-01 ENCOUNTER — LAB REQUISITION (OUTPATIENT)
Dept: LAB | Facility: CLINIC | Age: 79
End: 2024-10-01
Payer: COMMERCIAL

## 2024-10-01 DIAGNOSIS — D68.61 ANTIPHOSPHOLIPID SYNDROME (H): ICD-10-CM

## 2024-10-01 LAB — INR PPP: 1.88 (ref 0.85–1.15)

## 2024-10-01 PROCEDURE — 85610 PROTHROMBIN TIME: CPT | Mod: ORL | Performed by: PHARMACIST

## 2024-10-01 PROCEDURE — P9604 ONE-WAY ALLOW PRORATED TRIP: HCPCS | Mod: ORL | Performed by: PHARMACIST

## 2024-10-01 PROCEDURE — 36415 COLL VENOUS BLD VENIPUNCTURE: CPT | Mod: ORL | Performed by: PHARMACIST

## 2024-10-03 ENCOUNTER — LAB REQUISITION (OUTPATIENT)
Dept: LAB | Facility: CLINIC | Age: 79
End: 2024-10-03
Payer: COMMERCIAL

## 2024-10-03 DIAGNOSIS — D68.61 ANTIPHOSPHOLIPID SYNDROME (H): ICD-10-CM

## 2024-10-08 LAB — INR PPP: 1.71 (ref 0.85–1.15)

## 2024-10-08 PROCEDURE — 36415 COLL VENOUS BLD VENIPUNCTURE: CPT | Mod: ORL | Performed by: PHARMACIST

## 2024-10-08 PROCEDURE — 85610 PROTHROMBIN TIME: CPT | Mod: ORL | Performed by: PHARMACIST

## 2024-10-08 PROCEDURE — P9604 ONE-WAY ALLOW PRORATED TRIP: HCPCS | Mod: ORL | Performed by: PHARMACIST

## 2024-10-11 ENCOUNTER — LAB REQUISITION (OUTPATIENT)
Dept: LAB | Facility: CLINIC | Age: 79
End: 2024-10-11
Payer: COMMERCIAL

## 2024-10-11 DIAGNOSIS — R79.1 ABNORMAL COAGULATION PROFILE: ICD-10-CM

## 2024-10-11 DIAGNOSIS — D68.61 ANTIPHOSPHOLIPID SYNDROME (H): ICD-10-CM

## 2024-10-15 LAB — INR PPP: 2.06 (ref 0.85–1.15)

## 2024-10-15 PROCEDURE — 85610 PROTHROMBIN TIME: CPT | Mod: ORL | Performed by: PHARMACIST

## 2024-10-15 PROCEDURE — P9604 ONE-WAY ALLOW PRORATED TRIP: HCPCS | Mod: ORL | Performed by: PHARMACIST

## 2024-10-15 PROCEDURE — 36415 COLL VENOUS BLD VENIPUNCTURE: CPT | Mod: ORL | Performed by: PHARMACIST

## 2024-10-18 ENCOUNTER — LAB REQUISITION (OUTPATIENT)
Dept: LAB | Facility: CLINIC | Age: 79
End: 2024-10-18
Payer: COMMERCIAL

## 2024-10-18 DIAGNOSIS — D68.61 ANTIPHOSPHOLIPID SYNDROME (H): ICD-10-CM

## 2024-10-22 LAB — INR PPP: 2.29 (ref 0.85–1.15)

## 2024-10-22 PROCEDURE — P9603 ONE-WAY ALLOW PRORATED MILES: HCPCS | Mod: ORL | Performed by: PHARMACIST

## 2024-10-22 PROCEDURE — 36415 COLL VENOUS BLD VENIPUNCTURE: CPT | Mod: ORL | Performed by: PHARMACIST

## 2024-10-22 PROCEDURE — 85610 PROTHROMBIN TIME: CPT | Mod: ORL | Performed by: PHARMACIST

## 2024-10-25 ENCOUNTER — LAB REQUISITION (OUTPATIENT)
Dept: LAB | Facility: CLINIC | Age: 79
End: 2024-10-25

## 2024-10-25 PROCEDURE — 85260 CLOT FACTOR X STUART-POWER: CPT

## 2024-10-26 LAB — FACT X ACT/NOR PPP CHRO: 24 % (ref 70–130)

## 2024-10-31 ENCOUNTER — LAB REQUISITION (OUTPATIENT)
Dept: LAB | Facility: CLINIC | Age: 79
End: 2024-10-31
Payer: COMMERCIAL

## 2024-10-31 DIAGNOSIS — D68.61 ANTIPHOSPHOLIPID SYNDROME (H): ICD-10-CM

## 2024-11-05 LAB — INR PPP: 2.07 (ref 0.85–1.15)

## 2024-11-05 PROCEDURE — 36415 COLL VENOUS BLD VENIPUNCTURE: CPT | Mod: ORL

## 2024-11-05 PROCEDURE — 85610 PROTHROMBIN TIME: CPT | Mod: ORL

## 2024-11-05 PROCEDURE — P9603 ONE-WAY ALLOW PRORATED MILES: HCPCS | Mod: ORL

## 2024-11-15 ENCOUNTER — LAB REQUISITION (OUTPATIENT)
Dept: LAB | Facility: CLINIC | Age: 79
End: 2024-11-15
Payer: COMMERCIAL

## 2024-11-15 DIAGNOSIS — D68.61 ANTIPHOSPHOLIPID SYNDROME (H): ICD-10-CM

## 2024-11-19 LAB — INR PPP: 2.89 (ref 0.85–1.15)

## 2024-11-19 PROCEDURE — 85610 PROTHROMBIN TIME: CPT | Mod: ORL

## 2024-11-19 PROCEDURE — 36415 COLL VENOUS BLD VENIPUNCTURE: CPT | Mod: ORL

## 2024-11-19 PROCEDURE — P9604 ONE-WAY ALLOW PRORATED TRIP: HCPCS | Mod: ORL

## 2024-11-21 ENCOUNTER — LAB REQUISITION (OUTPATIENT)
Dept: LAB | Facility: CLINIC | Age: 79
End: 2024-11-21
Payer: COMMERCIAL

## 2024-11-21 DIAGNOSIS — D68.61 ANTIPHOSPHOLIPID SYNDROME (H): ICD-10-CM

## 2024-11-26 LAB — INR PPP: 2.34 (ref 0.85–1.15)

## 2024-11-26 PROCEDURE — P9603 ONE-WAY ALLOW PRORATED MILES: HCPCS | Mod: ORL | Performed by: NURSE PRACTITIONER

## 2024-11-26 PROCEDURE — 36415 COLL VENOUS BLD VENIPUNCTURE: CPT | Mod: ORL | Performed by: NURSE PRACTITIONER

## 2024-11-26 PROCEDURE — 85610 PROTHROMBIN TIME: CPT | Mod: ORL | Performed by: NURSE PRACTITIONER

## 2024-12-09 ENCOUNTER — LAB REQUISITION (OUTPATIENT)
Dept: LAB | Facility: CLINIC | Age: 79
End: 2024-12-09
Payer: COMMERCIAL

## 2024-12-09 DIAGNOSIS — D68.61 ANTIPHOSPHOLIPID SYNDROME (H): ICD-10-CM

## 2024-12-10 LAB — INR PPP: 2.16 (ref 0.85–1.15)

## 2024-12-10 PROCEDURE — 85610 PROTHROMBIN TIME: CPT | Mod: ORL

## 2024-12-10 PROCEDURE — P9604 ONE-WAY ALLOW PRORATED TRIP: HCPCS | Mod: ORL

## 2024-12-10 PROCEDURE — 36415 COLL VENOUS BLD VENIPUNCTURE: CPT | Mod: ORL

## 2024-12-14 ENCOUNTER — LAB REQUISITION (OUTPATIENT)
Dept: LAB | Facility: CLINIC | Age: 79
End: 2024-12-14
Payer: COMMERCIAL

## 2024-12-14 DIAGNOSIS — D68.61 ANTIPHOSPHOLIPID SYNDROME (H): ICD-10-CM

## 2024-12-17 LAB — INR PPP: 2.19 (ref 0.85–1.15)

## 2024-12-17 PROCEDURE — P9604 ONE-WAY ALLOW PRORATED TRIP: HCPCS | Mod: ORL | Performed by: PHARMACIST

## 2024-12-17 PROCEDURE — 85610 PROTHROMBIN TIME: CPT | Mod: ORL | Performed by: PHARMACIST

## 2024-12-17 PROCEDURE — 36415 COLL VENOUS BLD VENIPUNCTURE: CPT | Mod: ORL | Performed by: PHARMACIST

## 2025-01-04 ENCOUNTER — LAB REQUISITION (OUTPATIENT)
Dept: LAB | Facility: CLINIC | Age: 80
End: 2025-01-04
Payer: COMMERCIAL

## (undated) DEVICE — SURGICEL HEMOSTAT 4X8" 1952

## (undated) DEVICE — COVER CAMERA IN-LIGHT DISP LT-C02

## (undated) DEVICE — GLOVE SENSICARE 7.0 MSG1070 LATEX FREE

## (undated) DEVICE — ESU ELEC BLADE 6" COATED E1450-6

## (undated) DEVICE — NDL COUNTER 20CT 31142493

## (undated) DEVICE — SU VICRYL 0 CTX 36" J370H

## (undated) DEVICE — SPONGE LAP 18X18" X8435

## (undated) DEVICE — DRAPE IOBAN INCISE 23X17" 6650EZ

## (undated) DEVICE — BLADE CLIPPER SGL USE 9680

## (undated) DEVICE — BLADE SAW STERNAL 20X30MM KM-32

## (undated) DEVICE — DRAIN CHEST TUBE 36FR STR 8036

## (undated) DEVICE — ESU HOLSTER PLASTIC DISP E2400

## (undated) DEVICE — CONNECTOR DRAIN CHEST Y EXTENSION SET 19909

## (undated) DEVICE — LINEN TOWEL PACK X6 WHITE 5487

## (undated) DEVICE — SU PROLENE 5-0 RB-2DA 30" 8710H

## (undated) DEVICE — TUBING INSUFFLATION W/FILTER CPC TO LUER 620-030-301

## (undated) DEVICE — SU PROLENE 4-0 RB-1DA 36" 8557H

## (undated) DEVICE — SUCTION DRY CHEST DRAIN OASIS 3600-100

## (undated) DEVICE — BNDG ELASTIC 4"X5YDS STERILE 6611-4S

## (undated) DEVICE — WIPES FOLEY CARE SURESTEP PROVON DFC100

## (undated) DEVICE — DRSG ADAPTIC 3X16"  6114

## (undated) DEVICE — SUCTION CATH AIRLIFE TRI-FLO W/CONTROL PORT 14FR  T60C

## (undated) DEVICE — SOL NACL 0.9% IRRIG 1000ML BOTTLE 2F7124

## (undated) DEVICE — TIES BANDING T50R

## (undated) DEVICE — ESU BIPOLAR SEALER AQUAMANTYS 6MM 23-112-1

## (undated) DEVICE — SU VICRYL 2-0 CT-1 27" UND J259H

## (undated) DEVICE — SU PROLENE 7-0 BV-1DA 24" 8702H

## (undated) DEVICE — ESU EYE LOW TEMP AA17

## (undated) DEVICE — SU STEEL MYO/WIRE II STERNOTOMY 8 BE-1 3X14" 048-217

## (undated) DEVICE — BONE WAX 2.5GM W31G

## (undated) DEVICE — PROTECTOR ARM ONE-STEP TRENDELENBURG 40418

## (undated) DEVICE — SUCTION MANIFOLD DORNOCH ULTRA CART UL-CL500

## (undated) DEVICE — DRSG MEPILEX BORDER SACRUM 6.3X7.9" 282055

## (undated) DEVICE — SU PDS II 1 CTX 36" Z371T

## (undated) DEVICE — TOURNIQUET VASCULAR KIT ARGYLE 8888-585000

## (undated) DEVICE — CANISTER WOUND VAC W/GEL 1000ML M8275093/5

## (undated) DEVICE — SOL NACL 0.9% IRRIG 3000ML BAG 2B7477

## (undated) DEVICE — TAPE MEDIPORE 4"X2YD 2864

## (undated) DEVICE — BLOWER/MISTER CLEARVIEW 22150

## (undated) DEVICE — STPL SKIN 35W ROTATING HEAD PRW35

## (undated) DEVICE — SURGICEL POWDER ABSORBABLE HEMOSTAT 3GM 3013SP

## (undated) DEVICE — Device

## (undated) DEVICE — SU SILK 0 TIE 6X30" A306H

## (undated) DEVICE — DEFIB PRO-PADZ LVP LQD GEL ADULT 8900-2105-01

## (undated) DEVICE — SU ETHIBOND 2-0 V-5DA 10X30" PXX52

## (undated) DEVICE — SU STEEL 6 CCS 4X18" M654G

## (undated) DEVICE — SU ETHIBOND 0 CT-1 CR 8X18" CX21D

## (undated) DEVICE — CANNULA VESSEL DLP  30001

## (undated) DEVICE — SU PROLENE 3-0 SHDA 36" 8522H

## (undated) DEVICE — DRSG WOUND VAC SPONGE MED BLACK M8275052/5

## (undated) DEVICE — LINEN TOWEL PACK X30 5481

## (undated) DEVICE — CLIP HORIZON SM RED WIDE SLOT 001201

## (undated) DEVICE — SU VICRYL 3-0 FS-1 27" J442H

## (undated) DEVICE — DRAIN CHEST TUBE RIGHT ANGLED 28FR 8128

## (undated) DEVICE — SU ETHIBOND 2-0 SHDA 30" X563H

## (undated) DEVICE — SU PROLENE 4-0 SHDA 36" 8521H

## (undated) DEVICE — SPONGE RAY-TEC 4X8" 7318

## (undated) DEVICE — PACK ADULT HEART UMMC PV15CG92D

## (undated) DEVICE — SU PROLENE 6-0 C-1DA 30" 8706H

## (undated) DEVICE — PUNCH AORTIC 4.0MM LONG AP-540

## (undated) DEVICE — BLADE KNIFE BEAVER MICROSHARP GREEN 377515

## (undated) DEVICE — BNDG ELASTIC 6"X5YDS STERILE 6611-6S

## (undated) DEVICE — DRSG TEGADERM 8X12" 1629

## (undated) DEVICE — ADH SKIN CLOSURE PREMIERPRO EXOFIN 1.0ML 3470

## (undated) DEVICE — CLIP SPRING FOGARTY SOFTJAW CSOFT6

## (undated) DEVICE — CLIP HORIZON MED BLUE 002200

## (undated) DEVICE — STPL SKIN PROXIMATE 35 WIDE PMW35

## (undated) DEVICE — PREP CHLORAPREP 26ML TINTED ORANGE  260815

## (undated) DEVICE — BLADE KNIFE BEAVER MINI STR BEAVER6900

## (undated) DEVICE — DRSG ABDOMINAL 07 1/2X8" 7197D

## (undated) DEVICE — DRSG MEDIPORE 3 1/2X13 3/4" 3573

## (undated) RX ORDER — LIDOCAINE HYDROCHLORIDE 10 MG/ML
INJECTION, SOLUTION EPIDURAL; INFILTRATION; INTRACAUDAL; PERINEURAL
Status: DISPENSED
Start: 2020-02-16

## (undated) RX ORDER — CALCIUM CHLORIDE 100 MG/ML
INJECTION INTRAVENOUS; INTRAVENTRICULAR
Status: DISPENSED
Start: 2020-02-10

## (undated) RX ORDER — ALBUMIN, HUMAN INJ 5% 5 %
SOLUTION INTRAVENOUS
Status: DISPENSED
Start: 2020-02-10

## (undated) RX ORDER — PROTAMINE SULFATE 10 MG/ML
INJECTION, SOLUTION INTRAVENOUS
Status: DISPENSED
Start: 2020-02-10

## (undated) RX ORDER — CEFAZOLIN SODIUM 1 G/3ML
INJECTION, POWDER, FOR SOLUTION INTRAMUSCULAR; INTRAVENOUS
Status: DISPENSED
Start: 2020-02-10

## (undated) RX ORDER — LIDOCAINE HYDROCHLORIDE 20 MG/ML
INJECTION, SOLUTION EPIDURAL; INFILTRATION; INTRACAUDAL; PERINEURAL
Status: DISPENSED
Start: 2020-02-10

## (undated) RX ORDER — FENTANYL CITRATE 50 UG/ML
INJECTION, SOLUTION INTRAMUSCULAR; INTRAVENOUS
Status: DISPENSED
Start: 2020-02-10

## (undated) RX ORDER — FENTANYL CITRATE 50 UG/ML
INJECTION, SOLUTION INTRAMUSCULAR; INTRAVENOUS
Status: DISPENSED
Start: 2020-02-09

## (undated) RX ORDER — DEXTROSE MONOHYDRATE 25 G/50ML
INJECTION, SOLUTION INTRAVENOUS
Status: DISPENSED
Start: 2020-02-10

## (undated) RX ORDER — LIDOCAINE HYDROCHLORIDE 20 MG/ML
SOLUTION OROPHARYNGEAL
Status: DISPENSED
Start: 2020-02-28

## (undated) RX ORDER — PHENYLEPHRINE HCL IN 0.9% NACL 1 MG/10 ML
SYRINGE (ML) INTRAVENOUS
Status: DISPENSED
Start: 2020-02-10

## (undated) RX ORDER — EPHEDRINE SULFATE 50 MG/ML
INJECTION, SOLUTION INTRAMUSCULAR; INTRAVENOUS; SUBCUTANEOUS
Status: DISPENSED
Start: 2020-02-10

## (undated) RX ORDER — ETOMIDATE 2 MG/ML
INJECTION INTRAVENOUS
Status: DISPENSED
Start: 2020-02-10

## (undated) RX ORDER — PROPOFOL 10 MG/ML
INJECTION, EMULSION INTRAVENOUS
Status: DISPENSED
Start: 2020-02-10

## (undated) RX ORDER — HEPARIN SODIUM 1000 [USP'U]/ML
INJECTION, SOLUTION INTRAVENOUS; SUBCUTANEOUS
Status: DISPENSED
Start: 2020-02-10

## (undated) RX ORDER — ESMOLOL HYDROCHLORIDE 10 MG/ML
INJECTION INTRAVENOUS
Status: DISPENSED
Start: 2020-02-10

## (undated) RX ORDER — MILRINONE LACTATE 0.2 MG/ML
INJECTION, SOLUTION INTRAVENOUS
Status: DISPENSED
Start: 2020-02-10

## (undated) RX ORDER — ONDANSETRON 2 MG/ML
INJECTION INTRAMUSCULAR; INTRAVENOUS
Status: DISPENSED
Start: 2020-02-10